# Patient Record
Sex: MALE | Race: WHITE | NOT HISPANIC OR LATINO | Employment: UNEMPLOYED | URBAN - METROPOLITAN AREA
[De-identification: names, ages, dates, MRNs, and addresses within clinical notes are randomized per-mention and may not be internally consistent; named-entity substitution may affect disease eponyms.]

---

## 2017-01-25 DIAGNOSIS — E03.9 HYPOTHYROIDISM: ICD-10-CM

## 2017-01-25 DIAGNOSIS — Z79.899 ASSESSMENT OF EFFECTS OF PSYCHOTROPIC DRUG IN PATIENT AT RISK FOR METABOLIC SYNDROME: ICD-10-CM

## 2017-01-25 DIAGNOSIS — Z91.89 ASSESSMENT OF EFFECTS OF PSYCHOTROPIC DRUG IN PATIENT AT RISK FOR METABOLIC SYNDROME: ICD-10-CM

## 2017-01-25 DIAGNOSIS — E78.2 MIXED HYPERLIPIDEMIA: Primary | ICD-10-CM

## 2017-01-25 DIAGNOSIS — Q90.9 DOWN'S SYNDROME: ICD-10-CM

## 2017-01-25 DIAGNOSIS — E66.9 OBESITY: ICD-10-CM

## 2017-01-25 DIAGNOSIS — Z01.89 ASSESSMENT OF EFFECTS OF PSYCHOTROPIC DRUG IN PATIENT AT RISK FOR METABOLIC SYNDROME: ICD-10-CM

## 2017-02-03 RX ORDER — ESCITALOPRAM OXALATE 20 MG/1
20 TABLET ORAL DAILY
Qty: 30 TABLET | Status: CANCELLED | OUTPATIENT
Start: 2017-02-03

## 2017-02-03 NOTE — TELEPHONE ENCOUNTER
escitalopram (LEXAPRO) 20 MG tablet      Last Written Prescription Date:  na  Last Fill Quantity: na,   # refills: na  Last Office Visit with G, P or [facility] prescribing provider: 9-  Future Office visit:       Routing refill request to provider for review/approval because:  Medication is reported/historical

## 2017-02-06 NOTE — TELEPHONE ENCOUNTER
I don't believe we've ever filled for Hany.  He gets some of his meds through [facility], I think. Please clarify with pharmacy and see if there's a regular person that fills this they need to request from.  Thanks.  Nelson

## 2017-02-07 ENCOUNTER — APPOINTMENT (OUTPATIENT)
Dept: GENERAL RADIOLOGY | Facility: CLINIC | Age: 22
DRG: 870 | End: 2017-02-07
Attending: FAMILY MEDICINE
Payer: MEDICARE

## 2017-02-07 ENCOUNTER — APPOINTMENT (OUTPATIENT)
Dept: GENERAL RADIOLOGY | Facility: CLINIC | Age: 22
DRG: 870 | End: 2017-02-07
Attending: RADIOLOGY
Payer: MEDICARE

## 2017-02-07 ENCOUNTER — APPOINTMENT (OUTPATIENT)
Dept: INTERVENTIONAL RADIOLOGY/VASCULAR | Facility: CLINIC | Age: 22
DRG: 870 | End: 2017-02-07
Attending: FAMILY MEDICINE
Payer: MEDICARE

## 2017-02-07 ENCOUNTER — APPOINTMENT (OUTPATIENT)
Dept: CT IMAGING | Facility: CLINIC | Age: 22
DRG: 870 | End: 2017-02-07
Attending: FAMILY MEDICINE
Payer: MEDICARE

## 2017-02-07 ENCOUNTER — ANESTHESIA EVENT (OUTPATIENT)
Dept: SURGERY | Facility: CLINIC | Age: 22
DRG: 870 | End: 2017-02-07
Payer: MEDICARE

## 2017-02-07 ENCOUNTER — ANESTHESIA (OUTPATIENT)
Dept: SURGERY | Facility: CLINIC | Age: 22
DRG: 870 | End: 2017-02-07
Payer: MEDICARE

## 2017-02-07 ENCOUNTER — TELEPHONE (OUTPATIENT)
Dept: FAMILY MEDICINE | Facility: CLINIC | Age: 22
End: 2017-02-07

## 2017-02-07 ENCOUNTER — HOSPITAL ENCOUNTER (INPATIENT)
Facility: CLINIC | Age: 22
LOS: 12 days | Discharge: HOME HEALTH | DRG: 870 | End: 2017-02-20
Attending: FAMILY MEDICINE | Admitting: SURGERY
Payer: MEDICARE

## 2017-02-07 DIAGNOSIS — R09.02 HYPOXIA: Primary | ICD-10-CM

## 2017-02-07 DIAGNOSIS — J18.9 COMMUNITY ACQUIRED PNEUMONIA: ICD-10-CM

## 2017-02-07 DIAGNOSIS — J91.8 PARAPNEUMONIC EFFUSION: ICD-10-CM

## 2017-02-07 DIAGNOSIS — J18.9 PNEUMONIA: ICD-10-CM

## 2017-02-07 DIAGNOSIS — J18.9 PARAPNEUMONIC EFFUSION: ICD-10-CM

## 2017-02-07 DIAGNOSIS — Q90.9 DOWN'S SYNDROME: ICD-10-CM

## 2017-02-07 DIAGNOSIS — K52.9 CHRONIC DIARRHEA: ICD-10-CM

## 2017-02-07 LAB
ALBUMIN SERPL-MCNC: 2.5 G/DL (ref 3.4–5)
ALP SERPL-CCNC: 60 U/L (ref 40–150)
ALT SERPL W P-5'-P-CCNC: 17 U/L (ref 0–70)
ANION GAP SERPL CALCULATED.3IONS-SCNC: 8 MMOL/L (ref 3–14)
APTT PPP: 35 SEC (ref 22–37)
AST SERPL W P-5'-P-CCNC: 13 U/L (ref 0–45)
BASOPHILS # BLD AUTO: 0.1 10E9/L (ref 0–0.2)
BASOPHILS NFR BLD AUTO: 0.2 %
BILIRUB SERPL-MCNC: 0.7 MG/DL (ref 0.2–1.3)
BUN SERPL-MCNC: 9 MG/DL (ref 7–30)
CALCIUM SERPL-MCNC: 8.8 MG/DL (ref 8.5–10.1)
CHLORIDE SERPL-SCNC: 98 MMOL/L (ref 94–109)
CO2 SERPL-SCNC: 28 MMOL/L (ref 20–32)
CREAT SERPL-MCNC: 0.66 MG/DL (ref 0.66–1.25)
DEPRECATED S PYO AG THROAT QL EIA: NORMAL
DIFFERENTIAL METHOD BLD: ABNORMAL
EOSINOPHIL # BLD AUTO: 0 10E9/L (ref 0–0.7)
EOSINOPHIL NFR BLD AUTO: 0 %
ERYTHROCYTE [DISTWIDTH] IN BLOOD BY AUTOMATED COUNT: 15.2 % (ref 10–15)
GFR SERPL CREATININE-BSD FRML MDRD: ABNORMAL ML/MIN/1.7M2
GLUCOSE SERPL-MCNC: 128 MG/DL (ref 70–99)
HCT VFR BLD AUTO: 39.6 % (ref 40–53)
HGB BLD-MCNC: 13.3 G/DL (ref 13.3–17.7)
IMM GRANULOCYTES # BLD: 0.1 10E9/L (ref 0–0.4)
IMM GRANULOCYTES NFR BLD: 0.5 %
INR PPP: 1.29 (ref 0.86–1.14)
LACTATE BLD-SCNC: 1.8 MMOL/L (ref 0.7–2.1)
LIPASE SERPL-CCNC: 44 U/L (ref 73–393)
LYMPHOCYTES # BLD AUTO: 1 10E9/L (ref 0.8–5.3)
LYMPHOCYTES NFR BLD AUTO: 3.9 %
MCH RBC QN AUTO: 30.2 PG (ref 26.5–33)
MCHC RBC AUTO-ENTMCNC: 33.6 G/DL (ref 31.5–36.5)
MCV RBC AUTO: 90 FL (ref 78–100)
MICRO REPORT STATUS: NORMAL
MONOCYTES # BLD AUTO: 2.2 10E9/L (ref 0–1.3)
MONOCYTES NFR BLD AUTO: 8.5 %
NEUTROPHILS # BLD AUTO: 22.8 10E9/L (ref 1.6–8.3)
NEUTROPHILS NFR BLD AUTO: 86.9 %
NRBC # BLD AUTO: 0 10*3/UL
NRBC BLD AUTO-RTO: 0 /100
PLATELET # BLD AUTO: 305 10E9/L (ref 150–450)
POTASSIUM SERPL-SCNC: 4.2 MMOL/L (ref 3.4–5.3)
PROT SERPL-MCNC: 6.9 G/DL (ref 6.8–8.8)
RBC # BLD AUTO: 4.4 10E12/L (ref 4.4–5.9)
SODIUM SERPL-SCNC: 134 MMOL/L (ref 133–144)
SPECIMEN SOURCE: NORMAL
WBC # BLD AUTO: 26.2 10E9/L (ref 4–11)

## 2017-02-07 PROCEDURE — 85025 COMPLETE CBC W/AUTO DIFF WBC: CPT | Performed by: FAMILY MEDICINE

## 2017-02-07 PROCEDURE — 87077 CULTURE AEROBIC IDENTIFY: CPT | Performed by: FAMILY MEDICINE

## 2017-02-07 PROCEDURE — 89051 BODY FLUID CELL COUNT: CPT | Performed by: FAMILY MEDICINE

## 2017-02-07 PROCEDURE — 27210995 ZZH RX 272: Performed by: RADIOLOGY

## 2017-02-07 PROCEDURE — A9270 NON-COVERED ITEM OR SERVICE: HCPCS | Mod: GY | Performed by: FAMILY MEDICINE

## 2017-02-07 PROCEDURE — 99285 EMERGENCY DEPT VISIT HI MDM: CPT | Mod: 25

## 2017-02-07 PROCEDURE — 36000064 ZZH SURGERY LEVEL 4 EA 15 ADDTL MIN - UMMC: Performed by: RADIOLOGY

## 2017-02-07 PROCEDURE — 83690 ASSAY OF LIPASE: CPT | Performed by: FAMILY MEDICINE

## 2017-02-07 PROCEDURE — 27211024 ZZHC OR SUPPLY OTHER OPNP: Performed by: RADIOLOGY

## 2017-02-07 PROCEDURE — 71000017 ZZH RECOVERY PHASE 1 LEVEL 3 EA ADDTL HR: Performed by: RADIOLOGY

## 2017-02-07 PROCEDURE — 80053 COMPREHEN METABOLIC PANEL: CPT | Performed by: FAMILY MEDICINE

## 2017-02-07 PROCEDURE — 40000277 XR SURGERY CARM FLUORO LESS THAN 5 MIN W STILLS

## 2017-02-07 PROCEDURE — 83605 ASSAY OF LACTIC ACID: CPT | Performed by: FAMILY MEDICINE

## 2017-02-07 PROCEDURE — 37000008 ZZH ANESTHESIA TECHNICAL FEE, 1ST 30 MIN: Performed by: RADIOLOGY

## 2017-02-07 PROCEDURE — 25000132 ZZH RX MED GY IP 250 OP 250 PS 637: Mod: GY | Performed by: NURSE ANESTHETIST, CERTIFIED REGISTERED

## 2017-02-07 PROCEDURE — 87081 CULTURE SCREEN ONLY: CPT | Performed by: FAMILY MEDICINE

## 2017-02-07 PROCEDURE — 85730 THROMBOPLASTIN TIME PARTIAL: CPT | Performed by: FAMILY MEDICINE

## 2017-02-07 PROCEDURE — 85610 PROTHROMBIN TIME: CPT | Performed by: FAMILY MEDICINE

## 2017-02-07 PROCEDURE — 40000940 XR CHEST PORT 1 VW

## 2017-02-07 PROCEDURE — 36000066 ZZH SURGERY LEVEL 4 W FLUORO 1ST 30 MIN - UMMC: Performed by: RADIOLOGY

## 2017-02-07 PROCEDURE — 25800025 ZZH RX 258: Performed by: NURSE ANESTHETIST, CERTIFIED REGISTERED

## 2017-02-07 PROCEDURE — 40000003 IR CHEST TUBE PLACEMENT NON-TUNNELLED LEFT: Mod: TC,LT

## 2017-02-07 PROCEDURE — 27210794 ZZH OR GENERAL SUPPLY STERILE: Performed by: RADIOLOGY

## 2017-02-07 PROCEDURE — A9270 NON-COVERED ITEM OR SERVICE: HCPCS | Mod: GY | Performed by: NURSE ANESTHETIST, CERTIFIED REGISTERED

## 2017-02-07 PROCEDURE — 40000170 ZZH STATISTIC PRE-PROCEDURE ASSESSMENT II: Performed by: RADIOLOGY

## 2017-02-07 PROCEDURE — C1887 CATHETER, GUIDING: HCPCS | Performed by: RADIOLOGY

## 2017-02-07 PROCEDURE — 25000128 H RX IP 250 OP 636: Performed by: NURSE ANESTHETIST, CERTIFIED REGISTERED

## 2017-02-07 PROCEDURE — 87040 BLOOD CULTURE FOR BACTERIA: CPT | Performed by: FAMILY MEDICINE

## 2017-02-07 PROCEDURE — 25000128 H RX IP 250 OP 636: Performed by: ANESTHESIOLOGY

## 2017-02-07 PROCEDURE — 71020 XR CHEST 2 VW: CPT

## 2017-02-07 PROCEDURE — 37000009 ZZH ANESTHESIA TECHNICAL FEE, EACH ADDTL 15 MIN: Performed by: RADIOLOGY

## 2017-02-07 PROCEDURE — 25000132 ZZH RX MED GY IP 250 OP 250 PS 637: Mod: GY | Performed by: FAMILY MEDICINE

## 2017-02-07 PROCEDURE — C1769 GUIDE WIRE: HCPCS | Performed by: RADIOLOGY

## 2017-02-07 PROCEDURE — 87186 SC STD MICRODIL/AGAR DIL: CPT | Performed by: FAMILY MEDICINE

## 2017-02-07 PROCEDURE — 87880 STREP A ASSAY W/OPTIC: CPT | Performed by: FAMILY MEDICINE

## 2017-02-07 PROCEDURE — 96365 THER/PROPH/DIAG IV INF INIT: CPT

## 2017-02-07 PROCEDURE — 74176 CT ABD & PELVIS W/O CONTRAST: CPT

## 2017-02-07 PROCEDURE — 99285 EMERGENCY DEPT VISIT HI MDM: CPT | Mod: Z6 | Performed by: FAMILY MEDICINE

## 2017-02-07 PROCEDURE — 25000128 H RX IP 250 OP 636: Performed by: FAMILY MEDICINE

## 2017-02-07 PROCEDURE — C1729 CATH, DRAINAGE: HCPCS | Performed by: RADIOLOGY

## 2017-02-07 PROCEDURE — 0W9B30Z DRAINAGE OF LEFT PLEURAL CAVITY WITH DRAINAGE DEVICE, PERCUTANEOUS APPROACH: ICD-10-PCS | Performed by: RADIOLOGY

## 2017-02-07 PROCEDURE — 87205 SMEAR GRAM STAIN: CPT | Performed by: FAMILY MEDICINE

## 2017-02-07 PROCEDURE — 71000016 ZZH RECOVERY PHASE 1 LEVEL 3 FIRST HR: Performed by: RADIOLOGY

## 2017-02-07 PROCEDURE — C1894 INTRO/SHEATH, NON-LASER: HCPCS | Performed by: RADIOLOGY

## 2017-02-07 PROCEDURE — 87070 CULTURE OTHR SPECIMN AEROBIC: CPT | Performed by: FAMILY MEDICINE

## 2017-02-07 PROCEDURE — 25000125 ZZHC RX 250: Performed by: NURSE ANESTHETIST, CERTIFIED REGISTERED

## 2017-02-07 RX ORDER — LIDOCAINE HYDROCHLORIDE 20 MG/ML
INJECTION, SOLUTION INFILTRATION; PERINEURAL PRN
Status: DISCONTINUED | OUTPATIENT
Start: 2017-02-07 | End: 2017-02-07

## 2017-02-07 RX ORDER — SODIUM CHLORIDE, SODIUM LACTATE, POTASSIUM CHLORIDE, CALCIUM CHLORIDE 600; 310; 30; 20 MG/100ML; MG/100ML; MG/100ML; MG/100ML
INJECTION, SOLUTION INTRAVENOUS CONTINUOUS
Status: DISCONTINUED | OUTPATIENT
Start: 2017-02-07 | End: 2017-02-08 | Stop reason: HOSPADM

## 2017-02-07 RX ORDER — ONDANSETRON 4 MG/1
4 TABLET, ORALLY DISINTEGRATING ORAL EVERY 30 MIN PRN
Status: DISCONTINUED | OUTPATIENT
Start: 2017-02-07 | End: 2017-02-08 | Stop reason: HOSPADM

## 2017-02-07 RX ORDER — ONDANSETRON 2 MG/ML
4 INJECTION INTRAMUSCULAR; INTRAVENOUS EVERY 30 MIN PRN
Status: DISCONTINUED | OUTPATIENT
Start: 2017-02-07 | End: 2017-02-08 | Stop reason: HOSPADM

## 2017-02-07 RX ORDER — PROPOFOL 10 MG/ML
INJECTION, EMULSION INTRAVENOUS PRN
Status: DISCONTINUED | OUTPATIENT
Start: 2017-02-07 | End: 2017-02-07

## 2017-02-07 RX ORDER — FENTANYL CITRATE 50 UG/ML
INJECTION, SOLUTION INTRAMUSCULAR; INTRAVENOUS PRN
Status: DISCONTINUED | OUTPATIENT
Start: 2017-02-07 | End: 2017-02-07

## 2017-02-07 RX ORDER — SODIUM CHLORIDE 9 MG/ML
1000 INJECTION, SOLUTION INTRAVENOUS CONTINUOUS
Status: DISCONTINUED | OUTPATIENT
Start: 2017-02-07 | End: 2017-02-08 | Stop reason: ALTCHOICE

## 2017-02-07 RX ORDER — LORAZEPAM 2 MG/ML
0.25 INJECTION INTRAMUSCULAR ONCE
Status: COMPLETED | OUTPATIENT
Start: 2017-02-07 | End: 2017-02-08

## 2017-02-07 RX ORDER — PIPERACILLIN SODIUM, TAZOBACTAM SODIUM 4; .5 G/20ML; G/20ML
4.5 INJECTION, POWDER, LYOPHILIZED, FOR SOLUTION INTRAVENOUS ONCE
Status: COMPLETED | OUTPATIENT
Start: 2017-02-07 | End: 2017-02-07

## 2017-02-07 RX ORDER — ACETAMINOPHEN 120 MG/1
SUPPOSITORY RECTAL PRN
Status: DISCONTINUED | OUTPATIENT
Start: 2017-02-07 | End: 2017-02-07

## 2017-02-07 RX ORDER — PROPOFOL 10 MG/ML
INJECTION, EMULSION INTRAVENOUS CONTINUOUS PRN
Status: DISCONTINUED | OUTPATIENT
Start: 2017-02-07 | End: 2017-02-07

## 2017-02-07 RX ORDER — HYDROMORPHONE HYDROCHLORIDE 1 MG/ML
.3-.5 INJECTION, SOLUTION INTRAMUSCULAR; INTRAVENOUS; SUBCUTANEOUS EVERY 5 MIN PRN
Status: DISCONTINUED | OUTPATIENT
Start: 2017-02-07 | End: 2017-02-08 | Stop reason: HOSPADM

## 2017-02-07 RX ORDER — SODIUM CHLORIDE, SODIUM LACTATE, POTASSIUM CHLORIDE, CALCIUM CHLORIDE 600; 310; 30; 20 MG/100ML; MG/100ML; MG/100ML; MG/100ML
INJECTION, SOLUTION INTRAVENOUS CONTINUOUS PRN
Status: DISCONTINUED | OUTPATIENT
Start: 2017-02-07 | End: 2017-02-07

## 2017-02-07 RX ORDER — ACETAMINOPHEN 325 MG/1
650 TABLET ORAL ONCE
Status: COMPLETED | OUTPATIENT
Start: 2017-02-07 | End: 2017-02-07

## 2017-02-07 RX ADMIN — PIPERACILLIN SODIUM,TAZOBACTAM SODIUM 4.5 G: 4; .5 INJECTION, POWDER, FOR SOLUTION INTRAVENOUS at 15:38

## 2017-02-07 RX ADMIN — PHENYLEPHRINE HYDROCHLORIDE 100 MCG: 10 INJECTION, SOLUTION INTRAMUSCULAR; INTRAVENOUS; SUBCUTANEOUS at 20:10

## 2017-02-07 RX ADMIN — PHENYLEPHRINE HYDROCHLORIDE 100 MCG: 10 INJECTION, SOLUTION INTRAMUSCULAR; INTRAVENOUS; SUBCUTANEOUS at 19:56

## 2017-02-07 RX ADMIN — FENTANYL CITRATE 50 MCG: 50 INJECTION, SOLUTION INTRAMUSCULAR; INTRAVENOUS at 19:26

## 2017-02-07 RX ADMIN — DEXMEDETOMIDINE 10 MCG: 100 INJECTION, SOLUTION, CONCENTRATE INTRAVENOUS at 20:46

## 2017-02-07 RX ADMIN — LIDOCAINE HYDROCHLORIDE 40 MG: 20 INJECTION, SOLUTION INFILTRATION; PERINEURAL at 19:19

## 2017-02-07 RX ADMIN — PROPOFOL 50 MG: 10 INJECTION, EMULSION INTRAVENOUS at 19:19

## 2017-02-07 RX ADMIN — DEXMEDETOMIDINE 10 MCG: 100 INJECTION, SOLUTION, CONCENTRATE INTRAVENOUS at 20:38

## 2017-02-07 RX ADMIN — HYDROMORPHONE HYDROCHLORIDE 0.3 MG: 10 INJECTION, SOLUTION INTRAMUSCULAR; INTRAVENOUS; SUBCUTANEOUS at 22:08

## 2017-02-07 RX ADMIN — SODIUM CHLORIDE, POTASSIUM CHLORIDE, SODIUM LACTATE AND CALCIUM CHLORIDE: 600; 310; 30; 20 INJECTION, SOLUTION INTRAVENOUS at 19:19

## 2017-02-07 RX ADMIN — SODIUM CHLORIDE 1000 ML: 9 INJECTION, SOLUTION INTRAVENOUS at 13:52

## 2017-02-07 RX ADMIN — MIDAZOLAM HYDROCHLORIDE 2 MG: 1 INJECTION, SOLUTION INTRAMUSCULAR; INTRAVENOUS at 19:19

## 2017-02-07 RX ADMIN — PROPOFOL 150 MCG/KG/MIN: 10 INJECTION, EMULSION INTRAVENOUS at 19:26

## 2017-02-07 RX ADMIN — PHENYLEPHRINE HYDROCHLORIDE 100 MCG: 10 INJECTION, SOLUTION INTRAMUSCULAR; INTRAVENOUS; SUBCUTANEOUS at 19:43

## 2017-02-07 RX ADMIN — HYDROMORPHONE HYDROCHLORIDE 0.4 MG: 10 INJECTION, SOLUTION INTRAMUSCULAR; INTRAVENOUS; SUBCUTANEOUS at 23:45

## 2017-02-07 RX ADMIN — DEXMEDETOMIDINE 20 MCG: 100 INJECTION, SOLUTION, CONCENTRATE INTRAVENOUS at 20:01

## 2017-02-07 RX ADMIN — PHENYLEPHRINE HYDROCHLORIDE 100 MCG: 10 INJECTION, SOLUTION INTRAMUSCULAR; INTRAVENOUS; SUBCUTANEOUS at 20:00

## 2017-02-07 RX ADMIN — HYDROMORPHONE HYDROCHLORIDE 0.3 MG: 10 INJECTION, SOLUTION INTRAMUSCULAR; INTRAVENOUS; SUBCUTANEOUS at 22:39

## 2017-02-07 RX ADMIN — PHENYLEPHRINE HYDROCHLORIDE 100 MCG: 10 INJECTION, SOLUTION INTRAMUSCULAR; INTRAVENOUS; SUBCUTANEOUS at 19:37

## 2017-02-07 RX ADMIN — FENTANYL CITRATE 25 MCG: 50 INJECTION, SOLUTION INTRAMUSCULAR; INTRAVENOUS at 20:00

## 2017-02-07 RX ADMIN — ACETAMINOPHEN 650 MG: 325 TABLET, FILM COATED ORAL at 15:21

## 2017-02-07 ASSESSMENT — ENCOUNTER SYMPTOMS
VOMITING: 1
COUGH: 1
DIARRHEA: 1
ABDOMINAL PAIN: 1
SLEEP DISTURBANCE: 1
SORE THROAT: 1
FEVER: 1

## 2017-02-07 NOTE — ED NOTES
Pt. Report from Evelyn HUGO and assume care of pt. Pt. Is alert and sitting in bed asking to go home.  Explained to patient and mother plan of care, but patient does not understand.  MD Humphrey explained to mother plan of care and ordered Ativan to try and help relax the patient.  Patient refuses to keep cardiac monitor on.  Patient intermittently keeps SpO2 monitor on and is sating around 90%.  Patient refuses to keep oxygen on.  Patient is not in any respiratory distress at this time.

## 2017-02-07 NOTE — TELEPHONE ENCOUNTER
"Red flag call. Hany had diarrhea at day program and temp of 101.2 yesterday. Also vomited 1x. He has been coughing as well and has had a temp between 100 and 101 the rest of the day and this morning. Didn't sleep last night - just sat awake in the living room. About 6 am today finally went upstairs to bed. Mom went to check on him and noticed is breathing is very fast and shallow. States she isn't sure if she will be able to wake him up because \"he was up all night.\" RN stated that if his breathing is abnormal and she cannot wake him up she needs to call 911. If he wakes up and is able to walk to the car and breathing sounds better she could drive him to the ER but he needs to be seen right away if he is having acute respiratory changes. Mom agrees and will do so.     Taniya Cote RN   February 7, 2017 9:50 AM  [facility] Triage   [phone]   "

## 2017-02-07 NOTE — IP AVS SNAPSHOT
[address] [address]    Phone:  [phone]                                       After Visit Summary   2/7/2017    Hany Patel    MRN: [id number]           After Visit Summary Signature Page     I have received my discharge instructions, and my questions have been answered. I have discussed any challenges I see with this plan with the nurse or doctor.    ..........................................................................................................................................  Patient/Patient Representative Signature      ..........................................................................................................................................  Patient Representative Print Name and Relationship to Patient    ..................................................               ................................................  Date                                            Time    ..........................................................................................................................................  Reviewed by Signature/Title    ...................................................              ..............................................  Date                                                            Time

## 2017-02-07 NOTE — ED PROVIDER NOTES
History     Chief Complaint   Patient presents with     Nausea, Vomiting, & Diarrhea     Nasal Congestion     HPI  Hany Patel is a 22 year old male with a history of Down's syndrome who presents for evaluation of abdominal pain, vomiting, and diarrhea. The patient comes in with both of his parents today. Per mom, the patient has had diarrhea, cough, nasal congestion, poor sleep, and sore throat over the past 2 days. Yesterday, at his day rehabilitation program, staff had reported that the patient developed a fever of 101.5, and when mom picked him up to take him home, he had an episode of vomiting. Mom reports that he's had multiple episodes since then, some of which have been phlegmy post-tussive emeses, while others have been more liquid. Mom reports that they've been treating the patient's fever with Tylenol, and his fever did come down some to 100.4 today. The patient here also complains of diffuse periumbilical pain and ear pain, including pain of the auricle. In addition to these symptoms, mom expresses particular concern for the patient's change in behavior. She describes that he's exhibited anhedonia to activities that he would normal enjoy, including opening gifts for his birthday yesterday.     Mom reports instances of possible sick contact, including the patient's recent attendance to a local [employer], continued attendance to his day rehab program, and to his sister who works with elderly patients.    I have reviewed the Medications, Allergies, Past Medical and Surgical History, and Social History in the Epic system.    Current Facility-Administered Medications   Medication     0.9% sodium chloride infusion     piperacillin-tazobactam (ZOSYN) 4.5 g vial to attach to  mL bag     Current Outpatient Prescriptions   Medication     cyanocobalamin 1000 MCG SUBL     omega-3 acid ethyl esters (LOVAZA) 1 G capsule     vitamin  B complex with vitamin C (VITAMIN  B COMPLEX) TABS     escitalopram  (LEXAPRO) 20 MG tablet     loperamide (IMODIUM) 2 MG capsule     Ascorbic Acid (VITAMIN C) 500 MG CHEW     Calcium Carbonate-Vitamin D (CALCIUM + D PO)     metFORMIN (GLUCOPHAGE) 1000 MG tablet     levothyroxine (SYNTHROID, LEVOTHROID) 112 MCG tablet     Multiple Vitamin (MULTI-VITAMIN) per tablet     Past Medical History   Diagnosis Date     Diarrhea      Diarrhea Episodes        Past Surgical History   Procedure Laterality Date     Surgical history of -        Ear tubes      Surgical history of -        Adenoid removal        Family History   Problem Relation Age of Onset     Breast Cancer Mother      Hypertension Father        Social History   Substance Use Topics     Smoking status: Never Smoker      Smokeless tobacco: Never Used     Alcohol Use: No      No Known Allergies    Review of Systems   Constitutional: Positive for fever.   HENT: Positive for congestion, ear pain and sore throat. Negative for ear discharge.    Respiratory: Positive for cough.    Gastrointestinal: Positive for vomiting, abdominal pain and diarrhea.   Psychiatric/Behavioral: Positive for sleep disturbance.        Anhedonia   All other systems reviewed and are negative.      Physical Exam   BP: 99/56 mmHg  Pulse: 126  Temp: 99  F (37.2  C)  Resp: 18  SpO2: 90 %  Physical Exam   Constitutional: No distress.   HENT:   Head: Atraumatic.   Mouth/Throat: Oropharynx is clear and moist. No oropharyngeal exudate.   Eyes: Pupils are equal, round, and reactive to light. No scleral icterus.   Neck: Neck supple.   Cardiovascular: Normal heart sounds and intact distal pulses.    Pulmonary/Chest: No respiratory distress (O2 sat 88-90% on room air). He has rales (diminished breath sounds throughout on the left).   Abdominal: Soft. Bowel sounds are normal. There is tenderness in the periumbilical area and left lower quadrant. There is no rigidity, no rebound and no guarding.   Genitourinary: Testes normal and penis normal.   Musculoskeletal: He exhibits  no edema or tenderness.   Skin: Skin is warm. No rash noted. He is not diaphoretic.       ED Course     Procedures       12:55 PM  The patient was seen and examined by Balta Humphrey MD, in Room 03.        Critical Care time:  none               Labs Ordered and Resulted from Time of ED Arrival Up to the Time of Departure from the ED   CBC WITH PLATELETS DIFFERENTIAL - Abnormal; Notable for the following:     WBC 26.2 (*)     Hematocrit 39.6 (*)     RDW 15.2 (*)     Absolute Neutrophil 22.8 (*)     Absolute Monocytes 2.2 (*)     All other components within normal limits   COMPREHENSIVE METABOLIC PANEL - Abnormal; Notable for the following:     Glucose 128 (*)     Albumin 2.5 (*)     All other components within normal limits   LIPASE - Abnormal; Notable for the following:     Lipase 44 (*)     All other components within normal limits   INR - Abnormal; Notable for the following:     INR 1.29 (*)     All other components within normal limits   LACTIC ACID WHOLE BLOOD   PARTIAL THROMBOPLASTIN TIME   ROUTINE UA WITH MICROSCOPIC REFLEX TO CULTURE   RAPID STREP SCREEN   BLOOD CULTURE   BETA STREP GROUP A CULTURE       Assessments & Plan (with Medical Decision Making)   Patient with a history of Down s syndrome presenting with 3 days  history of febrile respiratory illness. Also associated vomiting and diarrhea. The patient is unable to provide much meaningful history due to his baseline cognitive impairment. Differential diagnosis includes influenza, streptococcal pharyngitis, pneumonia, bronchitis, as well as abdominal problems including appendicitis, pyelonephritis, bowel obstruction, cholecystitis, numerous other acute infectious and inflammatory conditions which could cause this symptom complex.     On exam, initially the patient is in no respiratory distress. He is febrile and tachycardic. His blood pressure is within the normal range. His oxygen saturation is 80-90% on room air but he does not appear uncomfortable.  He did have diminished breath sounds on the left side and diffuse abdominal tenderness without guarding or peritoneal signs. Genitourinary exam normal, and the remainder of his physical exam is unremarkable. He did vomit once during my evaluation.     A broad diagnostic workup was indicated due to the severity of illness and the inability to obtain much accurate history from the patient himself. Workup is significant for a very elevated white count and a very large left pleural effusion which may be associated with infiltrate or atelectasis. The remainder of his workup today is negative. I m still waiting on his urinalysis.     This clinical scenario appears to fit most closely with acute pneumonia with effusion. No other apparent reason why this patient would have a large pleural effusion and no known history of that. His lactate is normal. As yet, his vital signs have been stable. The patient will not wear oxygen at this time because he says it smells bad, but he does not appear to be in respiratory distress. I have started broad-spectrum antibiotics and will initiate admission.   Case discussed with the hospitalist.  They will agree to admit, but at this time there are no available appropriate beds at the [facility].  We are in agreement that the patient would likely benefit from diagnostic and therapeutic thoracentesis.  Case discussed with interventional radiology.  They're most comfortable performing this procedure in the operating room given the patient's cognitive impairment and underlying comorbidity, which I agree is appropriate.  As the patient is not completely nothing by mouth he will need to be held for another 2-3 hours prior to the procedure.  After the procedure which will until placement of a chest tube for continued drainage of pleural effusion he will be admitted.    This part of the document was transcribed by Noel Dobbs for Balta Humphrey MD.    I have reviewed the nursing  notes.    I have reviewed the findings, diagnosis, plan and need for follow up with the patient.    New Prescriptions    No medications on file       Final diagnoses:   Pneumonia   Parapneumonic effusion   Down's syndrome     I, Noel Dobbs, am serving as a trained medical scribe to document services personally performed by Balta Humphrey MD, based on the provider's statements to me.      I, Balta Humphrey MD, was physically present and have reviewed and verified the accuracy of this note documented by Noel Dobbs.    2/7/2017   [facility], [place], EMERGENCY DEPARTMENT      Balta Humphrey MD  02/07/17 6027    Balta Humphrey MD  02/07/17 0197

## 2017-02-07 NOTE — IP AVS SNAPSHOT
MRN:[id number]                      After Visit Summary   2/7/2017    Hany Patel    MRN: [id number]           Thank you!     Thank you for choosing [facility] for your care. Our goal is always to provide you with excellent care. Hearing back from our patients is one way we can continue to improve our services. Please take a few minutes to complete the written survey that you may receive in the mail after you visit with us. Thank you!        Patient Information     Date Of Birth          1995        About your hospital stay     You were admitted on:  February 8, 2017 You last received care in the:  [address] [place] [address]    You were discharged on:  February 20, 2017        Reason for your hospital stay       You were hospitalized for community acquired pneumonia and parapneumonic effusion. You required chest tube placement and intubation in the ICU. Were on several days of IV antibiotics. Have been stable with chest tube out for several days. No need for supplemental oxygen. Follow up xray was improved. Will continue a few more days of oral antibiotics as discharge. Will need follow up with primary care provider this week.                  Who to Call     For medical emergencies, please call 911.  For non-urgent questions about your medical care, please call your primary care provider or clinic, [phone]          Attending Provider     Provider Specialty    Balta Humphrey MD Family Practice    [place], Alma Patel MD Emergency Medicine    [facility], Chandan Flores MD Surgery    KindredYeimy MD Internal Medicine       Primary Care Provider Office Phone # Fax #    Liu Song PA-C [phone]       [address] [address]         When to contact your care team       If fevers or chills. Change in activity levels. Labored breathing or cough.                  After Care Instructions     Activity       Your activity upon  discharge: activity as tolerated. Would not return to day program for at least one week.            Diet       Follow this diet upon discharge: Orders Placed This Encounter      Snacks/Supplements Adult: Ensure Plus (Adult); Between Meals      Calorie Counts      Room Service      Regular Diet Adult            Discharge Instructions       Will need to continue antibiotics this week to complete course. Follow up with primary care provider this week for post hospital follow up with labs.            Monitor and record       Fevers, chills, labored breathing, cough, change in alertness                  Follow-up Appointments     Adult [facility] Follow-up and recommended labs and tests       Follow up with primary care provider, YULISA SALGADO, this week for hospital follow- up.  The following labs/tests are recommended: BMP and CBC.      Appointments on [facility] and/or [facility] (with [facility] or [facility] provider or service). Call [phone] if you haven't heard regarding these appointments within 7 days of discharge.            Follow Up and recommended labs and tests       Follow up CBC and BMP this week with PCP                  Your next 10 appointments already scheduled     Feb 24, 2017  1:00 PM CST   Office Visit with Yulisa Salgado PA-C   [facility]    [address] [place] [address] [phone]           Bring a current list of meds and any records pertaining to this visit.  For Physicals, please bring immunization records and any forms needing to be filled out.  Please arrive 10 minutes early to complete paperwork.              Additional Services     Home Care OT Referral for Hospital Discharge       OT to eval and treat    Your provider has ordered home care - occupational therapy. If you have not been contacted within 2 days of your discharge please call the department phone number listed on the top of this document.             "Home Care PT Referral for Hospital Discharge       PT to eval and treat    Your provider has ordered home care - physical therapy. If you have not been contacted within 2 days of your discharge please call the department phone number listed on the top of this document.            Home care nursing referral       _______________________  [facility]  Phone  [phone]  Fax  [phone]  ______________________  RN skilled nursing visit   RN to assess vitals signs, respiratory status  RN to assess hydration, nutrition and bowel status  RN to verify medication management with guardian/caregiver.    Your provider has ordered home care nursing services. If you have not been contacted within 2 days of your discharge please call the inpatient department phone number at [phone] .                  Further instructions from your care team       PLEASE FAX DC ORDERS TO    [facility]  Phone  [phone]  Fax  [phone]    Pending Results     Date and Time Order Name Status Description    2/19/2017 1259 EKG 12-lead, tracing only Preliminary     2/19/2017 0429 Blood culture Preliminary     2/14/2017 2239 Blood culture Preliminary     2/14/2017 2239 Blood culture Preliminary     2/7/2017 1645 XR Surgery SAVANNA L/T 5 Min Fluoro w Stills In process             Statement of Approval     Ordered          02/20/17 0910  I have reviewed and agree with all the recommendations and orders detailed in this document.  EFFECTIVE NOW     Approved and electronically signed by:  Reyna Gonzalez MD             Admission Information     Date & Time Provider Department Dept. Phone    2/7/2017 Yeimy Hughes MD [address] [facility] [phone]      Your Vitals Were     Blood Pressure Pulse Temperature Respirations Height Weight    124/64 (BP Location: Right arm) 87 99  F (37.2  C) (Oral) 24 1.6 m (5' 2.99\") 111.2 kg (245 lb 3.2 oz)    Pulse Oximetry BMI (Body Mass Index)                94% 43.45 kg/m2        "   [employer] Information     [employer] gives you secure access to your electronic health record. If you see a primary care provider, you can also send messages to your care team and make appointments. If you have questions, please call your primary care clinic.  If you do not have a primary care provider, please call [phone] and they will assist you.        Care EveryWhere ID     This is your Care EveryWhere ID. This could be used by other organizations to access your [facility] medical records  [id number]           Review of your medicines      START taking        Dose / Directions    levofloxacin 750 MG tablet   Commonly known as:  LEVAQUIN   Indication:  Community Acquired Pneumonia   Used for:  Parapneumonic effusion, Community acquired pneumonia        Dose:  750 mg   Take 1 tablet (750 mg) by mouth every 48 hours   Quantity:  3 tablet   Refills:  0         CONTINUE these medicines which have NOT CHANGED        Dose / Directions    CALCIUM + D PO        Daily chewable   Refills:  0       cyanocobalamin 1000 MCG Subl sublingual tablet        Dose:  1000 mcg   Place 1,000 mcg under the tongue daily   Refills:  0       levothyroxine 112 MCG tablet   Commonly known as:  SYNTHROID/LEVOTHROID        Dose:  112 mcg   Take 112 mcg by mouth daily.   Refills:  0       LEXAPRO 20 MG tablet   Generic drug:  escitalopram        Dose:  20 mg   Take 20 mg by mouth daily   Refills:  0       loperamide 2 MG capsule   Commonly known as:  IMODIUM        Dose:  2 mg   Take 2 mg by mouth daily (with breakfast) Take two tablets with breakfast   Refills:  0       metFORMIN 1000 MG tablet   Commonly known as:  GLUCOPHAGE        Dose:  1000 mg   Take 1,000 mg by mouth 2 times daily (with meals).   Refills:  0       Multi-vitamin Tabs tablet   Generic drug:  multivitamin, therapeutic with minerals        Dose:  1 tablet   Take 1 tablet by mouth daily. With D3   Refills:  0       omega-3 acid ethyl esters 1 G capsule   Commonly known as:   Lovaza        TAKE 2 CAPSULES BY MOUTH TWICE A DAY   Refills:  6       vitamin B complex with vitamin C Tabs tablet        Dose:  1 tablet   Take 1 tablet by mouth daily   Refills:  0       vitamin C 500 MG Chew        Take by mouth daily   Refills:  0            Where to get your medicines      These medications were sent to [facility] - [place], [place] - [address]  [address] [address]     Phone:  [phone]     levofloxacin 750 MG tablet                Protect others around you: Learn how to safely use, store and throw away your medicines at www.disposemymeds.org.             Medication List: This is a list of all your medications and when to take them. Check marks below indicate your daily home schedule. Keep this list as a reference.      Medications           Morning Afternoon Evening Bedtime As Needed    CALCIUM + D PO   Daily chewable                                   cyanocobalamin 1000 MCG Subl sublingual tablet   Place 1,000 mcg under the tongue daily                                   levofloxacin 750 MG tablet   Commonly known as:  LEVAQUIN   Take 1 tablet (750 mg) by mouth every 48 hours   Last time this was given:  750 mg on 2/19/2017  1:29 PM                                   levothyroxine 112 MCG tablet   Commonly known as:  SYNTHROID/LEVOTHROID   Take 112 mcg by mouth daily.   Last time this was given:  112 mcg on 2/20/2017  7:51 AM                                   LEXAPRO 20 MG tablet   Take 20 mg by mouth daily   Last time this was given:  20 mg on 2/20/2017  7:51 AM   Generic drug:  escitalopram                                   loperamide 2 MG capsule   Commonly known as:  IMODIUM   Take 2 mg by mouth daily (with breakfast) Take two tablets with breakfast   Last time this was given:  2 mg on 2/19/2017  4:38 PM                                   metFORMIN 1000 MG tablet   Commonly known as:  GLUCOPHAGE   Take 1,000 mg by mouth 2 times daily  (with meals).                                      Multi-vitamin Tabs tablet   Take 1 tablet by mouth daily. With D3   Generic drug:  multivitamin, therapeutic with minerals                                   omega-3 acid ethyl esters 1 G capsule   Commonly known as:  Lovaza   TAKE 2 CAPSULES BY MOUTH TWICE A DAY                                      vitamin B complex with vitamin C Tabs tablet   Take 1 tablet by mouth daily                                   vitamin C 500 MG Chew   Take by mouth daily

## 2017-02-07 NOTE — ED NOTES
Starting yesterday pt had n/v/d.  He has also been congested. He had a fever yesterday.  Mom is concerned about his behavior, being less engaged, not wanting to eat, did not want to open his birthday gifts yesterday.

## 2017-02-07 NOTE — ED PROVIDER NOTES
Emergency Department Patient Sign-out       Brief HPI:  This is a 22 year old male signed out to me by Dr. Humphrey.  See initial ED Provider note for details of the presentation.     Significant Events prior to my assuming care: 21 y/o male with Down syndrome presenting with fever and hypoxia.  Elevated WBC to 26K.  CT abd negative but large left pleural effusion with PNA.   Patient has received 1L IVF and zosyn.  Plan to admit to [facility] but no bed available, will have thoracentesis in meantime with pigtail with IR will do in OR with anesthesia.  Admit after procedure.      Exam:   Patient Vitals for the past 24 hrs:   BP Temp Temp src Pulse Resp SpO2   02/07/17 1243 99/56 mmHg 99  F (37.2  C) Oral 126 18 90 %           ED RESULTS:   Results for orders placed or performed during the hospital encounter of 02/07/17 (from the past 24 hour(s))   Beta strep group A culture     Status: None (Preliminary result)    Collection Time: 02/07/17  1:50 PM   Result Value Ref Range    Specimen Description Throat     Special Requests Specimen collected in eSwab transport (white cap)     Culture Micro Pending     Micro Report Status Pending    CBC with platelets differential     Status: Abnormal    Collection Time: 02/07/17  1:53 PM   Result Value Ref Range    WBC 26.2 (H) 4.0 - 11.0 10e9/L    RBC Count 4.40 4.4 - 5.9 10e12/L    Hemoglobin 13.3 13.3 - 17.7 g/dL    Hematocrit 39.6 (L) 40.0 - 53.0 %    MCV 90 78 - 100 fl    MCH 30.2 26.5 - 33.0 pg    MCHC 33.6 31.5 - 36.5 g/dL    RDW 15.2 (H) 10.0 - 15.0 %    Platelet Count 305 150 - 450 10e9/L    Diff Method Automated Method     % Neutrophils 86.9 %    % Lymphocytes 3.9 %    % Monocytes 8.5 %    % Eosinophils 0.0 %    % Basophils 0.2 %    % Immature Granulocytes 0.5 %    Nucleated RBCs 0 0 /100    Absolute Neutrophil 22.8 (H) 1.6 - 8.3 10e9/L    Absolute Lymphocytes 1.0 0.8 - 5.3 10e9/L    Absolute Monocytes 2.2 (H) 0.0 - 1.3 10e9/L    Absolute Eosinophils 0.0 0.0 - 0.7  10e9/L    Absolute Basophils 0.1 0.0 - 0.2 10e9/L    Abs Immature Granulocytes 0.1 0 - 0.4 10e9/L    Absolute Nucleated RBC 0.0    Comprehensive metabolic panel     Status: Abnormal    Collection Time: 02/07/17  1:53 PM   Result Value Ref Range    Sodium 134 133 - 144 mmol/L    Potassium 4.2 3.4 - 5.3 mmol/L    Chloride 98 94 - 109 mmol/L    Carbon Dioxide 28 20 - 32 mmol/L    Anion Gap 8 3 - 14 mmol/L    Glucose 128 (H) 70 - 99 mg/dL    Urea Nitrogen 9 7 - 30 mg/dL    Creatinine 0.66 0.66 - 1.25 mg/dL    GFR Estimate >90  Non  GFR Calc   >60 mL/min/1.7m2    GFR Estimate If Black >90   GFR Calc   >60 mL/min/1.7m2    Calcium 8.8 8.5 - 10.1 mg/dL    Bilirubin Total 0.7 0.2 - 1.3 mg/dL    Albumin 2.5 (L) 3.4 - 5.0 g/dL    Protein Total 6.9 6.8 - 8.8 g/dL    Alkaline Phosphatase 60 40 - 150 U/L    ALT 17 0 - 70 U/L    AST 13 0 - 45 U/L   Lipase     Status: Abnormal    Collection Time: 02/07/17  1:53 PM   Result Value Ref Range    Lipase 44 (L) 73 - 393 U/L   Lactic acid     Status: None    Collection Time: 02/07/17  1:53 PM   Result Value Ref Range    Lactic Acid 1.8 0.7 - 2.1 mmol/L   INR     Status: Abnormal    Collection Time: 02/07/17  1:53 PM   Result Value Ref Range    INR 1.29 (H) 0.86 - 1.14   PTT     Status: None    Collection Time: 02/07/17  1:53 PM   Result Value Ref Range    PTT 35 22 - 37 sec   Rapid strep screen     Status: None    Collection Time: 02/07/17  1:56 PM   Result Value Ref Range    Specimen Description Throat     Rapid Strep A Screen       NEGATIVE: No Group A streptococcal antigen detected by immunoassay, await   culture report.      Micro Report Status FINAL 02/07/2017    Abd/pelvis CT no contrast - [facility] Protocol     Status: None    Collection Time: 02/07/17  2:42 PM    Narrative    CT ABDOMEN AND PELVIS WITHOUT CONTRAST 2/7/2017 2:42 PM    HISTORY: Abdominal pain and fever.    TECHNIQUE: Helical axial scans from the dome of liver through the  pubic symphysis  without contrast. Radiation dose for this scan was  reduced using automated exposure control, adjustment of the mA and/or  kV according to patient size, or iterative reconstruction technique.  Exam is somewhat compromised by patient motion artifact.    COMPARISON: None.    FINDINGS: No urinary tract calculi or obstruction bilaterally. There  is a large left pleural effusion with associated left lung  atelectasis. The liver, spleen, pancreas, bilateral adrenal glands and  kidneys bilaterally appear normal without contrast. The bowel and  mesentery in the upper abdomen show no abnormalities.    Scans through the pelvis show moderate distention of the urinary  bladder. No acute abnormalities. The appendix is not definitely  identified but there is no CT evidence for appendicitis. No free  fluid.      Impression    IMPRESSION:  1. Large left pleural effusion with associated left lung atelectatic  change.  2. Urinary bladder distention.    SHEEBA RECINOS MD   Chest XR,  PA & LAT     Status: None    Collection Time: 02/07/17  2:51 PM    Narrative    CHEST TWO VIEW   2/7/2017 2:51 PM     HISTORY: Cough.    COMPARISON: None.    FINDINGS: Near complete opacification of the left hemithorax likely  primarily pleural fluid. Underlying infiltrate cannot be excluded.  Right lung is clear.      Impression    IMPRESSION: Left hemithorax is nearly completely opacified probably a  combination of pleural fluid and infiltrate.    MARISEL CARPENTER MD       ED MEDICATIONS:   Medications   0.9% sodium chloride infusion (not administered)   LORazepam (ATIVAN) injection 0.25 mg (not administered)   0.9% sodium chloride BOLUS (1,000 mLs Intravenous New Bag 2/7/17 1352)   piperacillin-tazobactam (ZOSYN) 4.5 g vial to attach to  mL bag (4.5 g Intravenous New Bag 2/7/17 1538)   acetaminophen (TYLENOL) tablet 650 mg (650 mg Oral Given 2/7/17 1521)         Impression:    ICD-10-CM    1. Pneumonia J18.9 Blood culture ONE site     INR      PTT   2. Parapneumonic effusion J18.9     J91.8    3. Down's syndrome Q90.9        Plan:    Patient transferred to the OR for further management of effusion and admission to medicine following procedure.       Alma Wang, Alma Patel MD  02/08/17 0150

## 2017-02-07 NOTE — TELEPHONE ENCOUNTER
Called and spoke with pharmacist, who said that this has already been addressed.    Jeramie Hinton RN

## 2017-02-07 NOTE — TELEPHONE ENCOUNTER
Patient has downs syndrome, lives with mother April. She did make an appt for him today due to flu symptoms at [facility] but she is concerned regarding his breathing. Just really short, quick breaths. He was up all night not feeling well. Mom would like to speak with nurse to find out what she can do to help Hany Salamanca  Patient Rep  [place]

## 2017-02-08 ENCOUNTER — APPOINTMENT (OUTPATIENT)
Dept: GENERAL RADIOLOGY | Facility: CLINIC | Age: 22
DRG: 870 | End: 2017-02-08
Payer: MEDICARE

## 2017-02-08 ENCOUNTER — ANESTHESIA (OUTPATIENT)
Dept: INTENSIVE CARE | Facility: CLINIC | Age: 22
DRG: 870 | End: 2017-02-08
Payer: MEDICARE

## 2017-02-08 ENCOUNTER — ANESTHESIA EVENT (OUTPATIENT)
Dept: INTENSIVE CARE | Facility: CLINIC | Age: 22
DRG: 870 | End: 2017-02-08
Payer: MEDICARE

## 2017-02-08 PROBLEM — R09.02 HYPOXIA: Status: ACTIVE | Noted: 2017-02-08

## 2017-02-08 LAB
ALBUMIN UR-MCNC: NEGATIVE MG/DL
ANION GAP SERPL CALCULATED.3IONS-SCNC: 7 MMOL/L (ref 3–14)
APPEARANCE FLD: NORMAL
APPEARANCE UR: CLEAR
BASE EXCESS BLDA CALC-SCNC: 0.3 MMOL/L
BASOPHILS NFR FLD MANUAL: 4 %
BILIRUB UR QL STRIP: NEGATIVE
BUN SERPL-MCNC: 8 MG/DL (ref 7–30)
CALCIUM SERPL-MCNC: 7.6 MG/DL (ref 8.5–10.1)
CHLORIDE SERPL-SCNC: 105 MMOL/L (ref 94–109)
CO2 SERPL-SCNC: 27 MMOL/L (ref 20–32)
COLOR FLD: YELLOW
COLOR UR AUTO: YELLOW
CREAT SERPL-MCNC: 0.61 MG/DL (ref 0.66–1.25)
EOSINOPHIL NFR FLD MANUAL: 3 %
ERYTHROCYTE [DISTWIDTH] IN BLOOD BY AUTOMATED COUNT: 15.8 % (ref 10–15)
FLUAV+FLUBV RNA SPEC QL NAA+PROBE: NORMAL
FLUAV+FLUBV RNA SPEC QL NAA+PROBE: NORMAL
GFR SERPL CREATININE-BSD FRML MDRD: ABNORMAL ML/MIN/1.7M2
GLUCOSE BLDC GLUCOMTR-MCNC: 136 MG/DL (ref 70–99)
GLUCOSE FLD-MCNC: 18 MG/DL
GLUCOSE SERPL-MCNC: 103 MG/DL (ref 70–99)
GLUCOSE UR STRIP-MCNC: NEGATIVE MG/DL
GRAM STN SPEC: NORMAL
GRAM STN SPEC: NORMAL
HCO3 BLD-SCNC: 25 MMOL/L (ref 21–28)
HCT VFR BLD AUTO: 33.6 % (ref 40–53)
HGB BLD-MCNC: 10.9 G/DL (ref 13.3–17.7)
HGB UR QL STRIP: NEGATIVE
INTERPRETATION ECG - MUSE: NORMAL
KETONES UR STRIP-MCNC: NEGATIVE MG/DL
L PNEUMO1 AG UR QL IA: NORMAL
L PNEUMO1 AG UR QL IA: NORMAL
LACTATE BLD-SCNC: 0.8 MMOL/L (ref 0.7–2.1)
LACTATE BLD-SCNC: 1.3 MMOL/L (ref 0.7–2.1)
LDH FLD L TO P-CCNC: 155 U/L
LDH SERPL L TO P-CCNC: 95 U/L (ref 85–227)
LEUKOCYTE ESTERASE UR QL STRIP: NEGATIVE
LYMPHOCYTES NFR FLD MANUAL: 45 %
Lab: NORMAL
Lab: NORMAL
MCH RBC QN AUTO: 29.4 PG (ref 26.5–33)
MCHC RBC AUTO-ENTMCNC: 32.4 G/DL (ref 31.5–36.5)
MCV RBC AUTO: 91 FL (ref 78–100)
MICRO REPORT STATUS: NORMAL
MRSA DNA SPEC QL NAA+PROBE: NORMAL
MUCOUS THREADS #/AREA URNS LPF: PRESENT /LPF
NEUTS BAND NFR FLD MANUAL: 45 %
NITRATE UR QL: NEGATIVE
O2/TOTAL GAS SETTING VFR VENT: 60 %
OTHER CELLS FLD MANUAL: 3 %
OXYHGB MFR BLD: 97 % (ref 92–100)
PCO2 BLD: 37 MM HG (ref 35–45)
PH BLD: 7.43 PH (ref 7.35–7.45)
PH FLD: 6 PH
PH UR STRIP: 6.5 PH (ref 5–7)
PLATELET # BLD AUTO: 249 10E9/L (ref 150–450)
PO2 BLD: 106 MM HG (ref 80–105)
POTASSIUM SERPL-SCNC: 4 MMOL/L (ref 3.4–5.3)
PROT FLD-MCNC: 0.5 G/DL
RBC # BLD AUTO: 3.71 10E12/L (ref 4.4–5.9)
RBC # FLD: NORMAL /UL
RBC #/AREA URNS AUTO: 2 /HPF (ref 0–2)
RSV RNA SPEC NAA+PROBE: NORMAL
S PNEUM AG SPEC QL: NORMAL
S PNEUM AG SPEC QL: NORMAL
SODIUM SERPL-SCNC: 139 MMOL/L (ref 133–144)
SP GR UR STRIP: 1.01 (ref 1–1.03)
SPECIMEN SOURCE FLD: NORMAL
SPECIMEN SOURCE: NORMAL
URN SPEC COLLECT METH UR: ABNORMAL
UROBILINOGEN UR STRIP-MCNC: NORMAL MG/DL (ref 0–2)
WBC # BLD AUTO: 17.8 10E9/L (ref 4–11)
WBC # FLD AUTO: 144 /UL
WBC #/AREA URNS AUTO: 4 /HPF (ref 0–2)

## 2017-02-08 PROCEDURE — 25000128 H RX IP 250 OP 636: Performed by: RADIOLOGY

## 2017-02-08 PROCEDURE — 25000125 ZZHC RX 250: Performed by: INTERNAL MEDICINE

## 2017-02-08 PROCEDURE — 93005 ELECTROCARDIOGRAM TRACING: CPT

## 2017-02-08 PROCEDURE — 25000128 H RX IP 250 OP 636

## 2017-02-08 PROCEDURE — 83615 LACTATE (LD) (LDH) ENZYME: CPT | Performed by: SURGERY

## 2017-02-08 PROCEDURE — 25000128 H RX IP 250 OP 636: Performed by: INTERNAL MEDICINE

## 2017-02-08 PROCEDURE — 82945 GLUCOSE OTHER FLUID: CPT | Performed by: INTERNAL MEDICINE

## 2017-02-08 PROCEDURE — 40000275 ZZH STATISTIC RCP TIME EA 10 MIN

## 2017-02-08 PROCEDURE — 82805 BLOOD GASES W/O2 SATURATION: CPT | Performed by: INTERNAL MEDICINE

## 2017-02-08 PROCEDURE — 83986 ASSAY PH BODY FLUID NOS: CPT | Performed by: INTERNAL MEDICINE

## 2017-02-08 PROCEDURE — 71010 XR CHEST PORT 1 VW: CPT

## 2017-02-08 PROCEDURE — 84157 ASSAY OF PROTEIN OTHER: CPT | Performed by: INTERNAL MEDICINE

## 2017-02-08 PROCEDURE — 36556 INSERT NON-TUNNEL CV CATH: CPT | Mod: GC | Performed by: INTERNAL MEDICINE

## 2017-02-08 PROCEDURE — 25800025 ZZH RX 258: Performed by: INTERNAL MEDICINE

## 2017-02-08 PROCEDURE — 87205 SMEAR GRAM STAIN: CPT | Performed by: INTERNAL MEDICINE

## 2017-02-08 PROCEDURE — 87800 DETECT AGNT MULT DNA DIREC: CPT | Performed by: INTERNAL MEDICINE

## 2017-02-08 PROCEDURE — 80048 BASIC METABOLIC PNL TOTAL CA: CPT | Performed by: INTERNAL MEDICINE

## 2017-02-08 PROCEDURE — 87641 MR-STAPH DNA AMP PROBE: CPT | Performed by: INTERNAL MEDICINE

## 2017-02-08 PROCEDURE — 87040 BLOOD CULTURE FOR BACTERIA: CPT | Performed by: INTERNAL MEDICINE

## 2017-02-08 PROCEDURE — 85027 COMPLETE CBC AUTOMATED: CPT | Performed by: SURGERY

## 2017-02-08 PROCEDURE — 99292 CRITICAL CARE ADDL 30 MIN: CPT | Mod: 25 | Performed by: INTERNAL MEDICINE

## 2017-02-08 PROCEDURE — 83615 LACTATE (LD) (LDH) ENZYME: CPT | Performed by: INTERNAL MEDICINE

## 2017-02-08 PROCEDURE — 94002 VENT MGMT INPAT INIT DAY: CPT

## 2017-02-08 PROCEDURE — 87070 CULTURE OTHR SPECIMN AEROBIC: CPT | Performed by: INTERNAL MEDICINE

## 2017-02-08 PROCEDURE — 40000671 ZZH STATISTIC ANESTHESIA CASE

## 2017-02-08 PROCEDURE — 25000125 ZZHC RX 250: Performed by: NURSE PRACTITIONER

## 2017-02-08 PROCEDURE — 83605 ASSAY OF LACTIC ACID: CPT | Performed by: INTERNAL MEDICINE

## 2017-02-08 PROCEDURE — 87899 AGENT NOS ASSAY W/OPTIC: CPT | Performed by: INTERNAL MEDICINE

## 2017-02-08 PROCEDURE — 99291 CRITICAL CARE FIRST HOUR: CPT | Mod: GC | Performed by: SURGERY

## 2017-02-08 PROCEDURE — A9270 NON-COVERED ITEM OR SERVICE: HCPCS | Mod: GY | Performed by: INTERNAL MEDICINE

## 2017-02-08 PROCEDURE — 87077 CULTURE AEROBIC IDENTIFY: CPT | Performed by: INTERNAL MEDICINE

## 2017-02-08 PROCEDURE — 87640 STAPH A DNA AMP PROBE: CPT | Performed by: INTERNAL MEDICINE

## 2017-02-08 PROCEDURE — 25800025 ZZH RX 258

## 2017-02-08 PROCEDURE — 87631 RESP VIRUS 3-5 TARGETS: CPT | Performed by: INTERNAL MEDICINE

## 2017-02-08 PROCEDURE — 83605 ASSAY OF LACTIC ACID: CPT | Performed by: SURGERY

## 2017-02-08 PROCEDURE — 20000004 ZZH R&B ICU UMMC

## 2017-02-08 PROCEDURE — 40000556 ZZH STATISTIC PERIPHERAL IV START W US GUIDANCE

## 2017-02-08 PROCEDURE — 25000125 ZZHC RX 250

## 2017-02-08 PROCEDURE — 25000128 H RX IP 250 OP 636: Performed by: FAMILY MEDICINE

## 2017-02-08 PROCEDURE — 25000132 ZZH RX MED GY IP 250 OP 250 PS 637: Mod: GY | Performed by: INTERNAL MEDICINE

## 2017-02-08 PROCEDURE — 00000146 ZZHCL STATISTIC GLUCOSE BY METER IP

## 2017-02-08 PROCEDURE — 80048 BASIC METABOLIC PNL TOTAL CA: CPT | Performed by: SURGERY

## 2017-02-08 PROCEDURE — 81001 URINALYSIS AUTO W/SCOPE: CPT | Performed by: FAMILY MEDICINE

## 2017-02-08 PROCEDURE — 40000940 XR CHEST PORT 1 VW

## 2017-02-08 PROCEDURE — 36600 WITHDRAWAL OF ARTERIAL BLOOD: CPT

## 2017-02-08 PROCEDURE — 25000125 ZZHC RX 250: Performed by: NURSE ANESTHETIST, CERTIFIED REGISTERED

## 2017-02-08 PROCEDURE — 3E043XZ INTRODUCTION OF VASOPRESSOR INTO CENTRAL VEIN, PERCUTANEOUS APPROACH: ICD-10-PCS | Performed by: INTERNAL MEDICINE

## 2017-02-08 PROCEDURE — 87186 SC STD MICRODIL/AGAR DIL: CPT | Performed by: INTERNAL MEDICINE

## 2017-02-08 RX ORDER — ACETAMINOPHEN 325 MG/1
325-650 TABLET ORAL EVERY 4 HOURS PRN
Status: DISCONTINUED | OUTPATIENT
Start: 2017-02-08 | End: 2017-02-20 | Stop reason: HOSPADM

## 2017-02-08 RX ORDER — FENTANYL CITRATE 50 UG/ML
100 INJECTION, SOLUTION INTRAMUSCULAR; INTRAVENOUS ONCE
Status: COMPLETED | OUTPATIENT
Start: 2017-02-08 | End: 2017-02-08

## 2017-02-08 RX ORDER — PROPOFOL 10 MG/ML
INJECTION, EMULSION INTRAVENOUS PRN
Status: DISCONTINUED | OUTPATIENT
Start: 2017-02-08 | End: 2017-02-08

## 2017-02-08 RX ORDER — LORAZEPAM 2 MG/ML
INJECTION INTRAMUSCULAR
Status: DISCONTINUED
Start: 2017-02-08 | End: 2017-02-08 | Stop reason: HOSPADM

## 2017-02-08 RX ORDER — SODIUM CHLORIDE, SODIUM LACTATE, POTASSIUM CHLORIDE, CALCIUM CHLORIDE 600; 310; 30; 20 MG/100ML; MG/100ML; MG/100ML; MG/100ML
INJECTION, SOLUTION INTRAVENOUS
Status: COMPLETED
Start: 2017-02-08 | End: 2017-02-08

## 2017-02-08 RX ORDER — PROPOFOL 10 MG/ML
5-75 INJECTION, EMULSION INTRAVENOUS CONTINUOUS
Status: DISCONTINUED | OUTPATIENT
Start: 2017-02-08 | End: 2017-02-08

## 2017-02-08 RX ORDER — PROPOFOL 10 MG/ML
10-20 INJECTION, EMULSION INTRAVENOUS EVERY 30 MIN PRN
Status: DISCONTINUED | OUTPATIENT
Start: 2017-02-08 | End: 2017-02-08 | Stop reason: ALTCHOICE

## 2017-02-08 RX ORDER — PANTOPRAZOLE SODIUM 40 MG/1
40 TABLET, DELAYED RELEASE ORAL
Status: DISCONTINUED | OUTPATIENT
Start: 2017-02-08 | End: 2017-02-13

## 2017-02-08 RX ORDER — LEVOTHYROXINE SODIUM 112 UG/1
112 TABLET ORAL DAILY
Status: DISCONTINUED | OUTPATIENT
Start: 2017-02-08 | End: 2017-02-20 | Stop reason: HOSPADM

## 2017-02-08 RX ORDER — LEVOFLOXACIN 5 MG/ML
750 INJECTION, SOLUTION INTRAVENOUS EVERY 24 HOURS
Status: DISCONTINUED | OUTPATIENT
Start: 2017-02-08 | End: 2017-02-11

## 2017-02-08 RX ORDER — SODIUM CHLORIDE, SODIUM LACTATE, POTASSIUM CHLORIDE, CALCIUM CHLORIDE 600; 310; 30; 20 MG/100ML; MG/100ML; MG/100ML; MG/100ML
INJECTION, SOLUTION INTRAVENOUS CONTINUOUS
Status: DISPENSED | OUTPATIENT
Start: 2017-02-08 | End: 2017-02-09

## 2017-02-08 RX ORDER — FENTANYL CITRATE 50 UG/ML
50-100 INJECTION, SOLUTION INTRAMUSCULAR; INTRAVENOUS
Status: DISCONTINUED | OUTPATIENT
Start: 2017-02-08 | End: 2017-02-17

## 2017-02-08 RX ORDER — PROPOFOL 10 MG/ML
10-20 INJECTION, EMULSION INTRAVENOUS EVERY 30 MIN PRN
Status: DISCONTINUED | OUTPATIENT
Start: 2017-02-08 | End: 2017-02-08

## 2017-02-08 RX ORDER — NALOXONE HYDROCHLORIDE 0.4 MG/ML
.1-.4 INJECTION, SOLUTION INTRAMUSCULAR; INTRAVENOUS; SUBCUTANEOUS
Status: DISCONTINUED | OUTPATIENT
Start: 2017-02-08 | End: 2017-02-20 | Stop reason: HOSPADM

## 2017-02-08 RX ORDER — ACETAMINOPHEN 500 MG
500-1000 TABLET ORAL EVERY 6 HOURS PRN
Status: DISCONTINUED | OUTPATIENT
Start: 2017-02-08 | End: 2017-02-08

## 2017-02-08 RX ORDER — HALOPERIDOL 5 MG/ML
2 INJECTION INTRAMUSCULAR EVERY 6 HOURS PRN
Status: DISCONTINUED | OUTPATIENT
Start: 2017-02-08 | End: 2017-02-08

## 2017-02-08 RX ORDER — HALOPERIDOL 5 MG/ML
INJECTION INTRAMUSCULAR
Status: DISCONTINUED
Start: 2017-02-08 | End: 2017-02-08 | Stop reason: HOSPADM

## 2017-02-08 RX ORDER — DEXMEDETOMIDINE HYDROCHLORIDE 4 UG/ML
0.2-0.7 INJECTION, SOLUTION INTRAVENOUS CONTINUOUS
Status: DISCONTINUED | OUTPATIENT
Start: 2017-02-08 | End: 2017-02-09 | Stop reason: CLARIF

## 2017-02-08 RX ORDER — PIPERACILLIN SODIUM, TAZOBACTAM SODIUM 4; .5 G/20ML; G/20ML
4.5 INJECTION, POWDER, LYOPHILIZED, FOR SOLUTION INTRAVENOUS EVERY 6 HOURS
Status: DISCONTINUED | OUTPATIENT
Start: 2017-02-08 | End: 2017-02-11

## 2017-02-08 RX ORDER — PROPOFOL 10 MG/ML
5-75 INJECTION, EMULSION INTRAVENOUS CONTINUOUS
Status: DISCONTINUED | OUTPATIENT
Start: 2017-02-08 | End: 2017-02-08 | Stop reason: ALTCHOICE

## 2017-02-08 RX ORDER — HALOPERIDOL 5 MG/ML
3 INJECTION INTRAMUSCULAR ONCE
Status: DISCONTINUED | OUTPATIENT
Start: 2017-02-08 | End: 2017-02-08

## 2017-02-08 RX ADMIN — SODIUM CHLORIDE, POTASSIUM CHLORIDE, SODIUM LACTATE AND CALCIUM CHLORIDE 1000 ML: 600; 310; 30; 20 INJECTION, SOLUTION INTRAVENOUS at 11:27

## 2017-02-08 RX ADMIN — SODIUM CHLORIDE, POTASSIUM CHLORIDE, SODIUM LACTATE AND CALCIUM CHLORIDE 1000 ML: 600; 310; 30; 20 INJECTION, SOLUTION INTRAVENOUS at 06:09

## 2017-02-08 RX ADMIN — PROPOFOL 60 MG: 10 INJECTION, EMULSION INTRAVENOUS at 03:13

## 2017-02-08 RX ADMIN — LORAZEPAM 0.5 MG: 2 INJECTION INTRAMUSCULAR; INTRAVENOUS at 02:04

## 2017-02-08 RX ADMIN — PIPERACILLIN AND TAZOBACTAM 4.5 G: 4; .5 INJECTION, POWDER, FOR SOLUTION INTRAVENOUS at 21:27

## 2017-02-08 RX ADMIN — MIDAZOLAM HYDROCHLORIDE 4 MG/HR: 5 INJECTION, SOLUTION INTRAMUSCULAR; INTRAVENOUS at 09:52

## 2017-02-08 RX ADMIN — VANCOMYCIN HYDROCHLORIDE 1500 MG: 10 INJECTION, POWDER, LYOPHILIZED, FOR SOLUTION INTRAVENOUS at 06:09

## 2017-02-08 RX ADMIN — PIPERACILLIN AND TAZOBACTAM 4.5 G: 4; .5 INJECTION, POWDER, FOR SOLUTION INTRAVENOUS at 10:51

## 2017-02-08 RX ADMIN — MIDAZOLAM HYDROCHLORIDE 2 MG: 1 INJECTION, SOLUTION INTRAMUSCULAR; INTRAVENOUS at 16:30

## 2017-02-08 RX ADMIN — MIDAZOLAM 2 MG: 1 INJECTION INTRAMUSCULAR; INTRAVENOUS at 04:56

## 2017-02-08 RX ADMIN — SODIUM CHLORIDE, POTASSIUM CHLORIDE, SODIUM LACTATE AND CALCIUM CHLORIDE 1000 ML: 600; 310; 30; 20 INJECTION, SOLUTION INTRAVENOUS at 15:32

## 2017-02-08 RX ADMIN — SODIUM CHLORIDE, POTASSIUM CHLORIDE, SODIUM LACTATE AND CALCIUM CHLORIDE: 600; 310; 30; 20 INJECTION, SOLUTION INTRAVENOUS at 18:18

## 2017-02-08 RX ADMIN — ALTEPLASE 4 MG: KIT at 10:19

## 2017-02-08 RX ADMIN — PROPOFOL 30 MCG/KG/MIN: 10 INJECTION, EMULSION INTRAVENOUS at 04:18

## 2017-02-08 RX ADMIN — LEVOTHYROXINE SODIUM 112 MCG: 112 TABLET ORAL at 07:56

## 2017-02-08 RX ADMIN — FENTANYL CITRATE 100 MCG: 50 INJECTION, SOLUTION INTRAMUSCULAR; INTRAVENOUS at 16:30

## 2017-02-08 RX ADMIN — MIDAZOLAM 2 MG: 1 INJECTION INTRAMUSCULAR; INTRAVENOUS at 16:07

## 2017-02-08 RX ADMIN — HALOPERIDOL LACTATE 3 MG: 5 INJECTION, SOLUTION INTRAMUSCULAR at 02:37

## 2017-02-08 RX ADMIN — HALOPERIDOL LACTATE 2 MG: 5 INJECTION, SOLUTION INTRAMUSCULAR at 02:04

## 2017-02-08 RX ADMIN — PANTOPRAZOLE SODIUM 40 MG: 40 TABLET, DELAYED RELEASE ORAL at 07:56

## 2017-02-08 RX ADMIN — FENTANYL CITRATE 50 MCG: 50 INJECTION INTRAMUSCULAR; INTRAVENOUS at 16:06

## 2017-02-08 RX ADMIN — MIDAZOLAM 2 MG: 1 INJECTION INTRAMUSCULAR; INTRAVENOUS at 14:00

## 2017-02-08 RX ADMIN — Medication 50 ML: at 22:13

## 2017-02-08 RX ADMIN — SUCCINYLCHOLINE CHLORIDE 100 MG: 20 INJECTION, SOLUTION INTRAMUSCULAR; INTRAVENOUS at 03:13

## 2017-02-08 RX ADMIN — FENTANYL CITRATE 50 MCG: 50 INJECTION INTRAMUSCULAR; INTRAVENOUS at 16:34

## 2017-02-08 RX ADMIN — LORAZEPAM 1 MG: 2 INJECTION INTRAMUSCULAR; INTRAVENOUS at 02:17

## 2017-02-08 RX ADMIN — PROPOFOL 20 MCG/KG/MIN: 10 INJECTION, EMULSION INTRAVENOUS at 08:47

## 2017-02-08 RX ADMIN — VANCOMYCIN HYDROCHLORIDE 1500 MG: 10 INJECTION, POWDER, LYOPHILIZED, FOR SOLUTION INTRAVENOUS at 14:58

## 2017-02-08 RX ADMIN — FENTANYL CITRATE 50 MCG: 50 INJECTION INTRAMUSCULAR; INTRAVENOUS at 09:58

## 2017-02-08 RX ADMIN — LORAZEPAM 0.25 MG: 2 INJECTION INTRAMUSCULAR; INTRAVENOUS at 00:12

## 2017-02-08 RX ADMIN — NOREPINEPHRINE BITARTRATE 0.03 MCG/KG/MIN: 1 INJECTION INTRAVENOUS at 16:30

## 2017-02-08 RX ADMIN — VANCOMYCIN HYDROCHLORIDE 1500 MG: 10 INJECTION, POWDER, LYOPHILIZED, FOR SOLUTION INTRAVENOUS at 22:32

## 2017-02-08 RX ADMIN — PIPERACILLIN AND TAZOBACTAM 4.5 G: 4; .5 INJECTION, POWDER, FOR SOLUTION INTRAVENOUS at 04:56

## 2017-02-08 RX ADMIN — MIDAZOLAM 2 MG: 1 INJECTION INTRAMUSCULAR; INTRAVENOUS at 09:51

## 2017-02-08 RX ADMIN — ACETAMINOPHEN 650 MG: 325 TABLET, FILM COATED ORAL at 07:56

## 2017-02-08 RX ADMIN — LEVOFLOXACIN 750 MG: 5 INJECTION, SOLUTION INTRAVENOUS at 08:20

## 2017-02-08 ASSESSMENT — ACTIVITIES OF DAILY LIVING (ADL)
FALL_HISTORY_WITHIN_LAST_SIX_MONTHS: NO
BATHING: 2-->ASSISTIVE PERSON
AMBULATION: 0-->INDEPENDENT
TRANSFERRING: 0-->INDEPENDENT
RETIRED_EATING: 0-->INDEPENDENT
SWALLOWING: 0-->SWALLOWS FOODS/LIQUIDS WITHOUT DIFFICULTY
RETIRED_COMMUNICATION: 2-->DIFFICULTY UNDERSTANDING (NOT RELATED TO LANGUAGE BARRIER)
COGNITION: 2 - DIFFICULTY WITH ORGANIZING THOUGHTS
TOILETING: 0-->INDEPENDENT
DRESS: 0-->INDEPENDENT

## 2017-02-08 NOTE — H&P
[facility]      MICU History and Physicial  Hany Patel  MRN: [id number]  : 1995  Date of Admission:2017  Primary care provider: Liu Song             Chief Complaint:   Fever         History of Present Illness:   Hany Patel is a 23 yo M with a history of Down syndrome, hypothyroidism, and obesity who presents with fevers.     Per his mom, he has not been feeling well over the last few days. Earlier in the week he was having some loose stools. She picked him up from his day training program on Monday after he had a fever to 101.5. She notes that after she picked him up he had an episode of emesis. Reports that since then, he hasn't been eating which is very unusual for him. She also states that he has not been behaving like himself. He seems to be less attentive at times and has had difficulty with sleeping. He was endorsing some congestion, but no shortness of breath or cough. She did feel that yesterday, he looked like he was breathing heavily which is when she brought him to be evaluated.     In the ED, he was febrile to 101.7. Found to have a leukocytosis to 26. Chest XR showed near complete opacification of L hemithorax. He was started on Zosyn empirically. Underwent IR placement of chest tube. On admission to the floor, he was agitated and refusing to wear oxygen. He was sating high 70- low 80s on room air. Additionally with increasing tachycardia and tachypnea. Received haloperidol and ativan for agitation without any improvement. He was transferred to the ICU for further management and intubated by anesthesia on arrival.         Review of Systems:   Unable to perform 2/2 intubation and sedation.          Past Medical History:   Medical History reviewed.   Down Syndrome  Pre-Diabetes  Hypothyroidism         Past Surgical History:   Surgical History reviewed.   Past Surgical History   Procedure Laterality Date     Surgical history of -        Ear tubes      Surgical history  of -        Adenoid removal             Social History:   Social History reviewed.  Social History     Social History     Marital Status: Single     Spouse Name: N/A     Number of Children: N/A     Years of Education: N/A     Occupational History     Not on file.     Social History Main Topics     Smoking status: Never Smoker      Smokeless tobacco: Never Used     Alcohol Use: No     Drug Use: No     Sexual Activity: No     Other Topics Concern     Parent/Sibling W/ Cabg, Mi Or Angioplasty Before 65f 55m? No     Social History Narrative          Family History:   Family History reviewed.   Family History   Problem Relation Age of Onset     Breast Cancer Mother      Hypertension Father           Allergies:    No Known Allergies         Medications:   Medications Reviewed.   Prescriptions prior to admission   Medication Sig Dispense Refill Last Dose     cyanocobalamin 1000 MCG SUBL Place 1,000 mcg under the tongue daily   2/7/2017 at Unknown time     omega-3 acid ethyl esters (LOVAZA) 1 G capsule TAKE 2 CAPSULES BY MOUTH TWICE A DAY  6 2/7/2017 at Unknown time     vitamin  B complex with vitamin C (VITAMIN  B COMPLEX) TABS Take 1 tablet by mouth daily   2/7/2017 at Unknown time     escitalopram (LEXAPRO) 20 MG tablet Take 20 mg by mouth daily   2/7/2017 at Unknown time     loperamide (IMODIUM) 2 MG capsule Take 2 mg by mouth daily (with breakfast) Take two tablets with breakfast   2/6/2017 at Unknown time     Ascorbic Acid (VITAMIN C) 500 MG CHEW Take by mouth daily   2/7/2017 at Unknown time     Calcium Carbonate-Vitamin D (CALCIUM + D PO) Daily chewable   2/7/2017 at Unknown time     metFORMIN (GLUCOPHAGE) 1000 MG tablet Take 1,000 mg by mouth 2 times daily (with meals).   2/7/2017 at Unknown time     levothyroxine (SYNTHROID, LEVOTHROID) 112 MCG tablet Take 112 mcg by mouth daily.   2/7/2017 at Unknown time     Multiple Vitamin (MULTI-VITAMIN) per tablet Take 1 tablet by mouth daily. With D3   2/7/2017 at Unknown  time            Physical Exam:   Vitals were reviewed.  Temp:  [99  F (37.2  C)-103.6  F (39.8  C)] 102.3  F (39.1  C)  Pulse:  [123-126] 123  Heart Rate:  [] 110  Resp:  [13-45] 13  BP: ()/() 112/49 mmHg  FiO2 (%):  [60 %] 60 %  SpO2:  [60 %-98 %] 97 %    General: Intubated, no acute distress  HEENT: PERRLA  CV: Tachycardic, regular, normal S1S2, no murmurs, rubs, or gallops   Resp: Decreased BS L-side, R clear to auscultation without rales, L CT in place with serosanguinous drainage  Abd: Soft, non-tender, non-distended, BS+  : Lagos in place  Extremities: warm and well perfused, no lower extremity edema  Neuro: No focal deficits, moving all extremities spontaneously          Data:     Intake/Output Summary (Last 24 hours) at 02/08/17 0541  Last data filed at 02/08/17 0500   Gross per 24 hour   Intake 1535.77 ml   Output   3427 ml   Net -1891.23 ml     ROUTINE LABS (Last four results)    Recent Results (from the past 24 hour(s))   Beta strep group A culture    Collection Time: 02/07/17  1:50 PM   Result Value Ref Range    Specimen Description Throat     Special Requests Specimen collected in eSwab transport (white cap)     Culture Micro Pending     Micro Report Status Pending    CBC with platelets differential    Collection Time: 02/07/17  1:53 PM   Result Value Ref Range    WBC 26.2 (H) 4.0 - 11.0 10e9/L    RBC Count 4.40 4.4 - 5.9 10e12/L    Hemoglobin 13.3 13.3 - 17.7 g/dL    Hematocrit 39.6 (L) 40.0 - 53.0 %    MCV 90 78 - 100 fl    MCH 30.2 26.5 - 33.0 pg    MCHC 33.6 31.5 - 36.5 g/dL    RDW 15.2 (H) 10.0 - 15.0 %    Platelet Count 305 150 - 450 10e9/L    Diff Method Automated Method     % Neutrophils 86.9 %    % Lymphocytes 3.9 %    % Monocytes 8.5 %    % Eosinophils 0.0 %    % Basophils 0.2 %    % Immature Granulocytes 0.5 %    Nucleated RBCs 0 0 /100    Absolute Neutrophil 22.8 (H) 1.6 - 8.3 10e9/L    Absolute Lymphocytes 1.0 0.8 - 5.3 10e9/L    Absolute Monocytes 2.2 (H) 0.0 - 1.3  10e9/L    Absolute Eosinophils 0.0 0.0 - 0.7 10e9/L    Absolute Basophils 0.1 0.0 - 0.2 10e9/L    Abs Immature Granulocytes 0.1 0 - 0.4 10e9/L    Absolute Nucleated RBC 0.0    Comprehensive metabolic panel    Collection Time: 02/07/17  1:53 PM   Result Value Ref Range    Sodium 134 133 - 144 mmol/L    Potassium 4.2 3.4 - 5.3 mmol/L    Chloride 98 94 - 109 mmol/L    Carbon Dioxide 28 20 - 32 mmol/L    Anion Gap 8 3 - 14 mmol/L    Glucose 128 (H) 70 - 99 mg/dL    Urea Nitrogen 9 7 - 30 mg/dL    Creatinine 0.66 0.66 - 1.25 mg/dL    GFR Estimate >90  Non  GFR Calc   >60 mL/min/1.7m2    GFR Estimate If Black >90   GFR Calc   >60 mL/min/1.7m2    Calcium 8.8 8.5 - 10.1 mg/dL    Bilirubin Total 0.7 0.2 - 1.3 mg/dL    Albumin 2.5 (L) 3.4 - 5.0 g/dL    Protein Total 6.9 6.8 - 8.8 g/dL    Alkaline Phosphatase 60 40 - 150 U/L    ALT 17 0 - 70 U/L    AST 13 0 - 45 U/L   Lipase    Collection Time: 02/07/17  1:53 PM   Result Value Ref Range    Lipase 44 (L) 73 - 393 U/L   Lactic acid    Collection Time: 02/07/17  1:53 PM   Result Value Ref Range    Lactic Acid 1.8 0.7 - 2.1 mmol/L   Blood culture ONE site    Collection Time: 02/07/17  1:53 PM   Result Value Ref Range    Specimen Description Blood Right Arm     Special Requests Aerobic and anaerobic bottles received     Culture Micro No growth after 13 hours     Micro Report Status Pending    INR    Collection Time: 02/07/17  1:53 PM   Result Value Ref Range    INR 1.29 (H) 0.86 - 1.14   PTT    Collection Time: 02/07/17  1:53 PM   Result Value Ref Range    PTT 35 22 - 37 sec   Rapid strep screen    Collection Time: 02/07/17  1:56 PM   Result Value Ref Range    Specimen Description Throat     Rapid Strep A Screen       NEGATIVE: No Group A streptococcal antigen detected by immunoassay, await   culture report.      Micro Report Status FINAL 02/07/2017    Cell count with differential fluid    Collection Time: 02/07/17  8:30 PM   Result Value Ref Range     Body Fluid Analysis Source Pleural fluid     % Neutrophils Fluid 45 %    % Lymphocytes Fluid 45 %    % Eosinophils Fluid 3 %    % Basophils Fluid 4 %    % Other Cells Fluid 3 %    Color Fluid Yellow     Appearance Fluid Slightly Cloudy     RBC Fluid << Do Not Report >> /uL    WBC Fluid 144 /uL   Gram stain    Collection Time: 02/07/17  8:30 PM   Result Value Ref Range    Specimen Description Pleural fluid     Gram Stain       No organisms seen  Few PMNs seen  Gram stain review consistent with reported results. Gram stain slide reviewed at   the [facility]  NOTIFIED AIDEN URIAS @ 4866 2.7.17 MG      Micro Report Status Pending    Glucose by meter    Collection Time: 02/08/17  2:35 AM   Result Value Ref Range    Glucose 136 (H) 70 - 99 mg/dL   UA with Microscopic reflex to Culture    Collection Time: 02/08/17  3:45 AM   Result Value Ref Range    Color Urine Yellow     Appearance Urine Clear     Glucose Urine Negative NEG mg/dL    Bilirubin Urine Negative NEG    Ketones Urine Negative NEG mg/dL    Specific Gravity Urine 1.007 1.003 - 1.035    Blood Urine Negative NEG    pH Urine 6.5 5.0 - 7.0 pH    Protein Albumin Urine Negative NEG mg/dL    Urobilinogen mg/dL Normal 0.0 - 2.0 mg/dL    Nitrite Urine Negative NEG    Leukocyte Esterase Urine Negative NEG    Source Catheterized Urine     WBC Urine 4 (H) 0 - 2 /HPF    RBC Urine 2 0 - 2 /HPF    Mucous Urine Present (A) NEG /LPF   Legionella pneumonia antigen urine    Collection Time: 02/08/17  3:45 AM   Result Value Ref Range    Specimen Description Urine     L Pneumo Urine Antigen       Canceled, Test credited Unsatisfactory specimen - leaked in transit, QNS for   analysis Notification of test cancellation was given to BETHEL FLYNN (4C).   02.08.17 0448 GJS      Micro Report Status FINAL 02/08/2017    EKG 12-lead    Collection Time: 02/08/17  3:47 AM   Result Value Ref Range    Interpretation ECG Click View Image link to view  waveform and result    Blood gas arterial and oxyhgb    Collection Time: 02/08/17  4:24 AM   Result Value Ref Range    pH Arterial 7.43 7.35 - 7.45 pH    pCO2 Arterial 37 35 - 45 mm Hg    pO2 Arterial 106 (H) 80 - 105 mm Hg    Bicarbonate Arterial 25 21 - 28 mmol/L    FIO2 60     Oxyhemoglobin Arterial 97 92 - 100 %    Base Excess Art 0.3 mmol/L   Lactic acid whole blood    Collection Time: 02/08/17  4:24 AM   Result Value Ref Range    Lactic Acid 1.3 0.7 - 2.1 mmol/L     Assessment and Plan:     Hoang Patel is a 21 yo M with a history of Down syndrome, hypothyroidism, and obesity admitted with sepsis and hypoxic respiratory failure in setting of presumed pneumonia and associated L sided pleural effusion.     PLAN:  ===PULMONARY===  # Acute hypoxic respiratory failure  Requiring intubation for hypoxia and respiratory distress. In setting of PNA and associated effusion.  - Vent settings:  R 12 PEEP 5 FiO2 60%    # L parapneumonic effusion  Large left sided pleural effusion in setting of presumed pneumonia. S/p CT placement.  - Add on fluid LDH, protein, pH  - Chest tube to water seal  - Follow up pleural fluid cultures    ===INFECTIOUS DISEASE===  # Sepsis  # Community acquired pneumonia  Presented with fevers, tachycardia, and leukocytosis. Found to have L sided pleural effusion and likely associated pneumonia. Infectious workup with UA otherwise unremarkable. Blood cultures pending.   - Empiric vanc and zosyn for sepsis. Narrow pending studies.   - Sputum cultures, pleural fluid cultures, and blood cultures sent  - Urine strep and legionella  - Influenza PCR    ===NEURO===  # Sedation  - Propofol gtt and PRN boluses for sedation. Goal RASS 0- -1.     ===CARDIOVASCULAR===  # Tachycardia  Sinus tachycardia. Likely hypovolemia in setting of poor po intake.   - Fluid resuscitation with 1L LR    ===GASTROINTESTINAL===  # Nutrition:   - NPO currently.     ===RENAL===  No acute issues.   - Lagos in place. Monitor  I/Os.    ===ENDOCRINE===  # Hypothyroidism  - Continue PTA levothyroxine    Prophylaxis:  DVT: Lovenox   GI: PPI  Family:  Updated Mother at bedside.  Disposition: Critically Ill. Requires ongoing ICU care.    Code Status: Full    Patient was seen and discussed with attending physician Dr. Bolden, who agrees with above assessment and plan.    Lilly Garay MD, MPH  Internal Medicine, PGY-3  Pager [phone]

## 2017-02-08 NOTE — PROGRESS NOTES
MICU STAFF CRITICAL CARE PROGRESS NOTE:    I saw and examined the patient with the MICU team and concur with findings and A&P as detailed in Dr. Garay's note of today    21 yo with Downs Syndrome presented yesterday with several days of fever and loose stools.  Two days ago had fever to 101.5 at day program and V x1.  Then not eating with some congestion.  Yesterday was breathing faster and less alert with some behavioral change.  Presented to [facility] ED with T 101.7 and O2 Sat ~ 85% on RA.  WBC 26K and L sided opacification on CXR.  Treated with Zosyn empirically for pneumonia and had L pigtail catheter placed by IR.  On floor he had trouble with agitation and hypoxia, refusing O2 with O 2 sat 75 - 85%.  Given haldol and ativan without improvement.  Transferred to ICU and rapidly intubated while refusing O2 Rx - no ABG done and not clear whether hypoxemia would have improved with supplemental O2.      PMH  Downs  Hypothyroidism on L-T4  Pre-DM on metformin    Meds: as above    Exam:  Tmax 103.6,  initially now 100-110; BP adequate;   ACMV 450; 12/  5 60%  Intubated male  Decreased BS on left;  R clear  L pigtail catheter in place  Tachy RRR no m/g/r appreciated  ]Abd soft nontender  Ext warm well perfused w/o edema  No rash    Labs:  Hgb 13  WBC 26K Plt 305K  Creat 0.6 nl lytes & LFTs;  Alb 2.5  Lactate 1.8  ABG 7.4/37/106 post intubation  UA - not remarkable  BC done  Pleural 144 WBC 50% PMNs; 45% Lymphs;  Glucose 18;  PH 6.0;  [id number], Prot 0.5  CXR:  Very large L effusion with mediastinal shift  710 mls pleural drainage    Hypoxic Resp Failure with Large L Effusion  Zosyn, Vanco empirically  Not a classic pneumonia presentation. No clear h/o aspiration except for 1 episode of V w/o clinical aspiration  Cultures pending   Legionella, strep pneumonia, influenza studies pending  Need to check function of current pigtail catheter and achieve adequate drainage  Add atypical coverage for CAP    (Critical Care 45  minutes cumulative thus far today)    Balta Reid MD  MICU Staff  [id number]

## 2017-02-08 NOTE — PROGRESS NOTES
Pt arrived to unit 6B at 0120. , /79, O2 sats at 80%. Pt resistive to wearing any type of O2 device. [place] team cross coverage and RT present.

## 2017-02-08 NOTE — PROGRESS NOTES
Pt was intubated at 0310 am, due to respiratory failure with no complications. Pt was intubated with an 8.0 ETT, 24@ the lip. Pt has clear breath sounds bilaterally. Chest x-ray and ABG are ordered. Pt placed on initial vent settings. RT will continue to follow.

## 2017-02-08 NOTE — OR NURSING
Patient transported to [place] Unit 6[facility] by [facility] Transport crew, handoff given to Jason from [facility] and [place], Unit [address] [place] RN.

## 2017-02-08 NOTE — ANESTHESIA PROCEDURE NOTES
ANESTHESIOLOGY RESIDENT/CRNA INTUBATION NOTE  Indication for intubation: respiratory insufficiency, hemodynamic instability, altered level of consciousness, airway protection.  Provider Ordering Intubation: JOAO BOONE  History regarding the most recent potassium obtained: Yes  History regarding renal failure obtained: Yes  History of presence or absence of CVA/stroke was obtained: Yes  History of presence or absence of NM disorder obtained: Yes  Post Intubation:  No apparent complications, Sedation to be ordered by primary/ICU team, Primary/ICU team to review CXR, Vent settings by primary/ICU team, ETT secured and Report given to primary nurse and/or team  Uneventful intubation

## 2017-02-08 NOTE — OR NURSING
Pt was incontinent of urine in OR, PACU nurses attempted to change linens and pt pants and underwear. Pt refused even with his parents involvement, got agitated and started to repeatedly request to take his chest tube out. Able to draw pt's attention to watching DVD movies instead, pt seems cooperative with chest tube at this time, but still doesn't want to change his clothes. Pt's O2 sats in mid 80s on room air, refuses oxygen mask or nasal cannula.Pts mother at bedside.

## 2017-02-08 NOTE — PLAN OF CARE
Problem: Goal Outcome Summary  Goal: Goal Outcome Summary  Outcome: Declining  Neuro: Downs Syndrome. A&O to self. Uncooperative, restless, agitated.  Cardiac: ST, 's-150's. SBP in 100's.   Respiratory: Sating on RA in 70's-80's. Refused O2 devices  GI/: KATRINA. Refused assessment.  Diet/appetite: Reg.  Activity:  Assist of 2.   Pain: Denied   Skin: Intact, no deficits noted.    Pt came to the unit from [facility] PACU at 0120. Pt was tachycardic in the 140's, SBP in 120's, hypoxic and sating in 80's on RA. Pt became agitated, restless, and noncompliant when attempting to place  O2 NC, and oxymask. Pt continued to desat into the 70's and became more agitated and restless. Dr. Charles and RT notified and present at bedside at 0145. Tried multiple attempts to place O2, placed restraints and gave haldol and ativan per order. Pt did not comply and was transferred to  at 0315 to be intubated.

## 2017-02-08 NOTE — BRIEF OP NOTE
Interventional Radiology Brief Post Procedure Note    Procedure: Left chest tube placement    Proceduralist: Coco Zambrano MD    Assistant: None    Time Out: Prior to the start of the procedure and with procedural staff participation, I verbally confirmed the patient s identity using two indicators, relevant allergies, that the procedure was appropriate and matched the consent or emergent situation, and that the correct equipment/implants were available. Immediately prior to starting the procedure I conducted the Time Out with the procedural staff and re-confirmed the patient s name, procedure, and site/side. (The Joint Commission universal protocol was followed.)  Yes    Sedation: Monitored Anesthesia Care (MAC) administered and documented by Anesthesia Care Provider    Findings:   Constant movement during procedure. 12 Fr Lazaro Mcgregor with locking suture removed was placed in left pleural space    Estimated Blood Loss: Less than 10 ml    Fluoroscopy Time: Less than 1 minute    SPECIMENS: Fluid and/or tissue for laboratory analysis and culture    Complications: 1. None     Condition: Stable    Plan:   Chest tube to pleurVAC at 20 cm H20  CXR in am  Intracavitary alteplase to chest tube daily. First dose ordered    Comments: See dictated procedure note for full details.    Coco Zambrano MD

## 2017-02-08 NOTE — ANESTHESIA CARE TRANSFER NOTE
Patient: Hany Patel    Procedure(s):  Placement of Left Chest Tube - Wound Class: I-Clean    Diagnosis: Pleural Effusion  Diagnosis Additional Information: No value filed.    Anesthesia Type:   No value filed.     Note:  Airway :Blow-by  Patient transferred to:PACU        Vitals: (Last set prior to Anesthesia Care Transfer)    CRNA VITALS  2/7/2017 2008 - 2/7/2017 2051 2/7/2017             Resp Rate (observed): (!) 55                Electronically Signed By: TAMEKA Abebe CRNA  February 7, 2017  8:51 PM

## 2017-02-08 NOTE — PLAN OF CARE
Problem: Goal Outcome Summary  Goal: Goal Outcome Summary  Outcome: Declining  D: Pt transferred from  for resp decomp, intubation r/t pneumonia  I/A: Pt arrived around 0330, intubated on arrival- (O2 sats in 70s): CMV/50%/RR 12//Peep 5. Thick white secretions, crackles throughout lungs. Febrile 102.3- pan cultured, abx started. Diaphoretic. Sedated on propofol, BP soft but stable. Chest tube placed prior to transfer- serosang drainage. Lagos placed with 2L immediate UOP. No BM.  Withdraws from pain, does not follow commands- Downs syndrome at baseline.  P: Continue POC, titrate sedation/FiO2 as able.

## 2017-02-08 NOTE — PHARMACY-VANCOMYCIN DOSING SERVICE
Pharmacy Vancomycin Initial Note  Date of Service 2017  Patient's  1995  22 year old, male    Indication: Sepsis    Current estimated CrCl = Estimated Creatinine Clearance: 197.5 mL/min (based on Cr of 0.66).    Creatinine for last 3 days  2017:  1:53 PM Creatinine 0.66 mg/dL    Recent Vancomycin Level(s) for last 3 days  No results found for requested labs within last 3 days.      Vancomycin IV Administrations (past 72 hours)      No vancomycin orders with administrations in past 72 hours.                Nephrotoxins and other renal medications (Future)    Start     Dose/Rate Route Frequency Ordered Stop    17 0530  vancomycin (VANCOCIN) 1,500 mg in NaCl 0.9 % 250 mL intermittent infusion      1,500 mg Intravenous EVERY 8 HOURS 17 0504      17 0400  piperacillin-tazobactam (ZOSYN) 4.5 g vial to attach to  mL bag      4.5 g  over 1 Hours Intravenous EVERY 6 HOURS 17 0344            Contrast Orders - past 72 hours     None                Plan:  1.  Start vancomycin  1500 mg IV q8h.   2.  Goal Trough Level: 15-20 mg/L   3.  Pharmacy will check trough levels as appropriate in 1-3 Days.    4. Serum creatinine levels will be ordered daily for the first week of therapy and at least twice weekly for subsequent weeks.    5. [place] method utilized to dose vancomycin therapy: Method 2    Jordi Aguilar

## 2017-02-08 NOTE — ANESTHESIA PREPROCEDURE EVALUATION
Physical Exam  Normal systems: cardiovascular, pulmonary and dental    Airway   Mallampati: III  TM distance: <3 FB  Neck ROM: limited    Dental     Cardiovascular       Pulmonary                     Anesthesia Plan      History & Physical Review  History and physical reviewed and following examination; no interval change.    ASA Status:  3 emergent.    NPO Status:  > 8 hours    Plan for MAC with Intravenous and Propofol induction. Maintenance will be TIVA.  Reason for MAC:  Deep or markedly invasive procedure (G8)  PONV prophylaxis:  Ondansetron (or other 5HT-3)  Additional equipment: Videolaryngoscope      Postoperative Care  Postoperative pain management:  IV analgesics.  Plan for postoperative opioid use.    Consents  Anesthetic plan, risks, benefits and alternatives discussed with:  Patient and Parent (Mother and/or Father).  Use of blood products discussed: No .   .                          .

## 2017-02-08 NOTE — ANESTHESIA POSTPROCEDURE EVALUATION
Patient: Hany Patel    Procedure(s):  Placement of Left Chest Tube - Wound Class: I-Clean    Diagnosis:Pleural Effusion  Diagnosis Additional Information: No value filed.    Anesthesia Type:  MAC    Note:  Anesthesia Post Evaluation    Patient location during evaluation: PACU  Patient participation: Able to fully participate in evaluation  Level of consciousness: awake and alert  Pain management: adequate  Airway patency: patent  Cardiovascular status: acceptable  Respiratory status: acceptable  Hydration status: acceptable  PONV: none     Anesthetic complications: None    Comments: SpO2 on RA post operatively was 85-89%, pre operative RA SpO2 was 90-91%.  Patient was awake and alert and refusing oxygen supplementation and would scream and fight against having an oxygen mask so we did not apply supplemental O2 and patient remained stable.     Janiya Echevarria MD  February 7, 2017          Last vitals:  Filed Vitals:    02/07/17 2115 02/07/17 2130 02/07/17 2139   BP:   106/59   Pulse:      Temp:      Resp:      SpO2: 89% 86% 85%         Electronically Signed By: Janiya Echevarria MD  February 7, 2017  10:10 PM

## 2017-02-08 NOTE — PROGRESS NOTES
CLINICAL NUTRITION SERVICES - ASSESSMENT NOTE     Nutrition Prescription    RECOMMENDATIONS FOR MDs/PROVIDERS TO ORDER:  Recommend diet advancement or nutrition support within the next 2-3 days    Malnutrition Status:    Patient does not meet two of the above criteria necessary for diagnosing malnutrition but is at risk for malnutrition.    Recommendations already ordered by Registered Dietitian (RD):  None at this time    Future/Additional Recommendations:  If nutrition support is warranted, recommend TwoCal HN through OGT @ 40 ml/hr (960 ml/day) to provide 1920 kcals (27 kcal/kg), 81 g PRO (1.1 g/kg), 672 ml free H2O, 210 g CHO and 5 g Fiber daily.    Provide free water flushes of 30 mL q 4 hrs    Recommend multivitamin with mineral for potential micronutrient deficiencies related to variable intake.        REASON FOR ASSESSMENT  Hany Patel is a/an 22 year old male assessed by the dietitian for Interdisciplinary Rounds    Patient has a history of Down syndrome, hypothyroidism, and obesity admitted with sepsis and hypoxic respiratory failure in setting of pneumonia and associated L sided pleural effusion    NUTRITION HISTORY  Per mother, the patient's last meal was Monday afternoon (2/6) during his day training program. He has had some orange juice and a can of soda in the ED but no solid food since then. Prior to Monday, patient was eating well and mother reports he loves to eat. After going out to dinner with his paraprofessionals, he would sometimes complain that his stomach hurt from eating too much, so the parents have been discussing healthy eating habits with Hany.      CURRENT NUTRITION ORDERS  Diet: NPO x 2 days  Intake/Tolerance: N/A    LABS  Phos: 2.2 (L)  Cre 0.62 (L)- likely r/t low lean body mass d/t DS  Urinary Ketones: 80 (H)- unclear if r/t starvation or not. Pt has not received significant amt of dextrose in IVF    MEDICATIONS  Norepinephrine: 2.2 mL/hr     ANTHROPOMETRICS  Height: 160 cm (5'  "3\")  Most Recent Weight: 112.8 kg (257 lb 15 oz)    IBW: 56 kg (209% ideal)  BMI: 45.7kg/m^2 Obesity Grade III BMI >40  Weight History: No weight loss noted  Wt Readings from Last 10 Encounters:   02/08/17 117 kg (257 lb 15 oz)   09/19/16 109.997 kg (242 lb 8 oz)   09/12/16 112.605 kg (248 lb 4 oz)   10/14/15 112.038 kg (247 lb)   06/05/15 110.224 kg (243 lb)   05/18/15 111.131 kg (245 lb)   01/30/15 108.863 kg (240 lb)   10/01/14 108.41 kg (239 lb)   06/04/14 106.142 kg (234 lb)   04/14/14 105.235 kg (232 lb)     Dosing Weight: 70 kg (adjusted weight based on lowest admission weight)    ASSESSED NUTRITION NEEDS  Estimated Energy Needs: 9259-0157 kcals/day (25 - 30 kcals/kg)  Justification: Maintenance  Estimated Protein Needs: 70-84 grams protein/day (1 - 1.2 grams of pro/kg)  Justification: Maintenance  Estimated Fluid Needs: 9601-8829 mL/day (1 mL/kcal)   Justification: Maintenance or Per Provider    PHYSICAL FINDINGS  No abnormal nutrition-related physical findings observed.   Mother reported patient frequently gets pustules on buttocks.    MALNUTRITION  % Intake: < 50% for 3 days   % Weight Loss: None noted  Subcutaneous Fat Loss: None observed  Muscle Loss: None observed  Fluid Accumulation/Edema: None noted  Malnutrition Diagnosis: Patient does not meet two of the above criteria necessary for diagnosing malnutrition but is at risk for malnutrition    NUTRITION DIAGNOSIS  Inadequate oral intake related to decreased appetite with illness and NPO diet order as evidenced by NPO x 2 days.     INTERVENTIONS  Implementation  Collaboration with other providers-discussed patient during interdisciplinary rounds    Goals  Diet adv v nutrition support within 2-3 days.    Monitoring/Evaluation  Progress toward goals will be monitored and evaluated per protocol.    Erica Strickland  Dietetic Intern      I have read and agree with the above assessment, evaluation, interventions and recommendations.  Ana Rosa Meredith RD, " ED  ([facility] [place] [facility] dietitian, [phone]- [phone])

## 2017-02-08 NOTE — OR NURSING
Patient awake, sitting up in PACU, very agitated with monitoring and repeatedly requesting to go home. O2 sats in mid 80s, patient refuses any form of oxygen delivery device on or near face. Dr. Echevarria and Dr. Zambrano aware. Parents at bedside to help support and calm patient. Patient was incontinent of urine in OR but refusing clean linens at this time. Patient tolerating PO intake and calm while watching movie. Portable CXR done at bedside. Dr. Zambrano at bedside to update parents. Report given to Candido Brown RN. Handoff of care.

## 2017-02-09 ENCOUNTER — APPOINTMENT (OUTPATIENT)
Dept: GENERAL RADIOLOGY | Facility: CLINIC | Age: 22
DRG: 870 | End: 2017-02-09
Payer: MEDICARE

## 2017-02-09 ENCOUNTER — APPOINTMENT (OUTPATIENT)
Dept: CARDIOLOGY | Facility: CLINIC | Age: 22
DRG: 870 | End: 2017-02-09
Payer: MEDICARE

## 2017-02-09 ENCOUNTER — APPOINTMENT (OUTPATIENT)
Dept: OCCUPATIONAL THERAPY | Facility: CLINIC | Age: 22
DRG: 870 | End: 2017-02-09
Payer: MEDICARE

## 2017-02-09 LAB
ALBUMIN SERPL-MCNC: 1.5 G/DL (ref 3.4–5)
ALBUMIN UR-MCNC: 30 MG/DL
ALP SERPL-CCNC: 54 U/L (ref 40–150)
ALT SERPL W P-5'-P-CCNC: 12 U/L (ref 0–70)
AMORPH CRY #/AREA URNS HPF: ABNORMAL /HPF
ANION GAP SERPL CALCULATED.3IONS-SCNC: 8 MMOL/L (ref 3–14)
APPEARANCE UR: CLEAR
AST SERPL W P-5'-P-CCNC: 9 U/L (ref 0–45)
BACTERIA #/AREA URNS HPF: ABNORMAL /HPF
BASOPHILS # BLD AUTO: 0 10E9/L (ref 0–0.2)
BASOPHILS NFR BLD AUTO: 0.3 %
BILIRUB SERPL-MCNC: 0.5 MG/DL (ref 0.2–1.3)
BILIRUB UR QL STRIP: NEGATIVE
BUN SERPL-MCNC: 7 MG/DL (ref 7–30)
CA-I BLD-MCNC: 4.4 MG/DL (ref 4.4–5.2)
CALCIUM SERPL-MCNC: 7.9 MG/DL (ref 8.5–10.1)
CHLORIDE SERPL-SCNC: 106 MMOL/L (ref 94–109)
CO2 SERPL-SCNC: 27 MMOL/L (ref 20–32)
COLOR UR AUTO: YELLOW
CREAT SERPL-MCNC: 0.62 MG/DL (ref 0.66–1.25)
DIFFERENTIAL METHOD BLD: ABNORMAL
EOSINOPHIL # BLD AUTO: 0 10E9/L (ref 0–0.7)
EOSINOPHIL NFR BLD AUTO: 0.1 %
ERYTHROCYTE [DISTWIDTH] IN BLOOD BY AUTOMATED COUNT: 15.8 % (ref 10–15)
GFR SERPL CREATININE-BSD FRML MDRD: ABNORMAL ML/MIN/1.7M2
GLUCOSE SERPL-MCNC: 90 MG/DL (ref 70–99)
GLUCOSE UR STRIP-MCNC: NEGATIVE MG/DL
GRAN CASTS #/AREA URNS LPF: 1 /LPF
HCT VFR BLD AUTO: 32.2 % (ref 40–53)
HGB BLD-MCNC: 10.5 G/DL (ref 13.3–17.7)
HGB BLD-MCNC: 10.6 G/DL (ref 13.3–17.7)
HGB UR QL STRIP: NEGATIVE
IMM GRANULOCYTES # BLD: 0.1 10E9/L (ref 0–0.4)
IMM GRANULOCYTES NFR BLD: 0.3 %
KETONES UR STRIP-MCNC: 80 MG/DL
LEUKOCYTE ESTERASE UR QL STRIP: NEGATIVE
LYMPHOCYTES # BLD AUTO: 1.6 10E9/L (ref 0.8–5.3)
LYMPHOCYTES NFR BLD AUTO: 11 %
MAGNESIUM SERPL-MCNC: 2.1 MG/DL (ref 1.6–2.3)
MCH RBC QN AUTO: 29 PG (ref 26.5–33)
MCHC RBC AUTO-ENTMCNC: 32.6 G/DL (ref 31.5–36.5)
MCV RBC AUTO: 89 FL (ref 78–100)
MONOCYTES # BLD AUTO: 1.7 10E9/L (ref 0–1.3)
MONOCYTES NFR BLD AUTO: 11.4 %
MUCOUS THREADS #/AREA URNS LPF: PRESENT /LPF
NEUTROPHILS # BLD AUTO: 11.3 10E9/L (ref 1.6–8.3)
NEUTROPHILS NFR BLD AUTO: 76.9 %
NITRATE UR QL: NEGATIVE
NRBC # BLD AUTO: 0 10*3/UL
NRBC BLD AUTO-RTO: 0 /100
PH UR STRIP: 6 PH (ref 5–7)
PHOSPHATE SERPL-MCNC: 2.2 MG/DL (ref 2.5–4.5)
PLATELET # BLD AUTO: 249 10E9/L (ref 150–450)
POTASSIUM SERPL-SCNC: 3.6 MMOL/L (ref 3.4–5.3)
PROT SERPL-MCNC: 5 G/DL (ref 6.8–8.8)
RBC # BLD AUTO: 3.62 10E12/L (ref 4.4–5.9)
RBC #/AREA URNS AUTO: 2 /HPF (ref 0–2)
SODIUM SERPL-SCNC: 141 MMOL/L (ref 133–144)
SP GR UR STRIP: 1.02 (ref 1–1.03)
TSH SERPL DL<=0.005 MIU/L-ACNC: 2.84 MU/L (ref 0.4–4)
URN SPEC COLLECT METH UR: ABNORMAL
UROBILINOGEN UR STRIP-MCNC: NORMAL MG/DL (ref 0–2)
VANCOMYCIN SERPL-MCNC: 9.8 MG/L
WBC # BLD AUTO: 14.7 10E9/L (ref 4–11)
WBC #/AREA URNS AUTO: 4 /HPF (ref 0–2)

## 2017-02-09 PROCEDURE — 93306 TTE W/DOPPLER COMPLETE: CPT | Mod: 26 | Performed by: INTERNAL MEDICINE

## 2017-02-09 PROCEDURE — 83735 ASSAY OF MAGNESIUM: CPT | Performed by: SURGERY

## 2017-02-09 PROCEDURE — 81001 URINALYSIS AUTO W/SCOPE: CPT | Performed by: INTERNAL MEDICINE

## 2017-02-09 PROCEDURE — 25000128 H RX IP 250 OP 636: Performed by: INTERNAL MEDICINE

## 2017-02-09 PROCEDURE — 97166 OT EVAL MOD COMPLEX 45 MIN: CPT | Mod: GO | Performed by: OCCUPATIONAL THERAPIST

## 2017-02-09 PROCEDURE — 20000004 ZZH R&B ICU UMMC

## 2017-02-09 PROCEDURE — 84100 ASSAY OF PHOSPHORUS: CPT | Performed by: SURGERY

## 2017-02-09 PROCEDURE — 25800025 ZZH RX 258: Performed by: INTERNAL MEDICINE

## 2017-02-09 PROCEDURE — 25000125 ZZHC RX 250: Performed by: NURSE PRACTITIONER

## 2017-02-09 PROCEDURE — 25000125 ZZHC RX 250: Performed by: SURGERY

## 2017-02-09 PROCEDURE — 25000128 H RX IP 250 OP 636: Performed by: SURGERY

## 2017-02-09 PROCEDURE — 88305 TISSUE EXAM BY PATHOLOGIST: CPT | Performed by: INTERNAL MEDICINE

## 2017-02-09 PROCEDURE — 88112 CYTOPATH CELL ENHANCE TECH: CPT | Performed by: INTERNAL MEDICINE

## 2017-02-09 PROCEDURE — 97110 THERAPEUTIC EXERCISES: CPT | Mod: GO | Performed by: OCCUPATIONAL THERAPIST

## 2017-02-09 PROCEDURE — 99291 CRITICAL CARE FIRST HOUR: CPT | Mod: GC | Performed by: INTERNAL MEDICINE

## 2017-02-09 PROCEDURE — 25500064 ZZH RX 255 OP 636: Performed by: SURGERY

## 2017-02-09 PROCEDURE — 71010 XR CHEST PORT 1 VW: CPT

## 2017-02-09 PROCEDURE — 80053 COMPREHEN METABOLIC PANEL: CPT | Performed by: SURGERY

## 2017-02-09 PROCEDURE — 88312 SPECIAL STAINS GROUP 1: CPT | Performed by: INTERNAL MEDICINE

## 2017-02-09 PROCEDURE — 25000125 ZZHC RX 250: Performed by: INTERNAL MEDICINE

## 2017-02-09 PROCEDURE — 87040 BLOOD CULTURE FOR BACTERIA: CPT | Performed by: INTERNAL MEDICINE

## 2017-02-09 PROCEDURE — 82330 ASSAY OF CALCIUM: CPT | Performed by: INTERNAL MEDICINE

## 2017-02-09 PROCEDURE — 25000128 H RX IP 250 OP 636

## 2017-02-09 PROCEDURE — 85025 COMPLETE CBC W/AUTO DIFF WBC: CPT | Performed by: SURGERY

## 2017-02-09 PROCEDURE — 00000155 ZZHCL STATISTIC H-CELL BLOCK W/STAIN: Performed by: INTERNAL MEDICINE

## 2017-02-09 PROCEDURE — 00000102 ZZHCL STATISTIC CYTO WRIGHT STAIN TC: Performed by: INTERNAL MEDICINE

## 2017-02-09 PROCEDURE — 80202 ASSAY OF VANCOMYCIN: CPT | Performed by: SURGERY

## 2017-02-09 PROCEDURE — 84443 ASSAY THYROID STIM HORMONE: CPT | Performed by: SURGERY

## 2017-02-09 PROCEDURE — A9270 NON-COVERED ITEM OR SERVICE: HCPCS | Mod: GY | Performed by: INTERNAL MEDICINE

## 2017-02-09 PROCEDURE — 25000132 ZZH RX MED GY IP 250 OP 250 PS 637: Mod: GY | Performed by: INTERNAL MEDICINE

## 2017-02-09 PROCEDURE — 40000264 ECHO COMPLETE WITH OPTISON

## 2017-02-09 PROCEDURE — 40000275 ZZH STATISTIC RCP TIME EA 10 MIN

## 2017-02-09 PROCEDURE — 40000133 ZZH STATISTIC OT WARD VISIT: Performed by: OCCUPATIONAL THERAPIST

## 2017-02-09 PROCEDURE — 94003 VENT MGMT INPAT SUBQ DAY: CPT

## 2017-02-09 PROCEDURE — 25000125 ZZHC RX 250

## 2017-02-09 PROCEDURE — 85018 HEMOGLOBIN: CPT | Performed by: INTERNAL MEDICINE

## 2017-02-09 RX ORDER — SODIUM CHLORIDE, SODIUM LACTATE, POTASSIUM CHLORIDE, CALCIUM CHLORIDE 600; 310; 30; 20 MG/100ML; MG/100ML; MG/100ML; MG/100ML
INJECTION, SOLUTION INTRAVENOUS CONTINUOUS
Status: DISPENSED | OUTPATIENT
Start: 2017-02-09 | End: 2017-02-09

## 2017-02-09 RX ORDER — MAGNESIUM SULFATE HEPTAHYDRATE 40 MG/ML
4 INJECTION, SOLUTION INTRAVENOUS EVERY 4 HOURS PRN
Status: DISCONTINUED | OUTPATIENT
Start: 2017-02-09 | End: 2017-02-17

## 2017-02-09 RX ORDER — POTASSIUM CHLORIDE 29.8 MG/ML
20 INJECTION INTRAVENOUS
Status: DISCONTINUED | OUTPATIENT
Start: 2017-02-09 | End: 2017-02-13

## 2017-02-09 RX ORDER — POTASSIUM CHLORIDE 750 MG/1
20-40 TABLET, EXTENDED RELEASE ORAL
Status: DISCONTINUED | OUTPATIENT
Start: 2017-02-09 | End: 2017-02-13

## 2017-02-09 RX ORDER — POTASSIUM CHLORIDE 29.8 MG/ML
20 INJECTION INTRAVENOUS 2 TIMES DAILY
Status: COMPLETED | OUTPATIENT
Start: 2017-02-09 | End: 2017-02-09

## 2017-02-09 RX ORDER — POTASSIUM CHLORIDE 7.45 MG/ML
10 INJECTION INTRAVENOUS
Status: DISCONTINUED | OUTPATIENT
Start: 2017-02-09 | End: 2017-02-13

## 2017-02-09 RX ORDER — SODIUM CHLORIDE, SODIUM LACTATE, POTASSIUM CHLORIDE, CALCIUM CHLORIDE 600; 310; 30; 20 MG/100ML; MG/100ML; MG/100ML; MG/100ML
INJECTION, SOLUTION INTRAVENOUS CONTINUOUS
Status: DISCONTINUED | OUTPATIENT
Start: 2017-02-09 | End: 2017-02-10 | Stop reason: ALTCHOICE

## 2017-02-09 RX ORDER — POTASSIUM CHLORIDE 1.5 G/1.58G
20-40 POWDER, FOR SOLUTION ORAL
Status: DISCONTINUED | OUTPATIENT
Start: 2017-02-09 | End: 2017-02-13

## 2017-02-09 RX ADMIN — PIPERACILLIN AND TAZOBACTAM 4.5 G: 4; .5 INJECTION, POWDER, FOR SOLUTION INTRAVENOUS at 04:33

## 2017-02-09 RX ADMIN — LEVOTHYROXINE SODIUM 112 MCG: 112 TABLET ORAL at 07:51

## 2017-02-09 RX ADMIN — LEVOFLOXACIN 750 MG: 5 INJECTION, SOLUTION INTRAVENOUS at 09:03

## 2017-02-09 RX ADMIN — Medication 40 MG: at 08:14

## 2017-02-09 RX ADMIN — PIPERACILLIN AND TAZOBACTAM 4.5 G: 4; .5 INJECTION, POWDER, FOR SOLUTION INTRAVENOUS at 11:01

## 2017-02-09 RX ADMIN — PIPERACILLIN AND TAZOBACTAM 4.5 G: 4; .5 INJECTION, POWDER, FOR SOLUTION INTRAVENOUS at 22:23

## 2017-02-09 RX ADMIN — POTASSIUM PHOSPHATE, MONOBASIC AND POTASSIUM PHOSPHATE, DIBASIC 15 MMOL: 224; 236 INJECTION, SOLUTION INTRAVENOUS at 10:43

## 2017-02-09 RX ADMIN — SODIUM CHLORIDE, POTASSIUM CHLORIDE, SODIUM LACTATE AND CALCIUM CHLORIDE: 600; 310; 30; 20 INJECTION, SOLUTION INTRAVENOUS at 10:08

## 2017-02-09 RX ADMIN — SODIUM CHLORIDE, POTASSIUM CHLORIDE, SODIUM LACTATE AND CALCIUM CHLORIDE: 600; 310; 30; 20 INJECTION, SOLUTION INTRAVENOUS at 02:03

## 2017-02-09 RX ADMIN — PIPERACILLIN AND TAZOBACTAM 4.5 G: 4; .5 INJECTION, POWDER, FOR SOLUTION INTRAVENOUS at 16:20

## 2017-02-09 RX ADMIN — Medication 50 ML: at 04:34

## 2017-02-09 RX ADMIN — ACETAMINOPHEN 650 MG: 325 TABLET, FILM COATED ORAL at 19:41

## 2017-02-09 RX ADMIN — ACETAMINOPHEN 650 MG: 325 TABLET, FILM COATED ORAL at 00:22

## 2017-02-09 RX ADMIN — POTASSIUM CHLORIDE 20 MEQ: 29.8 INJECTION, SOLUTION INTRAVENOUS at 07:51

## 2017-02-09 RX ADMIN — VANCOMYCIN HYDROCHLORIDE 1500 MG: 10 INJECTION, POWDER, LYOPHILIZED, FOR SOLUTION INTRAVENOUS at 14:10

## 2017-02-09 RX ADMIN — VANCOMYCIN HYDROCHLORIDE 2000 MG: 1 INJECTION, POWDER, LYOPHILIZED, FOR SOLUTION INTRAVENOUS at 21:39

## 2017-02-09 RX ADMIN — MIDAZOLAM HYDROCHLORIDE 6 MG/HR: 5 INJECTION, SOLUTION INTRAMUSCULAR; INTRAVENOUS at 02:02

## 2017-02-09 RX ADMIN — Medication 50 ML: at 16:20

## 2017-02-09 RX ADMIN — VANCOMYCIN HYDROCHLORIDE 1500 MG: 10 INJECTION, POWDER, LYOPHILIZED, FOR SOLUTION INTRAVENOUS at 06:21

## 2017-02-09 RX ADMIN — HUMAN ALBUMIN MICROSPHERES AND PERFLUTREN 3 ML: 10; .22 INJECTION, SOLUTION INTRAVENOUS at 14:00

## 2017-02-09 RX ADMIN — POTASSIUM CHLORIDE 20 MEQ: 29.8 INJECTION, SOLUTION INTRAVENOUS at 10:38

## 2017-02-09 NOTE — PLAN OF CARE
Problem: Goal Outcome Summary  Goal: Goal Outcome Summary  Outcome: No Change  D: Resp decomp, HRF d/t PNA, L pleural effusion  I/A: Pt opens eyes to voice but not following commands. Vent overnight (see flowsheet for settings). Febrile, prn tylenol given. Map goal >65, Levophen weaned to 0.02 mcg/kg/min. No BM, adequate UOP. Remains in restraints for impulsiveness. Chest tube clamped most of night due to high output, currently unclamped to water seal. Sedated on 6 of versed  P: Wean levo, watch CT output, notify MD of any acute issues.

## 2017-02-09 NOTE — PHARMACY-VANCOMYCIN DOSING SERVICE
Pharmacy Vancomycin Note  Date of Service 2017  Patient's  1995   22 year old, male    Indication: Sepsis  Goal Trough Level: 15-20 mg/L  Day of Therapy: 2  Current Vancomycin regimen:  1500 mg IV q8h    Current estimated CrCl = Estimated Creatinine Clearance: 206.1 mL/min (based on Cr of 0.62).    Creatinine for last 3 days  2017:  1:53 PM Creatinine 0.66 mg/dL  2017:  7:46 PM Creatinine 0.61 mg/dL*  2017:  4:54 AM Creatinine 0.62 mg/dL*    Recent Vancomycin Levels (past 3 days)  2017:  4:54 AM Vancomycin Level 9.8 mg/L    Vancomycin IV Administrations (past 72 hours)                   vancomycin (VANCOCIN) 1,500 mg in NaCl 0.9 % 250 mL intermittent infusion (mg) 1,500 mg New Bag 17 1410     1,500 mg New Bag  0621     1,500 mg New Bag 17 2232     1,500 mg New Bag  1458     1,500 mg New Bag  0609                Nephrotoxins and other renal medications (Future)    Start     Dose/Rate Route Frequency Ordered Stop    17 2200  vancomycin (VANCOCIN) 2,000 mg in NaCl 0.9 % 250 mL intermittent infusion      2,000 mg (central catheter) Intravenous EVERY 8 HOURS 17 1503      17 1630  norepinephrine (LEVOPHED) 16 mg in D5W 250 mL infusion      0.03-0.4 mcg/kg/min × 117 kg  3.3-43.9 mL/hr  Intravenous CONTINUOUS 17 1616      17 0400  piperacillin-tazobactam (ZOSYN) 4.5 g vial to attach to  mL bag      4.5 g  over 1 Hours Intravenous EVERY 6 HOURS 17 0344               Contrast Orders - past 72 hours (72h ago through future)    Start     Dose/Rate Route Frequency Ordered Stop    17 1400  perflutren diluted 1mL to 2mL with saline (OPTISON) diluted injection 4 mL      4 mL Intravenous ONCE 17 1347 17 1400          Interpretation of levels and current regimen:  Trough level is  Subtherapeutic    Has serum creatinine changed > 50% in last 72 hours: No    Urine output:  good urine output    Renal Function: Stable    Plan:  1.   Increase vancomycin dose to 2000 mg IV q8 hours.  2.  Pharmacy will check trough levels as appropriate in 1-3 Days.    3. Serum creatinine levels will be ordered daily for the first week of therapy and at least twice weekly for subsequent weeks.      Nirali Aaron, pharmD  PGY-1 Pharmacy Resident

## 2017-02-09 NOTE — PLAN OF CARE
Problem: Goal Outcome Summary  Goal: Goal Outcome Summary  D: Pneumonia  I/A: Vented, minimal vent settings. FiO2 between 40-50% today. , Rate 12, Peep 5. Moderate white, thin secretions orally and from ETT. Lungs coarse with crackles. Maintaining Sats >92%. Propofol D/Cd and Versed started d/t soft BPs. Multiple PRN versed bumps needed to maintain adequate sedation. Left pigtail chest tube to large pleural effusion. Output about 750 since insertion, no longer draining. Alteplase infused and clamped for about 1 hour. Large volume of output put out. Serous/Serosanguinous. MD notified, tube clamped for about 1 hour and unclamped. Increased output, see flowsheets. Per MD to clamp again at 1800 d/t increased output. Central line placed d/t multiple incompatible antibiotics and sedation. Unable to draw labs peripherally due to tough stick. Central line placed by Resident MD. Levo started @ 0.03mcg/min d/t soft BPs. Map goal >65, maintaining.   P: Continue to monitor chest tube output, monitor vitals closely. Will continue to follow plan of care and notify MD with changes.

## 2017-02-09 NOTE — PLAN OF CARE
Problem: Goal Outcome Summary  Goal: Goal Outcome Summary    4C: OT eval completed and tx initiated. Pt opens eyes to name and tracks to voice; able to follow simple motor commands in all limbs. Pt engaged in AAROM/PROM to BUEs and BLEs to promote joint integrity and maintain ROM needed for ADLs. VSS.    Anticipate pt may need rehab stay at discharge pending LOS.

## 2017-02-09 NOTE — PROVIDER NOTIFICATION
Pt requiring bilat soft wrist restraints due to agitation/restlessness, pulling at lines. No s/s of injury. Will continue to monitor q2hrs and will notify MD regarding any change in behavior.

## 2017-02-09 NOTE — PROGRESS NOTES
MICU Progress Note    Hany Patel MRN: [id number]  1995  Date of Admission:2/7/2017  Primary care provider: Liu Song      Assessment and Plan     Hany Patel is a 21 yo M with a history of Down syndrome, hypothyroidism, and obesity admitted with sepsis and hypoxic respiratory failure in setting of presumed pneumonia and associated L sided pleural effusion.     Changes Today  -- Continues to be febrile; pan cultured. Levofed down to 0.02.  -- Large amounts of sero more than serosanguineous drainage from chest tube. 2.5 L out on 2/8 and another 800 ccs out so far today  -- Fluids decreased to 75cc/hr from 150 cc/hr  -- Continued on Zosyn, Levaquin, and Vancomycin  -- Exudative Pleural Effusion; Cytology sent; will discuss bronchoscopy.  -- PEEP maintained at 5; RR of 12; FiO2 down to 40%.  -- One set of blood cultures from 2/8 growing Coag Neg Staph. Likely contaminant.   -- Influenza PCR neg; S pneumo and Legionella Uag neg.   -- Worked with OT and Nursing to get up to chair. Responsive and following commands.    ===PULMONARY===  # Acute hypoxic respiratory failure  Requiring intubation for hypoxia and respiratory distress. In setting of PNA and associated effusion.  - Vent settings:  R 12 PEEP 5 FiO2 40%    # L parapneumonic effusion - Exudative.  DDx is broad but I favor pneumonia +/- malignancy given history of Trisomy 21. Tsh is wnl. Large left sided pleural effusion in setting of presumed pneumonia. S/p CT placement of L pleural pigtail catheter that is putting out serosanguineous fluid.   -- Meets both of LDH Jorge's Criteria. TSH wnl.   -- Cytology sent of pleural fluid. Will consider bronch     ===INFECTIOUS DISEASE===  # Sepsis  # Community acquired pneumonia?   Presented with fevers, tachycardia, and leukocytosis. Found to have L sided pleural effusion and likely associated pneumonia. Infectious workup with UA otherwise unremarkable. Blood cultures  pending.    -- Repeat Blood Cx drawn on 2/9/17 as well as UA/UC  -- Empirically being treated with Vancomycin, Zosyn, and Levofloxacin.    -- One bottle of blood culture from 2/8 growing Coag Neg staph - likely contaminant. WIll continue to follow. Urine Strep and Legionella neg.  -- Influenza PCR neg    ===NEURO===  # Sedation  - Versed gtt - down to 2 (2/9) from 6 early the am of 2/9/    ===CARDIOVASCULAR===  # Tachycardia  Sinus tachycardia. Likely hypovolemia in setting of poor po intake; Resolving though intermittently agitated with increased HR at those times.   -- Continue 75cc/hr of LR.    ===GASTROINTESTINAL===  # Nutrition:    - NPO currently.  Will consider tube feedings starting tomorrow.     ===RENAL===  #Electrolytes: Repleting PRN - on Potassium and Phosphate Repletion protocols  High Dose    ===ENDOCRINE===  # Hypothyroidism  - Continue PTA levothyroxine; TSH on 2/9 wnl.     ===HEM/ONC===  #Normocytic anemia  Likely some amount of dilution but also acute blood loss from chest tube site. RDW elevated. Recheck stable at 10.6. Will watch closely for now.     Prophylaxis:  DVT: Lovenox   GI: PPI  Family:  Updated Mother at bedside.  Disposition: Critically Ill. Requires ongoing ICU care.    Code Status: Full    Patient seen and discussed with staff attending, Dr. Reid.     Gabe Leon MD  Internal Medicine PGY-1  Pager: [phone]      MICU STAFF CRITICAL CARE PROGRESS NOTE:    I saw and examined the patient with the MICU team and concur with findings and A&P as detailed in Dr. Leon's note of today    See my independent note of today    Balta Reid MD  MICU Staff  2280      Events of last 24 hrs:     Patient being weaned off of the levofed. Spiked a fever to 102.4 around midnight. Pan cultured this AM. Alert and able to follow commands. Maintained on Vanc, Zosyn, Levaquin.      OBJECTIVE   Ventilator  Ventilation Mode: CMV/AC  FiO2 (%): 40 %  Rate Set (breaths/minute): 12  "breaths/min  Tidal Volume Set (mL): 450 mL  PEEP (cm H2O): 5 cmH2O  Oxygen Concentration (%): 40 %  Resp: 14  Arterial Blood Gas result: No ABG results       Intake/Output    Intake/Output Summary (Last 24 hours) at 02/09/17 0702  Last data filed at 02/09/17 0700   Gross per 24 hour   Intake 6202.1 ml   Output   3805 ml   Net 2397.1 ml       Vital Signs    Temp: 100.5  F (38.1  C) Temp src: Axillary BP: 107/57 mmHg   Heart Rate: 87 Resp: 14 SpO2: 97 % O2 Device: Mechanical Ventilator   Height: 160 cm (5' 2.99\") Weight: 112.8 kg (248 lb 10.9 oz)  Estimated body mass index is 44.06 kg/(m^2) as calculated from the following:    Height as of this encounter: 1.6 m (5' 2.99\").    Weight as of this encounter: 112.8 kg (248 lb 10.9 oz).       Physical Exam  General: Intubated, no acute distress; able to follow commands  HEENT: PERRLA  CV: Tachycardic, regular, normal S1S2, no murmurs, rubs, or gallops    Resp: Decreased BS L-side, R clear to auscultation without rales, L CT in place with serosanguinous drainage  Abd: Soft, non-tender, non-distended, BS+  : Lagos in place  Extremities: warm and well perfused, no lower extremity edema  Neuro: No focal deficits, moving all extremities spontaneously      LINES  RIJ 2/8 -- Present  Lagos 2/8 - Present  Peripheral IV x1 2/8 - Present R arm  Peripheral IVx1 2/7 - Present  R arm  12 Welsh Left Pleural Pigtail catheter - L chest  NG Tube 2/8 - Present    ETT Tube Size 8   ROUTINE ICU LABS:     Labs Reviewed  Pertinent for:   Recent Labs   Lab Test  02/09/17   0454  02/08/17   1946   NA  141  139   POTASSIUM  3.6  4.0   CHLORIDE  106  105   CO2  27  27   ANIONGAP  8  7   GLC  90  103*   BUN  7  8   CR  0.62*  0.61*   JUAN  7.9*  7.6*     ICal 4.4  Albumin 1.5 from 2.5  Mg 2.1  Phos 2.2  WBC 26.2 --> 14.7  Hgb 13.3 --> 10.5  Plat 305 --> 249    Serum LDH 95  Pleural   Serum Protein 6.9  Pleural Protein 0.5  Mg 2.1  Phos 2.5  Ical 4.4    Microbiology         Blood culture " [[id number]] (Abnormal) Collected: 02/08/17 0535     Order Status: Completed Lab Status: Preliminary result Updated: 02/09/17 1132     Specimen Information: Blood       Specimen Description Blood Port       Culture Micro -- (A)       Result:        Cultured on the 1st day of incubation: Gram positive cocci in clusters   Critical Value/Significant Value, preliminary result only, called to and read    back by Teresita Brown RN 4C @0822 2/9/17. DH.   (Note)   POSITIVE for Staphylococci other than S.aureus, S.epidermidis and   S.lugdunensis, by Knotchigene multiplex nucleic acid test.   Coagulase-negative staphylococci are the most common venipuncture or   collection associated skin CONTAMINANTS grown in blood cultures.   Final identification and antimicrobial susceptibility testing will be   verified by standard methods.     Specimen tested with Verigene multiplex, gram-positive blood culture   nucleic acid test for the following targets: Staph aureus, Staph   epidermidis, Staph lugdunensis, other Staph species, Enterococcus   faecalis, Enterococcus faecium, Streptococcus species, S. agalactiae,   S. anginosus grp., S. pneumoniae, S. pyogenes, Listeria sp., mecA   (methicillin resistance) and Sherri/B (vancomycin resistance).     Critical Value/Significant Value called to and read back by Teresita Brown RN @1132 02/09/17 gd           Imaging     Exam:  XR CHEST PORT 1 VW, 2/9/2017 8:20 AM     History: Follow up chest tube drainage     Comparison:  Chest radiograph from 2/8/2017.     Findings:  Single AP view of the chest is obtained. Stable  endotracheal tube position approximately 5 cm above the diana. Right  IJ central venous catheter tip in the high SVC. Enteric tube projects  off the field-of-view below the diaphragm. Stable position of left  basilar pigtail chest tube. The cardiomediastinal silhouette is  obscured. No significant change in large left pleural effusion and  near complete opacification of the left lung.  Hazy right perihilar and  basilar opacities may represent edema versus atelectasis and/or  infection. No pneumothorax. Visualized upper abdomen and osseous  structures are unremarkable.                                                                       Impression:     1. Unchanged appearance of large left pleural effusion with near  complete opacification of the left lung. Stable position of left  basilar chest tube.  2. Right perihilar and basilar hazy airspace opacities may represent  edema versus atelectasis and/or infection.     I have personally reviewed the examination and initial interpretation  and I agree with the findings.     ISSAC BECK MD

## 2017-02-09 NOTE — PROGRESS NOTES
02/09/17 1611   Quick Adds   Type of Visit Initial Occupational Therapy Evaluation   Living Environment   Lives With parent(s)   Living Arrangements house  (3 level; pt's bedroom is upstairs)   Home Accessibility stairs to enter home;stairs within home   Number of Stairs to Enter Home 4   Number of Stairs Within Home 24   Transportation Available family or friend will provide   Living Environment Comment Pt's father works full time during the day; pt's mother is self-employed and planning to retire at the end of this month; pt intubated/sedated - mother present and provided eval information   Self-Care   Dominant Hand right   Usual Activity Tolerance good   Current Activity Tolerance poor   Regular Exercise yes   Activity/Exercise Type (treadmill, stationary bike, walking the dog)   Exercise Amount/Frequency daily   Equipment Currently Used at Home none   Functional Level Prior   Ambulation 0-->independent   Transferring 0-->independent   Toileting 2-->assistive person   Bathing 2-->assistive person  (set-up assist)   Dressing 0-->independent   Eating 0-->independent   Communication 0-->understands/communicates without difficulty   Swallowing 0-->swallows foods/liquids without difficulty   Cognition (pervasive developmental delay)   Fall history within last six months no   Which of the above functional risks had a recent onset or change? ambulation;transferring;toileting;bathing;dressing;cognition   Prior Functional Level Comment Pt was (I) with functional transfers, g/h and bathing after set-up, toileting except with BMs (needs help with pericares), and dressing; pt's parents do meals and medications; pt attends a day program 4 days/week   General Information   Onset of Illness/Injury or Date of Surgery - Date 02/07/17   Referring Physician Lilly Garay MD   Patient/Family Goals Statement unable to state due to intubation   Additional Occupational Profile Info/Pertinent History of Current Problem Pt is a 23 yo  with Downs Syndrome presented yesterday with several days of fever and loose stools.  Two days ago had fever to 101.5 at day program and V x1.  Then not eating with some congestion.  Yesterday was breathing faster and less alert with some behavioral change.  Presented to [facility] ED with T 101.7 and O2 Sat ~ 85% on RA.  WBC 26K and L sided opacification on CXR.  Treated with Zosyn empirically for pneumonia and had L pigtail catheter placed by IR   Precautions/Limitations oxygen therapy device and L/min   Weight-Bearing Status - LUE full weight-bearing   Weight-Bearing Status - RUE full weight-bearing   Weight-Bearing Status - LLE full weight-bearing   Weight-Bearing Status - RLE full weight-bearing   Cognitive Status Examination   Cognitive Comment Pt opens eyes and tracks to name; pt able to follow simple motor commands   Visual Perception   Visual Perception (has glasses but does not wear per mother)   Sensory Examination   Sensory Quick Adds No deficits were identified   Pain Assessment   Patient Currently in Pain No   Range of Motion (ROM)   ROM Comment BUE/BLE PROM WFL   Strength   Strength Comments unable to assess   Activities of Daily Living Analysis   Impairments Contributing to Impaired Activities of Daily Living cognition impaired;strength decreased   General Therapy Interventions   Planned Therapy Interventions ADL retraining;IADL retraining;bed mobility training;cognition;ROM;strengthening;stretching;transfer training;home program guidelines;progressive activity/exercise   Clinical Impression   Criteria for Skilled Therapeutic Interventions Met yes, treatment indicated   OT Diagnosis decreased ADL/IADL (I)   Influenced by the following impairments weakness, cognition   Assessment of Occupational Performance 5 or more Performance Deficits   Identified Performance Deficits toileting, grooming, bathing, dressing, functional transfers   Clinical Decision Making (Complexity) Moderate complexity   Therapy  "Frequency 5 times/wk   Predicted Duration of Therapy Intervention (days/wks) 14 days   Anticipated Equipment Needs at Discharge (TBD pending discharge location)   Anticipated Discharge Disposition Home with Home Therapy;Home with Outpatient Therapy;Transitional Care Facility;Acute Rehabilitation Facility   Risks and Benefits of Treatment have been explained. Yes   Patient, Family & other staff in agreement with plan of care Yes   [facility] TM \"6 Clicks\"   2016, Trustees of [facility], under license to [employer].  All rights reserved.   6 Clicks Short Forms Daily Activity Inpatient Short Form   [facility]  \"6 Clicks\" Daily Activity Inpatient Short Form   1. Putting on and taking off regular lower body clothing? 2 - A Lot   2. Bathing (including washing, rinsing, drying)? 2 - A Lot   3. Toileting, which includes using toilet, bedpan or urinal? 2 - A Lot   4. Putting on and taking off regular upper body clothing? 2 - A Lot   5. Taking care of personal grooming such as brushing teeth? 2 - A Lot   6. Eating meals? 3 - A Little   Daily Activity Raw Score (Score out of 24.Lower scores equate to lower levels of function) 13   Total Evaluation Time   Total Evaluation Time (Minutes) 7     "

## 2017-02-09 NOTE — PROGRESS NOTES
MICU STAFF CRITICAL CARE PROGRESS NOTE:    I saw and examined the patient with the MICU team and concur with findings and A&P as detailed in Dr. Fulton note of today    Febrile overnight Tmax 102.4  On low dose levophed 0.03 for MAP > 65 with good UO  2.4L out from chest tube    Sleeping male on ventilator, following commands when awake  Versed 2 mg; Norepi 0.02  Tachy 100 RRR w/o m  R crackles ant base  Left chest tube with some crackles and wheezing  Abd mildly distended and soft, not apparently tender  No edema    Hgb 10.5 WBC 14.7 (26K yest)  Plt adeqauate  K 3.6; HCO3 27 creat 0.6 Chris 4.4  Prot 5 Alb 1.5 nl LFTs  Mg  PO4 2.2  Pleural: / serum 95  Protein   Meets Light's criteria for exudate  BC 1+ GPC in clusters Staph - prob coag negative by report  UA - small bacteria & WBC  Negative Legionella, strep pneumonia, influenza studies  ACMV 12 450 5 40%  CXR:  Less mediastinal shift;  Possible increase in R basal density; Left side still largely opacified  Moderate thick yellow secretions from ETT  Sputum gm stain PMNs, culture pending    Febrile Hypoxic Resp Failure with Large L Exudative Effusion  Zosyn, Vanco & Levoflox  empirically  Not a classic pneumonia presentation. No clear h/o aspiration except for 1 episode of V w/o clinical aspiration  Pleural fluid cytology given strong family history  Consider Bronch depending upon chest CT results after better expansion  Chest tube to suction to drain max of 500 mls per 8 hours (and then water seal or clamp)    Hypotension / ? Sepsis  Cardiac echo to r/o valvular disease component.    Downs Syndrome    Hypothyroidism      (Critical Care 45 minutes cumulative thus far today)    Balta Reid MD  MICU Staff  [phone]

## 2017-02-09 NOTE — PROCEDURES
"Procedure/Surgery Information  [facility], [place]    Bedside Procedure Note  Date of Service (when I performed the procedure): 02/08/2017    Hany Patel is a 22 year old male patient.  1. Pneumonia    2. Parapneumonic effusion    3. Down's syndrome      Past Medical History   Diagnosis Date     Diarrhea      Diarrhea Episodes      Temp: 98.7  F (37.1  C) Temp src: Axillary BP: 128/60 mmHg   Heart Rate: 96 Resp: 18 SpO2: 96 % O2 Device: Mechanical Ventilator      Central line  Date/Time: 2/8/2017 6:48 PM  Performed by: JAMEL CHERRY  Authorized by: JAMEL CHERRY  Consent: Verbal consent obtained. Written consent obtained.  Risks and benefits: risks, benefits and alternatives were discussed  Consent given by: parent  Patient understanding: patient states understanding of the procedure being performed  Patient consent: the patient's understanding of the procedure matches consent given  Procedure consent: procedure consent matches procedure scheduled  Relevant documents: relevant documents present and verified  Test results: test results available and properly labeled  Site marked: the operative site was marked  Imaging studies: imaging studies available  Required items: required blood products, implants, devices, and special equipment available  Patient identity confirmed: arm band and hospital-assigned identification number  Time out: Immediately prior to procedure a \"time out\" was called to verify the correct patient, procedure, equipment, support staff and site/side marked as required.  Indications: vascular access  Local anesthetic: lidocaine 1% without epinephrine  Patient sedated: yes  Sedation type: anxiolysis  Sedatives: midazolam and fentanyl  Vitals: Vital signs were monitored during sedation.  Preparation: skin prepped with chlorhexidine and sterile field established  Location details: right internal jugular  Patient position: flat  Catheter type: triple " lumen  Pre-procedure: landmarks identified  Ultrasound guidance: yes  Number of attempts: 1  Successful placement: yes  Post-procedure: line sutured and dressing applied  Assessment: blood return through all parts,  free fluid flow and placement verified by x-ray  Patient tolerance: Patient tolerated the procedure well with no immediate complications    Supervised by Anyiah Pina & Balta Leon     MICU Staff    I was present for the critical portion of the procedure.  Reviewed and agree    Balta Reid MD

## 2017-02-09 NOTE — PLAN OF CARE
Problem: Goal Outcome Summary  Goal: Goal Outcome Summary  PT 4C: HOLD - PT orders received and acknowledged. Pt is newly intubated and following minimal commands. At this time, OT to follow for ROM needs and PT to HOLD. Will initiate PT when the pt is able to more actively participate with two therapies.

## 2017-02-10 ENCOUNTER — APPOINTMENT (OUTPATIENT)
Dept: GENERAL RADIOLOGY | Facility: CLINIC | Age: 22
DRG: 870 | End: 2017-02-10
Payer: MEDICARE

## 2017-02-10 ENCOUNTER — APPOINTMENT (OUTPATIENT)
Dept: OCCUPATIONAL THERAPY | Facility: CLINIC | Age: 22
DRG: 870 | End: 2017-02-10
Payer: MEDICARE

## 2017-02-10 LAB
ALBUMIN UR-MCNC: NEGATIVE MG/DL
ANION GAP SERPL CALCULATED.3IONS-SCNC: 9 MMOL/L (ref 3–14)
APPEARANCE FLD: NORMAL
APPEARANCE UR: CLEAR
BACTERIA SPEC CULT: NORMAL
BASOPHILS # BLD AUTO: 0.1 10E9/L (ref 0–0.2)
BASOPHILS NFR BLD AUTO: 0.4 %
BILIRUB UR QL STRIP: NEGATIVE
BUN SERPL-MCNC: 12 MG/DL (ref 7–30)
CA-I BLD-MCNC: 4.7 MG/DL (ref 4.4–5.2)
CALCIUM SERPL-MCNC: 8.3 MG/DL (ref 8.5–10.1)
CHLORIDE SERPL-SCNC: 105 MMOL/L (ref 94–109)
CO2 SERPL-SCNC: 28 MMOL/L (ref 20–32)
COLOR FLD: COLORLESS
COLOR UR AUTO: ABNORMAL
COPATH REPORT: NORMAL
CREAT SERPL-MCNC: 0.93 MG/DL (ref 0.66–1.25)
CREAT UR-MCNC: 42 MG/DL
DIFFERENTIAL METHOD BLD: ABNORMAL
EOSINOPHIL # BLD AUTO: 0.1 10E9/L (ref 0–0.7)
EOSINOPHIL NFR BLD AUTO: 0.6 %
EOSINOPHIL NFR FLD MANUAL: 4 %
ERYTHROCYTE [DISTWIDTH] IN BLOOD BY AUTOMATED COUNT: 15.8 % (ref 10–15)
FRACT EXCRET NA UR+SERPL-RTO: 1 %
GFR SERPL CREATININE-BSD FRML MDRD: ABNORMAL ML/MIN/1.7M2
GLUCOSE SERPL-MCNC: 85 MG/DL (ref 70–99)
GLUCOSE UR STRIP-MCNC: NEGATIVE MG/DL
GRAM STN SPEC: NORMAL
GRAM STN SPEC: NORMAL
HCT VFR BLD AUTO: 31.4 % (ref 40–53)
HGB BLD-MCNC: 10.2 G/DL (ref 13.3–17.7)
HGB UR QL STRIP: ABNORMAL
IMM GRANULOCYTES # BLD: 0.1 10E9/L (ref 0–0.4)
IMM GRANULOCYTES NFR BLD: 0.8 %
KETONES UR STRIP-MCNC: 5 MG/DL
LEUKOCYTE ESTERASE UR QL STRIP: NEGATIVE
LYMPHOCYTES # BLD AUTO: 1.4 10E9/L (ref 0.8–5.3)
LYMPHOCYTES NFR BLD AUTO: 12 %
LYMPHOCYTES NFR FLD MANUAL: 8 %
Lab: NORMAL
Lab: NORMAL
MAGNESIUM SERPL-MCNC: 2.2 MG/DL (ref 1.6–2.3)
MAGNESIUM SERPL-MCNC: 2.3 MG/DL (ref 1.6–2.3)
MCH RBC QN AUTO: 29.4 PG (ref 26.5–33)
MCHC RBC AUTO-ENTMCNC: 32.5 G/DL (ref 31.5–36.5)
MCV RBC AUTO: 91 FL (ref 78–100)
MICRO REPORT STATUS: NORMAL
MONOCYTES # BLD AUTO: 1.5 10E9/L (ref 0–1.3)
MONOCYTES NFR BLD AUTO: 12.2 %
MUCOUS THREADS #/AREA URNS LPF: PRESENT /LPF
NEUTROPHILS # BLD AUTO: 8.8 10E9/L (ref 1.6–8.3)
NEUTROPHILS NFR BLD AUTO: 74 %
NEUTS BAND NFR FLD MANUAL: 23 %
NITRATE UR QL: NEGATIVE
NRBC # BLD AUTO: 0 10*3/UL
NRBC BLD AUTO-RTO: 0 /100
OTHER CELLS FLD MANUAL: 65 %
PH UR STRIP: 5.5 PH (ref 5–7)
PHOSPHATE SERPL-MCNC: 2.8 MG/DL (ref 2.5–4.5)
PHOSPHATE SERPL-MCNC: 3.3 MG/DL (ref 2.5–4.5)
PLATELET # BLD AUTO: 274 10E9/L (ref 150–450)
POTASSIUM SERPL-SCNC: 3.7 MMOL/L (ref 3.4–5.3)
RBC # BLD AUTO: 3.47 10E12/L (ref 4.4–5.9)
RBC # FLD: NORMAL /UL
RBC #/AREA URNS AUTO: 47 /HPF (ref 0–2)
SODIUM SERPL-SCNC: 142 MMOL/L (ref 133–144)
SODIUM UR-SCNC: 62 MMOL/L
SP GR UR STRIP: 1.01 (ref 1–1.03)
SPECIMEN SOURCE FLD: NORMAL
SPECIMEN SOURCE: NORMAL
SQUAMOUS #/AREA URNS AUTO: <1 /HPF (ref 0–1)
TRANS CELLS #/AREA URNS HPF: <1 /HPF (ref 0–1)
URN SPEC COLLECT METH UR: ABNORMAL
UROBILINOGEN UR STRIP-MCNC: NORMAL MG/DL (ref 0–2)
VANCOMYCIN SERPL-MCNC: 24.2 MG/L
WBC # BLD AUTO: 11.9 10E9/L (ref 4–11)
WBC # FLD AUTO: 178 /UL
WBC #/AREA URNS AUTO: 4 /HPF (ref 0–2)

## 2017-02-10 PROCEDURE — 97530 THERAPEUTIC ACTIVITIES: CPT | Mod: GO | Performed by: OCCUPATIONAL THERAPIST

## 2017-02-10 PROCEDURE — 25000125 ZZHC RX 250: Performed by: NURSE PRACTITIONER

## 2017-02-10 PROCEDURE — 87205 SMEAR GRAM STAIN: CPT | Performed by: INTERNAL MEDICINE

## 2017-02-10 PROCEDURE — 5A1955Z RESPIRATORY VENTILATION, GREATER THAN 96 CONSECUTIVE HOURS: ICD-10-PCS | Performed by: SURGERY

## 2017-02-10 PROCEDURE — 20000004 ZZH R&B ICU UMMC

## 2017-02-10 PROCEDURE — 40000275 ZZH STATISTIC RCP TIME EA 10 MIN

## 2017-02-10 PROCEDURE — 80048 BASIC METABOLIC PNL TOTAL CA: CPT | Performed by: INTERNAL MEDICINE

## 2017-02-10 PROCEDURE — 25000128 H RX IP 250 OP 636: Performed by: SURGERY

## 2017-02-10 PROCEDURE — 82330 ASSAY OF CALCIUM: CPT | Performed by: INTERNAL MEDICINE

## 2017-02-10 PROCEDURE — 25000125 ZZHC RX 250: Performed by: SURGERY

## 2017-02-10 PROCEDURE — 82570 ASSAY OF URINE CREATININE: CPT | Performed by: INTERNAL MEDICINE

## 2017-02-10 PROCEDURE — 25000125 ZZHC RX 250: Performed by: INTERNAL MEDICINE

## 2017-02-10 PROCEDURE — 99291 CRITICAL CARE FIRST HOUR: CPT | Mod: 25 | Performed by: INTERNAL MEDICINE

## 2017-02-10 PROCEDURE — 27210429 ZZH NUTRITION PRODUCT INTERMEDIATE LITER

## 2017-02-10 PROCEDURE — A7035 POS AIRWAY PRESS HEADGEAR: HCPCS

## 2017-02-10 PROCEDURE — 87070 CULTURE OTHR SPECIMN AEROBIC: CPT | Performed by: INTERNAL MEDICINE

## 2017-02-10 PROCEDURE — 25000128 H RX IP 250 OP 636: Performed by: INTERNAL MEDICINE

## 2017-02-10 PROCEDURE — 85025 COMPLETE CBC W/AUTO DIFF WBC: CPT | Performed by: INTERNAL MEDICINE

## 2017-02-10 PROCEDURE — A9270 NON-COVERED ITEM OR SERVICE: HCPCS | Mod: GY | Performed by: INTERNAL MEDICINE

## 2017-02-10 PROCEDURE — 25000132 ZZH RX MED GY IP 250 OP 250 PS 637: Mod: GY | Performed by: INTERNAL MEDICINE

## 2017-02-10 PROCEDURE — 87633 RESP VIRUS 12-25 TARGETS: CPT | Performed by: INTERNAL MEDICINE

## 2017-02-10 PROCEDURE — 94003 VENT MGMT INPAT SUBQ DAY: CPT

## 2017-02-10 PROCEDURE — 89051 BODY FLUID CELL COUNT: CPT | Performed by: INTERNAL MEDICINE

## 2017-02-10 PROCEDURE — 83735 ASSAY OF MAGNESIUM: CPT | Performed by: INTERNAL MEDICINE

## 2017-02-10 PROCEDURE — 84100 ASSAY OF PHOSPHORUS: CPT | Performed by: INTERNAL MEDICINE

## 2017-02-10 PROCEDURE — 40000133 ZZH STATISTIC OT WARD VISIT: Performed by: OCCUPATIONAL THERAPIST

## 2017-02-10 PROCEDURE — 81001 URINALYSIS AUTO W/SCOPE: CPT | Performed by: INTERNAL MEDICINE

## 2017-02-10 PROCEDURE — 88312 SPECIAL STAINS GROUP 1: CPT | Performed by: INTERNAL MEDICINE

## 2017-02-10 PROCEDURE — 84300 ASSAY OF URINE SODIUM: CPT | Performed by: INTERNAL MEDICINE

## 2017-02-10 PROCEDURE — 31624 DX BRONCHOSCOPE/LAVAGE: CPT

## 2017-02-10 PROCEDURE — 25000128 H RX IP 250 OP 636

## 2017-02-10 PROCEDURE — 80202 ASSAY OF VANCOMYCIN: CPT | Performed by: SURGERY

## 2017-02-10 PROCEDURE — 88108 CYTOPATH CONCENTRATE TECH: CPT | Performed by: INTERNAL MEDICINE

## 2017-02-10 PROCEDURE — 0B998ZX DRAINAGE OF LINGULA BRONCHUS, VIA NATURAL OR ARTIFICIAL OPENING ENDOSCOPIC, DIAGNOSTIC: ICD-10-PCS | Performed by: INTERNAL MEDICINE

## 2017-02-10 PROCEDURE — 71010 XR CHEST PORT 1 VW: CPT

## 2017-02-10 PROCEDURE — 87086 URINE CULTURE/COLONY COUNT: CPT | Performed by: SURGERY

## 2017-02-10 PROCEDURE — 40000809 ZZH STATISTIC NO DOCUMENTATION TO SUPPORT CHARGE

## 2017-02-10 PROCEDURE — 31624 DX BRONCHOSCOPE/LAVAGE: CPT | Mod: GC | Performed by: INTERNAL MEDICINE

## 2017-02-10 RX ORDER — FUROSEMIDE 10 MG/ML
10 INJECTION INTRAMUSCULAR; INTRAVENOUS ONCE
Status: COMPLETED | OUTPATIENT
Start: 2017-02-10 | End: 2017-02-10

## 2017-02-10 RX ORDER — SODIUM CHLORIDE 9 MG/ML
INJECTION, SOLUTION INTRAVENOUS
Status: COMPLETED
Start: 2017-02-10 | End: 2017-02-10

## 2017-02-10 RX ORDER — ATROPINE SULFATE 10 MG/ML
1 SOLUTION/ DROPS OPHTHALMIC 3 TIMES DAILY PRN
Status: DISCONTINUED | OUTPATIENT
Start: 2017-02-10 | End: 2017-02-10 | Stop reason: ALTCHOICE

## 2017-02-10 RX ORDER — GLYCOPYRROLATE 1 MG/5ML
2 SOLUTION ORAL 2 TIMES DAILY PRN
Status: DISCONTINUED | OUTPATIENT
Start: 2017-02-10 | End: 2017-02-14

## 2017-02-10 RX ORDER — FENTANYL CITRATE 50 UG/ML
50-100 INJECTION, SOLUTION INTRAMUSCULAR; INTRAVENOUS ONCE
Status: COMPLETED | OUTPATIENT
Start: 2017-02-10 | End: 2017-02-10

## 2017-02-10 RX ORDER — POLYETHYLENE GLYCOL 3350 17 G/17G
17 POWDER, FOR SOLUTION ORAL DAILY
Status: DISCONTINUED | OUTPATIENT
Start: 2017-02-10 | End: 2017-02-17

## 2017-02-10 RX ORDER — DEXMEDETOMIDINE HYDROCHLORIDE 4 UG/ML
0.2-0.7 INJECTION, SOLUTION INTRAVENOUS CONTINUOUS
Status: DISCONTINUED | OUTPATIENT
Start: 2017-02-10 | End: 2017-02-12

## 2017-02-10 RX ORDER — LORAZEPAM 2 MG/ML
.5-2 INJECTION INTRAMUSCULAR
Status: DISCONTINUED | OUTPATIENT
Start: 2017-02-10 | End: 2017-02-12 | Stop reason: ALTCHOICE

## 2017-02-10 RX ORDER — SODIUM CHLORIDE, SODIUM LACTATE, POTASSIUM CHLORIDE, CALCIUM CHLORIDE 600; 310; 30; 20 MG/100ML; MG/100ML; MG/100ML; MG/100ML
INJECTION, SOLUTION INTRAVENOUS
Status: DISCONTINUED
Start: 2017-02-10 | End: 2017-02-17 | Stop reason: HOSPADM

## 2017-02-10 RX ADMIN — POLYETHYLENE GLYCOL 3350 17 G: 17 POWDER, FOR SOLUTION ORAL at 08:19

## 2017-02-10 RX ADMIN — MIDAZOLAM 2 MG: 1 INJECTION INTRAMUSCULAR; INTRAVENOUS at 02:13

## 2017-02-10 RX ADMIN — LORAZEPAM 0.5 MG/HR: 2 INJECTION INTRAMUSCULAR; INTRAVENOUS at 12:36

## 2017-02-10 RX ADMIN — FUROSEMIDE 10 MG: 10 INJECTION, SOLUTION INTRAVENOUS at 18:51

## 2017-02-10 RX ADMIN — PIPERACILLIN AND TAZOBACTAM 4.5 G: 4; .5 INJECTION, POWDER, FOR SOLUTION INTRAVENOUS at 22:11

## 2017-02-10 RX ADMIN — VANCOMYCIN HYDROCHLORIDE 2000 MG: 10 INJECTION, POWDER, LYOPHILIZED, FOR SOLUTION INTRAVENOUS at 20:18

## 2017-02-10 RX ADMIN — LEVOTHYROXINE SODIUM 112 MCG: 112 TABLET ORAL at 08:11

## 2017-02-10 RX ADMIN — PIPERACILLIN AND TAZOBACTAM 4.5 G: 4; .5 INJECTION, POWDER, FOR SOLUTION INTRAVENOUS at 11:08

## 2017-02-10 RX ADMIN — LEVOFLOXACIN 750 MG: 5 INJECTION, SOLUTION INTRAVENOUS at 08:11

## 2017-02-10 RX ADMIN — PIPERACILLIN AND TAZOBACTAM 4.5 G: 4; .5 INJECTION, POWDER, FOR SOLUTION INTRAVENOUS at 15:54

## 2017-02-10 RX ADMIN — ACETAMINOPHEN 650 MG: 325 TABLET, FILM COATED ORAL at 08:59

## 2017-02-10 RX ADMIN — SENNOSIDES A AND B 5 ML: 415.36 LIQUID ORAL at 20:17

## 2017-02-10 RX ADMIN — FENTANYL CITRATE 50 MCG/HR: 50 INJECTION, SOLUTION INTRAMUSCULAR; INTRAVENOUS at 04:57

## 2017-02-10 RX ADMIN — SODIUM CHLORIDE 500 ML: 9 INJECTION, SOLUTION INTRAVENOUS at 08:19

## 2017-02-10 RX ADMIN — MIDAZOLAM 2 MG: 1 INJECTION INTRAMUSCULAR; INTRAVENOUS at 06:18

## 2017-02-10 RX ADMIN — MIDAZOLAM 2 MG: 1 INJECTION INTRAMUSCULAR; INTRAVENOUS at 04:12

## 2017-02-10 RX ADMIN — Medication 50 ML: at 15:54

## 2017-02-10 RX ADMIN — DEXMEDETOMIDINE HYDROCHLORIDE 0.5 MCG/KG/HR: 4 INJECTION, SOLUTION INTRAVENOUS at 22:13

## 2017-02-10 RX ADMIN — LORAZEPAM 0.5 MG: 2 INJECTION INTRAMUSCULAR; INTRAVENOUS at 20:15

## 2017-02-10 RX ADMIN — PIPERACILLIN AND TAZOBACTAM 4.5 G: 4; .5 INJECTION, POWDER, FOR SOLUTION INTRAVENOUS at 03:43

## 2017-02-10 RX ADMIN — MIDAZOLAM 2 MG: 1 INJECTION INTRAMUSCULAR; INTRAVENOUS at 03:19

## 2017-02-10 RX ADMIN — VANCOMYCIN HYDROCHLORIDE 2000 MG: 1 INJECTION, POWDER, LYOPHILIZED, FOR SOLUTION INTRAVENOUS at 05:57

## 2017-02-10 RX ADMIN — FENTANYL CITRATE 100 MCG: 50 INJECTION INTRAMUSCULAR; INTRAVENOUS at 13:36

## 2017-02-10 RX ADMIN — DEXMEDETOMIDINE HYDROCHLORIDE 0.2 MCG/KG/HR: 4 INJECTION, SOLUTION INTRAVENOUS at 06:59

## 2017-02-10 RX ADMIN — ACETAMINOPHEN 650 MG: 325 TABLET, FILM COATED ORAL at 20:17

## 2017-02-10 RX ADMIN — FENTANYL CITRATE 100 MCG: 50 INJECTION INTRAMUSCULAR; INTRAVENOUS at 04:31

## 2017-02-10 RX ADMIN — FENTANYL CITRATE 100 MCG: 50 INJECTION, SOLUTION INTRAMUSCULAR; INTRAVENOUS at 06:30

## 2017-02-10 RX ADMIN — Medication 40 MG: at 08:11

## 2017-02-10 RX ADMIN — SENNOSIDES A AND B 5 ML: 415.36 LIQUID ORAL at 08:18

## 2017-02-10 RX ADMIN — FENTANYL CITRATE 50 MCG: 50 INJECTION INTRAMUSCULAR; INTRAVENOUS at 03:33

## 2017-02-10 RX ADMIN — POTASSIUM CHLORIDE 20 MEQ: 29.8 INJECTION, SOLUTION INTRAVENOUS at 05:02

## 2017-02-10 RX ADMIN — Medication 50 ML: at 03:43

## 2017-02-10 RX ADMIN — FENTANYL CITRATE 100 MCG: 50 INJECTION INTRAMUSCULAR; INTRAVENOUS at 06:16

## 2017-02-10 RX ADMIN — ACETAMINOPHEN 650 MG: 325 TABLET, FILM COATED ORAL at 14:16

## 2017-02-10 RX ADMIN — Medication 15 ML: at 11:08

## 2017-02-10 RX ADMIN — FENTANYL CITRATE 50 MCG: 50 INJECTION INTRAMUSCULAR; INTRAVENOUS at 20:54

## 2017-02-10 RX ADMIN — FENTANYL CITRATE 100 MCG: 50 INJECTION INTRAMUSCULAR; INTRAVENOUS at 13:44

## 2017-02-10 RX ADMIN — DEXMEDETOMIDINE HYDROCHLORIDE 0.5 MCG/KG/HR: 4 INJECTION, SOLUTION INTRAVENOUS at 15:40

## 2017-02-10 NOTE — PHARMACY-VANCOMYCIN DOSING SERVICE
"Pharmacy Vancomycin Consult Note    Date of Service February 10, 2017  Patient's  1995   22 year old, male    Vancomycin therapy d/c'd earlier today but then resumed given GPCs in pleural fluid    Indication: GPCs in pleural fliuid  Goal Trough Level: 15-20 mg/L  Day of Therapy: 3  Current Vancomycin regimen: 2000 mg IV q8h    Current estimated CrCl = Estimated Creatinine Clearance: 138.9 mL/min (based on Cr of 0.93).    Creatinine for last 3 days  2017:  7:46 PM Creatinine 0.61 mg/dL*  2017:  4:54 AM Creatinine 0.62 mg/dL*  2/10/2017:  3:53 AM Creatinine 0.93 mg/dL    Recent Vancomycin Levels (past 3 days)  2017:  4:54 AM Vancomycin Level 9.8 mg/L  2/10/2017:  1:57 PM Vancomycin Level 24.2 mg/L (8 hrs post-dose)    Body mass index is 44.96 kg/(m^2).  Height is 5' 2.992\".   Wt Readings from Last 2 Encounters:   02/10/17 115.1 kg (253 lb 12 oz)   16 109.997 kg (242 lb 8 oz)       Vancomycin IV Administrations (past 72 hours)                   vancomycin (VANCOCIN) 2,000 mg in NaCl 0.9 % 250 mL intermittent infusion (mg) 2,000 mg New Bag 02/10/17 0557     2,000 mg New Bag 17 2139    vancomycin (VANCOCIN) 1,500 mg in NaCl 0.9 % 250 mL intermittent infusion (mg) 1,500 mg New Bag 17 1410     1,500 mg New Bag  0621     1,500 mg New Bag 17 2232     1,500 mg New Bag  1458     1,500 mg New Bag  0609              Nephrotoxins and other renal medications (Future)    Start     Dose/Rate Route Frequency Ordered Stop    17 1630  norepinephrine (LEVOPHED) 16 mg in D5W 250 mL infusion      0.03-0.4 mcg/kg/min × 117 kg  3.3-43.9 mL/hr  Intravenous CONTINUOUS 17 1616      17 0400  piperacillin-tazobactam (ZOSYN) 4.5 g vial to attach to  mL bag      4.5 g  over 1 Hours Intravenous EVERY 6 HOURS 17 0344           Contrast Orders - past 72 hours (72h ago through future)    Start     Dose/Rate Route Frequency Ordered Stop    17 1400  perflutren diluted 1mL to " 2mL with saline (OPTISON) diluted injection 4 mL      4 mL Intravenous ONCE 02/09/17 1347 02/09/17 1400        Interpretation of levels and current regimen:  Trough level is Supratherapeutic  Has serum creatinine changed > 50% in last 72 hours: No  Urine output: unable to determine  Renal Function: slight trend up in SCr    Plan:  1.  Vancomycin therapy is being resumed, last dose was vancomycin 2000 mg IV @ 05:57 this AM. Trough level=24.2 mg/L (8 hrs post-dose) so will decrease dose to 2000 mg IV every 12 hours. Resume dosing tonight at 8PM to allow for vancomycin level to decrease <20 mg/L.  2.  Pharmacy will check trough levels as appropriate in 1-3 Days.    3.  Serum creatinine levels will be ordered daily for the first week of therapy and at least twice weekly for subsequent weeks.      Bebe Cantu, PharmD  February 10, 2017              .

## 2017-02-10 NOTE — PLAN OF CARE
Problem: Goal Outcome Summary  Goal: Goal Outcome Summary  Outcome: No Change  D: Parapneumonia Effusion  I/A: Pt remains on CMV at 40%/12/450/5 with PIPs in the 20's.  Thick, clear secretions with suctioning.  Oral care/suctioning needed often.  Bronch performed today-labs sent. Failed PS this AM.  NSR, BP WDL (MAP Goal > 65), TMax 101.5.  Chest tube patent with good, serosanguinous output.  Alteplase instilled for 2 hours.  TF started at 14:30 at 10mL (goal 40ml/hr) q 4 30mL free water flushes-increase 10mL q 8 hours pending residuals.  Versed DC'd and Ativan started.  Remains on precedex and fentanyl.  Tylenol given x2 for fevers.   Lasix given x1.  Lagos patent with good UO-1 bout of pink urine with clots but cleared to yellow. UA sent.  Lagos DC'd-unable to place condom catheter.  Bowel regimen started to treat constipation.  Starting to develop pinkness on cheeks under ETT equipment.  Up to chair x1(pivot and lift).    P:  Will continue to support and monitor.  Possible PS tomorrow AM.  Possible CT tomorrow.

## 2017-02-10 NOTE — PLAN OF CARE
Problem: Goal Outcome Summary  Goal: Goal Outcome Summary  Outcome: Improving  D: patient stable this morning on 0.02 of levophed and 6 mg/hr versed, RASS -3 at 0700. Weaned versed and levophed as able. Lung sounds coarse with diminished left lung sounds, though only a small amount of thick secretions with suctioning. Chest tube in place with 650 cc output from 0700 - 1600. Order to clamp if over 500cc output in 8 hours. TPA in CT every 8 hours per order. Tmax 101.2, with positive blood culture returned from 2/7. Pan culture performed, including sending pleural fluid from chest tube. Sling used to assist patient to chair in afternoon after sedation wean. Discussion of tube feedings on rounds and decision to reassess tomorrow. Electrolytes replaced, but not rechecked. Noon hgb check stable.   A: Patient improved this afternoon. Now on 2 mg /hr versed and able to interact with nurse and family. Does not complain of pain. Remains febrile. Will administer tylenol. Chest tube continues to have serosanginous output when unclamped. Tolerated activity with no change in vital signs. Urine output remains adequate and patient continues on LR for hydration.   P:Continue to monitor overnight and treat as ordered. Notify team of any changes.

## 2017-02-10 NOTE — PROGRESS NOTES
MICU STAFF CRITICAL CARE PROGRESS NOTE:    I saw and examined the patient with the MICU team and concur with findings and A&P as detailed in Dr. Leon's note of today    Febrile on Vanco / Zosyn / Levoquin  Failed this AM's PSV trial due to tachypnea  Off levophed  7 AM very agitated, needing increased sedation    Agitated, febrile HR ,  ACMV 40% 12/15-18  450  I:O  + 2.3L (incl CT drainage)  1.2L out from chest tube  Responding to commands  Bronchial BS on Left  L pigtail catheter  RRR no m/g/r  Abd more distended  No edema    Hgb 10.2  WBC 11.9 (14.7)  Na 142 HCO3 28 K 3.7 creat 0.9 (0.6) PO4 2.8  Echo LVEF 50-55%  BC 1/many coag negative Staph  CXR  signiificant improvement L lung aeration  Pleural fluid cytology:  pending    Febrile Hypoxic Resp Failure with Large L Exudative Effusion  Zosyn, Vanco & Levoflox  empirically  Not a classic pneumonia presentation. No clear h/o aspiration except for 1 episode of V w/o clinical aspiration  Consider Bronch depending upon chest CT results after better expansion  Continue chest tube to suction to drain max of 500 mls per 8 hours (and then water seal or clamp)  D/c Vanco, continue rest    Hypotension / ? Sepsis  Cardiac echo to r/o valvular disease component.    Downs Syndrome    GUILLERMINA  D/c Vanco    Hypothyroidism    Start nutrition and bowel regimen    (Critical Care 45 minutes cumulative thus far today)    Balta Reid MD  MICU Staff  [phone]

## 2017-02-10 NOTE — PROGRESS NOTES
CLINICAL NUTRITION SERVICES - BRIEF NOTE    Nutrition Prescription    RECOMMENDATIONS FOR MDs/PROVIDERS TO ORDER:  -Closely monitor electrolytes and replace using the high replacement protocol  -Free water flush adjustment per MD discretion    Recommendations already ordered by Registered Dietitian (RD):  -If K/Mg are WNL and Phos >1.9, start TF via OGT: TwoCal HN @ 10 mL/hr.   -If lytes remain WNL, adv TF by 10 mL q8h to goal 40 ml/hr (960 ml/day) to provide 1920 kcals (27 kcal/kg), 81 g PRO (1.1 g/kg), 672 ml free H2O, 210 g CHO and 5 g Fiber daily per dosing weight 70 kg.  -15 mL liquid MVI for micronutrient needs  -30 mL water flush q4h for tube patency  -Assess gastric residuals and HOB >30 degrees while feeding gastrically    Future/Additional Recommendations:  -EN start, adv, lytes  -Gastric feed tolerance     Nutrition Progress Note - f/u for progress towards previous nutrition POC (see previous 2/9 reassessment for details)    Macy Shen RDN, LD  Pgr: [phone]

## 2017-02-10 NOTE — PLAN OF CARE
Problem: Goal Outcome Summary  Goal: Goal Outcome Summary  OT: anticipate TCU pt intubated and with mild anxiety like symptoms which may be interfering with his mobility assessment today. , pt min assist EOB to chair today, pt with deconditioning leading to decreased ADL I.

## 2017-02-10 NOTE — PHARMACY-CONSULT NOTE
Pharmacy Tube Feeding Consult    Medication reviewed for administration by feeding tube and for potential food/drug interactions.    Recommendation: No changes are needed at this time.     Pharmacy will continue to follow as new medications are ordered.      Cristian Segal, LeticiaD

## 2017-02-10 NOTE — PLAN OF CARE
Problem: Goal Outcome Summary  Goal: Goal Outcome Summary  Outcome: No Change  D: Intubated, chest tube for pneumonia    IA: Follows commands. Appropriately sedated at beginning of shift but steadily became more agitated. Not responding to versed boluses so fentanyl boluses given and gtt started. Continued to be difficult to sedate, trying to climb out of bed so now precedex added. Copious oral secretions, head gear changed. Pt attempting to communicate with hands, gesturing to take ETT out. Attempted to educate and redirect but pt not responsive to this. Tmax 101.3, tylenol given with good response. LS coarse, CMV 12/450/5/40%. L chest tube to water seal, instilling TPA and clamping. Failed PST this am with tachypneic. OG clamped. Lagos with adequate output. No BM since 2/7.     P: Keep pt adequately sedated. Continue to orient.

## 2017-02-10 NOTE — PROGRESS NOTES
MICU Progress Note    Hany Patel MRN: [id number]  1995  Date of Admission:2/7/2017  Primary care provider: Liu Song  DATE OF SERVICE: February 10, 2017        Assessment and Plan     Hany Patel is a 23 yo M with a history of Down syndrome, hypothyroidism, and obesity admitted with sepsis and hypoxic respiratory failure in setting of presumed pneumonia and associated L sided pleural effusion.     Changes Today  -- Continues to be febrile overnight; cultures not redrawn. No growth to date on any cultures except for one blood cx from 2/8 growing Coag neg staph - likely contaminant.  -- Levofed off; Very agitated this morning; Added precedex. Switched from versed to ativan gtt. Also getting fentanyl gtt with bumps  -- Chest tube output decreased yesterday to 1.2 L from 2.4L on 2/8.   -- Fluids turned off.   -- D/C ed Vancomycin; MRSA nasal swab negative and mild GUILLERMINA.  -- Cytology pending on pleural fluid  -- Failed pressure support trial due to tachypnea.  -- Nutrition started today.  -- Will likely obtain bronchoscopy today and repeat CT tomorrow.  -- CXR improved - L sided pleural effusions decreased  -- Started on bowel regimen - last bm 2/7    ===PULMONARY===  # Acute hypoxic respiratory failure  Requiring intubation for hypoxia and respiratory distress. In setting of PNA and associated effusion.  -- Vent settings:  R 12 PEEP 5 FiO2 40%  -- Failed PS trial this AM due to tachpnea to 40s.     # L parapneumonic effusion - Exudative.  DDx is broad but I favor pneumonia +/- malignancy given history of Trisomy 21. Tsh is wnl. Large left sided pleural effusion in setting of presumed pneumonia. S/p CT placement of L pleural pigtail catheter that is putting out serosanguineous fluid.   -- Meets both of LDH Light's Criteria. TSH wnl. Protein criteria not met  -- Cytology sent of pleural fluid. Will consider bronch. Cytology pending  -- White count downtrending.  Will continue levofloxacin and zosyn. D/Chirag vancomycin.     ===INFECTIOUS DISEASE===  # Sepsis  # Community acquired pneumonia?   Presented with fevers, tachycardia, and leukocytosis. Found to have L sided pleural effusion and likely associated pneumonia. Infectious workup with UA otherwise unremarkable. Blood cultures pending.    -- No new growth to date on any cultures.  -- On Zosyn and Levaquin. D/C ed vanc  -- One bottle of blood culture from 2/8 growing Coag Neg staph - likely contaminant. WIll continue to follow. Urine Strep and Legionella neg.  -- Influenza PCR neg  -- Will consider bronchoscopy for better cultures today. If not will redraw sputum culture.     ===NEURO===  # Sedation  - Ativan gtt, precedex gtt, fentanyl gtt. Transitioned off of versed. Has Versed and Fentanyl Bumps available.     ===CARDIOVASCULAR===  # Tachycardia  Sinus tachycardia. Likely hypovolemia in setting of poor po intake; Resolving though intermittently agitated with increased HR at those times.   -- Stopped IVF today as has had ~6L fluid resuscitation. Will start tube feeds today. See Fluid status discussion below.     #Decreased EF 50-55%  No evidence of any septal defects (noted because of Trisomy 21).     ===GASTROINTESTINAL===  # Nutrition:    - Put in dietary consult today and will start tube feeds per their recs.    ===RENAL===  #Fluid Status  Slightly net positive this admission. Will hold IVF at this time as >6L resuscitated. Will start tube feeds.     #Electrolytes: Repleting PRN - on Potassium and Phosphate Repletion protocols  High Dose     #GUILLERMINA Cr 0.9 from 0.6 baseline. On vancomycin and Zosyn. Trough level of vanc yesterday wnl but given that MRSA nares swab negative will d/c.  -- FENa and UA ordered  -- Will continue to follow  -- Lagos continues to drain well but will ask nursing to bladder sca  ===ENDOCRINE===  # Hypothyroidism  - Continue PTA levothyroxine; TSH on 2/9 wnl.     ===HEM/ONC===  #Normocytic  "anemia  Likely some amount of dilution but also acute blood loss from chest tube site. RDW elevated. Recheck stable at 10.6. Will watch closely for now.     Prophylaxis:  DVT: Lovenox   GI: PPI  Family:  Updated Mother at bedside.  Disposition: Critically Ill. Requires ongoing ICU care.    Code Status: Full    Patient seen and discussed with staff attending, Dr. Reid.     Gabe Leon MD  Internal Medicine PGY-1  Pager: [phone]    MICU STAFF CRITICAL CARE PROGRESS NOTE:    I saw and examined the patient with the MICU team and concur with findings and A&P as detailed in Dr. Leon's note of today    See my independent note of today.    Balta Reid MD  MICU Staff  [phone]        Events of last 24 hrs:     Patient weaned off of levofed; Failed pressure support trial this AM due to tachycardia. Continues to spike fevers; Tmax o/n was 101.3 - chest tube to water seal currently.      OBJECTIVE   Ventilator  Ventilation Mode: CMV/AC  FiO2 (%): 40 %  Rate Set (breaths/minute): 12 breaths/min  Tidal Volume Set (mL): 450 mL  PEEP (cm H2O): 5 cmH2O  Oxygen Concentration (%): 40 %  Resp: 14  Arterial Blood Gas result: No ABG results       Intake/Output      Intake/Output Summary (Last 24 hours) at 02/10/17 1110  Last data filed at 02/10/17 1100   Gross per 24 hour   Intake 3868.52 ml   Output   2105 ml   Net 1763.52 ml           Vital Signs    Temp: 101.5  F (38.6  C) Temp src: Axillary BP: 118/54 mmHg   Heart Rate: 86 Resp: 14 SpO2: 92 % O2 Device: Mechanical Ventilator   Height: 160 cm (5' 2.99\") Weight: 115.1 kg (253 lb 12 oz)  Estimated body mass index is 44.96 kg/(m^2) as calculated from the following:    Height as of this encounter: 1.6 m (5' 2.99\").    Weight as of this encounter: 115.1 kg (253 lb 12 oz).       Physical Exam  General: Intubated, no acute distress; able to follow commands  HEENT: PERRLA  CV: Tachycardic, regular, normal S1S2, no murmurs, rubs, or gallops    Resp: Decreased BS L-side, " R clear to auscultation without rales, L CT in place with serosanguinous drainage; Bronchial breath sounds auscultated in the L anterior lung fields  Abd: Soft, non-tender, non-distended, BS+  : [place] in place  Extremities: warm and well perfused, no lower extremity edema  Neuro: No focal deficits, moving all extremities spontaneously; able to follow commands    LINES  RIJ 2/8 -- Present  [place] 2/8 - Present  Peripheral IV x1 2/8 - Present R arm  Peripheral IVx1 2/7 - Present  R arm  12 Persian Left Pleural Pigtail catheter - L chest  NG Tube 2/8 - Present    ETT Tube Size 8   ROUTINE ICU LABS:     Labs Reviewed  Pertinent for:   Recent Labs   Lab Test  02/09/17   0454  02/08/17   1946   NA  141  139   POTASSIUM  3.6  4.0   CHLORIDE  106  105   CO2  27  27   ANIONGAP  8  7   GLC  90  103*   BUN  7  8   CR  0.62*  0.61*   JUAN  7.9*  7.6*     Na 134 --> 142  K of 3.7  ICal 4.4 -->   Albumin 1.5 from 2.5  Mg 2.1 --> 2.2  Phos 2.5 --> 2.8   WBC 26.2 --> 14.7 --> 11.9  Hgb 13.3 --> 10.5 --> 10.2  Plat 305 --> 249 --> 274    Serum LDH 95  Pleural   Serum Protein 6.9  Pleural Protein 0.5        Microbiology         Blood culture [[id number]] (Abnormal) Collected: 02/08/17 0535     Order Status: Completed Lab Status: Preliminary result Updated: 02/09/17 1132     Specimen Information: Blood       Specimen Description Blood Port       Culture Micro -- (A)       Result:        Cultured on the 1st day of incubation: Gram positive cocci in clusters   Critical Value/Significant Value, preliminary result only, called to and read    back by Teresita Brown RN 4C @0610 2/9/17. DH.   (Note)   POSITIVE for Staphylococci other than S.aureus, S.epidermidis and   S.lugdunensis, by FanGo multiplex nucleic acid test.   Coagulase-negative staphylococci are the most common venipuncture or   collection associated skin CONTAMINANTS grown in blood cultures.   Final identification and antimicrobial susceptibility testing will be    verified by standard methods.     Specimen tested with Fiixigene multiplex, gram-positive blood culture   nucleic acid test for the following targets: Staph aureus, Staph   epidermidis, Staph lugdunensis, other Staph species, Enterococcus   faecalis, Enterococcus faecium, Streptococcus species, S. agalactiae,   S. anginosus grp., S. pneumoniae, S. pyogenes, Listeria sp., mecA   (methicillin resistance) and Sherri/B (vancomycin resistance).     Critical Value/Significant Value called to and read back by Teresita Brown RN @1132 02/09/17 gd           Imaging     CXR from 2/10/17 AM shows improved L sided pleural effusions on my read; official read pending.                ADDENDUM 14:24 2/10/17    Pleural Fluid growing Gram +cocci. Vancomycin restarted. Bronchoscopy shows no obvious masses;

## 2017-02-10 NOTE — PROCEDURES
Bronchoscopy     PROCEDURE:   Bronchoscopy with Bronchoalveolar lavage    INDICATION:  Left sided infiltrates and effusions    PROCEDURE :  Gordon Pina MD    SUPERVISOR:  Dr. Reid    CONSENT:  Obtained and in the paper chart.    UNIVERSAL PROTOCOL: Patient Identification was verified, time out was performed. Imaging data reviewed. Barrier precaution done: Hands washed, mask, gloves, gown, and eye protection all used.     MEDICATIONS: 100 mcg fentanyl and 1 mg lorazepam    PROCEDURE: Patient monitored during entire procedure. 5 mL of lidocaine instilled via ET tube with minimal cough. The flexible bronchoscope was inserted through patients ET tube.  The ET tube was in adequate position.  The diana was sharp.  An airway inspection was done to the subsegmental level which showed moderate inflammation/edema of the airway lumens and minimal secretions.  The bronchoscope was then advanced to the lingula and placed into wedge.  A BAL was performed where  120 mL of saline were instilled in 60 mL aliquots.  A total of 80 mL were collected.    SAMPLES:   80 mL of  fluid were sent for analysis.    Dr. Reid was present for the procedure.    COMPLICATIONS: None    Gordon Pina MD    MICU Staff    I was present for the entire viewing portion of the procedure.  Above reviewed and agree.    Balta Reid MD

## 2017-02-11 ENCOUNTER — APPOINTMENT (OUTPATIENT)
Dept: ULTRASOUND IMAGING | Facility: CLINIC | Age: 22
DRG: 870 | End: 2017-02-11
Attending: SURGERY
Payer: MEDICARE

## 2017-02-11 ENCOUNTER — APPOINTMENT (OUTPATIENT)
Dept: CT IMAGING | Facility: CLINIC | Age: 22
DRG: 870 | End: 2017-02-11
Attending: INTERNAL MEDICINE
Payer: MEDICARE

## 2017-02-11 ENCOUNTER — APPOINTMENT (OUTPATIENT)
Dept: GENERAL RADIOLOGY | Facility: CLINIC | Age: 22
DRG: 870 | End: 2017-02-11
Payer: MEDICARE

## 2017-02-11 LAB
ALBUMIN SERPL-MCNC: 1.4 G/DL (ref 3.4–5)
ALBUMIN UR-MCNC: 10 MG/DL
ALP SERPL-CCNC: 114 U/L (ref 40–150)
ALT SERPL W P-5'-P-CCNC: 18 U/L (ref 0–70)
ANION GAP SERPL CALCULATED.3IONS-SCNC: 11 MMOL/L (ref 3–14)
ANION GAP SERPL CALCULATED.3IONS-SCNC: 6 MMOL/L (ref 3–14)
APPEARANCE UR: CLEAR
AST SERPL W P-5'-P-CCNC: 16 U/L (ref 0–45)
BACTERIA SPEC CULT: ABNORMAL
BACTERIA SPEC CULT: NORMAL
BASOPHILS # BLD AUTO: 0.1 10E9/L (ref 0–0.2)
BASOPHILS NFR BLD AUTO: 0.6 %
BILIRUB DIRECT SERPL-MCNC: 0.1 MG/DL (ref 0–0.2)
BILIRUB SERPL-MCNC: 0.4 MG/DL (ref 0.2–1.3)
BILIRUB UR QL STRIP: NEGATIVE
BUN SERPL-MCNC: 26 MG/DL (ref 7–30)
BUN SERPL-MCNC: 28 MG/DL (ref 7–30)
CALCIUM SERPL-MCNC: 7.6 MG/DL (ref 8.5–10.1)
CALCIUM SERPL-MCNC: 8.3 MG/DL (ref 8.5–10.1)
CHLORIDE SERPL-SCNC: 107 MMOL/L (ref 94–109)
CHLORIDE SERPL-SCNC: 108 MMOL/L (ref 94–109)
CO2 SERPL-SCNC: 25 MMOL/L (ref 20–32)
CO2 SERPL-SCNC: 29 MMOL/L (ref 20–32)
COLOR UR AUTO: ABNORMAL
CREAT SERPL-MCNC: 2.76 MG/DL (ref 0.66–1.25)
CREAT SERPL-MCNC: 3.33 MG/DL (ref 0.66–1.25)
CREAT UR-MCNC: 80 MG/DL
DIFFERENTIAL METHOD BLD: ABNORMAL
EOSINOPHIL # BLD AUTO: 0.1 10E9/L (ref 0–0.7)
EOSINOPHIL NFR BLD AUTO: 0.6 %
ERYTHROCYTE [DISTWIDTH] IN BLOOD BY AUTOMATED COUNT: 15.8 % (ref 10–15)
ERYTHROCYTE [DISTWIDTH] IN BLOOD BY AUTOMATED COUNT: 15.8 % (ref 10–15)
FLUAV H1 2009 PAND RNA SPEC QL NAA+PROBE: NEGATIVE
FLUAV H1 RNA SPEC QL NAA+PROBE: NEGATIVE
FLUAV H3 RNA SPEC QL NAA+PROBE: NEGATIVE
FLUAV RNA SPEC QL NAA+PROBE: NEGATIVE
FLUBV RNA SPEC QL NAA+PROBE: NEGATIVE
GFR SERPL CREATININE-BSD FRML MDRD: 23 ML/MIN/1.7M2
GFR SERPL CREATININE-BSD FRML MDRD: 28 ML/MIN/1.7M2
GLUCOSE SERPL-MCNC: 97 MG/DL (ref 70–99)
GLUCOSE SERPL-MCNC: 99 MG/DL (ref 70–99)
GLUCOSE UR STRIP-MCNC: NEGATIVE MG/DL
HADV DNA SPEC QL NAA+PROBE: NEGATIVE
HADV DNA SPEC QL NAA+PROBE: NEGATIVE
HCT VFR BLD AUTO: 29.2 % (ref 40–53)
HCT VFR BLD AUTO: 32.1 % (ref 40–53)
HGB BLD-MCNC: 10.4 G/DL (ref 13.3–17.7)
HGB BLD-MCNC: 9.3 G/DL (ref 13.3–17.7)
HGB UR QL STRIP: ABNORMAL
HMPV RNA SPEC QL NAA+PROBE: NEGATIVE
HPIV1 RNA SPEC QL NAA+PROBE: NEGATIVE
HPIV2 RNA SPEC QL NAA+PROBE: NEGATIVE
HPIV3 RNA SPEC QL NAA+PROBE: NEGATIVE
IMM GRANULOCYTES # BLD: 0.2 10E9/L (ref 0–0.4)
IMM GRANULOCYTES NFR BLD: 1.5 %
KETONES UR STRIP-MCNC: NEGATIVE MG/DL
LACTATE BLD-SCNC: 0.7 MMOL/L (ref 0.7–2.1)
LEUKOCYTE ESTERASE UR QL STRIP: ABNORMAL
LYMPHOCYTES # BLD AUTO: 1.2 10E9/L (ref 0.8–5.3)
LYMPHOCYTES NFR BLD AUTO: 12 %
MAGNESIUM SERPL-MCNC: 2.5 MG/DL (ref 1.6–2.3)
MCH RBC QN AUTO: 28.9 PG (ref 26.5–33)
MCH RBC QN AUTO: 29.3 PG (ref 26.5–33)
MCHC RBC AUTO-ENTMCNC: 31.8 G/DL (ref 31.5–36.5)
MCHC RBC AUTO-ENTMCNC: 32.4 G/DL (ref 31.5–36.5)
MCV RBC AUTO: 90 FL (ref 78–100)
MCV RBC AUTO: 91 FL (ref 78–100)
MICRO REPORT STATUS: ABNORMAL
MICRO REPORT STATUS: NORMAL
MICROBIOLOGIST REVIEW: NORMAL
MICROORGANISM SPEC CULT: ABNORMAL
MONOCYTES # BLD AUTO: 1.4 10E9/L (ref 0–1.3)
MONOCYTES NFR BLD AUTO: 13.5 %
MUCOUS THREADS #/AREA URNS LPF: PRESENT /LPF
NEUTROPHILS # BLD AUTO: 7.4 10E9/L (ref 1.6–8.3)
NEUTROPHILS NFR BLD AUTO: 71.8 %
NITRATE UR QL: NEGATIVE
NRBC # BLD AUTO: 0 10*3/UL
NRBC BLD AUTO-RTO: 0 /100
PH UR STRIP: 5.5 PH (ref 5–7)
PHOSPHATE SERPL-MCNC: 4.4 MG/DL (ref 2.5–4.5)
PLATELET # BLD AUTO: 297 10E9/L (ref 150–450)
PLATELET # BLD AUTO: 301 10E9/L (ref 150–450)
POTASSIUM SERPL-SCNC: 3.7 MMOL/L (ref 3.4–5.3)
POTASSIUM SERPL-SCNC: 3.9 MMOL/L (ref 3.4–5.3)
PROCALCITONIN SERPL-MCNC: 0.72 NG/ML
PROT SERPL-MCNC: 5 G/DL (ref 6.8–8.8)
RBC # BLD AUTO: 3.22 10E12/L (ref 4.4–5.9)
RBC # BLD AUTO: 3.55 10E12/L (ref 4.4–5.9)
RBC #/AREA URNS AUTO: 15 /HPF (ref 0–2)
RETICS # AUTO: 36.1 10E9/L (ref 25–95)
RETICS/RBC NFR AUTO: 1.1 % (ref 0.5–2)
RHINOVIRUS RNA SPEC QL NAA+PROBE: NEGATIVE
RSV RNA SPEC QL NAA+PROBE: NEGATIVE
RSV RNA SPEC QL NAA+PROBE: NEGATIVE
SODIUM SERPL-SCNC: 143 MMOL/L (ref 133–144)
SODIUM SERPL-SCNC: 143 MMOL/L (ref 133–144)
SP GR UR STRIP: 1.01 (ref 1–1.03)
SPECIMEN SOURCE: ABNORMAL
SPECIMEN SOURCE: NORMAL
SPECIMEN SOURCE: NORMAL
URN SPEC COLLECT METH UR: ABNORMAL
UROBILINOGEN UR STRIP-MCNC: NORMAL MG/DL (ref 0–2)
UUN UR-MCNC: 426 MG/DL
UUN/CREAT 24H UR: 5 G/G CR
VANCOMYCIN SERPL-MCNC: 40.7 MG/L
WBC # BLD AUTO: 10.3 10E9/L (ref 4–11)
WBC # BLD AUTO: 11.2 10E9/L (ref 4–11)
WBC #/AREA URNS AUTO: 6 /HPF (ref 0–2)

## 2017-02-11 PROCEDURE — 25000128 H RX IP 250 OP 636: Performed by: SURGERY

## 2017-02-11 PROCEDURE — 80076 HEPATIC FUNCTION PANEL: CPT | Performed by: INTERNAL MEDICINE

## 2017-02-11 PROCEDURE — 80048 BASIC METABOLIC PNL TOTAL CA: CPT | Performed by: INTERNAL MEDICINE

## 2017-02-11 PROCEDURE — 99291 CRITICAL CARE FIRST HOUR: CPT | Mod: GC | Performed by: INTERNAL MEDICINE

## 2017-02-11 PROCEDURE — 85025 COMPLETE CBC W/AUTO DIFF WBC: CPT | Performed by: INTERNAL MEDICINE

## 2017-02-11 PROCEDURE — A9270 NON-COVERED ITEM OR SERVICE: HCPCS | Mod: GY | Performed by: INTERNAL MEDICINE

## 2017-02-11 PROCEDURE — 27210429 ZZH NUTRITION PRODUCT INTERMEDIATE LITER

## 2017-02-11 PROCEDURE — 25000128 H RX IP 250 OP 636: Performed by: INTERNAL MEDICINE

## 2017-02-11 PROCEDURE — 25000125 ZZHC RX 250: Performed by: NURSE PRACTITIONER

## 2017-02-11 PROCEDURE — 83605 ASSAY OF LACTIC ACID: CPT | Performed by: INTERNAL MEDICINE

## 2017-02-11 PROCEDURE — 25000125 ZZHC RX 250: Performed by: INTERNAL MEDICINE

## 2017-02-11 PROCEDURE — 20000004 ZZH R&B ICU UMMC

## 2017-02-11 PROCEDURE — 87040 BLOOD CULTURE FOR BACTERIA: CPT | Performed by: SURGERY

## 2017-02-11 PROCEDURE — 85045 AUTOMATED RETICULOCYTE COUNT: CPT | Performed by: INTERNAL MEDICINE

## 2017-02-11 PROCEDURE — 25000132 ZZH RX MED GY IP 250 OP 250 PS 637: Mod: GY | Performed by: INTERNAL MEDICINE

## 2017-02-11 PROCEDURE — 71010 XR CHEST PORT 1 VW: CPT

## 2017-02-11 PROCEDURE — 84145 PROCALCITONIN (PCT): CPT | Performed by: INTERNAL MEDICINE

## 2017-02-11 PROCEDURE — 84540 ASSAY OF URINE/UREA-N: CPT | Performed by: INTERNAL MEDICINE

## 2017-02-11 PROCEDURE — 76700 US EXAM ABDOM COMPLETE: CPT

## 2017-02-11 PROCEDURE — 40000281 ZZH STATISTIC TRANSPORT TIME EA 15 MIN

## 2017-02-11 PROCEDURE — 36415 COLL VENOUS BLD VENIPUNCTURE: CPT | Performed by: SURGERY

## 2017-02-11 PROCEDURE — 84100 ASSAY OF PHOSPHORUS: CPT | Performed by: INTERNAL MEDICINE

## 2017-02-11 PROCEDURE — 85027 COMPLETE CBC AUTOMATED: CPT | Performed by: INTERNAL MEDICINE

## 2017-02-11 PROCEDURE — 40000275 ZZH STATISTIC RCP TIME EA 10 MIN

## 2017-02-11 PROCEDURE — 25000125 ZZHC RX 250: Performed by: SURGERY

## 2017-02-11 PROCEDURE — 81001 URINALYSIS AUTO W/SCOPE: CPT | Performed by: INTERNAL MEDICINE

## 2017-02-11 PROCEDURE — 83735 ASSAY OF MAGNESIUM: CPT | Performed by: INTERNAL MEDICINE

## 2017-02-11 PROCEDURE — 80202 ASSAY OF VANCOMYCIN: CPT | Performed by: SURGERY

## 2017-02-11 PROCEDURE — 71250 CT THORAX DX C-: CPT

## 2017-02-11 PROCEDURE — 94003 VENT MGMT INPAT SUBQ DAY: CPT

## 2017-02-11 PROCEDURE — 40000611 ZZHCL STATISTIC MORPHOLOGY W/INTERP HEMEPATH TC 85060: Performed by: SURGERY

## 2017-02-11 RX ORDER — SODIUM CHLORIDE 9 MG/ML
INJECTION, SOLUTION INTRAVENOUS
Status: DISCONTINUED
Start: 2017-02-11 | End: 2017-02-17 | Stop reason: HOSPADM

## 2017-02-11 RX ORDER — PIPERACILLIN SODIUM, TAZOBACTAM SODIUM 2; .25 G/10ML; G/10ML
2.25 INJECTION, POWDER, LYOPHILIZED, FOR SOLUTION INTRAVENOUS EVERY 6 HOURS
Status: DISCONTINUED | OUTPATIENT
Start: 2017-02-11 | End: 2017-02-11

## 2017-02-11 RX ORDER — PIPERACILLIN SODIUM, TAZOBACTAM SODIUM 3; .375 G/15ML; G/15ML
3.38 INJECTION, POWDER, LYOPHILIZED, FOR SOLUTION INTRAVENOUS EVERY 6 HOURS
Status: DISCONTINUED | OUTPATIENT
Start: 2017-02-11 | End: 2017-02-12

## 2017-02-11 RX ORDER — LEVOFLOXACIN 5 MG/ML
750 INJECTION, SOLUTION INTRAVENOUS
Status: DISCONTINUED | OUTPATIENT
Start: 2017-02-13 | End: 2017-02-13

## 2017-02-11 RX ADMIN — SODIUM CHLORIDE 1000 ML: 9 INJECTION, SOLUTION INTRAVENOUS at 08:45

## 2017-02-11 RX ADMIN — FENTANYL CITRATE 50 MCG: 50 INJECTION INTRAMUSCULAR; INTRAVENOUS at 08:15

## 2017-02-11 RX ADMIN — NOREPINEPHRINE BITARTRATE 0.03 MCG/KG/MIN: 1 INJECTION INTRAVENOUS at 17:36

## 2017-02-11 RX ADMIN — FENTANYL CITRATE 200 MCG/HR: 50 INJECTION, SOLUTION INTRAMUSCULAR; INTRAVENOUS at 22:15

## 2017-02-11 RX ADMIN — PIPERACILLIN AND TAZOBACTAM 4.5 G: 4; .5 INJECTION, POWDER, FOR SOLUTION INTRAVENOUS at 10:32

## 2017-02-11 RX ADMIN — SODIUM CHLORIDE 1000 ML: 9 INJECTION, SOLUTION INTRAVENOUS at 15:34

## 2017-02-11 RX ADMIN — ACETAMINOPHEN 650 MG: 325 TABLET, FILM COATED ORAL at 08:29

## 2017-02-11 RX ADMIN — SENNOSIDES A AND B 5 ML: 415.36 LIQUID ORAL at 08:29

## 2017-02-11 RX ADMIN — LORAZEPAM 1 MG: 2 INJECTION INTRAMUSCULAR; INTRAVENOUS at 09:11

## 2017-02-11 RX ADMIN — PIPERACILLIN SODIUM,TAZOBACTAM SODIUM 3.38 G: 3; .375 INJECTION, POWDER, FOR SOLUTION INTRAVENOUS at 23:04

## 2017-02-11 RX ADMIN — DEXMEDETOMIDINE HYDROCHLORIDE 0.5 MCG/KG/HR: 4 INJECTION, SOLUTION INTRAVENOUS at 12:30

## 2017-02-11 RX ADMIN — Medication 15 ML: at 08:29

## 2017-02-11 RX ADMIN — LORAZEPAM 1 MG: 2 INJECTION INTRAMUSCULAR; INTRAVENOUS at 20:00

## 2017-02-11 RX ADMIN — ACETAMINOPHEN 650 MG: 325 TABLET, FILM COATED ORAL at 12:51

## 2017-02-11 RX ADMIN — ACETAMINOPHEN 650 MG: 325 TABLET, FILM COATED ORAL at 18:36

## 2017-02-11 RX ADMIN — Medication 40 MG: at 08:30

## 2017-02-11 RX ADMIN — PIPERACILLIN AND TAZOBACTAM 4.5 G: 4; .5 INJECTION, POWDER, FOR SOLUTION INTRAVENOUS at 04:15

## 2017-02-11 RX ADMIN — PIPERACILLIN SODIUM,TAZOBACTAM SODIUM 3.38 G: 3; .375 INJECTION, POWDER, FOR SOLUTION INTRAVENOUS at 16:19

## 2017-02-11 RX ADMIN — LEVOFLOXACIN 750 MG: 5 INJECTION, SOLUTION INTRAVENOUS at 08:19

## 2017-02-11 RX ADMIN — DEXMEDETOMIDINE HYDROCHLORIDE 0.5 MCG/KG/HR: 4 INJECTION, SOLUTION INTRAVENOUS at 05:20

## 2017-02-11 RX ADMIN — SENNOSIDES A AND B 5 ML: 415.36 LIQUID ORAL at 20:10

## 2017-02-11 RX ADMIN — LEVOTHYROXINE SODIUM 112 MCG: 112 TABLET ORAL at 08:29

## 2017-02-11 RX ADMIN — POLYETHYLENE GLYCOL 3350 17 G: 17 POWDER, FOR SOLUTION ORAL at 08:30

## 2017-02-11 RX ADMIN — Medication 50 ML: at 04:13

## 2017-02-11 RX ADMIN — FENTANYL CITRATE 100 MCG: 50 INJECTION INTRAMUSCULAR; INTRAVENOUS at 20:00

## 2017-02-11 RX ADMIN — DEXMEDETOMIDINE HYDROCHLORIDE 0.7 MCG/KG/HR: 4 INJECTION, SOLUTION INTRAVENOUS at 18:56

## 2017-02-11 RX ADMIN — LORAZEPAM 1 MG: 2 INJECTION INTRAMUSCULAR; INTRAVENOUS at 12:29

## 2017-02-11 RX ADMIN — FENTANYL CITRATE 50 MCG/HR: 50 INJECTION, SOLUTION INTRAMUSCULAR; INTRAVENOUS at 05:43

## 2017-02-11 RX ADMIN — FENTANYL CITRATE 50 MCG: 50 INJECTION INTRAMUSCULAR; INTRAVENOUS at 12:29

## 2017-02-11 RX ADMIN — VANCOMYCIN HYDROCHLORIDE 2000 MG: 10 INJECTION, POWDER, LYOPHILIZED, FOR SOLUTION INTRAVENOUS at 08:48

## 2017-02-11 RX ADMIN — FENTANYL CITRATE 50 MCG: 50 INJECTION INTRAMUSCULAR; INTRAVENOUS at 17:16

## 2017-02-11 RX ADMIN — LORAZEPAM 4 MG/HR: 2 INJECTION INTRAMUSCULAR; INTRAVENOUS at 19:22

## 2017-02-11 NOTE — PLAN OF CARE
Problem: Goal Outcome Summary  Goal: Goal Outcome Summary  Outcome: Improving  D: Admitted to MICU for PNA, pleural effusion  I/A: Intubated on CMV settings. Lightly sedated, purposeful movements in all extremities, opening eyes spontaneously. CT with moderate amount serosang output. TF not advanced at this time d/t high residuals and hypoactive bowel sounds.  P: Attempt pressure support trial again this AM and monitor resp status closely.

## 2017-02-11 NOTE — PROGRESS NOTES
D/I: 800 ml chest tube output in 4 hours. MD notified, verbal order given to clamp CT. Resp status remains stable, minimal vent settings, sats >95% on 40% fiO2. No urine output post lasix, bladder scanned for 550 ml, altman reinserted for close I&O monitoring.   A/P: Reevaluate CT at 0000 with MD. Rest overnight. Attempt PST in am, consider PRN anxiolytics for tachypnea. Continue with plan of care.

## 2017-02-11 NOTE — PROGRESS NOTES
MICU STAFF CRITICAL CARE PROGRESS NOTE: 2-11-17    I saw and examined the patient with the MICU team and concur with findings and A&P as detailed in housestaff note of today    Febrile to 103 on Vanco / Zosyn   Failed this AM's PSV trial due to tachypnea  Off levophed      Agitated, febrile HR ,  ACMV 40% 12/15-18  450  I:O  + 2.3L (incl CT drainage)  1.2L out from chest tube  Responding to commands  Bronchial BS on Left  L pigtail catheter  RRR no m/g/r  Abd more distended  No edema    Hgb 11  WBC 11.2    Echo LVEF 50-55%  BC 1/many coag negative Staph  CXR  signiificant improvement L lung aeration  Pleural fluid cytology:  pending    Febrile Hypoxic Resp Failure with Large L Exudative Effusion  Zosyn, Vanco empirically  Not a classic pneumonia presentation. No clear h/o aspiration except for 1 episode of V w/o clinical aspiration  Continue chest tube to suction to drain fluid - get chest Ct without contrast to evaluate further  Suspect will need to wean sedation and extubate when felt to be ready for extubation as becomes agitated when awake    Hypotension / ? Sepsis  Cardiac echo to r/o valvular disease component.    Downs Syndrome    GUILLERMINA  D/c Vanco    Hypothyroidism    Start nutrition and bowel regimen    (Critical Care 35 minutes cumulative thus far today)      Artem Nunes  Pulmonary/Critical Care  February 11, 2017 2:20 PM

## 2017-02-11 NOTE — PLAN OF CARE
Problem: Goal Outcome Summary  Goal: Goal Outcome Summary  Outcome: No Change  D: Parapneumonic effusion, GUILLERMINA, possible lung infection  I/A: TMax 103 and creat increased from 0.93 to 2.67-MD notified.  1L NS bolus given, BC, UCs, urine creat and urine nitrogen sent, Tylenol and scheduled abx given.  Renal and abdominal US ordered to investigate GUILLERMINA and decreased GI motility (no BM since 2/7, BS hypoactive, high OG residuals and abdominal discomfort).  CMV at 40%/12/450/5 PIPs in 20's and RR 12-16.  Tolerated PS for 1.5 hours (RR15-25, 's)-taken off to prevent fatigue.  LS: inspiratory and expiratory wheezing/coarse, suctioning thick, white secretions.  Chest CT today.  Chest tube patent with serosanguinous output-verbal orders not to clamp anymore.  IJ and CT dressings changed.  Alteplase DC'd.  Creat elevated to 3.33 and soft BPs-NS bolus given and Levophed started and stopped d/t bradycardia.  TF stopped d/t high residuals. [place] patent with good UO.  Blanchable redness on coccyx-repositioned q2 and applied barrier cream.  Remains on precedex, ativan gtt and fentanyl.  Blood morphology lab sent d/t concern for malignancy.  P: Consider another pressor.  Will continue to support and monitor.

## 2017-02-11 NOTE — PROGRESS NOTES
MICU Progress Note    Hany Patel MRN: [id number]  1995  Date of Admission:2/7/2017  Primary care provider: Liu Song  DATE OF SERVICE: February 10, 2017        Assessment and Plan     Hany Patel is a 21 yo M with a history of Down syndrome, hypothyroidism, and obesity admitted with sepsis and hypoxic respiratory failure in setting of presumed pneumonia and associated L sided pleural effusion.     Changes Today  -- Continues to be febrile overnight, pan-cultured; No growth to date on any cultures except for one blood cx from 2/8 growing Coag neg staph, suspected contaminant  -- BAL cell ct: , 23% neutrophils, cultures NGTD, viral PCR negative  -- Pleural effusion cx 2/7 growing strep intermedius, sensitivities pending  -- Chest tube output 2/10 980cc   -- CT chest 2/11 -> decreased size fluid collection, extensive bilateral groundglass opacities and patchy consolidation L>R, complete LLL collapse  -- Cr up-trending 0.93 -> 2.76; UOP 2/10 ~1500cc, lytes stable  -- Abd US -> hepatomegaly, contracted gallbladder, trace ascites, two echogenic foci along gb wall (polyps vs adherent stones)   -- Send FeUrea, repeat UA    ===PULMONARY===  # Acute hypoxic respiratory failure   # Pneumonia c/b left sided parapneumonic effusion (exudative)   Initially only effusion visible on imaging, pigtail catheter placed by IR on admission 2/7, have been facilitating drainage with tpa. Repeat CT done 2/11 demonstrates decrease in pleural fluid but dense consolidation throughout bilateral lower lobes and some consolidation in upper lobes as well, c/f development of necrotizing pneumonia  -- Bronch 2/10 -> no obstructions  --  BAL cell ct: , 23% neutrophils, cultures NGTD, viral PCR negative  -- Pleural effusion cx 2/7 growing strep intermedius, sensitivities pending, cytology neg  -- Chest tube output 2/10 980cc   -- CT chest 2/11 -> decreased size fluid collection,  extensive bilateral groundglass opacities and patchy consolidation L>R, complete LLL collapse  -- Continue Vancomycin, Zosyn, Levaquin   -- Per review of CT imaging, respiratory status today do not expect patient to be ready to be extubated for several days   -- Continue tpa       ===INFECTIOUS DISEASE===  # Sepsis  # Community Acquired Pneumonia  Presented with fevers, tachycardia, and leukocytosis. Found to have L sided pleural effusion on admission, draining with pigtail catheter. Repeat imaging 2/11 c/w dense bilateral pneumonia. Remains intermittently febrile despite broad antibiotic coverage as of 2/11.   -- Pan-cultured overnight (blood, urine, sputum)  -- One bottle of blood culture from 2/8 growing Coag Neg staph - likely contaminant. WIll continue to follow. Urine Strep and Legionella neg.  .-- Bronch 2/10 -> no obstructions  --  BAL cell ct: , 23% neutrophils, cultures NGTD, viral PCR negative  -- Pleural effusion cx 2/7 growing strep intermedius, sensitivities pending, cytology neg    ===NEURO===  # Sedation  - Ativan gtt, precedex gtt, fentanyl gtt. Transitioned off of versed. Has Versed and Fentanyl boluses prn     ===CARDIOVASCULAR===  # Tachycardia (improving)   Sinus tachycardia on admission to ICU. Likely hypovolemia in setting of poor po intake; Resolving though intermittently agitated with increased HR at those times.   -- Stopped IVF 2/10 as has had ~6L fluid resuscitation. TFs started 2/10 See Fluid status discussion below  -- Goal maintain net even     #Decreased EF 50-55%  No evidence of any septal defects (noted because of Trisomy 21).     ===GASTROINTESTINAL===  # Nutrition:    -- TFs initiated 2/10    ===RENAL===  #Fluid Status  Slightly net positive this admission. Will hold IVF at this time as >6L resuscitated. Will start tube feeds, given CT imaging will minimize additional fluids and maintain goal net even to net neg 1L daily  -- Favor pressors over additional IVF resuscitation  "at this point (2/11)     #Electrolytes: Repleting PRN - on Potassium and Phosphate Repletion protocols  High Dose     #GUILLERMINA  Cr 0.6 -> 0.9 -> 2.76 (2/11) c/w GUILLERMINA. Unknown etiology, though suspect ATN given intermittent hypotension on admission to ICU. Renal US done 2/11 without evidence of hydronephrosis. Trough vanco level 2/10 wnl  -- UA  -- FeUrea    ===ENDOCRINE===  # Hypothyroidism  - Continue PTA levothyroxine; TSH on 2/9 wnl.     ===HEM/ONC===  #Normocytic anemia  Likely some amount of dilution but also acute blood loss from chest tube site. RDW elevated. Recheck stable at 10.6. Will watch closely for now.     Prophylaxis:  DVT: Lovenox   GI: PPI  Family:  Updated Mother at bedside.  Disposition: Critically Ill. Requires ongoing ICU care.    Code Status: Full    Patient seen and examined with attending physician, Dr. Nunes, who is in agreement with assessment and plan as above.    Bebe Moreno MD  Internal Medicine, PGY-2  [phone]        Events of last 24 hrs:     Febrile overnight, pan-cultured. MAPs stable in 70s, no additional pressors required. Sedation needs stable. No other concerns. Maintained good chest tube output, good UOP overnight. Intermittently wakes up and attempts to pull at tube, mitts and restraints on.      OBJECTIVE   Ventilator  Ventilation Mode: CMV/AC  FiO2 (%): 40 %  Rate Set (breaths/minute): 12 breaths/min  Tidal Volume Set (mL): 450 mL  PEEP (cm H2O): 5 cmH2O  Pressure Support (cm H2O): 10 cmH2O  Oxygen Concentration (%): 40 %  Resp: 14  Arterial Blood Gas result: No ABG results       Intake/Output      Intake/Output Summary (Last 24 hours) at 02/10/17 1110  Last data filed at 02/10/17 1100   Gross per 24 hour   Intake 3868.52 ml   Output   2105 ml   Net 1763.52 ml           Vital Signs    Temp: 100.7  F (38.2  C) Temp src: Tympanic BP: 94/52 mmHg   Heart Rate: 40 Resp: 14 SpO2: 97 % O2 Device: Mechanical Ventilator   Height: 160 cm (5' 2.99\") Weight: 115.6 kg (254 lb 13.6 " "oz)  Estimated body mass index is 45.16 kg/(m^2) as calculated from the following:    Height as of this encounter: 1.6 m (5' 2.99\").    Weight as of this encounter: 115.6 kg (254 lb 13.6 oz).       Physical Exam  General: Intubated, no acute distress; able to follow commands  HEENT: PERRLA  CV: Tachycardic, regular, normal S1S2, no murmurs, rubs, or gallops    Resp: bibasilar rhonchi, L>R, coarse breath sounds throughout  L CT in place with serosanguinous drainage;   Abd: Soft, non-tender, non-distended, BS+  Extremities: warm and well perfused, no lower extremity edema  Neuro: No focal deficits, moving all extremities spontaneously; able to follow commands    LINES  RIJ 2/8 -- Present  [place] 2/8 - Present  Peripheral IV x1 2/8 - Present R arm  Peripheral IVx1 2/7 - Present  R arm  12 Upper sorbian Left Pleural Pigtail catheter - L chest  NG Tube 2/8 - Present    ETT Tube Size 8   ROUTINE ICU LABS:     Labs Reviewed      Microbiology         Blood culture [[id number]] (Abnormal) Collected: 02/08/17 0535     Order Status: Completed Lab Status: Preliminary result Updated: 02/09/17 1132     Specimen Information: Blood       Specimen Description Blood Port       Culture Micro -- (A)       Result:        Cultured on the 1st day of incubation: Gram positive cocci in clusters   Critical Value/Significant Value, preliminary result only, called to and read    back by Teresita Brown RN 4C @2709 2/9/17. DH.   (Note)   POSITIVE for Staphylococci other than S.aureus, S.epidermidis and   S.lugdunensis, by Smappoigene multiplex nucleic acid test.   Coagulase-negative staphylococci are the most common venipuncture or   collection associated skin CONTAMINANTS grown in blood cultures.   Final identification and antimicrobial susceptibility testing will be   verified by standard methods.     Specimen tested with Verigene multiplex, gram-positive blood culture   nucleic acid test for the following targets: Staph aureus, Staph   epidermidis, Staph " lugdunensis, other Staph species, Enterococcus   faecalis, Enterococcus faecium, Streptococcus species, S. agalactiae,   S. anginosus grp., S. pneumoniae, S. pyogenes, Listeria sp., mecA   (methicillin resistance) and Sherri/B (vancomycin resistance).     Critical Value/Significant Value called to and read back by Teresita Brown RN @1132 02/09/17 gd           Imaging     CXR from 2/10/17 AM shows improved L sided pleural effusions on my read; official read pending.            CT chest w/o contrast 2/11   Impression:    1. Left basilar chest tube with a small hydropneumothorax. There is a  4.5 x 2.2 cm lentiform simple fluid density collection in the medial  base which could represent a loculated component of the pleural  effusion.  2. Patchy attenuation in the left upper and left lower lobes  concerning for necrotizing pneumonia. There are superimposed diffuse  bilateral groundglass opacities and patchy consolidation.  3. New small right-sided pleural effusion with right lower lobe  subsegmental atelectasis.  4. Complete left lower lobe collapse.    Renal US 2/11:  Impression:  1.  Hepatomegaly.  2.  Contracted gallbladder with layering sludge.  3.  Trace ascites with small pleural effusions.  4.  Two 1-2 mm echogenic foci along the anterior wall of the  gallbladder, either small cholesterol polyps or adherent stones.

## 2017-02-11 NOTE — PLAN OF CARE
Pharmacy Renal Dosing Adjustment    Patient currently receiving levofloxacin 750mg IV Q24h, vancomycin 2000mg IV Q12H, and piperacillin/tazo 4.5g IV Q6h for PNA.  Patient's CrCl ~ 46 , scr increased to 2.8 mg/dL and UOP for the past 8 hours has been 0.8 mL/kg/hr.  Following dosing is more appropriately -  levofloxacin 750mg IV Q48h, vancomycin 2000mg IV HELD will check lvl, and piperacillin/tazo 3.375g IV Q6h dose will be automatically changed per P&T renal dosing policy.     Tamika Montero, PharmD  [phone]

## 2017-02-11 NOTE — PLAN OF CARE
Problem: Goal Outcome Summary  Goal: Goal Outcome Summary    4C: Cxl - Pt leaving for procedure upon attempt this afternoon.

## 2017-02-12 ENCOUNTER — APPOINTMENT (OUTPATIENT)
Dept: GENERAL RADIOLOGY | Facility: CLINIC | Age: 22
DRG: 870 | End: 2017-02-12
Attending: INTERNAL MEDICINE
Payer: MEDICARE

## 2017-02-12 LAB
ANION GAP SERPL CALCULATED.3IONS-SCNC: 7 MMOL/L (ref 3–14)
BACTERIA SPEC CULT: NO GROWTH
BACTERIA SPEC CULT: NO GROWTH
BACTERIA SPEC CULT: NORMAL
BASE EXCESS BLDA CALC-SCNC: 0.2 MMOL/L
BUN SERPL-MCNC: 26 MG/DL (ref 7–30)
CALCIUM SERPL-MCNC: 8 MG/DL (ref 8.5–10.1)
CHLORIDE SERPL-SCNC: 108 MMOL/L (ref 94–109)
CO2 SERPL-SCNC: 27 MMOL/L (ref 20–32)
CREAT SERPL-MCNC: 3.43 MG/DL (ref 0.66–1.25)
ERYTHROCYTE [DISTWIDTH] IN BLOOD BY AUTOMATED COUNT: 16.3 % (ref 10–15)
GFR SERPL CREATININE-BSD FRML MDRD: 22 ML/MIN/1.7M2
GLUCOSE BLDC GLUCOMTR-MCNC: 96 MG/DL (ref 70–99)
GLUCOSE SERPL-MCNC: 90 MG/DL (ref 70–99)
HCO3 BLD-SCNC: 25 MMOL/L (ref 21–28)
HCT VFR BLD AUTO: 30.8 % (ref 40–53)
HGB BLD-MCNC: 9.8 G/DL (ref 13.3–17.7)
MCH RBC QN AUTO: 29.3 PG (ref 26.5–33)
MCHC RBC AUTO-ENTMCNC: 31.8 G/DL (ref 31.5–36.5)
MCV RBC AUTO: 92 FL (ref 78–100)
MICRO REPORT STATUS: NORMAL
O2/TOTAL GAS SETTING VFR VENT: 40 %
PCO2 BLD: 39 MM HG (ref 35–45)
PH BLD: 7.41 PH (ref 7.35–7.45)
PLATELET # BLD AUTO: 317 10E9/L (ref 150–450)
PO2 BLD: 115 MM HG (ref 80–105)
POTASSIUM SERPL-SCNC: 3.5 MMOL/L (ref 3.4–5.3)
PROCALCITONIN SERPL-MCNC: 0.35 NG/ML
RBC # BLD AUTO: 3.35 10E12/L (ref 4.4–5.9)
SODIUM SERPL-SCNC: 143 MMOL/L (ref 133–144)
SPECIMEN SOURCE: NORMAL
VANCOMYCIN SERPL-MCNC: 25.1 MG/L
WBC # BLD AUTO: 12 10E9/L (ref 4–11)

## 2017-02-12 PROCEDURE — 71010 XR CHEST PORT 1 VW: CPT

## 2017-02-12 PROCEDURE — A9270 NON-COVERED ITEM OR SERVICE: HCPCS | Mod: GY | Performed by: INTERNAL MEDICINE

## 2017-02-12 PROCEDURE — 84145 PROCALCITONIN (PCT): CPT | Performed by: SURGERY

## 2017-02-12 PROCEDURE — 25000128 H RX IP 250 OP 636: Performed by: SURGERY

## 2017-02-12 PROCEDURE — 25000132 ZZH RX MED GY IP 250 OP 250 PS 637: Mod: GY | Performed by: INTERNAL MEDICINE

## 2017-02-12 PROCEDURE — 80048 BASIC METABOLIC PNL TOTAL CA: CPT | Performed by: SURGERY

## 2017-02-12 PROCEDURE — 40000275 ZZH STATISTIC RCP TIME EA 10 MIN

## 2017-02-12 PROCEDURE — 25000125 ZZHC RX 250: Performed by: INTERNAL MEDICINE

## 2017-02-12 PROCEDURE — 20000004 ZZH R&B ICU UMMC

## 2017-02-12 PROCEDURE — 00000146 ZZHCL STATISTIC GLUCOSE BY METER IP

## 2017-02-12 PROCEDURE — 94003 VENT MGMT INPAT SUBQ DAY: CPT

## 2017-02-12 PROCEDURE — 99291 CRITICAL CARE FIRST HOUR: CPT | Mod: GC | Performed by: INTERNAL MEDICINE

## 2017-02-12 PROCEDURE — 82803 BLOOD GASES ANY COMBINATION: CPT | Performed by: INTERNAL MEDICINE

## 2017-02-12 PROCEDURE — 36600 WITHDRAWAL OF ARTERIAL BLOOD: CPT

## 2017-02-12 PROCEDURE — 25000128 H RX IP 250 OP 636: Performed by: INTERNAL MEDICINE

## 2017-02-12 PROCEDURE — 86255 FLUORESCENT ANTIBODY SCREEN: CPT | Performed by: SURGERY

## 2017-02-12 PROCEDURE — 80202 ASSAY OF VANCOMYCIN: CPT | Performed by: SURGERY

## 2017-02-12 PROCEDURE — 85027 COMPLETE CBC AUTOMATED: CPT | Performed by: SURGERY

## 2017-02-12 RX ORDER — PIPERACILLIN SODIUM, TAZOBACTAM SODIUM 2; .25 G/10ML; G/10ML
2.25 INJECTION, POWDER, LYOPHILIZED, FOR SOLUTION INTRAVENOUS EVERY 6 HOURS
Status: DISCONTINUED | OUTPATIENT
Start: 2017-02-12 | End: 2017-02-15

## 2017-02-12 RX ORDER — SODIUM CHLORIDE 9 MG/ML
INJECTION, SOLUTION INTRAVENOUS
Status: DISCONTINUED
Start: 2017-02-12 | End: 2017-02-17 | Stop reason: HOSPADM

## 2017-02-12 RX ORDER — PROPOFOL 10 MG/ML
5-75 INJECTION, EMULSION INTRAVENOUS CONTINUOUS
Status: DISCONTINUED | OUTPATIENT
Start: 2017-02-12 | End: 2017-02-15

## 2017-02-12 RX ADMIN — PIPERACILLIN SODIUM,TAZOBACTAM SODIUM 3.38 G: 3; .375 INJECTION, POWDER, FOR SOLUTION INTRAVENOUS at 04:45

## 2017-02-12 RX ADMIN — PIPERACILLIN SODIUM, TAZOBACTAM SODIUM 2.25 G: 2; .25 INJECTION, POWDER, LYOPHILIZED, FOR SOLUTION INTRAVENOUS at 16:45

## 2017-02-12 RX ADMIN — POLYETHYLENE GLYCOL 3350 17 G: 17 POWDER, FOR SOLUTION ORAL at 10:07

## 2017-02-12 RX ADMIN — MIDAZOLAM HYDROCHLORIDE 3 MG/HR: 5 INJECTION, SOLUTION INTRAMUSCULAR; INTRAVENOUS at 13:55

## 2017-02-12 RX ADMIN — FENTANYL CITRATE 200 MCG/HR: 50 INJECTION, SOLUTION INTRAMUSCULAR; INTRAVENOUS at 11:28

## 2017-02-12 RX ADMIN — Medication 40 MG: at 10:12

## 2017-02-12 RX ADMIN — PROPOFOL 15 MCG/KG/MIN: 10 INJECTION, EMULSION INTRAVENOUS at 21:12

## 2017-02-12 RX ADMIN — FENTANYL CITRATE 200 MCG/HR: 50 INJECTION, SOLUTION INTRAMUSCULAR; INTRAVENOUS at 05:20

## 2017-02-12 RX ADMIN — SENNOSIDES A AND B 5 ML: 415.36 LIQUID ORAL at 10:11

## 2017-02-12 RX ADMIN — LORAZEPAM 2 MG: 2 INJECTION INTRAMUSCULAR; INTRAVENOUS at 08:00

## 2017-02-12 RX ADMIN — FENTANYL CITRATE 100 MCG: 50 INJECTION INTRAMUSCULAR; INTRAVENOUS at 11:46

## 2017-02-12 RX ADMIN — FENTANYL CITRATE 100 MCG: 50 INJECTION INTRAMUSCULAR; INTRAVENOUS at 05:25

## 2017-02-12 RX ADMIN — SENNOSIDES A AND B 5 ML: 415.36 LIQUID ORAL at 20:39

## 2017-02-12 RX ADMIN — ACETAMINOPHEN 650 MG: 325 TABLET, FILM COATED ORAL at 21:43

## 2017-02-12 RX ADMIN — PIPERACILLIN SODIUM, TAZOBACTAM SODIUM 2.25 G: 2; .25 INJECTION, POWDER, LYOPHILIZED, FOR SOLUTION INTRAVENOUS at 10:09

## 2017-02-12 RX ADMIN — LORAZEPAM 2 MG: 2 INJECTION INTRAMUSCULAR; INTRAVENOUS at 11:46

## 2017-02-12 RX ADMIN — PIPERACILLIN SODIUM, TAZOBACTAM SODIUM 2.25 G: 2; .25 INJECTION, POWDER, LYOPHILIZED, FOR SOLUTION INTRAVENOUS at 21:13

## 2017-02-12 RX ADMIN — FENTANYL CITRATE 200 MCG/HR: 50 INJECTION, SOLUTION INTRAMUSCULAR; INTRAVENOUS at 18:27

## 2017-02-12 RX ADMIN — LEVOTHYROXINE SODIUM 112 MCG: 112 TABLET ORAL at 10:07

## 2017-02-12 RX ADMIN — Medication 15 ML: at 10:06

## 2017-02-12 RX ADMIN — ACETAMINOPHEN 650 MG: 325 TABLET, FILM COATED ORAL at 17:43

## 2017-02-12 RX ADMIN — LORAZEPAM 2 MG: 2 INJECTION INTRAMUSCULAR; INTRAVENOUS at 09:00

## 2017-02-12 RX ADMIN — SENNOSIDES A AND B 5 ML: 415.36 LIQUID ORAL at 10:06

## 2017-02-12 RX ADMIN — LORAZEPAM 5 MG/HR: 2 INJECTION INTRAMUSCULAR; INTRAVENOUS at 06:53

## 2017-02-12 NOTE — PLAN OF CARE
Problem: Goal Outcome Summary  Goal: Goal Outcome Summary  Outcome: Improving  D: Parapneumonic effusion, GUILLERMINA, possible lung infection  I/A: Agitated, follows commands.  Afebrile. CMV at 40%/12/550/5 PIPs in 20's and RR 12-16. Holding PS until infection improves. LS:coarse, suctioning thick, white secretions. NSR, maintaining MAP goal of 65.  CVP added-08:00 CVP 12.  Chest tube patent with minimal serosanguinous output. [place] patent with good UO. Blanchable redness on coccyx-repositioned q2 and applied barrier cream. Ativan DC'd, Versed started, fentanyl at 200, levo at 0.02   P: Will continue to support and monitor.

## 2017-02-12 NOTE — PHARMACY-VANCOMYCIN DOSING SERVICE
Pharmacy Vancomycin Note  Date of Service 2017  Patient's  1995   22 year old, male    Indication: GPCs in pleural fluid  Goal Trough Level: 15-20 mg/L  Day of Therapy: 4  Current Vancomycin regimen:  intermittent    Current estimated CrCl = Estimated Creatinine Clearance: 38.9 mL/min (based on Cr of 3.33).    Creatinine for last 3 days  2017:  4:54 AM Creatinine 0.62 mg/dL*  2/10/2017:  3:53 AM Creatinine 0.93 mg/dL  2017:  3:35 PM Creatinine 3.33 mg/dL*;  4:20 AM Creatinine 2.76 mg/dL*    Recent Vancomycin Levels (past 3 days)  2017:  4:54 AM Vancomycin Level 9.8 mg/L  2/10/2017:  1:57 PM Vancomycin Level 24.2 mg/L  2017:  6:27 PM Vancomycin Level 40.7 mg/L*    Vancomycin IV Administrations (past 72 hours)                   vancomycin (VANCOCIN) 2,000 mg in NaCl 0.9 % 250 mL intermittent infusion (mg) 2,000 mg New Bag 17 0848     2,000 mg New Bag 02/10/17 2018    vancomycin (VANCOCIN) 2,000 mg in NaCl 0.9 % 250 mL intermittent infusion (mg) 2,000 mg New Bag 02/10/17 0557     2,000 mg New Bag 17 2139                Nephrotoxins and other renal medications (Future)    Start     Dose/Rate Route Frequency Ordered Stop    17  vancomycin place dennis - receiving intermittent dosing      1 each Does not apply SEE ADMIN INSTRUCTIONS 17 1600  piperacillin-tazobactam (ZOSYN) 3.375 g vial to attach to  mL bag      3.375 g  over 1 Hours Intravenous EVERY 6 HOURS 17 1532      17 1630  norepinephrine (LEVOPHED) 16 mg in D5W 250 mL infusion      0.03-0.4 mcg/kg/min × 117 kg  3.3-43.9 mL/hr  Intravenous CONTINUOUS 17 1616               Contrast Orders - past 72 hours (72h ago through future)    Start     Dose/Rate Route Frequency Ordered Stop    17 1400  perflutren diluted 1mL to 2mL with saline (OPTISON) diluted injection 4 mL      4 mL Intravenous ONCE 17 1347 17 1400          Interpretation of levels  and current regimen:  Trough level is  Supratherapeutic @ 40.7 mg/L    Has serum creatinine changed > 50% in last 72 hours: Yes (5x baseline)  Urine output:  good urine output  Renal Function: GUILLERMINA    Plan:  1.  COntinue to hold vancomcyin  2.  Pharmacy will check trough levels as appropriate in 1-3 Days.    3. Serum creatinine levels will be ordered daily for the first week of therapy and at least twice weekly for subsequent weeks.      Sonia Esposito, PharmD        .

## 2017-02-12 NOTE — PLAN OF CARE
Problem: Infection, Risk/Actual (Adult)  Intervention: Manage Suspected/Actual Infection  D: Sepsis and hypoxic respiratory failure. Agitation.     I/A: Pt on full vent support overnight with minimal amount of secretions noticed when suctioned. LS coarse and diminished more on LLL. Chest tube secured and patent, minimal output for the shift. Sedation increased due to agitation and restlessness: Lorazepam 5mg, Fentanyl 200mcg with bumps given. Hypoactive bowel sounds, TF off, bowel meds given but no BM yet. SR overnight, slightly febrile, Tylenol given with good relief. [place] cath secured and patent with good urine output. Repositioned as planned.     P: Continue to assess and monitor respiratory status. Wean sedation as tolerated. Continue to monitor.

## 2017-02-12 NOTE — PLAN OF CARE
Problem: Goal Outcome Summary  Goal: Goal Outcome Summary     4C: Cxl - Per RN, pt needing high level of sedation to protect respiratory status/ETT; not appropriate for therapy as unable to tolerate sedation holiday; will f/u tomorrow and initiate PROM when appropriate if necessary.

## 2017-02-13 ENCOUNTER — APPOINTMENT (OUTPATIENT)
Dept: GENERAL RADIOLOGY | Facility: CLINIC | Age: 22
DRG: 870 | End: 2017-02-13
Payer: MEDICARE

## 2017-02-13 ENCOUNTER — APPOINTMENT (OUTPATIENT)
Dept: OCCUPATIONAL THERAPY | Facility: CLINIC | Age: 22
DRG: 870 | End: 2017-02-13
Payer: MEDICARE

## 2017-02-13 LAB
ANION GAP SERPL CALCULATED.3IONS-SCNC: 11 MMOL/L (ref 3–14)
BACTERIA SPEC CULT: ABNORMAL
BACTERIA SPEC CULT: NO GROWTH
BACTERIA SPEC CULT: NORMAL
BASE DEFICIT BLDV-SCNC: 0.5 MMOL/L
BASOPHILS # BLD AUTO: 0.1 10E9/L (ref 0–0.2)
BASOPHILS NFR BLD AUTO: 0.6 %
BUN SERPL-MCNC: 25 MG/DL (ref 7–30)
CALCIUM SERPL-MCNC: 8 MG/DL (ref 8.5–10.1)
CHLORIDE SERPL-SCNC: 107 MMOL/L (ref 94–109)
CO2 SERPL-SCNC: 24 MMOL/L (ref 20–32)
COPATH REPORT: NORMAL
COPATH REPORT: NORMAL
CREAT SERPL-MCNC: 3.41 MG/DL (ref 0.66–1.25)
DIFFERENTIAL METHOD BLD: ABNORMAL
EOSINOPHIL # BLD AUTO: 0.6 10E9/L (ref 0–0.7)
EOSINOPHIL NFR BLD AUTO: 5.6 %
ERYTHROCYTE [DISTWIDTH] IN BLOOD BY AUTOMATED COUNT: 15.9 % (ref 10–15)
GFR SERPL CREATININE-BSD FRML MDRD: 22 ML/MIN/1.7M2
GLUCOSE SERPL-MCNC: 86 MG/DL (ref 70–99)
GRAM STN SPEC: NORMAL
HCO3 BLDV-SCNC: 25 MMOL/L (ref 21–28)
HCT VFR BLD AUTO: 28.6 % (ref 40–53)
HGB BLD-MCNC: 9.3 G/DL (ref 13.3–17.7)
IMM GRANULOCYTES # BLD: 0.2 10E9/L (ref 0–0.4)
IMM GRANULOCYTES NFR BLD: 1.9 %
LYMPHOCYTES # BLD AUTO: 1.4 10E9/L (ref 0.8–5.3)
LYMPHOCYTES NFR BLD AUTO: 13.6 %
Lab: NORMAL
Lab: NORMAL
MAGNESIUM SERPL-MCNC: 2.4 MG/DL (ref 1.6–2.3)
MCH RBC QN AUTO: 29 PG (ref 26.5–33)
MCHC RBC AUTO-ENTMCNC: 32.5 G/DL (ref 31.5–36.5)
MCV RBC AUTO: 89 FL (ref 78–100)
MICRO REPORT STATUS: ABNORMAL
MICRO REPORT STATUS: NORMAL
MICROORGANISM SPEC CULT: ABNORMAL
MONOCYTES # BLD AUTO: 0.9 10E9/L (ref 0–1.3)
MONOCYTES NFR BLD AUTO: 8.4 %
NEUTROPHILS # BLD AUTO: 7.4 10E9/L (ref 1.6–8.3)
NEUTROPHILS NFR BLD AUTO: 69.9 %
NRBC # BLD AUTO: 0 10*3/UL
NRBC BLD AUTO-RTO: 0 /100
O2/TOTAL GAS SETTING VFR VENT: 40 %
PCO2 BLDV: 47 MM HG (ref 40–50)
PH BLDV: 7.34 PH (ref 7.32–7.43)
PHOSPHATE SERPL-MCNC: 3.7 MG/DL (ref 2.5–4.5)
PLATELET # BLD AUTO: 325 10E9/L (ref 150–450)
PO2 BLDV: 45 MM HG (ref 25–47)
POTASSIUM SERPL-SCNC: 3.6 MMOL/L (ref 3.4–5.3)
PROCALCITONIN SERPL-MCNC: 5.37 NG/ML
RBC # BLD AUTO: 3.21 10E12/L (ref 4.4–5.9)
SODIUM SERPL-SCNC: 142 MMOL/L (ref 133–144)
SPECIMEN SOURCE: ABNORMAL
SPECIMEN SOURCE: NORMAL
WBC # BLD AUTO: 10.6 10E9/L (ref 4–11)

## 2017-02-13 PROCEDURE — 80048 BASIC METABOLIC PNL TOTAL CA: CPT | Performed by: SURGERY

## 2017-02-13 PROCEDURE — 40000275 ZZH STATISTIC RCP TIME EA 10 MIN

## 2017-02-13 PROCEDURE — 71010 XR CHEST PORT 1 VW: CPT

## 2017-02-13 PROCEDURE — 25000132 ZZH RX MED GY IP 250 OP 250 PS 637: Mod: GY | Performed by: INTERNAL MEDICINE

## 2017-02-13 PROCEDURE — 84145 PROCALCITONIN (PCT): CPT | Performed by: SURGERY

## 2017-02-13 PROCEDURE — 82803 BLOOD GASES ANY COMBINATION: CPT | Performed by: INTERNAL MEDICINE

## 2017-02-13 PROCEDURE — 83735 ASSAY OF MAGNESIUM: CPT | Performed by: SURGERY

## 2017-02-13 PROCEDURE — 87205 SMEAR GRAM STAIN: CPT | Performed by: INTERNAL MEDICINE

## 2017-02-13 PROCEDURE — 25000128 H RX IP 250 OP 636: Performed by: INTERNAL MEDICINE

## 2017-02-13 PROCEDURE — 40000133 ZZH STATISTIC OT WARD VISIT

## 2017-02-13 PROCEDURE — A9270 NON-COVERED ITEM OR SERVICE: HCPCS | Mod: GY | Performed by: INTERNAL MEDICINE

## 2017-02-13 PROCEDURE — 97530 THERAPEUTIC ACTIVITIES: CPT | Mod: GO

## 2017-02-13 PROCEDURE — 87106 FUNGI IDENTIFICATION YEAST: CPT | Performed by: INTERNAL MEDICINE

## 2017-02-13 PROCEDURE — 40000196 ZZH STATISTIC RAPCV CVP MONITORING

## 2017-02-13 PROCEDURE — 20000004 ZZH R&B ICU UMMC

## 2017-02-13 PROCEDURE — 94003 VENT MGMT INPAT SUBQ DAY: CPT

## 2017-02-13 PROCEDURE — 27210429 ZZH NUTRITION PRODUCT INTERMEDIATE LITER

## 2017-02-13 PROCEDURE — 87070 CULTURE OTHR SPECIMN AEROBIC: CPT | Performed by: INTERNAL MEDICINE

## 2017-02-13 PROCEDURE — 25000128 H RX IP 250 OP 636: Performed by: SURGERY

## 2017-02-13 PROCEDURE — 25000125 ZZHC RX 250: Performed by: INTERNAL MEDICINE

## 2017-02-13 PROCEDURE — 85025 COMPLETE CBC W/AUTO DIFF WBC: CPT | Performed by: SURGERY

## 2017-02-13 PROCEDURE — 87040 BLOOD CULTURE FOR BACTERIA: CPT | Performed by: INTERNAL MEDICINE

## 2017-02-13 PROCEDURE — 99291 CRITICAL CARE FIRST HOUR: CPT | Mod: GC | Performed by: INTERNAL MEDICINE

## 2017-02-13 PROCEDURE — 84100 ASSAY OF PHOSPHORUS: CPT | Performed by: SURGERY

## 2017-02-13 RX ORDER — HEPARIN SODIUM 5000 [USP'U]/.5ML
5000 INJECTION, SOLUTION INTRAVENOUS; SUBCUTANEOUS EVERY 8 HOURS
Status: DISCONTINUED | OUTPATIENT
Start: 2017-02-13 | End: 2017-02-20 | Stop reason: HOSPADM

## 2017-02-13 RX ORDER — SODIUM CHLORIDE 9 MG/ML
INJECTION, SOLUTION INTRAVENOUS CONTINUOUS
Status: DISCONTINUED | OUTPATIENT
Start: 2017-02-13 | End: 2017-02-14

## 2017-02-13 RX ORDER — SODIUM CHLORIDE 9 MG/ML
INJECTION, SOLUTION INTRAVENOUS
Status: DISCONTINUED
Start: 2017-02-13 | End: 2017-02-17 | Stop reason: HOSPADM

## 2017-02-13 RX ADMIN — MIDAZOLAM HYDROCHLORIDE 2 MG: 1 INJECTION, SOLUTION INTRAMUSCULAR; INTRAVENOUS at 21:13

## 2017-02-13 RX ADMIN — LEVOFLOXACIN 750 MG: 5 INJECTION, SOLUTION INTRAVENOUS at 09:19

## 2017-02-13 RX ADMIN — POLYETHYLENE GLYCOL 3350 17 G: 17 POWDER, FOR SOLUTION ORAL at 09:32

## 2017-02-13 RX ADMIN — Medication 15 ML: at 09:24

## 2017-02-13 RX ADMIN — FENTANYL CITRATE 200 MCG/HR: 50 INJECTION, SOLUTION INTRAMUSCULAR; INTRAVENOUS at 05:56

## 2017-02-13 RX ADMIN — FENTANYL CITRATE 200 MCG/HR: 50 INJECTION, SOLUTION INTRAMUSCULAR; INTRAVENOUS at 16:40

## 2017-02-13 RX ADMIN — PIPERACILLIN SODIUM, TAZOBACTAM SODIUM 2.25 G: 2; .25 INJECTION, POWDER, LYOPHILIZED, FOR SOLUTION INTRAVENOUS at 16:01

## 2017-02-13 RX ADMIN — HEPARIN SODIUM 5000 UNITS: 10000 INJECTION, SOLUTION INTRAVENOUS; SUBCUTANEOUS at 21:14

## 2017-02-13 RX ADMIN — LEVOTHYROXINE SODIUM 112 MCG: 112 TABLET ORAL at 09:24

## 2017-02-13 RX ADMIN — PIPERACILLIN SODIUM, TAZOBACTAM SODIUM 2.25 G: 2; .25 INJECTION, POWDER, LYOPHILIZED, FOR SOLUTION INTRAVENOUS at 11:37

## 2017-02-13 RX ADMIN — MIDAZOLAM HYDROCHLORIDE 2 MG: 1 INJECTION, SOLUTION INTRAMUSCULAR; INTRAVENOUS at 13:11

## 2017-02-13 RX ADMIN — PIPERACILLIN SODIUM, TAZOBACTAM SODIUM 2.25 G: 2; .25 INJECTION, POWDER, LYOPHILIZED, FOR SOLUTION INTRAVENOUS at 21:29

## 2017-02-13 RX ADMIN — PANTOPRAZOLE SODIUM 40 MG: 40 TABLET, DELAYED RELEASE ORAL at 09:24

## 2017-02-13 RX ADMIN — ACETAMINOPHEN 650 MG: 325 TABLET, FILM COATED ORAL at 13:15

## 2017-02-13 RX ADMIN — SODIUM CHLORIDE: 9 INJECTION, SOLUTION INTRAVENOUS at 21:13

## 2017-02-13 RX ADMIN — FENTANYL CITRATE 200 MCG/HR: 50 INJECTION, SOLUTION INTRAMUSCULAR; INTRAVENOUS at 01:36

## 2017-02-13 RX ADMIN — SENNOSIDES A AND B 5 ML: 415.36 LIQUID ORAL at 09:35

## 2017-02-13 RX ADMIN — PROPOFOL 25 MCG/KG/MIN: 10 INJECTION, EMULSION INTRAVENOUS at 02:12

## 2017-02-13 RX ADMIN — HEPARIN SODIUM 5000 UNITS: 10000 INJECTION, SOLUTION INTRAVENOUS; SUBCUTANEOUS at 16:00

## 2017-02-13 RX ADMIN — PIPERACILLIN SODIUM, TAZOBACTAM SODIUM 2.25 G: 2; .25 INJECTION, POWDER, LYOPHILIZED, FOR SOLUTION INTRAVENOUS at 04:23

## 2017-02-13 NOTE — PLAN OF CARE
Problem: Restraint for Non-Violent/Non-Self-Destructive Behavior  Goal: Prevent/Manage Potential Problems  Maintain safety of patient and others during period of restraint.  Promote psychological and physical wellbeing.  Prevent injury to skin and involved body parts.  Promote nutrition, hydration, and elimination.   Outcome: Improving  Pt adequately sedated. No longer requires restraints. Discontinued at 2200 on 2/12/17.

## 2017-02-13 NOTE — PHARMACY-VANCOMYCIN DOSING SERVICE
Pharmacy Vancomycin Note  Date of Service 2017  Patient's  1995   22 year old, male    Indication: GPCs in pleural fluid  Goal Trough Level: 15-20 mg/L  Day of Therapy: 5  Current Vancomycin regimen: intermittent    Current estimated CrCl = Estimated Creatinine Clearance: 38.2 mL/min (based on Cr of 3.43).    Creatinine for last 3 days  2/10/2017:  3:53 AM Creatinine 0.93 mg/dL  2017:  3:35 PM Creatinine 2.76 mg/dL;  3:35 PM Creatinine 3.33 mg/dL  2017:  3:53 PM Creatinine 3.43 mg/dL    Recent Vancomycin Levels (past 3 days)  2/10/2017:  1:57 PM Vancomycin Level 24.2 mg/L  2017:  6:27 PM Vancomycin Level 40.7 mg/L  2017:  9:05 PM Vancomycin Level 25.1 mg/L    Vancomycin IV Administrations (past 72 hours)                   vancomycin (VANCOCIN) 2,000 mg in NaCl 0.9 % 250 mL intermittent infusion (mg) 2,000 mg New Bag 17 0848     2,000 mg New Bag 02/10/17 2018                Nephrotoxins and other renal medications (Future)    Start     Dose/Rate Route Frequency Ordered Stop    17 1000  piperacillin-tazobactam (ZOSYN) 2.25 g vial to attach to  ml bag      2.25 g  over 30 Minutes Intravenous EVERY 6 HOURS 17 0636      17  vancomycin place denins - receiving intermittent dosing      1 each Does not apply SEE ADMIN INSTRUCTIONS 17 1630  norepinephrine (LEVOPHED) 16 mg in D5W 250 mL infusion      0.03-0.4 mcg/kg/min × 117 kg  3.3-43.9 mL/hr  Intravenous CONTINUOUS 17 1616               Contrast Orders - past 72 hours     None          Interpretation of levels and current regimen:  Trough level is  Supratherapeutic    Has serum creatinine changed > 50% in last 72 hours: Yes    Urine output:  good urine output    Renal Function: Worsening    Plan:  1.  Continue to hold vancomycin  2.  Pharmacy will check trough levels as appropriate in 1-3 Days.    3. Serum creatinine levels will be ordered daily for the first week of  therapy and at least twice weekly for subsequent weeks.      Jordi Aguilar        .

## 2017-02-13 NOTE — PROGRESS NOTES
MICU Progress Note    Hany Patel MRN: [id number]  1995  Date of Admission:2/7/2017  Primary care provider: Liu Song  DATE OF SERVICE: February 10, 2017        Assessment and Plan     Hany Patel is a 21 yo M with a history of Down syndrome, hypothyroidism, and obesity admitted with sepsis and hypoxic respiratory failure in setting of presumed pneumonia and associated L sided pleural effusion.     Changes Today  -- Febrile to 101.1 o/n. Cultured again. No clear bacteria identified yet - growing out Strep Intermedius in the pleural fluid and Staph capitis in one bottle of blood - both of which may be contaminants.  -- Will retrial Pressure support today  -- D/Chirag vancomycin and levofloxacin; Will continue on zosyn.   -- Dropped RR to 8 to allow for some permissive hypercapnia to allow for spontaneous respiratory drive  -- Cr stable; Uop ~2400 mls on 2/12/17  -- Tube feeds off for unknown reason; Restarted this am.   -- Free water flushes decreased to [id number]  -- Switched to propofol from versed and precedex  -- drainage of chest pigtail catheter diminishing. 100ccs out on 2/12/17    ===PULMONARY===  # Acute hypoxic respiratory failure   # Pneumonia c/b left sided parapneumonic effusion (exudative)   Initially only effusion visible on imaging, pigtail catheter placed by IR on admission 2/7, have been facilitating drainage with tpa. Repeat CT done 2/11 demonstrates decrease in pleural fluid but dense consolidation throughout bilateral lower lobes and some consolidation in upper lobes as well, c/f development of necrotizing pneumonia  -- VENT Settings 2/12 AC FiO2 of 40% RR of 8-12 TV of 550 Peep of 5  -- Bronch 2/10 -> no obstructions  --  BAL cell ct: , 23% neutrophils, cultures NGTD, viral PCR negative  -- Pleural effusion cx 2/7 growing strep intermedius, sensitivities pending, cytology neg  -- Chest tube output 2/11 510 ccs  -- CT chest 2/11 ->  decreased size fluid collection, extensive bilateral groundglass opacities and patchy consolidation L>R, complete LLL collapse  -- Continue Zosyn    -- Per review of CT imaging, respiratory status, do not expect patient to be ready to be extubated for several days - Will trial pressure support    ===INFECTIOUS DISEASE===  # Sepsis  # Community Acquired Pneumonia  Presented with fevers, tachycardia, and leukocytosis. Found to have L sided pleural effusion on admission, draining with pigtail catheter. Repeat imaging 2/11 c/w dense bilateral pneumonia. Afebrile since 1800 on 2/12/17  -- One bottle of blood culture from 2/8 growing Coag Neg staph - likely contaminant. WIll continue to follow. Urine Strep and Legionella neg.  -- Bronch 2/10 -> no obstructions  --  BAL cell ct: , 23% neutrophils, cultures NGTD, viral PCR negative   -- Pleural effusion cx 2/7 growing strep intermedius, pan sensitive; cytology negative    ===NEURO===  # Sedation  - propofol gtt; with versed bumps available.     ===CARDIOVASCULAR===  # Tachycardia (improved)   Sinus tachycardia on admission to ICU. Likely hypovolemia in setting of poor po intake; Resolving though intermittently agitated with increased HR at those times.   -- Stopped IVF 2/10 as has had ~6L fluid resuscitation. TFs started 2/10 See Fluid status discussion below  -- Goal maintain net even     #Decreased EF 50-55%  No evidence of any septal defects (noted because of Trisomy 21).     ===GASTROINTESTINAL===  # Nutrition:    -- TFs initiated 2/10    ===RENAL===  #Fluid Status  Slightly net positive this admission. Will hold IVF at this time as >6L resuscitated. Will start tube feeds, given CT imaging will minimize additional fluids and maintain goal net even to net neg 1L daily  -- Favor net even today and may need to diurese to keep pulmonary status (may be developing some non cardiogenic pulmonary edema).     #Electrolytes: Repleting PRN - on Potassium and Phosphate  Repletion protocols  High Dose     #GUILLERMINA  Cr 0.6 -> 0.9 -> 2.76 -->3.3-->3.4 --> 3.4 -->3.4  c/w GUILLERMINA. Unknown etiology, though suspect ATN given intermittent hypotension on admission to ICU. Renal US done 2/11 without evidence of hydronephrosis and condom cath is draining good urine. Trough vanco level 2/10 wnl however jumped to supratherapeutic on 2/12 at 40. Trough on 2/12 was 25.1.   -- FeUrea is >1000%; suspect ATN 2/2 hypotension and vanc toxicity.   -- D/Chirag vanc  -- Avoid nephrotoxins as possible.   -- Strict IOs. Continue condom cath. Watch urine output closely.     ===ENDOCRINE===  # Hypothyroidism  - Continue PTA levothyroxine; TSH on 2/9 wnl.     ===HEM/ONC===  #Normocytic anemia  Likely some amount of dilution but also acute blood loss from chest tube site. RDW elevated. Down to 9.7 --> 9.3. Will continue to follow.     Prophylaxis:  DVT: Lovenox   GI: PPI  Family:  Updated Mother at bedside.  Disposition: Critically Ill. Requires ongoing ICU care.    Code Status: Full    Staffed with Dr. James Leon MD  Internal Medicine PGY-1  [phone]        Events of last 24 hrs:     Febrile o/n to 101.1; Switched to propofol and more comfortable at this time. Responsive and follows commands; Mother at bedside.      OBJECTIVE   Ventilator  Ventilation Mode: CPAP/PS  FiO2 (%): 40 %  Rate Set (breaths/minute): 8 breaths/min  Tidal Volume Set (mL): 550 mL  PEEP (cm H2O): 5 cmH2O  Pressure Support (cm H2O): 10 cmH2O  Oxygen Concentration (%): 40 %  Resp: 33  Arterial Blood Gas result:  7.41/39/115/25/0.2  CVP of 10-18     Intake/Output    Intake/Output Summary (Last 24 hours) at 02/12/17 1339  Last data filed at 02/12/17 1200   Gross per 24 hour   Intake          2099.92 ml   Output             1900 ml   Net           199.92 ml           Vital Signs    Temp: 100.3  F (37.9  C) Temp src: Oral BP: 99/52   Heart Rate: 108 Resp: (!) 33 SpO2: 92 % O2 Device: Mechanical Ventilator   Height: 160 cm (5'  "2.99\") Weight: 118 kg (260 lb 2.3 oz)  Estimated body mass index is 46.09 kg/(m^2) as calculated from the following:    Height as of this encounter: 1.6 m (5' 2.99\").    Weight as of this encounter: 118 kg (260 lb 2.3 oz).       Physical Exam  General: Intubated, no acute distress; able to follow commands  HEENT: PERRLA  CV: Tachycardic, regular, normal S1S2, no murmurs, rubs, or gallops    Resp: bibasilar rhonchi, L>R, coarse breath sounds throughout  L CT in place with serosanguinous drainage;   Abd: Soft, non-tender, non-distended, BS+  Extremities: warm and well perfused, no lower extremity edema  Neuro: No focal deficits, moving all extremities spontaneously; able to follow commands    LINES  RIJ 2/8 -- Present  [place] 2/8 - Present  Peripheral IV x1 2/8 - Present R arm  Peripheral IVx1 2/7 - Present  R arm  12 [place] Left Pleural Pigtail catheter - L chest  NG Tube 2/8 - Present    ETT Tube Size 8   ROUTINE ICU LABS:     Labs Reviewed  CBC RESULTS:   Recent Labs   Lab Test  02/13/17   0351   WBC  10.6   RBC  3.21*   HGB  9.3*   HCT  28.6*   MCV  89   MCH  29.0   MCHC  32.5   RDW  15.9*   PLT  325     Recent Labs   Lab Test  02/13/17   0351  02/12/17   1553   NA  142  143   POTASSIUM  3.6  3.5   CHLORIDE  107  108   CO2  24  27   ANIONGAP  11  7   GLC  86  90   BUN  25  26   CR  3.41*  3.43*   JUAN  8.0*  8.0*       Microbiology      Fluid Culture Aerobic Bacterial [[id number]] (Abnormal)  Collected: 02/07/17 2030     Order Status: Completed Lab Status: Preliminary result Updated: 02/12/17 0918     Specimen: Pleural fluid from Pleural fluid       Specimen Description Pleural fluid      Culture Micro -- (A)      Moderate growth Streptococcus intermedius   Critical Value/Significant Value, preliminary result only, called to and read    back by RUPERTO AYOUB RN 4C, ON 02/10/17 @ 1138, AUBREY         Micro Report Status Pending      Organism: Moderate growth Streptococcus intermedius     Culture & Susceptibility      " MODERATE GROWTH STREPTOCOCCUS INTERMEDIUS (THAI GRAM POS PANEL)      Antibiotic Sensitivity Unit Status     AMPICILLIN <=0.06 Susceptible ug/mL Final     CEFOTAXIME <=0.25 Susceptible ug/mL Final     CEFTRIAXONE <=0.25 Susceptible ug/mL Final     CLINDAMYCIN <=0.06 Susceptible ug/mL Final     PENICILLIN <=0.03 Susceptible ug/mL Final     VANCOMYCIN 0.5 Susceptible ug/mL Final                         Blood culture [[id number]] (Abnormal) Collected: 02/08/17 0535     Order Status: Completed Lab Status: Preliminary result Updated: 02/09/17 1132     Specimen Information: Blood       Specimen Description Blood Port       Culture Micro -- (A)       Result:        Cultured on the 1st day of incubation: Gram positive cocci in clusters   Critical Value/Significant Value, preliminary result only, called to and read    back by Teresita Brown RN 4C @1527 2/9/17. DH.   (Note)   POSITIVE for Staphylococci other than S.aureus, S.epidermidis and   S.lugdunensis, by Verigene multiplex nucleic acid test.   Coagulase-negative staphylococci are the most common venipuncture or   collection associated skin CONTAMINANTS grown in blood cultures.   Final identification and antimicrobial susceptibility testing will be   verified by standard methods.     Specimen tested with Verigene multiplex, gram-positive blood culture   nucleic acid test for the following targets: Staph aureus, Staph   epidermidis, Staph lugdunensis, other Staph species, Enterococcus   faecalis, Enterococcus faecium, Streptococcus species, S. agalactiae,   S. anginosus grp., S. pneumoniae, S. pyogenes, Listeria sp., mecA   (methicillin resistance) and Sherri/B (vancomycin resistance).     Critical Value/Significant Value called to and read back by Teresita Brown RN @1132 02/09/17 gd           Imaging     CXR from 2/13/17 fairly stable from prior read           CT chest w/o contrast 2/11   Impression:    1. Left basilar chest tube with a small hydropneumothorax. There is a  4.5  x 2.2 cm lentiform simple fluid density collection in the medial  base which could represent a loculated component of the pleural  effusion.  2. Patchy attenuation in the left upper and left lower lobes  concerning for necrotizing pneumonia. There are superimposed diffuse  bilateral groundglass opacities and patchy consolidation.  3. New small right-sided pleural effusion with right lower lobe  subsegmental atelectasis.  4. Complete left lower lobe collapse.    Renal US 2/11:  Impression:  1.  Hepatomegaly.  2.  Contracted gallbladder with layering sludge.  3.  Trace ascites with small pleural effusions.  4.  Two 1-2 mm echogenic foci along the anterior wall of the  gallbladder, either small cholesterol polyps or adherent stones.  Critical Care Services Progress Note:     Hany Patel remains critically ill with hypoxic respiratory failure and presumed sepsis     I personally examined and evaluated the patient today.   The patient s prognosis today is tentative and unchanged  I have evaluated all laboratory values and imaging studies from the past 24 hours.  Key findings and decisions made today included spontaneous breathing trial  I personally managed the ventilator, sedation, pain control and analgesia, metabolic abnormalities, antibiotic therapy and nutritional status.   Consults ongoing and ordered are none  Procedures that will happen today are: mechanical ventilation  All treatments were placed at my direction.  I formulated today s plan with the house staff team or resident(s) and agree with the findings and plan in the associated note.       The above plans and care have been discussed with mother and brother and all questions and concerns were addressed.     I spent a total of 40 minutes (excluding procedure time) personally providing and directing critical care services at the bedside and on the critical care unit for Hany Patel.        Stalin Ramirez

## 2017-02-13 NOTE — PLAN OF CARE
Problem: Goal Outcome Summary  Goal: Goal Outcome Summary  Outcome: No Change  D: Tmax 101.1. NSR. Stable on CMV settings, 50%, peep 5.  Adequate UO. BMx1. Free water @125ml/hr. Minimal output from left chest tube.  I: Tylenol given. Blood cultures and sputum sent. Levo titrated to maintain MAPS >60. Propofol started this shift. Versed titrated down and now stopped. Continues on fentanyl. Restraints removed. Did not replace potassium this am due to increasing creatinine, per MD.   A: Appeared restless and anxious at start of shift, now comfortably and appropriately sedated.   P: Continue to monitor respiratory status and kidney function.

## 2017-02-13 NOTE — PLAN OF CARE
Problem: Goal Outcome Summary  Goal: Goal Outcome Summary  OT 4A: Pt max A to EOB today, Unable to stand. Pt lifted to recliner. Incontinent of stool. VSS.     REC: TCU as pt significantly below baseline with ADLs and functional transfers.

## 2017-02-14 ENCOUNTER — APPOINTMENT (OUTPATIENT)
Dept: GENERAL RADIOLOGY | Facility: CLINIC | Age: 22
DRG: 870 | End: 2017-02-14
Payer: MEDICARE

## 2017-02-14 LAB
ANCA IGG TITR SER IF: NORMAL {TITER}
ANION GAP SERPL CALCULATED.3IONS-SCNC: 9 MMOL/L (ref 3–14)
BASE DEFICIT BLDV-SCNC: 0.2 MMOL/L
BUN SERPL-MCNC: 23 MG/DL (ref 7–30)
CALCIUM SERPL-MCNC: 8.1 MG/DL (ref 8.5–10.1)
CHLORIDE SERPL-SCNC: 109 MMOL/L (ref 94–109)
CO2 SERPL-SCNC: 26 MMOL/L (ref 20–32)
CREAT SERPL-MCNC: 3.15 MG/DL (ref 0.66–1.25)
ERYTHROCYTE [DISTWIDTH] IN BLOOD BY AUTOMATED COUNT: 15.8 % (ref 10–15)
GFR SERPL CREATININE-BSD FRML MDRD: 24 ML/MIN/1.7M2
GLUCOSE BLDC GLUCOMTR-MCNC: 103 MG/DL (ref 70–99)
GLUCOSE BLDC GLUCOMTR-MCNC: 104 MG/DL (ref 70–99)
GLUCOSE BLDC GLUCOMTR-MCNC: 109 MG/DL (ref 70–99)
GLUCOSE BLDC GLUCOMTR-MCNC: 98 MG/DL (ref 70–99)
GLUCOSE BLDC GLUCOMTR-MCNC: 99 MG/DL (ref 70–99)
GLUCOSE SERPL-MCNC: 100 MG/DL (ref 70–99)
HCO3 BLDV-SCNC: 25 MMOL/L (ref 21–28)
HCT VFR BLD AUTO: 27.2 % (ref 40–53)
HGB BLD-MCNC: 8.7 G/DL (ref 13.3–17.7)
MAGNESIUM SERPL-MCNC: 2.4 MG/DL (ref 1.6–2.3)
MCH RBC QN AUTO: 28.8 PG (ref 26.5–33)
MCHC RBC AUTO-ENTMCNC: 32 G/DL (ref 31.5–36.5)
MCV RBC AUTO: 90 FL (ref 78–100)
O2/TOTAL GAS SETTING VFR VENT: 40 %
PCO2 BLDV: 41 MM HG (ref 40–50)
PH BLDV: 7.39 PH (ref 7.32–7.43)
PHOSPHATE SERPL-MCNC: 4.5 MG/DL (ref 2.5–4.5)
PLATELET # BLD AUTO: 344 10E9/L (ref 150–450)
PO2 BLDV: 36 MM HG (ref 25–47)
POTASSIUM SERPL-SCNC: 3.6 MMOL/L (ref 3.4–5.3)
RBC # BLD AUTO: 3.02 10E12/L (ref 4.4–5.9)
SODIUM SERPL-SCNC: 144 MMOL/L (ref 133–144)
WBC # BLD AUTO: 10.1 10E9/L (ref 4–11)

## 2017-02-14 PROCEDURE — A9270 NON-COVERED ITEM OR SERVICE: HCPCS | Mod: GY | Performed by: INTERNAL MEDICINE

## 2017-02-14 PROCEDURE — 25800025 ZZH RX 258: Performed by: INTERNAL MEDICINE

## 2017-02-14 PROCEDURE — 71010 XR CHEST PORT 1 VW: CPT

## 2017-02-14 PROCEDURE — 83735 ASSAY OF MAGNESIUM: CPT | Performed by: INTERNAL MEDICINE

## 2017-02-14 PROCEDURE — 25000125 ZZHC RX 250: Performed by: INTERNAL MEDICINE

## 2017-02-14 PROCEDURE — 40000556 ZZH STATISTIC PERIPHERAL IV START W US GUIDANCE

## 2017-02-14 PROCEDURE — 25000128 H RX IP 250 OP 636: Performed by: INTERNAL MEDICINE

## 2017-02-14 PROCEDURE — 00000146 ZZHCL STATISTIC GLUCOSE BY METER IP

## 2017-02-14 PROCEDURE — 40000257 ZZH STATISTIC CONSULT NO CHARGE VASC ACCESS

## 2017-02-14 PROCEDURE — 84100 ASSAY OF PHOSPHORUS: CPT | Performed by: INTERNAL MEDICINE

## 2017-02-14 PROCEDURE — 40000275 ZZH STATISTIC RCP TIME EA 10 MIN

## 2017-02-14 PROCEDURE — 25000132 ZZH RX MED GY IP 250 OP 250 PS 637: Mod: GY | Performed by: INTERNAL MEDICINE

## 2017-02-14 PROCEDURE — 85027 COMPLETE CBC AUTOMATED: CPT | Performed by: INTERNAL MEDICINE

## 2017-02-14 PROCEDURE — 80048 BASIC METABOLIC PNL TOTAL CA: CPT | Performed by: INTERNAL MEDICINE

## 2017-02-14 PROCEDURE — 94003 VENT MGMT INPAT SUBQ DAY: CPT

## 2017-02-14 PROCEDURE — 25000128 H RX IP 250 OP 636

## 2017-02-14 PROCEDURE — 99291 CRITICAL CARE FIRST HOUR: CPT | Mod: GC | Performed by: INTERNAL MEDICINE

## 2017-02-14 PROCEDURE — 82803 BLOOD GASES ANY COMBINATION: CPT | Performed by: INTERNAL MEDICINE

## 2017-02-14 PROCEDURE — 74000 XR ABDOMEN PORT F1 VW: CPT

## 2017-02-14 PROCEDURE — 20000004 ZZH R&B ICU UMMC

## 2017-02-14 PROCEDURE — 25000128 H RX IP 250 OP 636: Performed by: SURGERY

## 2017-02-14 RX ORDER — SODIUM CHLORIDE 9 MG/ML
INJECTION, SOLUTION INTRAVENOUS
Status: COMPLETED
Start: 2017-02-14 | End: 2017-02-14

## 2017-02-14 RX ORDER — SODIUM CHLORIDE, SODIUM LACTATE, POTASSIUM CHLORIDE, CALCIUM CHLORIDE 600; 310; 30; 20 MG/100ML; MG/100ML; MG/100ML; MG/100ML
INJECTION, SOLUTION INTRAVENOUS CONTINUOUS
Status: DISPENSED | OUTPATIENT
Start: 2017-02-14 | End: 2017-02-15

## 2017-02-14 RX ORDER — GLYCOPYRROLATE 1 MG/5ML
2 SOLUTION ORAL 3 TIMES DAILY PRN
Status: DISCONTINUED | OUTPATIENT
Start: 2017-02-14 | End: 2017-02-20 | Stop reason: HOSPADM

## 2017-02-14 RX ORDER — ATROPINE SULFATE 10 MG/ML
1 SOLUTION/ DROPS OPHTHALMIC 3 TIMES DAILY
Status: DISCONTINUED | OUTPATIENT
Start: 2017-02-14 | End: 2017-02-15 | Stop reason: ALTCHOICE

## 2017-02-14 RX ADMIN — HEPARIN SODIUM 5000 UNITS: 10000 INJECTION, SOLUTION INTRAVENOUS; SUBCUTANEOUS at 06:43

## 2017-02-14 RX ADMIN — SENNOSIDES A AND B 5 ML: 415.36 LIQUID ORAL at 07:52

## 2017-02-14 RX ADMIN — PIPERACILLIN SODIUM, TAZOBACTAM SODIUM 2.25 G: 2; .25 INJECTION, POWDER, LYOPHILIZED, FOR SOLUTION INTRAVENOUS at 15:32

## 2017-02-14 RX ADMIN — MIDAZOLAM HYDROCHLORIDE 2 MG: 1 INJECTION, SOLUTION INTRAMUSCULAR; INTRAVENOUS at 15:40

## 2017-02-14 RX ADMIN — ACETAMINOPHEN 650 MG: 325 TABLET, FILM COATED ORAL at 13:41

## 2017-02-14 RX ADMIN — PIPERACILLIN SODIUM, TAZOBACTAM SODIUM 2.25 G: 2; .25 INJECTION, POWDER, LYOPHILIZED, FOR SOLUTION INTRAVENOUS at 23:27

## 2017-02-14 RX ADMIN — MIDAZOLAM HYDROCHLORIDE 2 MG: 1 INJECTION, SOLUTION INTRAMUSCULAR; INTRAVENOUS at 18:14

## 2017-02-14 RX ADMIN — MIDAZOLAM HYDROCHLORIDE 2 MG: 1 INJECTION, SOLUTION INTRAMUSCULAR; INTRAVENOUS at 14:26

## 2017-02-14 RX ADMIN — SODIUM CHLORIDE 1000 ML: 9 INJECTION, SOLUTION INTRAVENOUS at 20:29

## 2017-02-14 RX ADMIN — FENTANYL CITRATE 200 MCG/HR: 50 INJECTION, SOLUTION INTRAMUSCULAR; INTRAVENOUS at 04:50

## 2017-02-14 RX ADMIN — TOPICAL ANESTHETIC 1 EACH: 200 SPRAY DENTAL; PERIODONTAL at 15:50

## 2017-02-14 RX ADMIN — SENNOSIDES A AND B 5 ML: 415.36 LIQUID ORAL at 20:29

## 2017-02-14 RX ADMIN — PIPERACILLIN SODIUM, TAZOBACTAM SODIUM 2.25 G: 2; .25 INJECTION, POWDER, LYOPHILIZED, FOR SOLUTION INTRAVENOUS at 11:18

## 2017-02-14 RX ADMIN — MIDAZOLAM HYDROCHLORIDE 2 MG: 1 INJECTION, SOLUTION INTRAMUSCULAR; INTRAVENOUS at 01:11

## 2017-02-14 RX ADMIN — LEVOTHYROXINE SODIUM 112 MCG: 112 TABLET ORAL at 07:52

## 2017-02-14 RX ADMIN — ATROPINE SULFATE 1 DROP: 10 SOLUTION/ DROPS OPHTHALMIC at 20:29

## 2017-02-14 RX ADMIN — PIPERACILLIN SODIUM, TAZOBACTAM SODIUM 2.25 G: 2; .25 INJECTION, POWDER, LYOPHILIZED, FOR SOLUTION INTRAVENOUS at 03:58

## 2017-02-14 RX ADMIN — Medication 40 MG: at 07:53

## 2017-02-14 RX ADMIN — FENTANYL CITRATE 100 MCG/HR: 50 INJECTION, SOLUTION INTRAMUSCULAR; INTRAVENOUS at 18:22

## 2017-02-14 RX ADMIN — SODIUM CHLORIDE, POTASSIUM CHLORIDE, SODIUM LACTATE AND CALCIUM CHLORIDE: 600; 310; 30; 20 INJECTION, SOLUTION INTRAVENOUS at 20:29

## 2017-02-14 RX ADMIN — HEPARIN SODIUM 5000 UNITS: 10000 INJECTION, SOLUTION INTRAVENOUS; SUBCUTANEOUS at 23:27

## 2017-02-14 RX ADMIN — HEPARIN SODIUM 5000 UNITS: 10000 INJECTION, SOLUTION INTRAVENOUS; SUBCUTANEOUS at 15:32

## 2017-02-14 RX ADMIN — POLYETHYLENE GLYCOL 3350 17 G: 17 POWDER, FOR SOLUTION ORAL at 07:52

## 2017-02-14 RX ADMIN — DEXTROSE MONOHYDRATE: 100 INJECTION, SOLUTION INTRAVENOUS at 08:52

## 2017-02-14 RX ADMIN — Medication 15 ML: at 07:52

## 2017-02-14 RX ADMIN — ACETAMINOPHEN 650 MG: 325 TABLET, FILM COATED ORAL at 17:49

## 2017-02-14 NOTE — PROGRESS NOTES
MICU Progress Note  Hany Patel MRN: [id number]  1995  Date of Admission:2/7/2017  Primary care provider: Liu Song  DATE OF SERVICE: February 10, 2017      Assessment and Plan     Hany Patel is a 23 yo M with a history of Down syndrome, hypothyroidism, and obesity admitted with sepsis and hypoxic respiratory failure in setting of presumed pneumonia and associated L sided pleural effusion.     Changes Today  -- Norepi titrated on overnight with increasing propofol; however back off by 8 am this am.   -- OG found to be long-term pulled out today with tube feeds running. Readvanced forward and xray shows that it is in the correct place. However patient's fever curve uptrending slightly. Cultured again (blood x2, sputum, urine).   -- Cr slowly downtrending and urine output is stable at around 2.6L q day. Will watch closely for a post ATN diuresis  -- Patient pressure supported for ~1 hour this morning with TVs in the 200-300 range but did become more agitated later in the day. Copious oral secretions; Glycopyrollate added on.  -- Chest tube output minimal. Will d/c chest tube today.     ===PULMONARY===  # Acute hypoxic respiratory failure   # Pneumonia c/b left sided parapneumonic effusion (exudative)  # Necrotizing Pneumonia   Initially only effusion visible on imaging, pigtail catheter placed by IR on admission 2/7, have been facilitating drainage with tpa. Repeat CT done 2/11 demonstrates decrease in pleural fluid but dense consolidation throughout bilateral lower lobes and some consolidation in upper lobes as well, c/f development of necrotizing pneumonia  -- VENT Settings 2/12 AC FiO2 of 40% RR of 8-12 TV of 550 Peep of 5  -- Bronch 2/10 -> no obstructions  --  BAL cell ct: , 23% neutrophils, cultures negative; Viral pcr negative  -- Pleural effusion cx 2/7 growing strep intermedius, pan sensitive  -- Chest tube output 2/13 20ccs  -- CT chest 2/11 ->  decreased size fluid collection, extensive bilateral groundglass opacities and patchy consolidation L>R, complete LLL collapse  -- Continue Zosyn    -- Will aim to extubate in the next 24 to 48 hours.     ===INFECTIOUS DISEASE===  # Sepsis  # Community Acquired Pneumonia  Presented with fevers, tachycardia, and leukocytosis. Found to have L sided pleural effusion on admission, draining with pigtail catheter. Repeat imaging 2/11 c/w dense bilateral pneumonia. Afebrile since 1800 on 2/12/17  -- One bottle of blood culture from 2/8 growing Coag Neg staph - likely contaminant. WIll continue to follow. Urine Strep and Legionella neg.  -- Bronch 2/10 -> no obstructions  --  BAL cell ct: , 23% neutrophils, cultures NGTD, viral PCR negative   -- Pleural effusion cx 2/7 growing strep intermedius, pan sensitive; cytology negative    ===NEURO===  # Sedation  - propofol gtt; with versed bumps available.     ===CARDIOVASCULAR===  # Tachycardia (improved)   Sinus tachycardia on admission to ICU. Likely hypovolemia in setting of poor po intake; Resolving though intermittently agitated with increased HR at those times.   -- Stopped IVF 2/10 as has had ~6L fluid resuscitation. TFs started 2/10 See Fluid status discussion below  -- Goal maintain net even     #Decreased EF 50-55%  No evidence of any septal defects (noted because of Trisomy 21).     ===GASTROINTESTINAL===  # Nutrition:    -- TFs initiated 2/10    ===RENAL===  #Fluid Status  Slightly net positive this admission. Will hold IVF at this time as >6L resuscitated. Will start tube feeds, given CT imaging will minimize additional fluids and maintain goal net even to net neg 1L daily  -- Favor net even today and may need to diurese to keep pulmonary status (may be developing some non cardiogenic pulmonary edema).     #Electrolytes: Repleting PRN - on Potassium and Phosphate Repletion protocols  High Dose     #GUILLERMINA: Slowly resolving  Cr 0.6 -> 0.9 -> 2.76 -->3.3-->3.4 -->  "3.4 -->3.4 --> 3.1  Suspect ATN given intermittent hypotension on admission to ICU. Renal US done 2/11 without evidence of hydronephrosis and condom cath is draining good urine. Trough vanco level 2/10 wnl however jumped to supratherapeutic on 2/12 at 40. Trough on 2/12 was 25.1.   -- FeUrea is >1000%; suspect ATN 2/2 hypotension and vanc toxicity.   -- D/Chirag vanc  -- Avoid nephrotoxins as possible.   -- Strict IOs. Continue condom cath. Watch urine output closely.     ===ENDOCRINE===  # Hypothyroidism  - Continue PTA levothyroxine; TSH on 2/9 wnl.     ===HEM/ONC===  #Normocytic anemia  Likely some amount of dilution but also acute blood loss from chest tube site. RDW elevated. Down to 9.7 --> 9.3. Will continue to follow.     Prophylaxis:  DVT: Lovenox   GI: PPI  Family:  Updated Mother at bedside.  Disposition: Critically Ill. Requires ongoing ICU care.    Code Status: Full    Staffed with Dr. James Leon MD  Internal Medicine PGY-1  [phone]        Events of last 24 hrs:     -- Patient titrated back on to levofed o/n.  -- Febrile o/n to 101.6  -- Pressure supported this morning for ~1 hour      OBJECTIVE   Ventilator  Ventilation Mode: CMV/AC  FiO2 (%): 40 %  Rate Set (breaths/minute): 8 breaths/min  Tidal Volume Set (mL): 550 mL  PEEP (cm H2O): 5 cmH2O  Pressure Support (cm H2O): 10 cmH2O  Oxygen Concentration (%): 40 %  Resp: 18  Arterial Blood Gas result:  7.41/39/115/25/0.2  CVP of 10-18     Intake/Output      Intake/Output Summary (Last 24 hours) at 02/14/17 1701  Last data filed at 02/14/17 1600   Gross per 24 hour   Intake           1522.7 ml   Output             1410 ml   Net            112.7 ml       Vital Signs    Temp: 98.5  F (36.9  C) Temp src: Oral BP: 126/81 Pulse: 107 Heart Rate: 98 Resp: 18 SpO2: 95 % O2 Device: Nasal cannula Oxygen Delivery: 2.5 LPM Height: 160 cm (5' 2.99\") Weight: 118 kg (260 lb 2.3 oz)  Estimated body mass index is 46.09 kg/(m^2) as calculated from " "the following:    Height as of this encounter: 1.6 m (5' 2.99\").    Weight as of this encounter: 118 kg (260 lb 2.3 oz).       Physical Exam  General: Intubated,mild distress; able to follow commands  HEENT: PERRLA  CV: Tachycardic, regular, normal S1S2, no murmurs, rubs, or gallops    Resp: bibasilar rhonchi, L>R, coarse breath sounds throughout  L CT in place with serosanguinous drainage;   Abd: Soft, non-tender, non-distended, BS+  Extremities: warm and well perfused, no lower extremity edema  Neuro: No focal deficits, moving all extremities spontaneously; able to follow commands    LINES  RIJ 2/8 -- Present  [place] 2/8 - Present  Peripheral IV x1 2/8 - Present R arm  Peripheral IVx1 2/7 - Present  R arm  12 [place] Left Pleural Pigtail catheter - L chest  NG Tube 2/8 - Present    ETT Tube Size 8   ROUTINE ICU LABS:     Labs Reviewed  CBC RESULTS:   Recent Labs   Lab Test  02/14/17   0421   WBC  10.1   RBC  3.02*   HGB  8.7*   HCT  27.2*   MCV  90   MCH  28.8   MCHC  32.0   RDW  15.8*   PLT  344     Recent Labs   Lab Test  02/14/17   0421  02/13/17   0351   NA  144  142   POTASSIUM  3.6  3.6   CHLORIDE  109  107   CO2  26  24   ANIONGAP  9  11   GLC  100*  86   BUN  23  25   CR  3.15*  3.41*   JUAN  8.1*  8.0*         Microbiology      Fluid Culture Aerobic Bacterial [[id number]] (Abnormal)  Collected: 02/07/17 2030     Order Status: Completed Lab Status: Preliminary result Updated: 02/12/17 0918     Specimen: Pleural fluid from Pleural fluid       Specimen Description Pleural fluid      Culture Micro -- (A)      Moderate growth Streptococcus intermedius   Critical Value/Significant Value, preliminary result only, called to and read    back by RUPERTO AYOUB RN 4C, ON 02/10/17 @ [facility]         Micro Report Status Pending      Organism: Moderate growth Streptococcus intermedius     Culture & Susceptibility      MODERATE GROWTH STREPTOCOCCUS INTERMEDIUS (THAI GRAM POS PANEL)      Antibiotic Sensitivity Unit Status     " AMPICILLIN <=0.06 Susceptible ug/mL Final     CEFOTAXIME <=0.25 Susceptible ug/mL Final     CEFTRIAXONE <=0.25 Susceptible ug/mL Final     CLINDAMYCIN <=0.06 Susceptible ug/mL Final     PENICILLIN <=0.03 Susceptible ug/mL Final     VANCOMYCIN 0.5 Susceptible ug/mL Final                         Blood culture [[id number]] (Abnormal) Collected: 02/08/17 0535     Order Status: Completed Lab Status: Preliminary result Updated: 02/09/17 1132     Specimen Information: Blood       Specimen Description Blood Port       Culture Micro -- (A)       Result:        Cultured on the 1st day of incubation: Gram positive cocci in clusters   Critical Value/Significant Value, preliminary result only, called to and read    back by Teresita Brown RN 4C @0884 2/9/17. DH.   (Note)   POSITIVE for Staphylococci other than S.aureus, S.epidermidis and   S.lugdunensis, by [employer]igene multiplex nucleic acid test.   Coagulase-negative staphylococci are the most common venipuncture or   collection associated skin CONTAMINANTS grown in blood cultures.   Final identification and antimicrobial susceptibility testing will be   verified by standard methods.     Specimen tested with Verigene multiplex, gram-positive blood culture   nucleic acid test for the following targets: Staph aureus, Staph   epidermidis, Staph lugdunensis, other Staph species, Enterococcus   faecalis, Enterococcus faecium, Streptococcus species, S. agalactiae,   S. anginosus grp., S. pneumoniae, S. pyogenes, Listeria sp., mecA   (methicillin resistance) and Sherri/B (vancomycin resistance).     Critical Value/Significant Value called to and read back by Teresita Brown RN @1132 02/09/17 gd           Imaging     CXR from 2/13/17 fairly stable from prior read           CT chest w/o contrast 2/11   Impression:    1. Left basilar chest tube with a small hydropneumothorax. There is a  4.5 x 2.2 cm lentiform simple fluid density collection in the medial  base which could represent a loculated  component of the pleural  effusion.  2. Patchy attenuation in the left upper and left lower lobes  concerning for necrotizing pneumonia. There are superimposed diffuse  bilateral groundglass opacities and patchy consolidation.  3. New small right-sided pleural effusion with right lower lobe  subsegmental atelectasis.  4. Complete left lower lobe collapse.    Renal US 2/11:  Impression:  1.  Hepatomegaly.  2.  Contracted gallbladder with layering sludge.  3.  Trace ascites with small pleural effusions.  4.  Two 1-2 mm echogenic foci along the anterior wall of the  gallbladder, either small cholesterol polyps or adherent stones.      Critical Care Services Progress Note:     Hany Patel remains critically ill with hypoxic respiratory failure     I personally examined and evaluated the patient today.   The patient s prognosis today is unchanged  I have evaluated all laboratory values and imaging studies from the past 24 hours.  Key findings and decisions made today included spontaneous breathing trial and clamp chest tube  I personally managed the ventilator, sedation, pain control and analgesia, metabolic abnormalities, antibiotic therapy and nutritional status.   Consults ongoing and ordered are none  Procedures that will happen today are: mechanical ventilation  All treatments were placed at my direction.  I formulated today s plan with the house staff team or resident(s) and agree with the findings and plan in the associated note.       The above plans and care have been discussed with mother and father and all questions and concerns were addressed.     I spent a total of 40 minutes (excluding procedure time) personally providing and directing critical care services at the bedside and on the critical care unit for Hany Patel.        Stalin Ramirez

## 2017-02-14 NOTE — PLAN OF CARE
Problem: Goal Outcome Summary  Goal: Goal Outcome Summary  Outcome: No Change  D: Tmax 100.3. NSR. Stable on CMV settings, 50%, peep 5. PS 10/5 in afternoon for 1.5 hours. Tolerated well, became very agitated at end with RR in 50s. Adequate UO. BMx1. Free water changed to @125ml/4hrs. TF increased to 20 ml/hr.  Minimal output from left chest tube.  I: Versed given x1 d/t severe anxiety/restlessness. Levo titrated to maintain MAPS >60, currently at 0.02mcgs/hr. Continues on fentanyl.   A: Appeared restless and anxious throughout shift, versed had little effect.   P: Continue to monitor respiratory status and kidney function. Pressure support TID tomorrow.

## 2017-02-14 NOTE — PROGRESS NOTES
Levophed turned off about 03:00.  MAP >65 even when asleep.  C/O thirst, and becomes upset when told that he cannot have a drink just yet.  Lungs coarse throuhout all lung fields.  Suctioning moderate amount thick white secretions.  Good urine output.  Continues to have chest tube.  No air leak.

## 2017-02-15 ENCOUNTER — APPOINTMENT (OUTPATIENT)
Dept: OCCUPATIONAL THERAPY | Facility: CLINIC | Age: 22
DRG: 870 | End: 2017-02-15
Payer: MEDICARE

## 2017-02-15 ENCOUNTER — APPOINTMENT (OUTPATIENT)
Dept: SPEECH THERAPY | Facility: CLINIC | Age: 22
DRG: 870 | End: 2017-02-15
Payer: MEDICARE

## 2017-02-15 ENCOUNTER — APPOINTMENT (OUTPATIENT)
Dept: GENERAL RADIOLOGY | Facility: CLINIC | Age: 22
DRG: 870 | End: 2017-02-15
Payer: MEDICARE

## 2017-02-15 LAB
ANION GAP SERPL CALCULATED.3IONS-SCNC: 12 MMOL/L (ref 3–14)
ANION GAP SERPL CALCULATED.3IONS-SCNC: 12 MMOL/L (ref 3–14)
BACTERIA SPEC CULT: ABNORMAL
BACTERIA SPEC CULT: NO GROWTH
BACTERIA SPEC CULT: NO GROWTH
BASOPHILS # BLD AUTO: 0.1 10E9/L (ref 0–0.2)
BASOPHILS NFR BLD AUTO: 0.8 %
BUN SERPL-MCNC: 23 MG/DL (ref 7–30)
BUN SERPL-MCNC: 25 MG/DL (ref 7–30)
CALCIUM SERPL-MCNC: 7.9 MG/DL (ref 8.5–10.1)
CALCIUM SERPL-MCNC: 8 MG/DL (ref 8.5–10.1)
CHLORIDE SERPL-SCNC: 111 MMOL/L (ref 94–109)
CHLORIDE SERPL-SCNC: 111 MMOL/L (ref 94–109)
CO2 SERPL-SCNC: 23 MMOL/L (ref 20–32)
CO2 SERPL-SCNC: 23 MMOL/L (ref 20–32)
CREAT SERPL-MCNC: 3.17 MG/DL (ref 0.66–1.25)
CREAT SERPL-MCNC: 3.23 MG/DL (ref 0.66–1.25)
DIFFERENTIAL METHOD BLD: ABNORMAL
EOSINOPHIL # BLD AUTO: 0.3 10E9/L (ref 0–0.7)
EOSINOPHIL NFR BLD AUTO: 3.5 %
ERYTHROCYTE [DISTWIDTH] IN BLOOD BY AUTOMATED COUNT: 15.7 % (ref 10–15)
ERYTHROCYTE [DISTWIDTH] IN BLOOD BY AUTOMATED COUNT: 15.8 % (ref 10–15)
GFR SERPL CREATININE-BSD FRML MDRD: 24 ML/MIN/1.7M2
GFR SERPL CREATININE-BSD FRML MDRD: 24 ML/MIN/1.7M2
GLUCOSE BLDC GLUCOMTR-MCNC: 105 MG/DL (ref 70–99)
GLUCOSE SERPL-MCNC: 95 MG/DL (ref 70–99)
GLUCOSE SERPL-MCNC: 99 MG/DL (ref 70–99)
HCT VFR BLD AUTO: 26.4 % (ref 40–53)
HCT VFR BLD AUTO: 26.5 % (ref 40–53)
HGB BLD-MCNC: 8.7 G/DL (ref 13.3–17.7)
HGB BLD-MCNC: 8.8 G/DL (ref 13.3–17.7)
IMM GRANULOCYTES # BLD: 0.4 10E9/L (ref 0–0.4)
IMM GRANULOCYTES NFR BLD: 4.6 %
LYMPHOCYTES # BLD AUTO: 1.7 10E9/L (ref 0.8–5.3)
LYMPHOCYTES NFR BLD AUTO: 18.6 %
MAGNESIUM SERPL-MCNC: 2.4 MG/DL (ref 1.6–2.3)
MCH RBC QN AUTO: 29 PG (ref 26.5–33)
MCH RBC QN AUTO: 29.2 PG (ref 26.5–33)
MCHC RBC AUTO-ENTMCNC: 32.8 G/DL (ref 31.5–36.5)
MCHC RBC AUTO-ENTMCNC: 33.3 G/DL (ref 31.5–36.5)
MCV RBC AUTO: 88 FL (ref 78–100)
MCV RBC AUTO: 88 FL (ref 78–100)
MICRO REPORT STATUS: ABNORMAL
MICRO REPORT STATUS: NORMAL
MICRO REPORT STATUS: NORMAL
MONOCYTES # BLD AUTO: 0.9 10E9/L (ref 0–1.3)
MONOCYTES NFR BLD AUTO: 9.5 %
NEUTROPHILS # BLD AUTO: 5.8 10E9/L (ref 1.6–8.3)
NEUTROPHILS NFR BLD AUTO: 63 %
NRBC # BLD AUTO: 0 10*3/UL
NRBC BLD AUTO-RTO: 0 /100
PHOSPHATE SERPL-MCNC: 2.2 MG/DL (ref 2.5–4.5)
PLATELET # BLD AUTO: 395 10E9/L (ref 150–450)
PLATELET # BLD AUTO: 397 10E9/L (ref 150–450)
POTASSIUM SERPL-SCNC: 3.2 MMOL/L (ref 3.4–5.3)
POTASSIUM SERPL-SCNC: 3.2 MMOL/L (ref 3.4–5.3)
PROCALCITONIN SERPL-MCNC: 7.53 NG/ML
RBC # BLD AUTO: 3 10E12/L (ref 4.4–5.9)
RBC # BLD AUTO: 3.01 10E12/L (ref 4.4–5.9)
SODIUM SERPL-SCNC: 145 MMOL/L (ref 133–144)
SODIUM SERPL-SCNC: 146 MMOL/L (ref 133–144)
SPECIMEN SOURCE: ABNORMAL
SPECIMEN SOURCE: NORMAL
SPECIMEN SOURCE: NORMAL
WBC # BLD AUTO: 9.1 10E9/L (ref 4–11)
WBC # BLD AUTO: 9.3 10E9/L (ref 4–11)

## 2017-02-15 PROCEDURE — 25000132 ZZH RX MED GY IP 250 OP 250 PS 637: Mod: GY | Performed by: INTERNAL MEDICINE

## 2017-02-15 PROCEDURE — 20000004 ZZH R&B ICU UMMC

## 2017-02-15 PROCEDURE — 87040 BLOOD CULTURE FOR BACTERIA: CPT | Performed by: SURGERY

## 2017-02-15 PROCEDURE — 85025 COMPLETE CBC W/AUTO DIFF WBC: CPT | Performed by: SURGERY

## 2017-02-15 PROCEDURE — A9270 NON-COVERED ITEM OR SERVICE: HCPCS | Mod: GY | Performed by: INTERNAL MEDICINE

## 2017-02-15 PROCEDURE — 00000146 ZZHCL STATISTIC GLUCOSE BY METER IP

## 2017-02-15 PROCEDURE — 83735 ASSAY OF MAGNESIUM: CPT | Performed by: SURGERY

## 2017-02-15 PROCEDURE — 92610 EVALUATE SWALLOWING FUNCTION: CPT | Mod: GN

## 2017-02-15 PROCEDURE — 85027 COMPLETE CBC AUTOMATED: CPT | Performed by: SURGERY

## 2017-02-15 PROCEDURE — 25000125 ZZHC RX 250: Performed by: INTERNAL MEDICINE

## 2017-02-15 PROCEDURE — 40000225 ZZH STATISTIC SLP WARD VISIT

## 2017-02-15 PROCEDURE — 40000133 ZZH STATISTIC OT WARD VISIT: Performed by: OCCUPATIONAL THERAPIST

## 2017-02-15 PROCEDURE — 40000275 ZZH STATISTIC RCP TIME EA 10 MIN

## 2017-02-15 PROCEDURE — 71010 XR CHEST PORT 1 VW: CPT

## 2017-02-15 PROCEDURE — 94003 VENT MGMT INPAT SUBQ DAY: CPT

## 2017-02-15 PROCEDURE — 25000128 H RX IP 250 OP 636: Performed by: INTERNAL MEDICINE

## 2017-02-15 PROCEDURE — 84100 ASSAY OF PHOSPHORUS: CPT | Performed by: SURGERY

## 2017-02-15 PROCEDURE — 25000128 H RX IP 250 OP 636: Performed by: SURGERY

## 2017-02-15 PROCEDURE — 25800025 ZZH RX 258: Performed by: INTERNAL MEDICINE

## 2017-02-15 PROCEDURE — 36415 COLL VENOUS BLD VENIPUNCTURE: CPT | Performed by: SURGERY

## 2017-02-15 PROCEDURE — 84145 PROCALCITONIN (PCT): CPT | Performed by: SURGERY

## 2017-02-15 PROCEDURE — 97530 THERAPEUTIC ACTIVITIES: CPT | Mod: GO | Performed by: OCCUPATIONAL THERAPIST

## 2017-02-15 PROCEDURE — 99291 CRITICAL CARE FIRST HOUR: CPT | Mod: GC | Performed by: INTERNAL MEDICINE

## 2017-02-15 PROCEDURE — 80048 BASIC METABOLIC PNL TOTAL CA: CPT | Performed by: SURGERY

## 2017-02-15 RX ORDER — PANTOPRAZOLE SODIUM 40 MG/1
40 TABLET, DELAYED RELEASE ORAL EVERY MORNING
Status: DISCONTINUED | OUTPATIENT
Start: 2017-02-16 | End: 2017-02-16

## 2017-02-15 RX ORDER — SODIUM CHLORIDE, SODIUM LACTATE, POTASSIUM CHLORIDE, CALCIUM CHLORIDE 600; 310; 30; 20 MG/100ML; MG/100ML; MG/100ML; MG/100ML
INJECTION, SOLUTION INTRAVENOUS CONTINUOUS
Status: DISCONTINUED | OUTPATIENT
Start: 2017-02-15 | End: 2017-02-15 | Stop reason: ALTCHOICE

## 2017-02-15 RX ORDER — ESCITALOPRAM OXALATE 20 MG/1
20 TABLET ORAL DAILY
Status: DISCONTINUED | OUTPATIENT
Start: 2017-02-15 | End: 2017-02-20 | Stop reason: HOSPADM

## 2017-02-15 RX ORDER — PIPERACILLIN SODIUM, TAZOBACTAM SODIUM 3; .375 G/15ML; G/15ML
3.38 INJECTION, POWDER, LYOPHILIZED, FOR SOLUTION INTRAVENOUS EVERY 6 HOURS
Status: DISCONTINUED | OUTPATIENT
Start: 2017-02-15 | End: 2017-02-17

## 2017-02-15 RX ORDER — MULTIPLE VITAMINS W/ MINERALS TAB 9MG-400MCG
1 TAB ORAL DAILY
Status: DISCONTINUED | OUTPATIENT
Start: 2017-02-16 | End: 2017-02-20 | Stop reason: HOSPADM

## 2017-02-15 RX ORDER — LORAZEPAM 2 MG/ML
.5-1 INJECTION INTRAMUSCULAR EVERY 4 HOURS PRN
Status: COMPLETED | OUTPATIENT
Start: 2017-02-15 | End: 2017-02-16

## 2017-02-15 RX ORDER — POTASSIUM CHLORIDE 1.5 G/1.58G
40 POWDER, FOR SOLUTION ORAL ONCE
Status: COMPLETED | OUTPATIENT
Start: 2017-02-15 | End: 2017-02-15

## 2017-02-15 RX ORDER — DEXTROSE MONOHYDRATE 50 MG/ML
INJECTION, SOLUTION INTRAVENOUS CONTINUOUS
Status: DISCONTINUED | OUTPATIENT
Start: 2017-02-15 | End: 2017-02-15 | Stop reason: ALTCHOICE

## 2017-02-15 RX ORDER — METHADONE HYDROCHLORIDE 5 MG/1
5 TABLET ORAL EVERY 8 HOURS
Status: DISCONTINUED | OUTPATIENT
Start: 2017-02-15 | End: 2017-02-15 | Stop reason: ALTCHOICE

## 2017-02-15 RX ORDER — TRAZODONE HYDROCHLORIDE 50 MG/1
50 TABLET, FILM COATED ORAL
Status: DISCONTINUED | OUTPATIENT
Start: 2017-02-15 | End: 2017-02-20 | Stop reason: HOSPADM

## 2017-02-15 RX ORDER — AMOXICILLIN 250 MG
1 CAPSULE ORAL 2 TIMES DAILY
Status: DISCONTINUED | OUTPATIENT
Start: 2017-02-15 | End: 2017-02-16

## 2017-02-15 RX ADMIN — PIPERACILLIN SODIUM, TAZOBACTAM SODIUM 2.25 G: 2; .25 INJECTION, POWDER, LYOPHILIZED, FOR SOLUTION INTRAVENOUS at 16:28

## 2017-02-15 RX ADMIN — HEPARIN SODIUM 5000 UNITS: 10000 INJECTION, SOLUTION INTRAVENOUS; SUBCUTANEOUS at 22:06

## 2017-02-15 RX ADMIN — FENTANYL CITRATE 125 MCG/HR: 50 INJECTION, SOLUTION INTRAMUSCULAR; INTRAVENOUS at 05:52

## 2017-02-15 RX ADMIN — HEPARIN SODIUM 5000 UNITS: 10000 INJECTION, SOLUTION INTRAVENOUS; SUBCUTANEOUS at 05:26

## 2017-02-15 RX ADMIN — HEPARIN SODIUM 5000 UNITS: 10000 INJECTION, SOLUTION INTRAVENOUS; SUBCUTANEOUS at 16:48

## 2017-02-15 RX ADMIN — POTASSIUM CHLORIDE 40 MEQ: 1.5 POWDER, FOR SOLUTION ORAL at 05:26

## 2017-02-15 RX ADMIN — Medication 15 ML: at 09:23

## 2017-02-15 RX ADMIN — LEVOTHYROXINE SODIUM 112 MCG: 112 TABLET ORAL at 09:23

## 2017-02-15 RX ADMIN — LORAZEPAM 0.5 MG: 2 INJECTION INTRAMUSCULAR; INTRAVENOUS at 17:56

## 2017-02-15 RX ADMIN — PIPERACILLIN SODIUM,TAZOBACTAM SODIUM 3.38 G: 3; .375 INJECTION, POWDER, FOR SOLUTION INTRAVENOUS at 22:06

## 2017-02-15 RX ADMIN — POTASSIUM PHOSPHATE, MONOBASIC AND POTASSIUM PHOSPHATE, DIBASIC 20 MMOL: 224; 236 INJECTION, SOLUTION INTRAVENOUS at 16:39

## 2017-02-15 RX ADMIN — ESCITALOPRAM OXALATE 20 MG: 20 TABLET ORAL at 16:46

## 2017-02-15 RX ADMIN — LORAZEPAM 0.5 MG: 2 INJECTION INTRAMUSCULAR; INTRAVENOUS at 19:38

## 2017-02-15 RX ADMIN — Medication 40 MG: at 09:23

## 2017-02-15 RX ADMIN — PIPERACILLIN SODIUM, TAZOBACTAM SODIUM 2.25 G: 2; .25 INJECTION, POWDER, LYOPHILIZED, FOR SOLUTION INTRAVENOUS at 05:26

## 2017-02-15 RX ADMIN — SODIUM CHLORIDE, POTASSIUM CHLORIDE, SODIUM LACTATE AND CALCIUM CHLORIDE: 600; 310; 30; 20 INJECTION, SOLUTION INTRAVENOUS at 09:23

## 2017-02-15 RX ADMIN — ATROPINE SULFATE 1 DROP: 10 SOLUTION/ DROPS OPHTHALMIC at 09:24

## 2017-02-15 RX ADMIN — DEXTROSE MONOHYDRATE: 50 INJECTION, SOLUTION INTRAVENOUS at 10:39

## 2017-02-15 RX ADMIN — PIPERACILLIN SODIUM, TAZOBACTAM SODIUM 2.25 G: 2; .25 INJECTION, POWDER, LYOPHILIZED, FOR SOLUTION INTRAVENOUS at 09:51

## 2017-02-15 NOTE — PLAN OF CARE
Problem: Goal Outcome Summary  Goal: Goal Outcome Summary  Outcome: Improving  D: Pt admitted to MICU for hypoxic episode requiring intubation. Remains intubated, sedated comfortably on Fentanyl.      I/A: Pt remained hemodynamically stable during shift, no blood pressure support needed. SR 70-90s. TMAX 101.7. Alert, follows commands, can answer question and makes most needs known. Becomes frustrated when told he can't have anything to drink and he is continuously asking for the tube to be taken out. Failed PST this morning due to apnea. No output from CT. Thick creamy secretions from ETT. Frequent oral cares d/t copious oral secretions. TF restarted at approx 2100. Increased to 30ml/hr at 0100. 1 small soft BM during shift. Adequate UO from altman. Potassium replaced per protocol.      P: Plan for extubation as soon as able. Continue to monitor respiratory status. Notify team of any changes.

## 2017-02-15 NOTE — PROGRESS NOTES
02/15/17 1645   General Information   Onset Date 02/07/17   Start of Care Date 02/15/17   Patient Profile Review/OT: Additional Occupational Profile Info See Profile for full history and prior level of function   Patient/Family Goals Statement Pt wants to drink Diet Mountain Dew   Swallowing Evaluation Bedside swallow evaluation   Behaviorial Observations (pleasant and cooperative; Down's Syndrome)   Mode of current nutrition NPO   Respiratory Status O2 Supply   Type of O2 supply Nasal cannula   Comments Orders received for swallow evaluation. Pt with a history of Down syndrome, hypothyroidism, and obesity admitted with sepsis and hypoxic respiratory failure in setting of presumed pneumonia and associated L sided pleural effusion. Extubated earlier this AM; medical team requesting swallow evaluation be completed this afternoon   Clinical Swallow Evaluation   Oral Musculature generally intact;unable to assess due to poor participation/comprehension   Structural Abnormalities (baseline variances related to Down's Syndrome)   Dentition present and adequate   Mucosal Quality adequate   Laryngeal Function Voicing initiated  (voice hoarse/harsh post extubation)   Additional Documentation Yes   Swallow Eval   Feeding Assistance minimal assistance required   Clinical Swallow Eval: Thin Liquid Texture Trial   Mode of Presentation, Thin Liquids straw;self-fed;fed by clinician   Volume of Liquid or Food Presented 7oz   Oral Phase of Swallow WFL   Pharyngeal Phase of Swallow intact  (no overt s/sx of aspiration observed)   Diagnostic Statement swallow appears functional for thin liquids   Clinical Swallow Eval: Puree Solid Texture Trial   Mode of Presentation, Puree spoon;self-fed   Volume of Puree Presented 1/2 tsp trials of pudding x10   Oral Phase, Puree WFL   Pharyngeal Phase, Puree intact  (no overt s/sx of aspiration observed)   Diagnostic Statement swallow appears functional for puree textures   Clinical Swallow Eval:  Solid Food Texture Trial   Diagnostic Statement Pt declined trials of advanced textures. On questioning, Pt endorsed pain/irritation in throat from ET tube as reason for declining   Swallow Compensations   Swallow Compensations Pacing;Reduce amounts  (implemented by clinician; Pt requires cues)   Esophageal Phase of Swallow   Patient reports or presents with symptoms of esophageal dysphagia No   General Therapy Interventions   Planned Therapy Interventions Dysphagia Treatment   Dysphagia treatment Modified diet education;Instruction of safe swallow strategies   Swallow Eval: Clinical Impressions   Skilled Criteria for Therapy Intervention Skilled criteria met.  Treatment indicated.   Functional Assessment Scale (FAS) 5   Treatment Diagnosis mild dysphagia   Diet texture recommendations Full liquid   Recommended Feeding/Eating Techniques alternate between small bites and sips of food/liquid;maintain upright posture during/after eating for 30 mins;small sips/bites   Demonstrates Need for Referral to Another Service occupational therapy;physical therapy;dietitian   Therapy Frequency 5 times/wk   Predicted Duration of Therapy Intervention (days/wks) 2 weeks   Risks and Benefits of Treatment have been explained. Yes   Patient, family and/or staff in agreement with Plan of Care Yes   Clinical Impression Comments Clinical swallow evaluation completed per MD order. Pt presents with mild oral-pharyngeal dysphagia in the setting of weakness and hoarse vocal quality post extubation. No overt s/sx of aspiration observed with thin liquids or puree textures. Pt declined trials of advanced solids this date. Recommend initiate full liquid diet as tolerated. Pt to sit upright for all PO intake, take small bites/sips and alternate consistencies. ST to follow for PO tolerance and to assist with diet advance as appropriate.   Total Evaluation Time   Total Evaluation Time (Minutes) 17

## 2017-02-15 NOTE — PLAN OF CARE
Problem: Goal Outcome Summary  Goal: Goal Outcome Summary  SLP: Orders received for bedside swallow evaluation. Pt just extubated.  ST to hold and f/u with bedside swallow evaluation 24 hours post extubation per best practice protocol.  ST to follow.

## 2017-02-15 NOTE — PROGRESS NOTES
[facility] [place]  Procedure Note          Extubation:       Hany Patel  MRN# [id number]   February 15, 2017, 9:38 AM         Patient extubated at: February 15, 2017, 9:38 AM   Supplemental Oxygen: Via nasal cannula at 6 liters per minute   Cough: The cough is good   Secretion Mode: Able to clear   Secretion Amount: Moderate amount, moderately thick and white / yellow in color   Respiratory Exam:: Breath sounds: good aeration     Location: bilaterally   Skin Exam:: Patient color: natural   Patient Status: Currently anxious   Arterial Blood Gasses: pH Arterial (pH)   Date Value   02/12/2017 7.41     pO2 Arterial (mm Hg)   Date Value   02/12/2017 115 (H)     pCO2 Arterial (mm Hg)   Date Value   02/12/2017 39     Bicarbonate Arterial (mmol/L)   Date Value   02/12/2017 25            Recorded by Taty Powers

## 2017-02-15 NOTE — PROGRESS NOTES
MICU Progress Note  Hany Patel MRN: [id number]  1995  Date of Admission:2/7/2017  Primary care provider: Liu Song  DATE OF SERVICE: February 10, 2017      Assessment and Plan     Hany Patel is a 23 yo M with a history of Down syndrome, hypothyroidism, and obesity admitted with sepsis and hypoxic respiratory failure in setting of presumed pneumonia and associated L sided pleural effusion who was extubated on 2/15/17    Changes Today  -- Patient pressure supported this morning well; awake; interactive, and able to take deep breaths. Extubated this AM and satting >94% on room air  -- Seen by speech who recommended full liquid diet - upright for all po intake, small bites/sips and alternated consistencies  -- Patient's chest tube pulled around 4pm on 2/15/17  -- Cr roughly stable    -- [place] d/avery today; Will try condom cath  -- Skin near groin, buttocks, inner thigh erythematous; Not more warm than the rest of his skin and not typical cellulitis look - will ask wound care to see and assess.   -- May be able to d/c to floor tomorrow.     ===PULMONARY===  # Acute hypoxic respiratory failure   # Pneumonia c/b left sided parapneumonic effusion (exudative)  # Necrotizing Pneumonia   Initially only effusion visible on imaging, pigtail catheter placed by IR on admission 2/7, have been facilitating drainage with tpa. Repeat CT done 2/11 demonstrates decrease in pleural fluid but dense consolidation throughout bilateral lower lobes and some consolidation in upper lobes as well, c/f development of necrotizing pneumonia  -- VENT Settings 2/12 AC FiO2 of 40% RR of 8-12 TV of 550 Peep of 5  -- Bronch 2/10 -> no obstructions  --  BAL cell ct: , 23% neutrophils, cultures negative; Viral pcr negative  -- Pleural effusion cx 2/7 growing strep intermedius, pan sensitive  -- Chest tube output 2/13 20ccs  -- CT chest 2/11 -> decreased size fluid collection, extensive bilateral  groundglass opacities and patchy consolidation L>R, complete LLL collapse  -- Continue Zosyn    -- Will aim to extubate in the next 24 to 48 hours.     ===INFECTIOUS DISEASE===  # Sepsis  # Community Acquired Pneumonia  Presented with fevers, tachycardia, and leukocytosis. Found to have L sided pleural effusion on admission, draining with pigtail catheter. Repeat imaging 2/11 c/w dense bilateral pneumonia. Afebrile since 1800 on 2/12/17  -- One bottle of blood culture from 2/8 growing Coag Neg staph - likely contaminant. WIll continue to follow. Urine Strep and Legionella neg.  -- Bronch 2/10 -> no obstructions  --  BAL cell ct: , 23% neutrophils, cultures NGTD, viral PCR negative   -- Pleural effusion cx 2/7 growing strep intermedius, pan sensitive; cytology negative    ===NEURO===  # Sedation  - propofol gtt; with versed bumps available.     ===CARDIOVASCULAR===  # Tachycardia (improved)   Sinus tachycardia on admission to ICU. Likely hypovolemia in setting of poor po intake; Resolving though intermittently agitated with increased HR at those times.   -- Stopped IVF 2/10 as has had ~6L fluid resuscitation. TFs started 2/10 See Fluid status discussion below  -- Goal maintain net even     #Decreased EF 50-55%  No evidence of any septal defects (noted because of Trisomy 21).     ===GASTROINTESTINAL===  # Nutrition:    -- TFs initiated 2/10    ===RENAL===  #Fluid Status  Slightly net positive this admission. Will hold IVF at this time as >6L resuscitated. Will start tube feeds, given CT imaging will minimize additional fluids and maintain goal net even to net neg 1L daily  -- Favor net even today and may need to diurese to keep pulmonary status (may be developing some non cardiogenic pulmonary edema).     #Electrolytes: Repleting PRN - on Potassium and Phosphate Repletion protocols  High Dose     #GUILLERMINA: Slowly resolving  Cr 0.6 -> 0.9 -> 2.76 -->3.3-->3.4 --> 3.4 -->3.4 --> 3.1  Suspect ATN given intermittent  "hypotension on admission to ICU. Renal US done 2/11 without evidence of hydronephrosis and condom cath is draining good urine. Trough vanco level 2/10 wnl however jumped to supratherapeutic on 2/12 at 40. Trough on 2/12 was 25.1.   -- FeUrea is >1000%; suspect ATN 2/2 hypotension and vanc toxicity.   -- D/Chirag vanc  -- Avoid nephrotoxins as possible.   -- Strict IOs. Continue condom cath. Watch urine output closely.     ===ENDOCRINE===  # Hypothyroidism  - Continue PTA levothyroxine; TSH on 2/9 wnl.     ===HEM/ONC===  #Normocytic anemia  Likely some amount of dilution but also acute blood loss from chest tube site. RDW elevated. Down to 9.7 --> 9.3. Will continue to follow.     Prophylaxis:  DVT: Lovenox   GI: PPI  Family:  Updated Mother at bedside.  Disposition: Stable; could possibly dispo to floor tomorrow.     Code Status: Full    Staffed with Dr. James Leon MD  Internal Medicine PGY-1  [phone]        Events of last 24 hrs:     -- Patient extubated today  -- Satting >94% on room air  -- Chest tube pulled  -- Speech saw and recommended full liquid diet.      OBJECTIVE   INTAKE and OUTPUT    Intake/Output Summary (Last 24 hours) at 02/15/17 1704  Last data filed at 02/15/17 1300   Gross per 24 hour   Intake          2714.99 ml   Output             1375 ml   Net          1339.99 ml       Vital Signs    Temp: 99  F (37.2  C) Temp src: Axillary BP: 112/88   Heart Rate: 97 Resp: 20 SpO2: 96 % O2 Device: Nasal cannula Oxygen Delivery: 2 LPM Height: 160 cm (5' 2.99\") Weight: 118 kg (260 lb 2.3 oz)  Estimated body mass index is 46.09 kg/(m^2) as calculated from the following:    Height as of this encounter: 1.6 m (5' 2.99\").    Weight as of this encounter: 118 kg (260 lb 2.3 oz).       Physical Exam  General: Intubated,mild distress; able to follow commands  HEENT: PERRLA  CV: Tachycardic, regular, normal S1S2, no murmurs, rubs, or gallops    Resp: bibasilar rhonchi, L>R, coarse breath sounds " throughout  L CT in place with serosanguinous drainage;   Abd: Soft, non-tender, non-distended, BS+  Extremities: warm and well perfused, no lower extremity edema  Neuro: No focal deficits, moving all extremities spontaneously; able to follow commands    LINES  RIJ 2/8 -- 2/14  [place] 2/8 - 2/14  Peripheral IV x1 2/8 - Present R arm  Peripheral IVx1 2/7 - Present  R arm  12 [place]uv[place] Left Pleural Pigtail catheter - L chest 2/8-2/15  OG Tube 2/8 - 2/15    ETT Tube Size 8 2/8 - 2/15    ROUTINE ICU LABS:     Labs Reviewed  CBC RESULTS:   Recent Labs   Lab Test  02/15/17   0432   WBC  9.1   RBC  3.00*   HGB  8.7*   HCT  26.5*   MCV  88   MCH  29.0   MCHC  32.8   RDW  15.7*   PLT  395     Recent Labs   Lab Test  02/15/17   0432  02/15/17   0217   NA  145*  146*   POTASSIUM  3.2*  3.2*   CHLORIDE  111*  111*   CO2  23  23   ANIONGAP  12  12   GLC  99  95   BUN  23  25   CR  3.17*  3.23*   JUAN  8.0*  7.9*         Microbiology      Fluid Culture Aerobic Bacterial [[id number]] (Abnormal)  Collected: 02/07/17 2030     Order Status: Completed Lab Status: Preliminary result Updated: 02/12/17 0918     Specimen: Pleural fluid from Pleural fluid       Specimen Description Pleural fluid      Culture Micro -- (A)      Moderate growth Streptococcus intermedius   Critical Value/Significant Value, preliminary result only, called to and read    back by RUPERTO AYOUB RN 4C, ON 02/10/17 @ 113AUBREY Morris         Micro Report Status Pending      Organism: Moderate growth Streptococcus intermedius     Culture & Susceptibility      MODERATE GROWTH STREPTOCOCCUS INTERMEDIUS (THAI GRAM POS PANEL)      Antibiotic Sensitivity Unit Status     AMPICILLIN <=0.06 Susceptible ug/mL Final     CEFOTAXIME <=0.25 Susceptible ug/mL Final     CEFTRIAXONE <=0.25 Susceptible ug/mL Final     CLINDAMYCIN <=0.06 Susceptible ug/mL Final     PENICILLIN <=0.03 Susceptible ug/mL Final     VANCOMYCIN 0.5 Susceptible ug/mL Final                         Blood culture [[id number]]  (Abnormal) Collected: 02/08/17 0535     Order Status: Completed Lab Status: Preliminary result Updated: 02/09/17 1132     Specimen Information: Blood       Specimen Description Blood Port       Culture Micro -- (A)       Result:        Cultured on the 1st day of incubation: Gram positive cocci in clusters   Critical Value/Significant Value, preliminary result only, called to and read    back by Teresita Brown RN 4C @0822 2/9/17. DH.   (Note)   POSITIVE for Staphylococci other than S.aureus, S.epidermidis and   S.lugdunensis, by [employer] multiplex nucleic acid test.   Coagulase-negative staphylococci are the most common venipuncture or   collection associated skin CONTAMINANTS grown in blood cultures.   Final identification and antimicrobial susceptibility testing will be   verified by standard methods.     Specimen tested with Verigene multiplex, gram-positive blood culture   nucleic acid test for the following targets: Staph aureus, Staph   epidermidis, Staph lugdunensis, other Staph species, Enterococcus   faecalis, Enterococcus faecium, Streptococcus species, S. agalactiae,   S. anginosus grp., S. pneumoniae, S. pyogenes, Listeria sp., mecA   (methicillin resistance) and Sherri/B (vancomycin resistance).     Critical Value/Significant Value called to and read back by Teresita Brown RN @1132 02/09/17 gd           Imaging     CXR from 2/13/17 fairly stable from prior read           CT chest w/o contrast 2/11   Impression:    1. Left basilar chest tube with a small hydropneumothorax. There is a  4.5 x 2.2 cm lentiform simple fluid density collection in the medial  base which could represent a loculated component of the pleural  effusion.  2. Patchy attenuation in the left upper and left lower lobes  concerning for necrotizing pneumonia. There are superimposed diffuse  bilateral groundglass opacities and patchy consolidation.  3. New small right-sided pleural effusion with right lower lobe  subsegmental atelectasis.  4.  Complete left lower lobe collapse.    Renal US 2/11:  Impression:  1.  Hepatomegaly.  2.  Contracted gallbladder with layering sludge.  3.  Trace ascites with small pleural effusions.  4.  Two 1-2 mm echogenic foci along the anterior wall of the  gallbladder, either small cholesterol polyps or adherent stones.      ATTENDING PHYSICIAN:    I reviewed the patient's history, obtained additional history, conducted an examination to confirm key findings and reviewed the studies and labs. Discussed the case with the MICU team and agree with the findings and plan as outlined in their note as written above.   I personally spent 35 minutes of critical care time reviewing labs and imaging, examining the patient, and managing the ventilator. This does not include time spent on procedures or teaching. Hany is critically ill based on: pneumonia with respiratory failure.      Stalin Ramirez MD  Pager [phone]

## 2017-02-15 NOTE — PLAN OF CARE
Problem: Goal Outcome Summary  Goal: Goal Outcome Summary  OT: REC TCU, pt with significant deconditioning leading to decreased ADL I. Pt mod to max assist standing today.

## 2017-02-15 NOTE — PLAN OF CARE
Problem: Goal Outcome Summary  Goal: Goal Outcome Summary  ST 4C: Clinical swallow evaluation completed per MD order. Pt presents with mild oral-pharyngeal dysphagia in the setting of weakness and hoarse vocal quality post extubation. No overt s/sx of aspiration observed with thin liquids or puree textures. Pt declined trials of advanced solids this date. Recommend initiate full liquid diet as tolerated. Pt to sit upright for all PO intake, take small bites/sips and alternate consistencies. ST to follow for PO tolerance and to assist with diet advance as appropriate.

## 2017-02-15 NOTE — PROGRESS NOTES
CLINICAL NUTRITION SERVICES - progress toward nutrition POC. See 2/9 note for full assessment.    -Pt extubated today, EN access (OGT) lost. Awaiting SLP eval tomorrow.    Interventions  -D/C'ed EN orders as no longer has EN access.      RD will continue to follow.    Carmen Pritchard RD, LD  Pager: [phone]

## 2017-02-16 ENCOUNTER — APPOINTMENT (OUTPATIENT)
Dept: PHYSICAL THERAPY | Facility: CLINIC | Age: 22
DRG: 870 | End: 2017-02-16
Payer: MEDICARE

## 2017-02-16 ENCOUNTER — APPOINTMENT (OUTPATIENT)
Dept: OCCUPATIONAL THERAPY | Facility: CLINIC | Age: 22
DRG: 870 | End: 2017-02-16
Payer: MEDICARE

## 2017-02-16 ENCOUNTER — APPOINTMENT (OUTPATIENT)
Dept: GENERAL RADIOLOGY | Facility: CLINIC | Age: 22
DRG: 870 | End: 2017-02-16
Payer: MEDICARE

## 2017-02-16 LAB
ANION GAP SERPL CALCULATED.3IONS-SCNC: 8 MMOL/L (ref 3–14)
BUN SERPL-MCNC: 19 MG/DL (ref 7–30)
CALCIUM SERPL-MCNC: 8 MG/DL (ref 8.5–10.1)
CHLORIDE SERPL-SCNC: 108 MMOL/L (ref 94–109)
CO2 SERPL-SCNC: 25 MMOL/L (ref 20–32)
CREAT SERPL-MCNC: 2.57 MG/DL (ref 0.66–1.25)
ERYTHROCYTE [DISTWIDTH] IN BLOOD BY AUTOMATED COUNT: 15.7 % (ref 10–15)
GFR SERPL CREATININE-BSD FRML MDRD: 31 ML/MIN/1.7M2
GLUCOSE SERPL-MCNC: 103 MG/DL (ref 70–99)
HCT VFR BLD AUTO: 29.5 % (ref 40–53)
HGB BLD-MCNC: 9.7 G/DL (ref 13.3–17.7)
MAGNESIUM SERPL-MCNC: 2.2 MG/DL (ref 1.6–2.3)
MCH RBC QN AUTO: 29.1 PG (ref 26.5–33)
MCHC RBC AUTO-ENTMCNC: 32.9 G/DL (ref 31.5–36.5)
MCV RBC AUTO: 89 FL (ref 78–100)
PLATELET # BLD AUTO: 487 10E9/L (ref 150–450)
POTASSIUM SERPL-SCNC: 3.7 MMOL/L (ref 3.4–5.3)
RBC # BLD AUTO: 3.33 10E12/L (ref 4.4–5.9)
SODIUM SERPL-SCNC: 141 MMOL/L (ref 133–144)
WBC # BLD AUTO: 14.4 10E9/L (ref 4–11)

## 2017-02-16 PROCEDURE — 40000193 ZZH STATISTIC PT WARD VISIT: Performed by: PHYSICAL THERAPIST

## 2017-02-16 PROCEDURE — 83735 ASSAY OF MAGNESIUM: CPT | Performed by: INTERNAL MEDICINE

## 2017-02-16 PROCEDURE — 93005 ELECTROCARDIOGRAM TRACING: CPT

## 2017-02-16 PROCEDURE — 85027 COMPLETE CBC AUTOMATED: CPT | Performed by: INTERNAL MEDICINE

## 2017-02-16 PROCEDURE — 97530 THERAPEUTIC ACTIVITIES: CPT | Mod: GP | Performed by: PHYSICAL THERAPIST

## 2017-02-16 PROCEDURE — 99233 SBSQ HOSP IP/OBS HIGH 50: CPT | Mod: GC | Performed by: INTERNAL MEDICINE

## 2017-02-16 PROCEDURE — A9270 NON-COVERED ITEM OR SERVICE: HCPCS | Mod: GY | Performed by: INTERNAL MEDICINE

## 2017-02-16 PROCEDURE — 12000001 ZZH R&B MED SURG/OB UMMC

## 2017-02-16 PROCEDURE — 25000132 ZZH RX MED GY IP 250 OP 250 PS 637: Mod: GY

## 2017-02-16 PROCEDURE — 25000128 H RX IP 250 OP 636: Performed by: SURGERY

## 2017-02-16 PROCEDURE — 25000128 H RX IP 250 OP 636: Performed by: INTERNAL MEDICINE

## 2017-02-16 PROCEDURE — 40000133 ZZH STATISTIC OT WARD VISIT: Performed by: OCCUPATIONAL THERAPIST

## 2017-02-16 PROCEDURE — 97535 SELF CARE MNGMENT TRAINING: CPT | Mod: GO | Performed by: OCCUPATIONAL THERAPIST

## 2017-02-16 PROCEDURE — 97110 THERAPEUTIC EXERCISES: CPT | Mod: GO | Performed by: OCCUPATIONAL THERAPIST

## 2017-02-16 PROCEDURE — A9270 NON-COVERED ITEM OR SERVICE: HCPCS | Mod: GY

## 2017-02-16 PROCEDURE — 25000132 ZZH RX MED GY IP 250 OP 250 PS 637: Mod: GY | Performed by: INTERNAL MEDICINE

## 2017-02-16 PROCEDURE — 80048 BASIC METABOLIC PNL TOTAL CA: CPT | Performed by: INTERNAL MEDICINE

## 2017-02-16 PROCEDURE — 97530 THERAPEUTIC ACTIVITIES: CPT | Mod: GO | Performed by: OCCUPATIONAL THERAPIST

## 2017-02-16 PROCEDURE — 36415 COLL VENOUS BLD VENIPUNCTURE: CPT | Performed by: INTERNAL MEDICINE

## 2017-02-16 PROCEDURE — 97162 PT EVAL MOD COMPLEX 30 MIN: CPT | Mod: GP | Performed by: PHYSICAL THERAPIST

## 2017-02-16 PROCEDURE — 93010 ELECTROCARDIOGRAM REPORT: CPT | Performed by: INTERNAL MEDICINE

## 2017-02-16 PROCEDURE — 71010 XR CHEST PORT 1 VW: CPT

## 2017-02-16 PROCEDURE — 25000128 H RX IP 250 OP 636

## 2017-02-16 RX ORDER — HALOPERIDOL 5 MG/ML
2 INJECTION INTRAMUSCULAR EVERY 6 HOURS PRN
Status: DISCONTINUED | OUTPATIENT
Start: 2017-02-16 | End: 2017-02-16

## 2017-02-16 RX ORDER — HALOPERIDOL 5 MG/ML
INJECTION INTRAMUSCULAR
Status: DISCONTINUED
Start: 2017-02-16 | End: 2017-02-17 | Stop reason: HOSPADM

## 2017-02-16 RX ORDER — ONDANSETRON 2 MG/ML
4 INJECTION INTRAMUSCULAR; INTRAVENOUS EVERY 6 HOURS PRN
Status: DISCONTINUED | OUTPATIENT
Start: 2017-02-16 | End: 2017-02-20 | Stop reason: HOSPADM

## 2017-02-16 RX ORDER — LORAZEPAM 2 MG/ML
0.5 INJECTION INTRAMUSCULAR ONCE
Status: COMPLETED | OUTPATIENT
Start: 2017-02-16 | End: 2017-02-16

## 2017-02-16 RX ORDER — OLANZAPINE 5 MG/1
5 TABLET ORAL ONCE
Status: DISCONTINUED | OUTPATIENT
Start: 2017-02-16 | End: 2017-02-16 | Stop reason: ALTCHOICE

## 2017-02-16 RX ORDER — LORAZEPAM 2 MG/ML
INJECTION INTRAMUSCULAR
Status: COMPLETED
Start: 2017-02-16 | End: 2017-02-16

## 2017-02-16 RX ORDER — AMOXICILLIN 250 MG
1 CAPSULE ORAL 2 TIMES DAILY PRN
Status: DISCONTINUED | OUTPATIENT
Start: 2017-02-16 | End: 2017-02-20 | Stop reason: HOSPADM

## 2017-02-16 RX ORDER — OLANZAPINE 5 MG/1
5 TABLET ORAL DAILY
Status: DISCONTINUED | OUTPATIENT
Start: 2017-02-16 | End: 2017-02-16

## 2017-02-16 RX ORDER — OLANZAPINE 10 MG/2ML
5 INJECTION, POWDER, FOR SOLUTION INTRAMUSCULAR DAILY PRN
Status: DISCONTINUED | OUTPATIENT
Start: 2017-02-16 | End: 2017-02-16

## 2017-02-16 RX ORDER — HALOPERIDOL 5 MG/ML
2 INJECTION INTRAMUSCULAR ONCE
Status: COMPLETED | OUTPATIENT
Start: 2017-02-16 | End: 2017-02-16

## 2017-02-16 RX ADMIN — MULTIPLE VITAMINS W/ MINERALS TAB 1 TABLET: TAB at 07:35

## 2017-02-16 RX ADMIN — PIPERACILLIN SODIUM,TAZOBACTAM SODIUM 3.38 G: 3; .375 INJECTION, POWDER, FOR SOLUTION INTRAVENOUS at 17:16

## 2017-02-16 RX ADMIN — HEPARIN SODIUM 5000 UNITS: 10000 INJECTION, SOLUTION INTRAVENOUS; SUBCUTANEOUS at 06:11

## 2017-02-16 RX ADMIN — PIPERACILLIN SODIUM,TAZOBACTAM SODIUM 3.38 G: 3; .375 INJECTION, POWDER, FOR SOLUTION INTRAVENOUS at 11:20

## 2017-02-16 RX ADMIN — ESCITALOPRAM OXALATE 20 MG: 20 TABLET ORAL at 07:35

## 2017-02-16 RX ADMIN — HALOPERIDOL LACTATE 2 MG: 5 INJECTION, SOLUTION INTRAMUSCULAR at 08:50

## 2017-02-16 RX ADMIN — LORAZEPAM 1 MG: 2 INJECTION INTRAMUSCULAR; INTRAVENOUS at 04:59

## 2017-02-16 RX ADMIN — ONDANSETRON 4 MG: 2 INJECTION INTRAMUSCULAR; INTRAVENOUS at 07:43

## 2017-02-16 RX ADMIN — PANTOPRAZOLE SODIUM 40 MG: 40 TABLET, DELAYED RELEASE ORAL at 07:35

## 2017-02-16 RX ADMIN — LORAZEPAM 0.5 MG: 2 INJECTION INTRAMUSCULAR; INTRAVENOUS at 10:36

## 2017-02-16 RX ADMIN — LEVOTHYROXINE SODIUM 112 MCG: 112 TABLET ORAL at 07:35

## 2017-02-16 RX ADMIN — PIPERACILLIN SODIUM,TAZOBACTAM SODIUM 3.38 G: 3; .375 INJECTION, POWDER, FOR SOLUTION INTRAVENOUS at 03:37

## 2017-02-16 RX ADMIN — HEPARIN SODIUM 5000 UNITS: 10000 INJECTION, SOLUTION INTRAVENOUS; SUBCUTANEOUS at 17:16

## 2017-02-16 RX ADMIN — PIPERACILLIN SODIUM,TAZOBACTAM SODIUM 3.38 G: 3; .375 INJECTION, POWDER, FOR SOLUTION INTRAVENOUS at 22:30

## 2017-02-16 RX ADMIN — LORAZEPAM 0.5 MG: 2 INJECTION INTRAMUSCULAR at 10:36

## 2017-02-16 NOTE — PROGRESS NOTES
MICU Progress Note  Hany Patel MRN: [id number]  1995  Date of Admission:2/7/2017  Primary care provider: Liu Song  DATE OF SERVICE: February 10, 2017      Assessment and Plan     Hany Patel is a 21 yo M with a history of Down syndrome, hypothyroidism, and obesity admitted with sepsis and hypoxic respiratory failure in setting of presumed pneumonia and associated L sided pleural effusion who was extubated on 2/15/17    Changes Today  -- Patient intermittently anxious with the nasal cannula and pulling at it yelling no; However needing it with sleeping and with any movement as he desats to 85-88% - (is 92-98% on rm air without movement).   -- Given 2mg of haldol and a total of 1.5mg of ativan and then started on olanzapine 5mg daily for agitation/anxiety  -- Continue Zosyn (2/7-  ) - 10-14 day course; given uptrending white count would opt for longer course  -- Repeat CXR today 2/16/17 shows slightly increased L sided pleural effusion after chest tube drainage. Would watch closely. Patient still oxygenating well - however wbc and procal uptrending.     ANTIMICROBIALS  Levofloxacin (2/9 - 2/11, 2/13)  Zosyn (2/7 - Present)  Vancomycin (2/8 - 2/11)      ===PULMONARY===  # Acute hypoxic respiratory failure   # Pneumonia c/b left sided parapneumonic effusion (exudative)  # Necrotizing Pneumonia   Very large effusion initially (could not see anything else on the L side on CXR), pigtail catheter placed by IR on admission 2/7 and was draining 1-2L of fluid daily until it tapered off and was pulled on 2/16/17. Repeat CT done 2/11 demonstrates decrease in pleural fluid but dense consolidation throughout bilateral lower lobes and some consolidation in upper lobes as well, c/f development of necrotizing pneumonia  -- Exudative pleural effusion - Light's Criteria (2/3  - both of the LDH criteria)   -- Bronch 2/10 -> no obstructions  --  BAL cell ct: , 23% neutrophils,  cultures negative; Viral pcr negative  -- Pleural effusion cx 2/7 growing Strep intermedius, pan sensitive  -- Chest tube pulled 2/15/17. No pneumothorax on chest xray 2/16  -- CT chest 2/11 -> decreased size fluid collection, extensive bilateral groundglass opacities and patchy consolidation L>R, complete LLL collapse  -- Continue Zosyn    --Still requiring oxygen 2-4L via NC when sleeping or moving too much - likely 2/2 necrotizing pneumonia in L lower lobe with lower lobe collapse and L sided pleural effusion. However at rest is satting 92-98% on rm air.     ===INFECTIOUS DISEASE===  # Sepsis  # Community Acquired Pneumonia  Presented with fevers, tachycardia, and leukocytosis. Found to have L sided pleural effusion on admission, draining with pigtail catheter. Repeat imaging 2/11 c/w dense bilateral pneumonia. Afebrile since 1800 on 2/12/17  -- One bottle of blood culture from 2/8 growing Coag Neg staph - likely contaminant. WIll continue to follow. Urine Strep and Legionella neg.  -- Bronch 2/10 -> no obstructions  --  BAL cell ct: , 23% neutrophils, cultures NGTD, viral PCR negative   -- Pleural effusion cx 2/7 growing strep intermedius, pan sensitive; cytology negative  -- Procal and WBC count uptrending; would finish out 14 day course of zosyn. If continues and L sided pleural effusions increases in size could consider replacement of the chest tube.     ANTIMICROBIALS  Levofloxacin (2/9 - 2/11, 2/13)  Zosyn (2/7 - Present)  Vancomycin (2/8 - 2/11)    ===NEURO===  # Agitation/Anxiety  Hany is very anxious with any new lines and with the nasal cannula. Gets very fixated on objects and needs to be redirected.   -- given 1.5mg of ativan on 2/16/17 and 2mg of haldol. Started on daily Zyprexa 5mg qday.     ===CARDIOVASCULAR===  # Tachycardia (improved)   Sinus tachycardia on admission to ICU. Likely hypovolemia in setting of poor po intake; Resolving though intermittently agitated with increased HR at those  times.   -- Goal maintain net even     #Decreased EF 50-55% on echo on 2/9/17  No evidence of any septal defects (noted because of Trisomy 21).     ===GASTROINTESTINAL===  # Nutrition:    -- Full liquid diet initiated on 2/15/17 after speech evaluation     ===RENAL===  #Fluid Status  Net positive this admission. Not having pulmonary edema currently and urine output (though not charted over the past 18 hours due to altman pulling has maintained with no additional IVF.   -- Favor net even fluid status on a day by day basis at this time.     #Electrolytes: Repleting PRN    #GUILLERMINA: Slowly resolving  Cr 0.6 -> 0.9 -> 2.76 -->3.3-->3.4 --> 3.4 -->3.4 --> 3.1 --> 2.57  Sustained ATN likely 2/2 intermittent hypotension on admission to ICU and vancomycin use.. Renal US done 2/11 without evidence of hydronephrosis . Trough vanco level 2/10 wnl however jumped to supratherapeutic on 2/12 at 40. Trough on 2/12 was 25.1.   -- FeUrea is >1000%; ATN likely 2/2 hypotension and vanc toxicity.   -- D/Avery vanc on 2/11  -- Avoid nephrotoxins as possible.      ===ENDOCRINE===  # Hypothyroidism  - Continue PTA levothyroxine; TSH on 2/9 wnl.     ===HEM/ONC===  #Normocytic anemia  Likely some amount of dilution but also acute blood loss from chest tube site. RDW elevated. Down to 9.7 --> 9.3 --> 9.7. Continue to follow.     ===SKIN===  #Pressure Wound/ Local Irritation from stool and urine  Noted to have large erythema around the groin without satellite lesions, purpura. Do not believe this is a cellulitis but rather local irritation from stool and urine and pressure wound.   -- Wound consult ordered.     Prophylaxis:  DVT: heparin   GI: PPI d/avery on 2/16/17   Family:  Updated Mother at bedside.  Disposition: Stable; Dispo to Med/Surg Today    Code Status: Full    Staffed with Dr. James Leon MD  Internal Medicine PGY-1  [phone]    ATTENDING PHYSICIAN:    I reviewed the patient's history, obtained additional history,  "conducted an examination to confirm key findings and reviewed the studies and labs. Discussed the case with the MICU team and agree with the findings and plan as outlined in their note as written above.   Stalin Ramirez MD  Pager [phone]          Events of last 24 hrs:     -- Patient very anxious about the nasal cannula and would like to go home. Is having a hard time understanding that the nasal cannula is for his oxygenation. [place] removed on 2/15. Patient not willing to move to use the commode and is therefore incontinent of bowel and bladder.      OBJECTIVE   INTAKE and OUTPUT    Intake/Output Summary (Last 24 hours) at 02/15/17 1704  Last data filed at 02/15/17 1300   Gross per 24 hour   Intake          2714.99 ml   Output             1375 ml   Net          1339.99 ml       Vital Signs    Temp: 99.8  F (37.7  C) Temp src: Axillary BP: 144/77   Heart Rate: 79 Resp: 20 SpO2: 95 % O2 Device: Nasal cannula Oxygen Delivery: 2 LPM Height: 160 cm (5' 2.99\") Weight: 118 kg (260 lb 2.3 oz)  Estimated body mass index is 46.09 kg/(m^2) as calculated from the following:    Height as of this encounter: 1.6 m (5' 2.99\").    Weight as of this encounter: 118 kg (260 lb 2.3 oz).       Physical Exam  General: Awake and alert; able to follow commands; no acute distress  HEENT: PERRLA  CV: Tachycardic, regular, normal S1S2, no murmurs, rubs, or gallops    Resp: bibasilar rhonchi, L>R, coarse breath sounds throughout  L CT in place with serosanguinous drainage;   Abd: Soft, non-tender, non-distended, BS+  Extremities: warm and well perfused, no lower extremity edema  Neuro: No focal deficits, moving all extremities spontaneously; able to follow commands  SKIN: Large erythema around the groin, scrotum, and sacrum. No open ulcers. No more warmth than the rest of the periennium. No purpura; No satellite lesions.     LINES  RIJ 2/8 -- 2/14  [place] 2/8 - 2/15  Peripheral IV x1 2/8 - Present R arm  Peripheral IVx1 2/7 - Present  " R arm  12 [place]vorian Left Pleural Pigtail catheter - L chest 2/8-2/15  OG Tube 2/8 - 2/15    ETT Tube Size 8 2/8 - 2/15    ROUTINE ICU LABS:     Labs Reviewed  CBC RESULTS:   Recent Labs   Lab Test  02/15/17   0432   WBC  9.1   RBC  3.00*   HGB  8.7*   HCT  26.5*   MCV  88   MCH  29.0   MCHC  32.8   RDW  15.7*   PLT  395     Recent Labs   Lab Test  02/15/17   0432  02/15/17   0217   NA  145*  146*   POTASSIUM  3.2*  3.2*   CHLORIDE  111*  111*   CO2  23  23   ANIONGAP  12  12   GLC  99  95   BUN  23  25   CR  3.17*  3.23*   JUAN  8.0*  7.9*         Microbiology      Fluid Culture Aerobic Bacterial [[id number]] (Abnormal)  Collected: 02/07/17 2030     Order Status: Completed Lab Status: Preliminary result Updated: 02/12/17 0918     Specimen: Pleural fluid from Pleural fluid       Specimen Description Pleural fluid      Culture Micro -- (A)      Moderate growth Streptococcus intermedius   Critical Value/Significant Value, preliminary result only, called to and read    back by RUPERTO AYOUB RN 4C, ON 02/10/17 @ 113AUBREY Morris         Micro Report Status Pending      Organism: Moderate growth Streptococcus intermedius     Culture & Susceptibility      MODERATE GROWTH STREPTOCOCCUS INTERMEDIUS (THAI GRAM POS PANEL)      Antibiotic Sensitivity Unit Status     AMPICILLIN <=0.06 Susceptible ug/mL Final     CEFOTAXIME <=0.25 Susceptible ug/mL Final     CEFTRIAXONE <=0.25 Susceptible ug/mL Final     CLINDAMYCIN <=0.06 Susceptible ug/mL Final     PENICILLIN <=0.03 Susceptible ug/mL Final     VANCOMYCIN 0.5 Susceptible ug/mL Final                         Blood culture [[id number]] (Abnormal) Collected: 02/08/17 0535     Order Status: Completed Lab Status: Preliminary result Updated: 02/09/17 1132     Specimen Information: Blood       Specimen Description Blood Port       Culture Micro -- (A)       Result:        Cultured on the 1st day of incubation: Gram positive cocci in clusters   Critical Value/Significant Value, preliminary result  only, called to and read    back by Teresita Brown RN 4C @0822 2/9/17. DH.   (Note)   POSITIVE for Staphylococci other than S.aureus, S.epidermidis and   S.lugdunensis, by [employer] multiplex nucleic acid test.   Coagulase-negative staphylococci are the most common venipuncture or   collection associated skin CONTAMINANTS grown in blood cultures.   Final identification and antimicrobial susceptibility testing will be   verified by standard methods.     Specimen tested with Verigene multiplex, gram-positive blood culture   nucleic acid test for the following targets: Staph aureus, Staph   epidermidis, Staph lugdunensis, other Staph species, Enterococcus   faecalis, Enterococcus faecium, Streptococcus species, S. agalactiae,   S. anginosus grp., S. pneumoniae, S. pyogenes, Listeria sp., mecA   (methicillin resistance) and Sherri/B (vancomycin resistance).     Critical Value/Significant Value called to and read back by Teresita Brown RN @1132 02/09/17 gd           Imaging     CT chest w/o contrast 2/11   Impression:    1. Left basilar chest tube with a small hydropneumothorax. There is a  4.5 x 2.2 cm lentiform simple fluid density collection in the medial  base which could represent a loculated component of the pleural  effusion.  2. Patchy attenuation in the left upper and left lower lobes  concerning for necrotizing pneumonia. There are superimposed diffuse  bilateral groundglass opacities and patchy consolidation.  3. New small right-sided pleural effusion with right lower lobe  subsegmental atelectasis.  4. Complete left lower lobe collapse.    Renal US 2/11:  Impression:  1.  Hepatomegaly.  2.  Contracted gallbladder with layering sludge.  3.  Trace ascites with small pleural effusions.  4.  Two 1-2 mm echogenic foci along the anterior wall of the  gallbladder, either small cholesterol polyps or adherent stones.

## 2017-02-16 NOTE — PROGRESS NOTES
Care Coordinator Progress Note     Admission Date/Time:  2/7/2017  Attending MD:  Chandan Bolden MD     Data  Chart reviewed, discussed with interdisciplinary team.   Patient was admitted for:    Pneumonia  Parapneumonic effusion  Down's syndrome  Hypoxia.    Concerns with insurance coverage for discharge needs: None.  Current Living Situation: Patient lives with family.  Support System: Supportive  Services Involved: N/A  Transportation: Family or Friend will provide          Assessment  Met with pt and his motherMarcella to review his plan of discharge.  Pt lives at home with his parents.  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 Pt has been asking his mother to go home as he has the family dog that he would like to see.  Pt's mother would prefer home PT over TCU/ARU but realizes that pt may need to go to rehab to be safe.  Current PT recommendations are TCU.  RNCC/SW to continue to follow for dual plan of TCU vs home with home PT.         Plan  Anticipated Discharge Date:  TBD    Anticipated Discharge Plan:  Home with home PT vs TCU      Gregg Eubanks RN, BSN  ICU Care Coordinator  Pager: [phone]  Phone:  [phone]

## 2017-02-16 NOTE — PLAN OF CARE
"Problem: Goal Outcome Summary  Goal: Goal Outcome Summary  PT 4C: PT eval completed and treatment initiated. Pt perseverating on his IVs/oxygen, often stating \"no\" and needs education to wear the NC for mobility. On RA in sitting, SpO2 drops to 87%,  But with 2L of O2 with mobility the pt's SpO2 >94%. Needs min Ax2 for bed mobility and mod Ax2 for standing transfers to the chair. Pt with mild knee buckling noted, is below his baseline for functional strength. PT to continue to follow for safe transfers and gait. At this time, anticipate the pt may need a rehab stay at discharge. However, pt has excellent support from family at baseline. May be able to discharge home with 24 hour assist. Will monitor progress carefully.       "

## 2017-02-16 NOTE — PLAN OF CARE
Problem: Goal Outcome Summary  Goal: Goal Outcome Summary  SLP: Dysphagia therapy cancelled.  Pt with increased respiratory needs and refusing O2 support.  In addition, pt declining participation in PO trials.  ST to follow as indicated on POC.

## 2017-02-16 NOTE — CONSULTS
Social Work: Assessment with Discharge Plan    Patient Name:  Hany Patel  :  1995  Age:  22 year old  MRN:  [id number]  Risk/Complexity Score:  Filed Complexity Screen Score: 8  Completed assessment with:  Mother: Marcella Crump; RNCC.    Presenting Information   Reason for Referral:  ICU admission; psychosocial assessment, patient/family support and discharge planning.   Date of Intake:  2017  Referral Source:   auto case finding.   Decision Maker:  Legal guardians: Mother: Marcella Crump ([phone]) and Father: Fred Patel.   Alternate Decision Maker:  Legal guardians: Mother: Marcella Crump ([phone]) and Father: Fred Patel.   Health Care Directive:  Guardian paperwork on file.   Living Situation:  House: with motherMarcella.   Previous Functional Status:  Independent with assistance from mom; participates in [employer]ympics for basketball, hockey, and will be starting track and field this spring.   Patient and family understanding of hospitalization:  Restorative.   Cultural/Language/Spiritual Considerations:  None specific reported.   Adjustment to Illness:  Appropriate for situation.     Physical Health  Reason for Admission:    1. Hypoxia    2. Pneumonia    3. Parapneumonic effusion    4. Down's syndrome      Services Needed/Recommended:    TCU currently being recommended; however mom strongly would prefer to take patient home.  Reviewed option of ARU with OT, but it was felt that patient would not be able to tolerate this level of therapy and mom would still much prefer to take patient home.     Mental Health/Chemical Dependency  Diagnosis:  Down Syndrome.   Support/Services in Place:  Support from mom, other family, friends and community.   Services Needed/Recommended:  As previous.     Support System  Significant relationship at present time:  Mother, Marcella; father, Fred.   Family of origin is available for support:  Yes; involved and supportive.   Other support  available:  Friends through [employer]ympics; community.   Gaps in support system:  None.   Patient is caregiver to:  None.    Provider Information   Primary Care Physician:  Liu Song   [phone]   Clinic:  [address] / [facility]      :  Uncertain.     Financial   Income Source:  Disability.   Financial Concerns:  None specific reported by mom.   Insurance:    Payor/Plan Subscriber Name Rel Member # Group #   MEDICARE - MEDICARE JAI HOWARD  [id number]       ATTN CLAIMS, [address]   [place] JAI HOWARD  [id number]       [address] [address], [address]       Discharge Plan   Patient and family discharge goal:  Home with support from family and friends.   Provided education on discharge plan:  YES - with mom.  Patient agreeable to discharge plan:  Very much wanting to d/c directly home.   Barriers to discharge:  Medical stability; likely transfer to the floor later today.     Discharge Recommendations   Anticipated Disposition:  Home with services; RNCC discussed C services with mom who was in agreement with this.   Transportation Needs:  TBD  Name of Transportation Company and Phone:  TBD    Additional comments   Await patient progress and further recommendations.   After meeting with family, spoke briefly with OT who indicate patient did much better with therapy today.  Goal remains hopefully home once medically stable; will continue to follow for ongoing support and d/c planning.     NEERAJ Morales, [facility]  ICU   Pager: [phone]  Phone: [phone]

## 2017-02-16 NOTE — PLAN OF CARE
Problem: Goal Outcome Summary  Goal: Goal Outcome Summary  OT: REC TCU, pt making progress in therapy today, pt ambulating 16 feet today mod assist with need for sitting rest breaks. Pt continues to be moderate assist for functional transfer however this is an increase from last few session where pt was max assist.

## 2017-02-16 NOTE — PROGRESS NOTES
02/16/17 1000   Quick Adds   Type of Visit Initial PT Evaluation   Living Environment   Lives With parent(s)   Living Arrangements house   Home Accessibility stairs to enter home;stairs within home   Number of Stairs to Enter Home 4   Number of Stairs Within Home 12   Transportation Available family or friend will provide   Living Environment Comment Pt has full time assistance from his mother as needed, she works from home. Typically independent in the home   Self-Care   Dominant Hand right   Usual Activity Tolerance good   Current Activity Tolerance poor   Regular Exercise yes   Activity/Exercise Type team sports  (basketball)   Exercise Amount/Frequency 3-5 times/wk   Equipment Currently Used at Home none   Activity/Exercise/Self-Care Comment Pt plays basketball and is very motibated for movement. Very active, redirectable at baseline.    Functional Level Prior   Ambulation 0-->independent   Transferring 0-->independent   Toileting 0-->independent   Bathing 0-->independent   Dressing 0-->independent   Cognition (baseline developmental delay)   Fall history within last six months no   Which of the above functional risks had a recent onset or change? ambulation;transferring   General Information   Onset of Illness/Injury or Date of Surgery - Date 02/08/17   Referring Physician Lilly Garay MD   Patient/Family Goals Statement none stated   Pertinent History of Current Problem (include personal factors and/or comorbidities that impact the POC) Hany Patel is a 23 yo M with a history of Down syndrome, hypothyroidism, and obesity admitted with sepsis and hypoxic respiratory failure in setting of presumed pneumonia and associated L sided pleural effusion who was extubated on 2/15/17   Precautions/Limitations fall precautions;oxygen therapy device and L/min   Cognitive Status Examination   Orientation person   Level of Consciousness alert   Follows Commands and Answers Questions 25% of the time;able to follow  single-step instructions   Personal Safety and Judgment impaired   Memory impaired   Cognitive Comment Pt perseverating on IVs/O2 often, not following many commands for mobility, but is able to be redirected   Pain Assessment   Patient Currently in Pain No   Integumentary/Edema   Integumentary/Edema Comments no increased edema noted from baseline   Posture    Posture Kyphosis;Forward head position;Protracted shoulders   Range of Motion (ROM)   ROM Quick Adds No deficits were identified   Strength   Strength Comments LE strength appearing to be >3/5, unable to perform MMTs due to decreased pt compliance   Bed Mobility   Bed Mobility Comments needs many cues and min Ax2 to roll and sit EOB   Transfer Skills   Transfer Comments sit<>stand with mod Ax2 and transfer to the chair   Gait   Gait Comments NT   Balance   Balance Comments sits EOB independently, needs Ax2 for safety with static standing balance    Sensory Examination   Sensory Perception Comments no noted deficits    Coordination   Coordination Comments good baseline coordination, no overt deficits noted   Muscle Tone   Muscle Tone Comments low tone, baseline for the pt    General Therapy Interventions   Planned Therapy Interventions balance training;bed mobility training;gait training;neuromuscular re-education;strengthening;transfer training   Clinical Impression   Criteria for Skilled Therapeutic Intervention yes, treatment indicated   PT Diagnosis decreased functional mobility   Influenced by the following impairments decreased strength affecting transfers and gait   Functional limitations due to impairments inability to return to the home safely   Clinical Presentation Evolving/Changing   Clinical Presentation Rationale PMH, decreased willingness to work with therapy   Clinical Decision Making (Complexity) Moderate complexity   Therapy Frequency` 5 times/week   Predicted Duration of Therapy Intervention (days/wks) 2 weeks   Anticipated Discharge  Disposition Home with Assist;Home with Home Therapy;Transitional Care Facility   Risk & Benefits of therapy have been explained Yes   Patient, Family & other staff in agreement with plan of care Yes   Clinical Impression Comments PT eval completed and tx initiated   Total Evaluation Time   Total Evaluation Time (Minutes) 10

## 2017-02-16 NOTE — PROGRESS NOTES
Medicine Daily Progress Note   02/16/2017    Hany Patel MRN# [id number]   Age: 22 year old YOB: 1995          Assessment and Plan:     Mr. Patel is a 22 year old M with PMH of Down Syndrome, hypothyroidism; who was admitted for acute hypoxic respiratory failure. Presumed to be secondary to community acquired pneumonia and left sided pleural effusion. Extubated on 2/15. Chest tube removed 2/15 as well. Stay complicated by ATN. Renal function improving.     ## Community Acquired Pneumonia/necrotizing pna:  ## Left parapneumonic effusion:  Presented with fever, tachycardia, and leukocytosis. Found to have large left sided effusion. A pigtail catheter placed by IR on admission 2/7 and was draining 1-2L of fluid daily until it tapered off. Pleural effusion cx 2/7 growing Strep intermedius, pan sensitive. Bronch 2/10 -> no obstructions.  Repeat CT done 2/11 demonstrates decrease in pleural fluid but dense consolidation throughout bilateral lower lobes and some consolidation in upper lobes as well, c/f development of necrotizing pneumonia.  --Continue Zosyn (2/7 -- ) plan for 14 day coarse  --chest tube removed   --Still requiring oxygen 2-4L via NC when sleeping or moving too much. Has aversion to NC and refused to wear.   --Repeat xray today with slight increase of pleural effusion. Low threshold to repeat xray if resp status changes    ## Agitation: has not been able to tolerate nasal cannula. Trialed ativan and haldol. Also zyprexa. Was quite somnolent this afternoon.   --hold on further meds for now.     #GUILLERMINA: Slowly resolving  Cr peaked at 3.1.  Sustained ATN likely 2/2 intermittent hypotension on admission to ICU and vancomycin use. Renal US done 2/11 without evidence of hydronephrosis . Trough vanco level 2/10 wnl however jumped to supratherapeutic on 2/12 at 40. Trough on 2/12 was 25.1.   -- FeUrea is >1000%; ATN likely 2/2 hypotension and vanc toxicity.   -- D/Chirag vanc on 2/11  -- Avoid  nephrotoxins as possible.      # Hypothyroidism  - Continue PTA levothyroxine; TSH on 2/9 wnl.     Summary  FEN: Full liquid diet, speech following  Thrombo-propholaxis:  Change to heparin given renal function  Code Status: FULL      Reyna Gonzalez  PGY-3   [phone]     Subjective:     Patient is 22 year old M who was admitted to MICU for pna and left parapneumonic effusion. Patient was extubated 2/15 and has been stable on room air during the day. Has been very agitated with use of nasal cannula. Given ativan, haldol, and zyrexa today. Very somnolent now this afternoon. Mom at bedside.     ROS: 4 system ROS was done. Pertinent positive and negatives noted above.         Physical Exam:   Vitals:  Temp:  [98.2  F (36.8  C)-99.8  F (37.7  C)] 99.8  F (37.7  C)  Heart Rate:  [] 79  Resp:  [18-22] 20  BP: (102-146)/(57-83) 144/77  SpO2:  [89 %-96 %] 95 %      Wt Readings from Last 4 Encounters:   02/12/17 118 kg (260 lb 2.3 oz)   09/19/16 110 kg (242 lb 8 oz)   09/12/16 112.6 kg (248 lb 4 oz)   10/14/15 112 kg (247 lb)         Intake/Output Summary (Last 24 hours) at 02/16/17 1402  Last data filed at 02/16/17 1000   Gross per 24 hour   Intake             1655 ml   Output              975 ml   Net              680 ml       Gen: sitting up in bed, intermittently falling asleep, no distress  HEENT: mm dry, cracked lips  Resp: left side decreased throughout, right decreased at base, no crackles or wheezes  CV: RRR, no m/r/g  Abdominal: Soft; NT, ND; NABS  Extremities: wwp, no edema in BLE  Neurological: alert and oriented, intermittently falling asleep sitting up however         Laboratory Data:   ROUTINE ICU LABS (Last four results)  CMP  Recent Labs  Lab 02/16/17  0828 02/15/17  0432 02/15/17  0217 02/14/17  0421 02/13/17  0351  02/11/17  1535 02/11/17  0420    145* 146* 144 142  < > 143 143   POTASSIUM 3.7 3.2* 3.2* 3.6 3.6  < > 3.7 3.9   CHLORIDE 108 111* 111* 109 107  < > 108 107   CO2 25 23 23 26 24   < > 29 25   ANIONGAP 8 12 12 9 11  < > 6 11   * 99 95 100* 86  < > 99 97   BUN 19 23 25 23 25  < > 28 26   CR 2.57* 3.17* 3.23* 3.15* 3.41*  < > 3.33* 2.76*   GFRESTIMATED 31* 24* 24* 24* 22*  < > 23* 28*   GFRESTBLACK 37* 29* 29* 30* 27*  < > 28* 34*   JUAN 8.0* 8.0* 7.9* 8.1* 8.0*  < > 7.6* 8.3*   MAG 2.2 2.4*  --  2.4* 2.4*  --   --  2.5*   PHOS  --  2.2*  --  4.5 3.7  --   --  4.4   PROTTOTAL  --   --   --   --   --   --  5.0*  --    ALBUMIN  --   --   --   --   --   --  1.4*  --    BILITOTAL  --   --   --   --   --   --  0.4  --    ALKPHOS  --   --   --   --   --   --  114  --    AST  --   --   --   --   --   --  16  --    ALT  --   --   --   --   --   --  18  --    < > = values in this interval not displayed.  CBC  Recent Labs  Lab 02/16/17  0828 02/15/17  0432 02/15/17  0217 02/14/17  0421   WBC 14.4* 9.1 9.3 10.1   RBC 3.33* 3.00* 3.01* 3.02*   HGB 9.7* 8.7* 8.8* 8.7*   HCT 29.5* 26.5* 26.4* 27.2*   MCV 89 88 88 90   MCH 29.1 29.0 29.2 28.8   MCHC 32.9 32.8 33.3 32.0   RDW 15.7* 15.7* 15.8* 15.8*   * [phone]              Imaging:     XRAY  Findings:   Frontal radiograph of the chest is obtained. Interval removal of  tracheal tube, enteric and left basilar chest tube. Slight increase in  left pleural effusion. Increased bibasilar opacities likely  representing atelectasis/consolidation. Unchanged perihilar opacities.  No pneumothorax.          Impression:   1. Interval removal of endotracheal tube and chest tube.  2. Increased bilateral opacities likely representing  atelectasis/consolidation.  3. Slight increase in left pleural effusion.

## 2017-02-17 ENCOUNTER — APPOINTMENT (OUTPATIENT)
Dept: OCCUPATIONAL THERAPY | Facility: CLINIC | Age: 22
DRG: 870 | End: 2017-02-17
Payer: MEDICARE

## 2017-02-17 ENCOUNTER — APPOINTMENT (OUTPATIENT)
Dept: PHYSICAL THERAPY | Facility: CLINIC | Age: 22
DRG: 870 | End: 2017-02-17
Payer: MEDICARE

## 2017-02-17 LAB
ANION GAP SERPL CALCULATED.3IONS-SCNC: 10 MMOL/L (ref 3–14)
BACTERIA SPEC CULT: NO GROWTH
BACTERIA SPEC CULT: NO GROWTH
BUN SERPL-MCNC: 18 MG/DL (ref 7–30)
C DIFF TOX B STL QL: NORMAL
CALCIUM SERPL-MCNC: 8.5 MG/DL (ref 8.5–10.1)
CHLORIDE SERPL-SCNC: 110 MMOL/L (ref 94–109)
CO2 SERPL-SCNC: 24 MMOL/L (ref 20–32)
CREAT SERPL-MCNC: 2.19 MG/DL (ref 0.66–1.25)
ERYTHROCYTE [DISTWIDTH] IN BLOOD BY AUTOMATED COUNT: 15.7 % (ref 10–15)
GFR SERPL CREATININE-BSD FRML MDRD: 37 ML/MIN/1.7M2
GLUCOSE SERPL-MCNC: 98 MG/DL (ref 70–99)
HCT VFR BLD AUTO: 30.1 % (ref 40–53)
HGB BLD-MCNC: 9.8 G/DL (ref 13.3–17.7)
INTERPRETATION ECG - MUSE: NORMAL
MAGNESIUM SERPL-MCNC: 2.3 MG/DL (ref 1.6–2.3)
MCH RBC QN AUTO: 28.9 PG (ref 26.5–33)
MCHC RBC AUTO-ENTMCNC: 32.6 G/DL (ref 31.5–36.5)
MCV RBC AUTO: 89 FL (ref 78–100)
MICRO REPORT STATUS: NORMAL
MICRO REPORT STATUS: NORMAL
PHOSPHATE SERPL-MCNC: 3.8 MG/DL (ref 2.5–4.5)
PLATELET # BLD AUTO: 555 10E9/L (ref 150–450)
POTASSIUM SERPL-SCNC: 3.7 MMOL/L (ref 3.4–5.3)
RBC # BLD AUTO: 3.39 10E12/L (ref 4.4–5.9)
SODIUM SERPL-SCNC: 144 MMOL/L (ref 133–144)
SPECIMEN SOURCE: NORMAL
WBC # BLD AUTO: 13.9 10E9/L (ref 4–11)

## 2017-02-17 PROCEDURE — 36415 COLL VENOUS BLD VENIPUNCTURE: CPT | Performed by: INTERNAL MEDICINE

## 2017-02-17 PROCEDURE — 97530 THERAPEUTIC ACTIVITIES: CPT | Mod: GO | Performed by: OCCUPATIONAL THERAPIST

## 2017-02-17 PROCEDURE — 25000132 ZZH RX MED GY IP 250 OP 250 PS 637: Mod: GY

## 2017-02-17 PROCEDURE — 25000132 ZZH RX MED GY IP 250 OP 250 PS 637: Mod: GY | Performed by: INTERNAL MEDICINE

## 2017-02-17 PROCEDURE — 87493 C DIFF AMPLIFIED PROBE: CPT | Performed by: STUDENT IN AN ORGANIZED HEALTH CARE EDUCATION/TRAINING PROGRAM

## 2017-02-17 PROCEDURE — 84100 ASSAY OF PHOSPHORUS: CPT | Performed by: INTERNAL MEDICINE

## 2017-02-17 PROCEDURE — 80048 BASIC METABOLIC PNL TOTAL CA: CPT | Performed by: INTERNAL MEDICINE

## 2017-02-17 PROCEDURE — 85027 COMPLETE CBC AUTOMATED: CPT | Performed by: INTERNAL MEDICINE

## 2017-02-17 PROCEDURE — 25000128 H RX IP 250 OP 636: Performed by: SURGERY

## 2017-02-17 PROCEDURE — 12000008 ZZH R&B INTERMEDIATE UMMC

## 2017-02-17 PROCEDURE — A9270 NON-COVERED ITEM OR SERVICE: HCPCS | Mod: GY | Performed by: INTERNAL MEDICINE

## 2017-02-17 PROCEDURE — 97116 GAIT TRAINING THERAPY: CPT | Mod: GP | Performed by: PHYSICAL THERAPIST

## 2017-02-17 PROCEDURE — 99233 SBSQ HOSP IP/OBS HIGH 50: CPT | Mod: GC | Performed by: INTERNAL MEDICINE

## 2017-02-17 PROCEDURE — 83735 ASSAY OF MAGNESIUM: CPT | Performed by: INTERNAL MEDICINE

## 2017-02-17 PROCEDURE — 99211 OFF/OP EST MAY X REQ PHY/QHP: CPT

## 2017-02-17 PROCEDURE — 40000193 ZZH STATISTIC PT WARD VISIT: Performed by: PHYSICAL THERAPIST

## 2017-02-17 PROCEDURE — 40000133 ZZH STATISTIC OT WARD VISIT: Performed by: OCCUPATIONAL THERAPIST

## 2017-02-17 PROCEDURE — 25000128 H RX IP 250 OP 636: Performed by: INTERNAL MEDICINE

## 2017-02-17 PROCEDURE — 97530 THERAPEUTIC ACTIVITIES: CPT | Mod: GP | Performed by: PHYSICAL THERAPIST

## 2017-02-17 RX ORDER — MICONAZOLE NITRATE 20 MG/G
CREAM TOPICAL 2 TIMES DAILY
Status: DISCONTINUED | OUTPATIENT
Start: 2017-02-17 | End: 2017-02-20 | Stop reason: HOSPADM

## 2017-02-17 RX ORDER — OLANZAPINE 2.5 MG/1
2.5 TABLET, FILM COATED ORAL
Status: COMPLETED | OUTPATIENT
Start: 2017-02-17 | End: 2017-02-17

## 2017-02-17 RX ADMIN — ESCITALOPRAM OXALATE 20 MG: 20 TABLET ORAL at 08:03

## 2017-02-17 RX ADMIN — HEPARIN SODIUM 5000 UNITS: 10000 INJECTION, SOLUTION INTRAVENOUS; SUBCUTANEOUS at 08:03

## 2017-02-17 RX ADMIN — MICONAZOLE NITRATE: 20 CREAM TOPICAL at 22:36

## 2017-02-17 RX ADMIN — PIPERACILLIN SODIUM,TAZOBACTAM SODIUM 3.38 G: 3; .375 INJECTION, POWDER, FOR SOLUTION INTRAVENOUS at 04:11

## 2017-02-17 RX ADMIN — MULTIPLE VITAMINS W/ MINERALS TAB 1 TABLET: TAB at 08:03

## 2017-02-17 RX ADMIN — LEVOTHYROXINE SODIUM 112 MCG: 112 TABLET ORAL at 08:03

## 2017-02-17 RX ADMIN — PIPERACILLIN SODIUM,TAZOBACTAM SODIUM 3.38 G: 3; .375 INJECTION, POWDER, FOR SOLUTION INTRAVENOUS at 11:30

## 2017-02-17 RX ADMIN — HEPARIN SODIUM 5000 UNITS: 10000 INJECTION, SOLUTION INTRAVENOUS; SUBCUTANEOUS at 17:30

## 2017-02-17 RX ADMIN — HEPARIN SODIUM 5000 UNITS: 10000 INJECTION, SOLUTION INTRAVENOUS; SUBCUTANEOUS at 00:45

## 2017-02-17 RX ADMIN — OLANZAPINE 2.5 MG: 2.5 TABLET, FILM COATED ORAL at 18:20

## 2017-02-17 NOTE — PLAN OF CARE
Problem: Goal Outcome Summary  Goal: Goal Outcome Summary  SLP: Dysphagia therapy cancelled.  Pt declined participation despite encouragement from ST and mother (present).  Pt perseverating on returning home and unwilling to accept any PO intake.  Spoke with MD team and mother who were in agreement with diet advancement despite possible risk related to aspiration given persistent refusal to participate.  Educated mother on s/s of aspiration and instructed to discontinue PO should overt s/s of aspiration occur.  ST to follow as indicated on POC.

## 2017-02-17 NOTE — PROGRESS NOTES
Medicine Daily Progress Note   02/17/2017    Hany Patel MRN# [id number]   Age: 22 year old YOB: 1995          Assessment and Plan:     Mr. Patel is a 22 year old M with PMH of Down Syndrome, hypothyroidism; who was admitted for acute hypoxic respiratory failure. Presumed to be secondary to community acquired pneumonia and left sided pleural effusion. Extubated on 2/15. Chest tube removed 2/15 as well. Stay complicated by ATN. Renal function improving. Off oxygen.     ## Community Acquired Pneumonia/necrotizing pna:  ## Left parapneumonic effusion:  Presented with fever, tachycardia, and leukocytosis. Found to have large left sided effusion. A pigtail catheter placed by IR on admission 2/7 and was draining 1-2L of fluid daily until it tapered off. Pleural effusion cx 2/7 growing Strep intermedius, pan sensitive. Bronch 2/10 -> no obstructions.  Repeat CT done 2/11 demonstrates decrease in pleural fluid but dense consolidation throughout bilateral lower lobes and some consolidation in upper lobes as well, c/f development of necrotizing pneumonia.  --Continue Zosyn (2/7 -- )stopped today  --chest tube removed 2/15  --Has been oxygenating well on room air.     ## Agitation: has not been able to tolerate nasal cannula. Flip agitated that he wants to go home. Improvement with low dose zyprexa  --hold on further meds for now.   --Consider zyprexa 2.5 mg if needed    ## Diarrhea:  -will check cdiff    #GUILLERMINA: Slowly resolving  Cr peaked at 3.1.  Sustained ATN likely 2/2 intermittent hypotension on admission to ICU and vancomycin use. Renal US done 2/11 without evidence of hydronephrosis . Trough vanco level 2/10 wnl however jumped to supratherapeutic on 2/12 at 40. Trough on 2/12 was 25.1.   -- FeUrea is >1000%; ATN likely 2/2 hypotension and vanc toxicity.   -- D/Chirag vanc on 2/11  -- Avoid nephrotoxins as possible.      # Hypothyroidism  - Continue PTA levothyroxine; TSH on 2/9 wnl.     Summary  FEN:  difficult to assess due to cooperating with assessment. Will trial empiric full diet  Thrombo-propholaxis:  Change to heparin given renal function  Code Status: FULL    Dispo: pending respiratory stability and rehab. Ideally, plan for dc to home.       Patient seen and discussed with Dr. Saul Gonzalez  PGY-3   [phone]     Subjective:     No acute events overnight. Doing well on room air. Wants to go home. Has not been drinking much. Faint cough. Denies shortness of breath. Parents at bedside. Concerned about diarrhea.     ROS: 4 system ROS was done. Pertinent positive and negatives noted above.         Physical Exam:   Vitals:  Temp:  [97.7  F (36.5  C)-98.8  F (37.1  C)] 98.8  F (37.1  C)  Heart Rate:  [81-89] 82  Resp:  [16-22] 18  BP: (118-136)/(65-86) 128/65  SpO2:  [90 %-92 %] 91 %      Wt Readings from Last 4 Encounters:   02/12/17 118 kg (260 lb 2.3 oz)   09/19/16 110 kg (242 lb 8 oz)   09/12/16 112.6 kg (248 lb 4 oz)   10/14/15 112 kg (247 lb)         Intake/Output Summary (Last 24 hours) at 02/16/17 1402  Last data filed at 02/16/17 1000   Gross per 24 hour   Intake             1655 ml   Output              975 ml   Net              680 ml       Gen: alert, sitting up in bed, no distress  HEENT: mm dry, cracked lips  Resp: left side decreased throughout, improved from yesterday  CV: RRR, no m/r/g  Abdominal: Soft; NT, ND; NABS  Extremities: wwp, no edema in BLE  Neurological: alert, answers questions appropriately         Laboratory Data:   ROUTINE ICU LABS (Last four results)  CMP  Recent Labs  Lab 02/17/17  0421 02/16/17  0828 02/15/17  0432 02/15/17  0217 02/14/17  0421 02/13/17  0351  02/11/17  1535    141 145* 146* 144 142  < > 143   POTASSIUM 3.7 3.7 3.2* 3.2* 3.6 3.6  < > 3.7   CHLORIDE 110* 108 111* 111* 109 107  < > 108   CO2 24 25 23 23 26 24  < > 29   ANIONGAP 10 8 12 12 9 11  < > 6   GLC 98 103* 99 95 100* 86  < > 99   BUN 18 19 23 25 23 25  < > 28   CR 2.19* 2.57* 3.17*  3.23* 3.15* 3.41*  < > 3.33*   GFRESTIMATED 37* 31* 24* 24* 24* 22*  < > 23*   GFRESTBLACK 45* 37* 29* 29* 30* 27*  < > 28*   JUAN 8.5 8.0* 8.0* 7.9* 8.1* 8.0*  < > 7.6*   MAG 2.3 2.2 2.4*  --  2.4* 2.4*  --   --    PHOS 3.8  --  2.2*  --  4.5 3.7  --   --    PROTTOTAL  --   --   --   --   --   --   --  5.0*   ALBUMIN  --   --   --   --   --   --   --  1.4*   BILITOTAL  --   --   --   --   --   --   --  0.4   ALKPHOS  --   --   --   --   --   --   --  114   AST  --   --   --   --   --   --   --  16   ALT  --   --   --   --   --   --   --  18   < > = values in this interval not displayed.  CBC    Recent Labs  Lab 02/17/17  0421 02/16/17  0828 02/15/17  0432 02/15/17  0217   WBC 13.9* 14.4* 9.1 9.3   RBC 3.39* 3.33* 3.00* 3.01*   HGB 9.8* 9.7* 8.7* 8.8*   HCT 30.1* 29.5* 26.5* 26.4*   MCV 89 89 88 88   MCH 28.9 29.1 29.0 29.2   MCHC 32.6 32.9 32.8 33.3   RDW 15.7* 15.7* 15.7* 15.8*   * 487* 395 397              Imaging:     No new imaging

## 2017-02-17 NOTE — PROGRESS NOTES
"WOC consult for groin and \"backside\"    Per nursing staff, pt too agitated to attempt to turn  P:  Will attempt visit tomm.   "

## 2017-02-17 NOTE — PLAN OF CARE
Problem: Goal Outcome Summary  Goal: Goal Outcome Summary  PT 7D: Pt making improvement with therapy. Able to ambulate ~200 feet with HHA x2 and perform transfers with CGAx2. Pt continues to have baseline behavorial deficits, fixating on wanting to return home. Pt incontinent of stool during walk. All VSS on RA, though some mild SOB noted. As per discussion with the pt's mother, pt would not do well with transport to a TCU/ARU and would do better with returning home. Pt is making great progress, but still would need to be able to perform transfers, gait, and stairs with Ax1 from family only. Has to do a minimum of 4 stairs to get into his house, otherwise could have 24 hour assist. If the pt remains in the hospital for 2-3 more days, anticipate that he would make progress to be able to discharge home with assistance and HHPT/OT. Will monitor progress closely.

## 2017-02-17 NOTE — PLAN OF CARE
Problem: Goal Outcome Summary  Goal: Goal Outcome Summary  Outcome: No Change  Assumed responsibility of care at apporx 1900hrs, with patient sitting in bed, calm, watching TV. Noted to intermittently be anxious, uncooperative  with placement of nasal cannula, stating and yelling  no ; However needing it with sleeping  as he desats to  85-88% , while awake and alert, Spo2 stable between 90-93%.  POC was, in order to administer supplemental O2, to sedate with Haldol, Ativan, Olanzapine 5mg daily for agitation/anxiety. Since increased somnolence has occurred during administration, judicial utilization of sedation now consideration. With allowing patient to express desires, Spo2 increases. Did not have to sedate patient thru shift, advised Dr. Ceballos.  Continues with current course of abx tx,  given uptrending white count.  Very large effusion initially (could not see anything else on the L side on CXR), pigtail catheter placed by IR on admission 2/7 and was pulled  on 2/15 , old CT insertion site with slight exudative purulence, covered with meplix.  Noted to be HDS, allows leads to be adjusted and remain on, but not NIBP cuff, which this writer has had to put on and take off after taking pressures, q4H NIBP noted on charting. Unable to accomplish strict I&O as patient has no urinary catheter, is incontinent to bowel and bladder as well as refuses condom cath.  Goal, however, is to maintain net even, despite poor PO intake of clear liquid diet. Noted to have large erythema around the groin without satellite lesions, purpura.  As per MD notes, unlikely to be cellulitis but rather local irritation from stool and urine and pressure wound. Wound consult has seen patient, however, unable to assess. Pending C  reassessment.  Will con t to implement POC till transfer to lower level of care as patient has transfer orders.

## 2017-02-17 NOTE — PLAN OF CARE
"Problem: Goal Outcome Summary  Goal: Goal Outcome Summary     OT-7d: Pt needing encouragement for participation, but ambulated hallway with close CGAx2 with hand-held assist and wheelchair follow. Pt ambulated approx 20 feet x 10 feet, somewhat shaky and SOB with activity. Pt becoming agitated, and repeating \"I wanna go home\". Unsuccessful at attempts of redirection/distraction. Discussed d/c with mother, and pt has good support at home, but is deconditioned and will likely need more assist than is available currently. Pt will also have to complete stairs to get to his bedroom. Rec d/c to TCU vs ARU to increase ADL independence and activity tolerance.       "

## 2017-02-17 NOTE — PROGRESS NOTES
Report called to Keerthi Newman RN at 0915. Full report given, including history of patient, current vital signs and current labs. Patient is currently anxious about changing rooms but the mother is at bedside calming/redirecting the patient. RN without any questions/concerns. Patient taken off of telemetry for transport. Oxygen sats remain 92% on room air. Refuses any oxygen for transport but o2 sat is goal and respiratory status is stable and breathing unlabored

## 2017-02-17 NOTE — PROGRESS NOTES
Soft wrist restraints initiated at 1000 and continued to 1400 for patient agitation / refusal to wear N/C with SpO2's consistently reading 85-86% on room air while asleep.  Pt. Would awaken at each attempt to place nasal cannula or any other oxygen device and would grab the tubing to prevent oxygen placement.  Distal pulses remained palpable, skin surrounding restraints remained intact / baseline in color, distal skin temp remained warm.      1400 soft wrist restraints removed as patient was awake and SpO2 on room air remains mid 90's while awake. Patient does however continue to refuse all oxygen therapy.

## 2017-02-17 NOTE — PROGRESS NOTES
OLVIN Per MD note: 22 year old M with PMH of Down Syndrome, hypothyroidism; who was admitted for acute hypoxic respiratory failure. Presumed to be secondary to community acquired pneumonia and left sided pleural effusion. Extubated on 2/15. Chest tube removed 2/15 as well. Stay complicated by ATN.   WOC consult to assess groin and buttocks skin. Patient with erythema and moisture at groins, scrotum and perineal skin. No evidence of pressure. Patient with recent fecal incontinence and sweating  I. 15 minutes face to face with patient assessing skin. Requested AARON antifungal incontinence barrier cream and completed skin care instructions  A. Patient with incontinence and moisture associated irritant dermatitis bilateral groins, scrotum and perineum. No evidence of pressure injury  P. Nursing to apply Aaron antifungal barrier to bilateral groins, scrotum and perineum 2 x daily. No further WOC f/u planned.

## 2017-02-18 ENCOUNTER — APPOINTMENT (OUTPATIENT)
Dept: PHYSICAL THERAPY | Facility: CLINIC | Age: 22
DRG: 870 | End: 2017-02-18
Payer: MEDICARE

## 2017-02-18 ENCOUNTER — APPOINTMENT (OUTPATIENT)
Dept: OCCUPATIONAL THERAPY | Facility: CLINIC | Age: 22
DRG: 870 | End: 2017-02-18
Payer: MEDICARE

## 2017-02-18 LAB
ANION GAP SERPL CALCULATED.3IONS-SCNC: 7 MMOL/L (ref 3–14)
BUN SERPL-MCNC: 18 MG/DL (ref 7–30)
CALCIUM SERPL-MCNC: 8.2 MG/DL (ref 8.5–10.1)
CHLORIDE SERPL-SCNC: 109 MMOL/L (ref 94–109)
CO2 SERPL-SCNC: 27 MMOL/L (ref 20–32)
CREAT SERPL-MCNC: 1.95 MG/DL (ref 0.66–1.25)
ERYTHROCYTE [DISTWIDTH] IN BLOOD BY AUTOMATED COUNT: 16 % (ref 10–15)
GFR SERPL CREATININE-BSD FRML MDRD: 42 ML/MIN/1.7M2
GLUCOSE SERPL-MCNC: 105 MG/DL (ref 70–99)
HCT VFR BLD AUTO: 31.3 % (ref 40–53)
HGB BLD-MCNC: 10.3 G/DL (ref 13.3–17.7)
LACTATE BLD-SCNC: 1.3 MMOL/L (ref 0.7–2.1)
MCH RBC QN AUTO: 29.2 PG (ref 26.5–33)
MCHC RBC AUTO-ENTMCNC: 32.9 G/DL (ref 31.5–36.5)
MCV RBC AUTO: 89 FL (ref 78–100)
PLATELET # BLD AUTO: 608 10E9/L (ref 150–450)
POTASSIUM SERPL-SCNC: 3.4 MMOL/L (ref 3.4–5.3)
RBC # BLD AUTO: 3.53 10E12/L (ref 4.4–5.9)
SODIUM SERPL-SCNC: 143 MMOL/L (ref 133–144)
WBC # BLD AUTO: 14 10E9/L (ref 4–11)

## 2017-02-18 PROCEDURE — A9270 NON-COVERED ITEM OR SERVICE: HCPCS | Mod: GY | Performed by: INTERNAL MEDICINE

## 2017-02-18 PROCEDURE — 36415 COLL VENOUS BLD VENIPUNCTURE: CPT | Performed by: INTERNAL MEDICINE

## 2017-02-18 PROCEDURE — 97530 THERAPEUTIC ACTIVITIES: CPT | Mod: GP

## 2017-02-18 PROCEDURE — 25000132 ZZH RX MED GY IP 250 OP 250 PS 637: Mod: GY | Performed by: INTERNAL MEDICINE

## 2017-02-18 PROCEDURE — 85027 COMPLETE CBC AUTOMATED: CPT | Performed by: INTERNAL MEDICINE

## 2017-02-18 PROCEDURE — 97530 THERAPEUTIC ACTIVITIES: CPT | Mod: GO

## 2017-02-18 PROCEDURE — 97535 SELF CARE MNGMENT TRAINING: CPT | Mod: GO

## 2017-02-18 PROCEDURE — 25000132 ZZH RX MED GY IP 250 OP 250 PS 637: Mod: GY

## 2017-02-18 PROCEDURE — 25000128 H RX IP 250 OP 636: Performed by: INTERNAL MEDICINE

## 2017-02-18 PROCEDURE — 12000008 ZZH R&B INTERMEDIATE UMMC

## 2017-02-18 PROCEDURE — 99233 SBSQ HOSP IP/OBS HIGH 50: CPT | Mod: GC | Performed by: INTERNAL MEDICINE

## 2017-02-18 PROCEDURE — 40000193 ZZH STATISTIC PT WARD VISIT

## 2017-02-18 PROCEDURE — 40000133 ZZH STATISTIC OT WARD VISIT

## 2017-02-18 PROCEDURE — 83605 ASSAY OF LACTIC ACID: CPT | Performed by: INTERNAL MEDICINE

## 2017-02-18 PROCEDURE — 80048 BASIC METABOLIC PNL TOTAL CA: CPT | Performed by: INTERNAL MEDICINE

## 2017-02-18 PROCEDURE — 97116 GAIT TRAINING THERAPY: CPT | Mod: GP

## 2017-02-18 RX ORDER — LOPERAMIDE HCL 2 MG
2 CAPSULE ORAL 4 TIMES DAILY PRN
Status: DISCONTINUED | OUTPATIENT
Start: 2017-02-18 | End: 2017-02-20 | Stop reason: HOSPADM

## 2017-02-18 RX ADMIN — HEPARIN SODIUM 5000 UNITS: 10000 INJECTION, SOLUTION INTRAVENOUS; SUBCUTANEOUS at 09:19

## 2017-02-18 RX ADMIN — LOPERAMIDE HYDROCHLORIDE 2 MG: 2 CAPSULE ORAL at 13:22

## 2017-02-18 RX ADMIN — MULTIPLE VITAMINS W/ MINERALS TAB 1 TABLET: TAB at 09:18

## 2017-02-18 RX ADMIN — MICONAZOLE NITRATE: 20 CREAM TOPICAL at 09:19

## 2017-02-18 RX ADMIN — ESCITALOPRAM OXALATE 20 MG: 20 TABLET ORAL at 09:18

## 2017-02-18 RX ADMIN — LEVOTHYROXINE SODIUM 112 MCG: 112 TABLET ORAL at 09:18

## 2017-02-18 NOTE — PROGRESS NOTES
Medicine Daily Progress Note   02/18/2017    Hany Patel MRN# [id number]   Age: 22 year old YOB: 1995          Assessment and Plan:     Mr. Patel is a 22 year old M with PMH of Down Syndrome, hypothyroidism; who was admitted for acute hypoxic respiratory failure. Presumed to be secondary to community acquired pneumonia and left sided pleural effusion. Extubated on 2/15. Chest tube removed 2/15 as well. Stay complicated by ATN. Renal function improving. Off oxygen.     ## Community Acquired Pneumonia/necrotizing pna:  ## Left parapneumonic effusion:  Presented with fever, tachycardia, and leukocytosis. Found to have large left sided effusion. A pigtail catheter placed by IR on admission 2/7 and was draining 1-2L of fluid daily until it tapered off. Pleural effusion cx 2/7 growing Strep intermedius, pan sensitive. Bronch 2/10 -> no obstructions.  Repeat CT done 2/11 demonstrates decrease in pleural fluid but dense consolidation throughout bilateral lower lobes and some consolidation in upper lobes as well, c/f development of necrotizing pneumonia.  --Continue Zosyn (2/7 -2/17)  --chest tube removed 2/15  --Has been oxygenating well on room air.     ## Agitation: has not been able to tolerate nasal cannula. Flip agitated that he wants to go home. Improvement with low dose zyprexa  --hold on further meds for now.   --Consider zyprexa 2.5 mg if needed    ## Diarrhea: c diff checked and negative  --start loperamide    #GUILLERMINA: Slowly resolving  Cr peaked at 3.1.  Sustained ATN likely 2/2 intermittent hypotension on admission to ICU and vancomycin use. Renal US done 2/11 without evidence of hydronephrosis . Trough vanco level 2/10 wnl however jumped to supratherapeutic on 2/12 at 40. Trough on 2/12 was 25.1.   -- D/Chirag vanc on 2/11  -- Avoid nephrotoxins as possible  --Renal function continues to improve     # Hypothyroidism  - Continue PTA levothyroxine; TSH on 2/9 wnl.     Summary  FEN: difficult to assess  due to cooperating with assessment. Will trial empiric full diet  Thrombo-propholaxis:  Change to heparin given renal function  Code Status: FULL    Dispo: pending respiratory stability and rehab. Ideally, plan for dc to home. Earliest would be Sunday afternoon.        Patient seen and discussed with Dr. Saul Gonzalez  PGY-3   [phone]     Subjective:     Overnight had some abd pain. Assessed by crosscover. No concerning features on exam. Trialed GI cocktail. No further events. Parent's concerned Hany mckeonnot acting like himself this AM. Upon further discussion, depressed mood due to still being in the hospital likely a major factor in this. Denies abd pain. Breathing feels fine. Plan to be working with rehab today.    ROS: 4 system ROS was done. Pertinent positive and negatives noted above.         Physical Exam:   Vitals:  Temp:  [96.6  F (35.9  C)-99.8  F (37.7  C)] 99.2  F (37.3  C)  Pulse:  [] 87  Heart Rate:  [81-98] 98  Resp:  [18-20] 20  BP: (134-144)/(53-76) 135/57  SpO2:  [90 %-94 %] 91 %      Wt Readings from Last 4 Encounters:   02/18/17 111.2 kg (245 lb 3.2 oz)   09/19/16 110 kg (242 lb 8 oz)   09/12/16 112.6 kg (248 lb 4 oz)   10/14/15 112 kg (247 lb)         Intake/Output Summary (Last 24 hours) at 02/16/17 1402  Last data filed at 02/16/17 1000   Gross per 24 hour   Intake             1655 ml   Output              975 ml   Net              680 ml       Gen: alert but head slumped down, sitting in wheelchair, less interactive  HEENT:no conjuncitval icterus or injection  Resp: left side decreased throughout to about 2/3 way up, clear on right  CV: RRR, no m/r/g  Abdominal: Soft; NT, ND  Extremities: wwp, no edema in BLE  Neurological: alert, answers questions appropriately         Laboratory Data:   ROUTINE ICU LABS (Last four results)  CMP  Recent Labs  Lab 02/18/17  0621 02/17/17  0421 02/16/17  0828 02/15/17  0432  02/14/17  0421 02/13/17  0351  02/11/17  1535    144 141 145*   < > 144 142  < > 143   POTASSIUM 3.4 3.7 3.7 3.2*  < > 3.6 3.6  < > 3.7   CHLORIDE 109 110* 108 111*  < > 109 107  < > 108   CO2 27 24 25 23  < > 26 24  < > 29   ANIONGAP 7 10 8 12  < > 9 11  < > 6   * 98 103* 99  < > 100* 86  < > 99   BUN 18 18 19 23  < > 23 25  < > 28   CR 1.95* 2.19* 2.57* 3.17*  < > 3.15* 3.41*  < > 3.33*   GFRESTIMATED 42* 37* 31* 24*  < > 24* 22*  < > 23*   GFRESTBLACK 51* 45* 37* 29*  < > 30* 27*  < > 28*   JUAN 8.2* 8.5 8.0* 8.0*  < > 8.1* 8.0*  < > 7.6*   MAG  --  2.3 2.2 2.4*  --  2.4* 2.4*  < >  --    PHOS  --  3.8  --  2.2*  --  4.5 3.7  --   --    PROTTOTAL  --   --   --   --   --   --   --   --  5.0*   ALBUMIN  --   --   --   --   --   --   --   --  1.4*   BILITOTAL  --   --   --   --   --   --   --   --  0.4   ALKPHOS  --   --   --   --   --   --   --   --  114   AST  --   --   --   --   --   --   --   --  16   ALT  --   --   --   --   --   --   --   --  18   < > = values in this interval not displayed.  CBC    Recent Labs  Lab 02/18/17  0621 02/17/17  0421 02/16/17  0828 02/15/17  0432   WBC 14.0* 13.9* 14.4* 9.1   RBC 3.53* 3.39* 3.33* 3.00*   HGB 10.3* 9.8* 9.7* 8.7*   HCT 31.3* 30.1* 29.5* 26.5*   MCV 89 89 89 88   MCH 29.2 28.9 29.1 29.0   MCHC 32.9 32.6 32.9 32.8   RDW 16.0* 15.7* 15.7* 15.7*   * 555* 487* 395              Imaging:     No new imaging

## 2017-02-18 NOTE — PROGRESS NOTES
Care Coordinator Progress Note     Admission Date/Time:  2/7/2017  Attending MD:  Yeimy Hughes MD     Data  Chart reviewed, discussed with interdisciplinary team.   Patient was admitted for:    Pneumonia  Parapneumonic effusion  Down's syndrome  Hypoxia  Chronic diarrhea.    Concerns with insurance coverage for discharge needs: None.  Current Living Situation: Patient lives with mother Marcella.  Support System: Supportive  Services Involved: [facility]    Transportation: Car-mother will provide transport.  Barriers to Discharge: continues to require acute care    Coordination of Care and Referrals: Provided patient/family with options for Home Care.        Assessment  This CC met with motherMarcella to discuss DC planning, mother felt that patient is just to anxious and would not benefit from going to a TCU and would most likely deteriorate because he is fixated on going home and would not participate with hterapies and may refuse to eat. Mother states she is able to care for at home and he would do better there. Mother is agreeable to using [facility] for physical therapy and would benefit from 1 or 2  SNV to make sure that patient is stable post  discharge.     Referral sent to [facility] for  SNV/PT    [facility]  Phone  [phone]  Fax  [phone]           Plan  Anticipated Discharge Date:  TBD   Anticipated Discharge Plan: Home with services.    Mar LUCIO RN [facility]

## 2017-02-18 NOTE — PLAN OF CARE
Problem: Discharge Planning  Goal: Discharge Planning (Adult, OB, Behavioral, Peds)  AVSS. Patient transferred in from another unit today.  History of down's syndrome. Alert and very talkative.  Up in chair with assist of 2 and gait belt.  Incontinent of B&B. Wound nurse came to see and placed new orders for excoriated areas on the buttocks and groin areas.  Drinking liquids and tolerating fine.  Denies any throat pain as he was extubated on 2/15 and speech therapy is seeing him to monitor his swallowing.  Repeatedly states that he wants to go home. Will continue POC.

## 2017-02-18 NOTE — PROGRESS NOTES
Calorie Counts     Intakes recorded for: 2/17/17 Kcals: 0 Pro: 0g    # Meals recorded: 2 meals ordered from kitchen, no meal intakes recorded.     # Supplements recorded: 0

## 2017-02-18 NOTE — PLAN OF CARE
Problem: Goal Outcome Summary  Goal: Goal Outcome Summary  PT/7D: Pt's PCA present during session to assist with mobility. Pt is making steady progress towards goals. Pt progressed STS transfers to Min A x 1. Ambulated ~35' w/ CGA x 1 demonstrating decreased rachel and short step length however pt demonstrates adequate ft clearance bilaterally, no overt LOB noted. Pt instructed in and performed stair negotiation 4 steps x 2, seated rest break taken between bouts. Progressed from use of BHR > RHR and HHA x 1 utilizing step to pattern. No posterior lean or LOB. Pt w/ increased fatigue after negotiating 4 steps however pt is safe to go up and down steps w/ one HRH and HHA x 1. At this time pt is not safe to negotiate a full flight of stairs. VSS O2 on rm air > 92% throughout.      Recommend DC to home w/ 24/7 assist from family and HHPT to improve strength, gait, and stair negotiation

## 2017-02-18 NOTE — PLAN OF CARE
Problem: Goal Outcome Summary  Goal: Goal Outcome Summary  Outcome: No Change     SIRS triggered, lactic acid =1.3.  Incontinent of urine and loose stools x 2, given imodium x 1 .  Pt was uncooperative with cares, refusing to take medications at first but eventually agreed to take medications.  Pt repeatedly  states that he does not want to be in the hospital and would like to go home. Poor PO intake.  Up with assist of one.  Pt's mother, father and Personal care attendant here during the day.  Possible discharge tomorrow.  Continue POC.

## 2017-02-18 NOTE — PLAN OF CARE
Problem: Goal Outcome Summary  Goal: Goal Outcome Summary     AVSS, afebrile. Ambulated x2 this shift, once with PT. Sat up in chair with A2 and gait belt, basically A1. Incontinent of B&B. C.diff stool sample sent down, results were negative, still in enteric as of now. Aaron antifungal barrier applied to bilateral groins, scrotum and perineum. Upgraded to regular diet, tolerated well; ate half his dinner. Pt refused medications/cares, but eventually agreed for them later on. Repeatedly states that he wants to go home, doesn't like it in the hospital. Reassurance and comforting provided frequently. Will continue POC.

## 2017-02-18 NOTE — PLAN OF CARE
Problem: Goal Outcome Summary  Goal: Goal Outcome Summary  OT 7D: very successful therapy session today. Pt brushed teeth while seated with Hari from Th, needed VCs to improve successful completion of oral cares. Pt required maxA to don and doff new brief and gown after BM, dependent in pericares while standing. Pts standing tolerance has improved greatly. Pt ambulated 75 feet x2 in hallway today with HHA x2 and CGA with w/c follow. Pts family and PCA present and very involved in session, very helpful in motivating pt to optimize participation in therapy session.     REC ARU vs home with 24 hour assist for ADLs and functional mobility and HH PT/OT. Pt improving significantly with IP therapy, but will definitely need further therapy to ensure safety and independence with completion of daily tasks. Per PT note, pts mother states that home would be better transition for pt vs rehab stay. Will monitor progress closely.

## 2017-02-18 NOTE — PLAN OF CARE
Problem: Goal Outcome Summary  Goal: Goal Outcome Summary  AVSS, afebrile. Pt was incontinent of urine and complete linen changed done x2. C.diff negative, enteric precautions discontinued. Antifungal barrier applied to bilateral groins, scrotum area when changed. Pt complained of RLQ abdominal pain. MD notified that pt was in pain. MD came to bedside to evaulaute patient. MD ordered tylenol and for pain but patient later refused and said he was not in pain anymore. Pt refused medications. Repeatedly states that he wants to go home, doesn't like it in the hospital.  Will continue POC. Mother called around 0100 to ask if there was anything else I could do to provide comfort and to help patient rest. Will continue to provide reassurance and comforting words that his mom and dad will be here in the morning.

## 2017-02-19 ENCOUNTER — APPOINTMENT (OUTPATIENT)
Dept: GENERAL RADIOLOGY | Facility: CLINIC | Age: 22
DRG: 870 | End: 2017-02-19
Attending: INTERNAL MEDICINE
Payer: MEDICARE

## 2017-02-19 ENCOUNTER — APPOINTMENT (OUTPATIENT)
Dept: OCCUPATIONAL THERAPY | Facility: CLINIC | Age: 22
DRG: 870 | End: 2017-02-19
Payer: MEDICARE

## 2017-02-19 LAB
ANION GAP SERPL CALCULATED.3IONS-SCNC: 12 MMOL/L (ref 3–14)
ANION GAP SERPL CALCULATED.3IONS-SCNC: 9 MMOL/L (ref 3–14)
BACTERIA SPEC CULT: NO GROWTH
BACTERIA SPEC CULT: NO GROWTH
BUN SERPL-MCNC: 20 MG/DL (ref 7–30)
BUN SERPL-MCNC: 27 MG/DL (ref 7–30)
CALCIUM SERPL-MCNC: 8.3 MG/DL (ref 8.5–10.1)
CALCIUM SERPL-MCNC: 9.1 MG/DL (ref 8.5–10.1)
CHLORIDE SERPL-SCNC: 109 MMOL/L (ref 94–109)
CHLORIDE SERPL-SCNC: 116 MMOL/L (ref 94–109)
CO2 SERPL-SCNC: 20 MMOL/L (ref 20–32)
CO2 SERPL-SCNC: 26 MMOL/L (ref 20–32)
CREAT SERPL-MCNC: 1.65 MG/DL (ref 0.66–1.25)
CREAT SERPL-MCNC: 2.48 MG/DL (ref 0.66–1.25)
ERYTHROCYTE [DISTWIDTH] IN BLOOD BY AUTOMATED COUNT: 16.3 % (ref 10–15)
GFR SERPL CREATININE-BSD FRML MDRD: 32 ML/MIN/1.7M2
GFR SERPL CREATININE-BSD FRML MDRD: 51 ML/MIN/1.7M2
GLUCOSE SERPL-MCNC: 102 MG/DL (ref 70–99)
GLUCOSE SERPL-MCNC: 103 MG/DL (ref 70–99)
HCT VFR BLD AUTO: 35.6 % (ref 40–53)
HGB BLD-MCNC: 11.8 G/DL (ref 13.3–17.7)
LACTATE BLD-SCNC: 1.6 MMOL/L (ref 0.7–2.1)
MCH RBC QN AUTO: 29.6 PG (ref 26.5–33)
MCHC RBC AUTO-ENTMCNC: 33.1 G/DL (ref 31.5–36.5)
MCV RBC AUTO: 89 FL (ref 78–100)
MICRO REPORT STATUS: NORMAL
MICRO REPORT STATUS: NORMAL
PLATELET # BLD AUTO: 600 10E9/L (ref 150–450)
POTASSIUM SERPL-SCNC: 3.9 MMOL/L (ref 3.4–5.3)
POTASSIUM SERPL-SCNC: 6 MMOL/L (ref 3.4–5.3)
RBC # BLD AUTO: 3.99 10E12/L (ref 4.4–5.9)
SODIUM SERPL-SCNC: 144 MMOL/L (ref 133–144)
SODIUM SERPL-SCNC: 148 MMOL/L (ref 133–144)
SPECIMEN SOURCE: NORMAL
SPECIMEN SOURCE: NORMAL
WBC # BLD AUTO: 16.3 10E9/L (ref 4–11)

## 2017-02-19 PROCEDURE — 40000133 ZZH STATISTIC OT WARD VISIT

## 2017-02-19 PROCEDURE — 36415 COLL VENOUS BLD VENIPUNCTURE: CPT | Performed by: INTERNAL MEDICINE

## 2017-02-19 PROCEDURE — 80048 BASIC METABOLIC PNL TOTAL CA: CPT | Performed by: INTERNAL MEDICINE

## 2017-02-19 PROCEDURE — 71020 XR CHEST 2 VW: CPT

## 2017-02-19 PROCEDURE — 25000132 ZZH RX MED GY IP 250 OP 250 PS 637: Mod: GY | Performed by: INTERNAL MEDICINE

## 2017-02-19 PROCEDURE — A9270 NON-COVERED ITEM OR SERVICE: HCPCS | Mod: GY | Performed by: INTERNAL MEDICINE

## 2017-02-19 PROCEDURE — 93010 ELECTROCARDIOGRAM REPORT: CPT | Performed by: INTERNAL MEDICINE

## 2017-02-19 PROCEDURE — 97530 THERAPEUTIC ACTIVITIES: CPT | Mod: GO

## 2017-02-19 PROCEDURE — 85027 COMPLETE CBC AUTOMATED: CPT | Performed by: INTERNAL MEDICINE

## 2017-02-19 PROCEDURE — 97535 SELF CARE MNGMENT TRAINING: CPT | Mod: GO

## 2017-02-19 PROCEDURE — 99233 SBSQ HOSP IP/OBS HIGH 50: CPT | Mod: GC | Performed by: INTERNAL MEDICINE

## 2017-02-19 PROCEDURE — 83605 ASSAY OF LACTIC ACID: CPT | Performed by: INTERNAL MEDICINE

## 2017-02-19 PROCEDURE — 87040 BLOOD CULTURE FOR BACTERIA: CPT | Performed by: INTERNAL MEDICINE

## 2017-02-19 PROCEDURE — 93005 ELECTROCARDIOGRAM TRACING: CPT

## 2017-02-19 PROCEDURE — 12000008 ZZH R&B INTERMEDIATE UMMC

## 2017-02-19 PROCEDURE — 25000128 H RX IP 250 OP 636: Performed by: INTERNAL MEDICINE

## 2017-02-19 PROCEDURE — 25000132 ZZH RX MED GY IP 250 OP 250 PS 637: Mod: GY

## 2017-02-19 RX ORDER — LEVOFLOXACIN 750 MG/1
750 TABLET, FILM COATED ORAL
Status: DISCONTINUED | OUTPATIENT
Start: 2017-02-19 | End: 2017-02-20

## 2017-02-19 RX ADMIN — MICONAZOLE NITRATE: 20 CREAM TOPICAL at 03:21

## 2017-02-19 RX ADMIN — LEVOTHYROXINE SODIUM 112 MCG: 112 TABLET ORAL at 09:11

## 2017-02-19 RX ADMIN — MICONAZOLE NITRATE: 20 CREAM TOPICAL at 21:45

## 2017-02-19 RX ADMIN — LOPERAMIDE HYDROCHLORIDE 2 MG: 2 CAPSULE ORAL at 16:38

## 2017-02-19 RX ADMIN — LEVOFLOXACIN 750 MG: 750 TABLET, FILM COATED ORAL at 13:29

## 2017-02-19 RX ADMIN — MULTIPLE VITAMINS W/ MINERALS TAB 1 TABLET: TAB at 09:11

## 2017-02-19 RX ADMIN — ESCITALOPRAM OXALATE 20 MG: 20 TABLET ORAL at 09:11

## 2017-02-19 RX ADMIN — MICONAZOLE NITRATE: 20 CREAM TOPICAL at 09:11

## 2017-02-19 NOTE — PROGRESS NOTES
Medicine Daily Progress Note   02/19/2017    Hany Patel MRN# [id number]   Age: 22 year old YOB: 1995          Assessment and Plan:     Mr. Patel is a 22 year old M with PMH of Down Syndrome, hypothyroidism; who was admitted for acute hypoxic respiratory failure. Presumed to be secondary to community acquired pneumonia and left sided pleural effusion. Extubated on 2/15. Chest tube removed 2/15 as well. Stay complicated by ATN. Renal function improving. Off oxygen. However, now having fever and WBC increasing.     ## Community Acquired Pneumonia/necrotizing pna:  ## Left parapneumonic effusion:  Presented with fever, tachycardia, and leukocytosis. Found to have large left sided effusion. A pigtail catheter placed by IR on admission 2/7 and was draining 1-2L of fluid daily until it tapered off. Pleural effusion cx 2/7 growing Strep intermedius, pan sensitive. Bronch 2/10 -> no obstructions.  Repeat CT done 2/11 demonstrates decrease in pleural fluid but dense consolidation throughout bilateral lower lobes and some consolidation in upper lobes as well, c/f development of necrotizing pneumonia.  --Continue Zosyn (2/7 -2/17)  --chest tube removed 2/15  --Has been oxygenating well on room air.   --Febrile overnight. Repeat cxr is improved. WBC is slightly up  --Will place on oral levofloxacin given the above  --Monitor overnight    ## Agitation: has not been able to tolerate nasal cannula. Also agitated that he wants to go home. Improvement with low dose zyprexa  --hold on further meds for now.   --Consider zyprexa 2.5 mg if needed    ## Diarrhea: c diff checked and negative  --start loperamide    #GUILLERMINA: Slowly resolving  Cr peaked at 3.1.  Sustained ATN likely 2/2 intermittent hypotension on admission to ICU and vancomycin use. Renal US done 2/11 without evidence of hydronephrosis . Trough vanco level 2/10 wnl however jumped to supratherapeutic on 2/12 at 40. Trough on 2/12 was 25.1.   -- D/Chirag vanc on  2/11  -- Avoid nephrotoxins as possible  --Renal function continues to improve 1.65     # Hypothyroidism  - Continue PTA levothyroxine; TSH on 2/9 wnl.     Summary  FEN: regular diet  Thrombo-propholaxis:  heparin  Code Status: FULL    Dispo: pending clinical improvement    Patient seen and discussed with Dr. Saul Gonzalez  PGY-3 IM  [phone]     Subjective:     Febrile overnight. Labs hemolyzed this AM. Difficult stick. Repeat labs reassuring. Was up and walking in the halls today. Appetite is improved.     ROS: 4 system ROS was done. Pertinent positive and negatives noted above.         Physical Exam:   Vitals:  Temp:  [95.9  F (35.5  C)-100.4  F (38  C)] 97.3  F (36.3  C)  Pulse:  [86-97] 86  Heart Rate:  [93-94] 94  Resp:  [20-30] 30  BP: ()/(45-74) 99/55  SpO2:  [92 %-96 %] 95 %      Wt Readings from Last 4 Encounters:   02/18/17 111.2 kg (245 lb 3.2 oz)   09/19/16 110 kg (242 lb 8 oz)   09/12/16 112.6 kg (248 lb 4 oz)   10/14/15 112 kg (247 lb)       Gen: alert sitting up in chair, no distress  HEENT:no conjuncitval icterus or injection  Resp: left side decreased throughout to about 2/3 way up, clear on right - no change  CV: RRR, no m/r/g  Abdominal: Soft; NT, ND  Extremities: wwp, no edema in BLE  Neurological: alert, answers questions appropriately         Laboratory Data:   ROUTINE ICU LABS (Last four results)  CMP  Recent Labs  Lab 02/19/17  0951 02/18/17  0621 02/17/17  0421 02/16/17  0828 02/15/17  0432  02/14/17  0421 02/13/17  0351   * 143 144 141 145*  < > 144 142   POTASSIUM 6.0* 3.4 3.7 3.7 3.2*  < > 3.6 3.6   CHLORIDE 116* 109 110* 108 111*  < > 109 107   CO2 20 27 24 25 23  < > 26 24   ANIONGAP 12 7 10 8 12  < > 9 11   * 105* 98 103* 99  < > 100* 86   BUN 27 18 18 19 23  < > 23 25   CR 2.48* 1.95* 2.19* 2.57* 3.17*  < > 3.15* 3.41*   GFRESTIMATED 32* 42* 37* 31* 24*  < > 24* 22*   GFRESTBLACK 39* 51* 45* 37* 29*  < > 30* 27*   JUAN 9.1 8.2* 8.5 8.0* 8.0*  < > 8.1*  8.0*   MAG  --   --  2.3 2.2 2.4*  --  2.4* 2.4*   PHOS  --   --  3.8  --  2.2*  --  4.5 3.7   < > = values in this interval not displayed.  CBC    Recent Labs  Lab 02/19/17  0951 02/18/17  0621 02/17/17  0421 02/16/17  0828   WBC 16.3* 14.0* 13.9* 14.4*   RBC 3.99* 3.53* 3.39* 3.33*   HGB 11.8* 10.3* 9.8* 9.7*   HCT 35.6* 31.3* 30.1* 29.5*   MCV 89 89 89 89   MCH 29.6 29.2 28.9 29.1   MCHC 33.1 32.9 32.6 32.9   RDW 16.3* 16.0* 15.7* 15.7*   * 608* 555* 487*              Imaging:     CXR 2/19    IMPRESSION:   1. Reduced left pleural effusion.     2. Continued pulmonary edema and basilar opacities.     3. Lucency projecting anterior to the trachea on the lateral view,  without definite signs of pneumomediastinum on the frontal view.  Likely represents superimposed air-filled esophagus.

## 2017-02-19 NOTE — PLAN OF CARE
Problem: Goal Outcome Summary  Goal: Goal Outcome Summary  T max 100.4 MD notified. BC ordered for AM draw. Pt refuses tylenol. Pt sleeping well between cares. Lung sounds still course with labored breathing at times. O2 is low 90s on room air. Pt stable throughout the night. Bed alarm on will continue to monitor.

## 2017-02-19 NOTE — PLAN OF CARE
Very good arlette. Incontinent of urine and stool x1. Cooperative. Fell asleep at 1000. RR 34, sating 90+% on room air. Continue to monitor.

## 2017-02-19 NOTE — PLAN OF CARE
Problem: Goal Outcome Summary  Goal: Goal Outcome Summary  PT/7D: Pt w/ other provider when attempted to see for therapy, cancel this day

## 2017-02-19 NOTE — DISCHARGE SUMMARY
[facility] Discharge Summary    Hany Patel MRN# [id number]   Age: 22 year old YOB: 1995     Date of Admission:  2/7/2017  Date of Discharge::  2/20/2017  Admitting Physician:  Chandan Bolden MD  Discharge Physician:  Yeimy Hughes MD     Home clinic: PCP Liu Song          Admission Diagnoses:     Parapneumonic effusion [J18.9, J91.8]  Pneumonia [J18.9]  Down's syndrome [Q90.9]  Hypothyroidism          Discharge Diagnosis:     Parapneumonic effusion [J18.9, J91.8]  Pneumonia [J18.9]  Down's syndrome [Q90.9]  Hypothyroidism  ATN, resolving  Diarrhea          Procedures:     Chest tube placement 2/7  Intubated 2/8  Bronchoscopy 2/10          Labs:          Lab Results   Component Value Date     02/20/2017    Lab Results   Component Value Date    CHLORIDE 107 02/20/2017    Lab Results   Component Value Date    BUN 21 02/20/2017      Lab Results   Component Value Date    POTASSIUM 3.9 02/20/2017    Lab Results   Component Value Date    CO2 26 02/20/2017    Lab Results   Component Value Date    CR 1.56 02/20/2017        Lab Results   Component Value Date    WBC 11.3 (H) 02/20/2017    HGB 10.8 (L) 02/20/2017    HCT 33.1 (L) 02/20/2017    MCV 90 02/20/2017     (H) 02/20/2017             Discharge Medications:     Current Discharge Medication List      START taking these medications    Details   levofloxacin (LEVAQUIN) 750 MG tablet Take 1 tablet (750 mg) by mouth every 48 hours  Qty: 3 tablet, Refills: 0    Associated Diagnoses: Parapneumonic effusion; Community acquired pneumonia         CONTINUE these medications which have NOT CHANGED    Details   cyanocobalamin 1000 MCG SUBL Place 1,000 mcg under the tongue daily      omega-3 acid ethyl esters (LOVAZA) 1 G capsule TAKE 2 CAPSULES BY MOUTH TWICE A DAY  Refills: 6      vitamin  B complex with vitamin C (VITAMIN  B COMPLEX) TABS Take 1 tablet by mouth daily      escitalopram (LEXAPRO) 20 MG tablet Take 20 mg by mouth daily     "  loperamide (IMODIUM) 2 MG capsule Take 2 mg by mouth daily (with breakfast) Take two tablets with breakfast      Ascorbic Acid (VITAMIN C) 500 MG CHEW Take by mouth daily      Calcium Carbonate-Vitamin D (CALCIUM + D PO) Daily chewable      metFORMIN (GLUCOPHAGE) 1000 MG tablet Take 1,000 mg by mouth 2 times daily (with meals).      levothyroxine (SYNTHROID, LEVOTHROID) 112 MCG tablet Take 112 mcg by mouth daily.      Multiple Vitamin (MULTI-VITAMIN) per tablet Take 1 tablet by mouth daily. With D3                   Consultations:     Interventional Radiology          Brief History of Illness:     Mr. Patel is a 22 year old M with PMH of Down Syndrome, hypothyroidism; who was admitted for acute hypoxic respiratory failure. Presumed to be secondary to community acquired pneumonia and left sided pleural effusion.     \"In the ED, he was febrile to 101.7. Found to have a leukocytosis to 26. Chest XR showed near complete opacification of L hemithorax. He was started on Zosyn empirically. Underwent IR placement of chest tube. On admission to the floor, he was agitated and refusing to wear oxygen. He was sating high 70- low 80s on room air. Additionally with increasing tachycardia and tachypnea. Received haloperidol and ativan for agitation without any improvement. He was transferred to the ICU for further management and intubated by anesthesia on arrival.\"    Please see H&P from 2/8/2017 for further details.           Hospital Course:     ## Community Acquired Pneumonia/necrotizing pna:  ## Left parapneumonic effusion:  Presented with fever, tachycardia, and leukocytosis. Found to have large left sided effusion. A pigtail catheter placed by IR on admission 2/7. Required transfer to MICU due to low oxygenation and agitation with supplemental oxygenation and was intubated. Chest tube was draining 1-2L of fluid daily until it tapered off. Pleural effusion cx 2/7 growing Strep intermedius, pan sensitive. Bronch 2/10 -> no " obstructions. Repeat CT done 2/11 demonstrated decrease in pleural fluid but dense consolidation throughout bilateral lower lobes and some consolidation in upper lobes as well, c/f development of necrotizing pneumonia. He was extubated and chest tube removed 2/15. Initially requiring supplemental O2 via NC but has been doing well on room air for past several days.  IV antibiotics stopped 2/17. Monitored for a two days and had slight fever and increasing WBC. Follow up CXR 2/19 is improved. But given fever and WBC, placed on oral antibiotics to complete 5 additional days. On day of discharge, afebrile and WBC trending down.   --Continue Zosyn (2/7 -2/17)  --Oral levofloxacin (2/19 -2/24)  --Follow up with PCP this week with repeat CBC      #GUILLERMINA: Resolving. Cr peaked at 3.1. Sustained ATN likely 2/2 intermittent hypotension on admission to ICU and vancomycin use. Renal US done 2/11 without evidence of hydronephrosis . Trough vanco level 2/10 wnl however jumped to supratherapeutic on 2/12 at 40. Trough on 2/12 was 25.1. Vanco discontinued on 2/11. Renal function has been continuing to improve. Cr 1.56 on day of discharge.   --Repeat BMP this week.     ## Diarrhea: c diff checked and negative  --continue home anti-diarrheals      # Hypothyroidism  - Continue PTA levothyroxine; TSH on 2/9 wnl.          Condition at Discharge:     Temp: 98.1  F (36.7  C) Temp src: Oral BP: 123/66 Pulse: 86 Heart Rate: 80 Resp: 30 SpO2: 95 % O2 Device: None (Room air)        General: alert, sitting up in bed, interactive, no distress  HEENT: dry facial skin, mmm, no conjunctival icterus or injection  Resp: Improved lung sounds on the left, but still somewhat decreased at the right. No crackles or wheezes  CV: RRR  Abd: soft, non-tender  Extrem: warm, no LE edema  Neuro: alert, answers questions appropriately          Discharge Instructions and Follow-Up:   Discharge diet: Regular   Discharge activity: Activity as tolerated   Discharge  follow-up: Follow up with primary care provider this week          Discharge Procedure Orders  Home care nursing referral   Referral Type: Home Health Therapies & Aides     Reason for your hospital stay   Order Comments: You were hospitalized for community acquired pneumonia and parapneumonic effusion. You required chest tube placement and intubation in the ICU. Were on several days of IV antibiotics. Have been stable with chest tube out for several days. No need for supplemental oxygen. Follow up xray was improved. Will continue a few more days of oral antibiotics as discharge. Will need follow up with primary care provider this week.     Activity   Order Comments: Your activity upon discharge: activity as tolerated. Would not return to day program for at least one week.   Order Specific Question Answer Comments   Is discharge order? Yes      Monitor and record   Order Comments: Fevers, chills, labored breathing, cough, change in alertness     When to contact your care team   Order Comments: If fevers or chills. Change in activity levels. Labored breathing or cough.     Discharge Instructions   Order Comments: Will need to continue antibiotics this week to complete course. Follow up with primary care provider this week for post hospital follow up with labs.     Adult [facility] Follow-up and recommended labs and tests   Order Comments: Follow up with primary care provider, YULISA SALGADO, this week for hospital follow- up.  The following labs/tests are recommended: BMP and CBC.      Appointments on [place] and/or [facility] (with [facility] or [facility] provider or service). Call [phone] if you haven't heard regarding these appointments within 7 days of discharge.     Follow Up and recommended labs and tests   Order Comments: Follow up CBC and BMP this week with PCP     Full Code     Diet   Order Comments: Follow this diet upon discharge: Orders Placed This Encounter     Snacks/Supplements Adult: Ensure Plus  (Adult); Between Meals     Calorie Counts     Room Service     Regular Diet Adult   Order Specific Question Answer Comments   Is discharge order? Yes               Discharge Disposition:   Discharged to home with family. Plan for home therapy.       Patient was seen and discussed with Dr. Yeimy Hughes    Please feel free to contact me with any questions    Reyna Gonzalez MD  PGY-3 Internal Medicine  [phone]

## 2017-02-19 NOTE — PLAN OF CARE
Problem: Goal Outcome Summary  Goal: Goal Outcome Summary  OT 7D: pt needed extensive encouragement for participation in therapy session, family present and very helpful with motivation and encouragement. Pt ambulated 300 feet total this session with SBA to CGA, broke up into 4 bouts of 75 feet approximately. Pt performed tub transfer with CGA x2, Th educated family to have Ax2 for safe tub transfers in home envrionment until pt regains full strength. Th provided caregiver education on how to properly don/doff gait belt for safety.     REC home with 24 hour assist for ADLs and functional mobility and strongly recommend HH PT/OT. Pt improving significantly with IP therapy, but will definitely need further therapy to ensure safety and independence with completion of daily tasks. Per PT note, pts mother states that home would be better transition for pt vs rehab stay.

## 2017-02-19 NOTE — PROGRESS NOTES
Calorie Counts  Intake recorded for: 2/18  Kcals: 0  Protein: 0  # Meals Recorded: 2 meals ordered from kitchen, no intake recorded.   # Supplements Recorded: no intake recorded.

## 2017-02-19 NOTE — PLAN OF CARE
Problem: Discharge Planning  Goal: Discharge Planning (Adult, OB, Behavioral, Peds)  AVSS. Lowrer grade temp in the 99. Range reported to MD Orellana who gave no new orders.  Patient up in chair. Appetite fair. Walked well with therapy today. Home tomorrow after am therapy if tonight is uneventful.  Mom and dad left around 6:00.  Dad wanted staff to be aware that the patient needs to have a light on at night. No diarrhea on this shift. Will continue POC.

## 2017-02-20 ENCOUNTER — APPOINTMENT (OUTPATIENT)
Dept: PHYSICAL THERAPY | Facility: CLINIC | Age: 22
DRG: 870 | End: 2017-02-20
Payer: MEDICARE

## 2017-02-20 ENCOUNTER — CARE COORDINATION (OUTPATIENT)
Dept: CARDIOLOGY | Facility: CLINIC | Age: 22
End: 2017-02-20

## 2017-02-20 ENCOUNTER — APPOINTMENT (OUTPATIENT)
Dept: OCCUPATIONAL THERAPY | Facility: CLINIC | Age: 22
DRG: 870 | End: 2017-02-20
Payer: MEDICARE

## 2017-02-20 VITALS
DIASTOLIC BLOOD PRESSURE: 64 MMHG | TEMPERATURE: 99 F | BODY MASS INDEX: 43.45 KG/M2 | HEIGHT: 63 IN | HEART RATE: 87 BPM | OXYGEN SATURATION: 94 % | WEIGHT: 245.2 LBS | RESPIRATION RATE: 24 BRPM | SYSTOLIC BLOOD PRESSURE: 124 MMHG

## 2017-02-20 LAB
ANION GAP SERPL CALCULATED.3IONS-SCNC: 10 MMOL/L (ref 3–14)
BASOPHILS # BLD AUTO: 0.1 10E9/L (ref 0–0.2)
BASOPHILS NFR BLD AUTO: 1 %
BUN SERPL-MCNC: 21 MG/DL (ref 7–30)
CALCIUM SERPL-MCNC: 8.6 MG/DL (ref 8.5–10.1)
CHLORIDE SERPL-SCNC: 107 MMOL/L (ref 94–109)
CO2 SERPL-SCNC: 26 MMOL/L (ref 20–32)
CREAT SERPL-MCNC: 1.56 MG/DL (ref 0.66–1.25)
DIFFERENTIAL METHOD BLD: ABNORMAL
EOSINOPHIL # BLD AUTO: 0.4 10E9/L (ref 0–0.7)
EOSINOPHIL NFR BLD AUTO: 3.8 %
ERYTHROCYTE [DISTWIDTH] IN BLOOD BY AUTOMATED COUNT: 16.3 % (ref 10–15)
GFR SERPL CREATININE-BSD FRML MDRD: 55 ML/MIN/1.7M2
GLUCOSE SERPL-MCNC: 98 MG/DL (ref 70–99)
HCT VFR BLD AUTO: 33.1 % (ref 40–53)
HGB BLD-MCNC: 10.8 G/DL (ref 13.3–17.7)
IMM GRANULOCYTES # BLD: 0.2 10E9/L (ref 0–0.4)
IMM GRANULOCYTES NFR BLD: 1.6 %
INTERPRETATION ECG - MUSE: NORMAL
LYMPHOCYTES # BLD AUTO: 1.9 10E9/L (ref 0.8–5.3)
LYMPHOCYTES NFR BLD AUTO: 16.7 %
MAGNESIUM SERPL-MCNC: 2.1 MG/DL (ref 1.6–2.3)
MCH RBC QN AUTO: 29.3 PG (ref 26.5–33)
MCHC RBC AUTO-ENTMCNC: 32.6 G/DL (ref 31.5–36.5)
MCV RBC AUTO: 90 FL (ref 78–100)
MONOCYTES # BLD AUTO: 0.8 10E9/L (ref 0–1.3)
MONOCYTES NFR BLD AUTO: 7.4 %
NEUTROPHILS # BLD AUTO: 7.8 10E9/L (ref 1.6–8.3)
NEUTROPHILS NFR BLD AUTO: 69.5 %
NRBC # BLD AUTO: 0 10*3/UL
NRBC BLD AUTO-RTO: 0 /100
PLATELET # BLD AUTO: 591 10E9/L (ref 150–450)
PLATELET # BLD EST: ABNORMAL 10*3/UL
POTASSIUM SERPL-SCNC: 3.9 MMOL/L (ref 3.4–5.3)
RBC # BLD AUTO: 3.68 10E12/L (ref 4.4–5.9)
SODIUM SERPL-SCNC: 142 MMOL/L (ref 133–144)
WBC # BLD AUTO: 11.3 10E9/L (ref 4–11)

## 2017-02-20 PROCEDURE — 40000133 ZZH STATISTIC OT WARD VISIT

## 2017-02-20 PROCEDURE — 97116 GAIT TRAINING THERAPY: CPT | Mod: GP | Performed by: PHYSICAL THERAPIST

## 2017-02-20 PROCEDURE — 36415 COLL VENOUS BLD VENIPUNCTURE: CPT

## 2017-02-20 PROCEDURE — 25000132 ZZH RX MED GY IP 250 OP 250 PS 637: Mod: GY | Performed by: INTERNAL MEDICINE

## 2017-02-20 PROCEDURE — 80048 BASIC METABOLIC PNL TOTAL CA: CPT | Performed by: INTERNAL MEDICINE

## 2017-02-20 PROCEDURE — 25000132 ZZH RX MED GY IP 250 OP 250 PS 637: Mod: GY

## 2017-02-20 PROCEDURE — 83735 ASSAY OF MAGNESIUM: CPT | Performed by: INTERNAL MEDICINE

## 2017-02-20 PROCEDURE — 97535 SELF CARE MNGMENT TRAINING: CPT | Mod: GO

## 2017-02-20 PROCEDURE — A9270 NON-COVERED ITEM OR SERVICE: HCPCS | Mod: GY | Performed by: INTERNAL MEDICINE

## 2017-02-20 PROCEDURE — 85025 COMPLETE CBC W/AUTO DIFF WBC: CPT | Performed by: INTERNAL MEDICINE

## 2017-02-20 PROCEDURE — 97530 THERAPEUTIC ACTIVITIES: CPT | Mod: GP | Performed by: PHYSICAL THERAPIST

## 2017-02-20 PROCEDURE — 40000193 ZZH STATISTIC PT WARD VISIT: Performed by: PHYSICAL THERAPIST

## 2017-02-20 PROCEDURE — 99239 HOSP IP/OBS DSCHRG MGMT >30: CPT | Mod: GC | Performed by: INTERNAL MEDICINE

## 2017-02-20 RX ORDER — LEVOFLOXACIN 750 MG/1
750 TABLET, FILM COATED ORAL DAILY
Qty: 4 TABLET | Refills: 0
Start: 2017-02-20 | End: 2017-02-23

## 2017-02-20 RX ORDER — LEVOFLOXACIN 750 MG/1
750 TABLET, FILM COATED ORAL EVERY 24 HOURS
Status: DISCONTINUED | OUTPATIENT
Start: 2017-02-20 | End: 2017-02-20 | Stop reason: HOSPADM

## 2017-02-20 RX ORDER — LEVOFLOXACIN 750 MG/1
750 TABLET, FILM COATED ORAL
Qty: 3 TABLET | Refills: 0 | Status: SHIPPED | OUTPATIENT
Start: 2017-02-20 | End: 2017-02-20

## 2017-02-20 RX ADMIN — MICONAZOLE NITRATE: 20 CREAM TOPICAL at 07:55

## 2017-02-20 RX ADMIN — LEVOFLOXACIN 750 MG: 750 TABLET, FILM COATED ORAL at 10:32

## 2017-02-20 RX ADMIN — LEVOTHYROXINE SODIUM 112 MCG: 112 TABLET ORAL at 07:51

## 2017-02-20 RX ADMIN — ESCITALOPRAM OXALATE 20 MG: 20 TABLET ORAL at 07:51

## 2017-02-20 RX ADMIN — MULTIPLE VITAMINS W/ MINERALS TAB 1 TABLET: TAB at 07:51

## 2017-02-20 NOTE — PLAN OF CARE
Problem: Goal Outcome Summary  Goal: Goal Outcome Summary  PT/7D: Patient continues to demonstrate improvements with his mobility. Needed CGA only with all mobility for safety. Ambulated 50' x 3 reps with CGA and steady balance. Performed 4 steps with B hand rails, CGA, steady but does become SOB with this activity. 02 sats 93% upon completion and needed short seated rest break. Family planning on having patient stay on main level until he has better tolerance to performing full flight of stairs. Discussed with family providing SBA-CGA with all mobility upon return to home for safety until patient is back to baseline level of mobility. Recommend d/c to home with 24/7 assist and home health PT in order to progress strength, gait and stair negotiation.     Physical Therapy Discharge Summary    Reason for therapy discharge:    Discharged to home with home therapy.    Progress towards therapy goal(s). See goals on Care Plan in [facility] electronic health record for goal details.  Goals partially met.  Barriers to achieving goals:   discharge from facility. Will have 24/7 assistance.     Therapy recommendation(s):    Continued therapy is recommended.  Rationale/Recommendations:  24/7 assistance and home health PT to progress strength, gait and stair negotiation. .

## 2017-02-20 NOTE — PLAN OF CARE
Problem: Goal Outcome Summary  Goal: Goal Outcome Summary  SLP session cancelled: pt discharged to home. Pt is on a regular texture diet and thin liquids per pt/family preference d/t poor cooperation with SLP.         Speech Language Therapy Discharge Summary     Reason for therapy discharge:    Discharged to home with home therapy.     Progress towards therapy goal(s). See goals on Care Plan in [facility] electronic health record for goal details.  Goals not met.  Barriers to achieving goals:   refusal to participate with SLP.     Therapy recommendation(s):    No further therapy is recommended.

## 2017-02-20 NOTE — PROGRESS NOTES
"[facility]  \"Hello, my name is Mirela Salinas , and I am calling from the [facility].  I want to check in and see how you are doing, after leaving the hospital.  You may also receive a call from your Care Coordinator (care team), but I want to make sure you don t have any urgent needs.  I have a couple questions to review with you:     Post-Discharge Outreach                                                    Hany Patel is a 22 year old male     Follow-up Appointments           Adult [facility] Follow-up and recommended labs and tests       Follow up with primary care provider, LIU SONG, this week for hospital follow- up. The following labs/tests are recommended: BMP and CBC.      Appointments on [place] and/or [facility] (with [facility] or [facility] provider or service). Call [phone] if you haven't heard regarding these appointments within 7 days of discharge.              Follow Up and recommended labs and tests       Follow up CBC and BMP this week with PCP                       Your next 10 appointments already scheduled            Feb 24, 2017 1:00 PM CST   Office Visit with Liu Song PA-C   [facility]               Care Team:    Patient Care Team       Relationship Specialty Notifications Start End    Liu Song PA-C PCP - General Family Practice  [birth year]     Phone: [phone] Fax: [phone]         [address] [address]    Oziel Perry MD MD Pediatrics  9/18/15     Phone: [phone] Fax: [phone]         [place] [address] [address]            Transition of Care Review                                                      Did you have a surgery or procedure during your hospital visit? No   If yes, do you have any of the following:     Signs of infection:  no    Pain:  No     Pain Scale " -NO     Location:     Wound/incision concerns? N/A    Do you have all of your medications/refills?  Yes    Are you having any side effects or questions about your medication(s)? Yes-     Do you have any new or worsening symptoms?  No    Do you have any future appointments scheduled?   yes       Next 5 appointments (look out 90 days)     Feb 23, 2017 10:40 AM CST   Office Visit with Liu Song PA-C   [facility]    [address] [place] [id number] [phone]                    Current Discharge Medication List            START taking these medications     Details   levofloxacin (LEVAQUIN) 750 MG tablet Take 1 tablet (750 mg) by mouth every 48 hours  Qty: 3 tablet, Refills: 0     Associated Diagnoses: Parapneumonic effusion; Community acquired pneumonia             -------->Patient has a question in reference to her son's antibiotics. She was told when discharged to take Levaquin every 48 hrs. But when she  went to the pharmacy they told her due to his labs , he will be taking it everyday until its done. His mother is wondering how should she be giving it to him? Should she listen to the DC instruction or the pharmacist? <---------    Plan                                                      Thanks for your time.  Your Care Coordinator may follow-up within the next couple days.  In the meantime if you have questions, concerns or problems call your care team.        Mirela Salinas

## 2017-02-20 NOTE — PLAN OF CARE
Problem: Goal Outcome Summary  Goal: Goal Outcome Summary     Tmax 99.0 oral, OVSS. Pt denied pain and n/v.  Up with assist of one.  Showered and jorge cares done.  Good PO intake.  Incontinent of urine and stool.  Pt's mother and father at bedside.       Discharge  D: Orders for discharge and outpatient medications written.  I: Home medications and return to clinic schedule reviewed with patient. Discharge instructions and parameters for calling Health Care Provider reviewed. Patient left at 1044 accompanied by his mother and father.   A: Patient/family verbalized understanding and was ready for discharge.   P: Patient instructed to  medications in Pharmacy. Follow up as scheduled TBD, mother plans to reschedule appointment with primary care provider.

## 2017-02-20 NOTE — PLAN OF CARE
Problem: Goal Outcome Summary  Goal: Goal Outcome Summary  Outcome: No Change     [id number]  Triggered SIRS, lactic acid 1.6.  AM labs drawn via finger stick, specimen hemolyzed, false reading for potassium =6.0.  Pt labs redrawn by Vas Acc RN with US, potassium =3.9.  Incontinent of urine and stool x 4.  Good PO intake.  Denied pain and nausea.  Pt is eager to go home and has been cooperative with cares.  Ambulated in hallways x 2 with assist of 2.  Family visited during the day, father is at bedside.  Bed alarm onwhen alone.  Continue POC.

## 2017-02-20 NOTE — PLAN OF CARE
Problem: Goal Outcome Summary  Goal: Goal Outcome Summary  T max temp 99.0 Pt lungs sound clear. Father Nicholas stays at  Bedside. Pt is ready to go home. Possible DC in AM.

## 2017-02-20 NOTE — PROGRESS NOTES
Calorie Counts    Intake Recorded For: 2/19 Kcals: 1104 Protein: 52g    # Meals Recorded: 3 meals    # Supplements Recorded: 1 Ensure Plus

## 2017-02-20 NOTE — PLAN OF CARE
Problem: Goal Outcome Summary  Goal: Goal Outcome Summary  Outcome: Therapy, progress toward functional goals as expected  OT/7D- Pt seen this morning for shower prior to discharge.  Implemented shower chair for increased safety and energy conservation - pt reports liking shower chair.  Educated pt and family on how to obtain for personal use at home.  Pt requires mod A to complete shower for thoroughness.  Min A for whole body dressing.       REC home with 24 hour assist for ADLs and functional mobility and strongly recommend HH PT/OT. Pt improving significantly with IP therapy, but will definitely need further therapy to ensure safety and independence with completion of daily tasks.        Occupational Therapy Discharge Summary     Reason for therapy discharge:    Discharged to home with home therapy. Pt will have 24/7 assist.     Progress towards therapy goal(s). See goals on Care Plan in [facility] electronic health record for goal details.  Goals partially met.  Barriers to achieving goals:   discharge from facility.     Therapy recommendation(s):    Continued therapy is recommended.  Rationale/Recommendations:  increase IND and safety with ADL/IADL.

## 2017-02-21 ENCOUNTER — MYC MEDICAL ADVICE (OUTPATIENT)
Dept: FAMILY MEDICINE | Facility: CLINIC | Age: 22
End: 2017-02-21

## 2017-02-21 LAB
BACTERIA SPEC CULT: NO GROWTH
BACTERIA SPEC CULT: NO GROWTH
MICRO REPORT STATUS: NORMAL
MICRO REPORT STATUS: NORMAL
SPECIMEN SOURCE: NORMAL
SPECIMEN SOURCE: NORMAL

## 2017-02-21 PROCEDURE — 99207 C MD CERTIFICATION HHA PATIENT: CPT | Performed by: FAMILY MEDICINE

## 2017-02-22 ENCOUNTER — TELEPHONE (OUTPATIENT)
Dept: FAMILY MEDICINE | Facility: CLINIC | Age: 22
End: 2017-02-22

## 2017-02-22 NOTE — TELEPHONE ENCOUNTER
I'm fine ordering all of these at the time of the visit. No need to come early - just bring us a list of the labs requested.  Thank you.  Liu Song

## 2017-02-22 NOTE — TELEPHONE ENCOUNTER
Reason for Call: Request for an order or referral:    Order or referral being requested: orders  -PT and OT evaluation and treat  -skilled nursing   -2 times a wk for 3wks  2 as needed     Date needed: as soon as possible    Has the patient been seen by the PCP for this problem? YES    Additional comments: please call [phone]    Phone number Patient can be reached at:      Best Time:  any    Can we leave a detailed message on this number?  YES    Call taken on 2/22/2017 at 1:24 PM by Reyna Rojas

## 2017-02-23 ENCOUNTER — DOCUMENTATION ONLY (OUTPATIENT)
Dept: CARE COORDINATION | Facility: CLINIC | Age: 22
End: 2017-02-23

## 2017-02-23 ENCOUNTER — OFFICE VISIT (OUTPATIENT)
Dept: FAMILY MEDICINE | Facility: CLINIC | Age: 22
End: 2017-02-23
Payer: MEDICARE

## 2017-02-23 VITALS
WEIGHT: 229.25 LBS | HEIGHT: 63 IN | SYSTOLIC BLOOD PRESSURE: 120 MMHG | BODY MASS INDEX: 40.62 KG/M2 | HEART RATE: 89 BPM | TEMPERATURE: 98 F | DIASTOLIC BLOOD PRESSURE: 68 MMHG

## 2017-02-23 DIAGNOSIS — Q90.9 DOWN'S SYNDROME: ICD-10-CM

## 2017-02-23 DIAGNOSIS — J18.9 PARAPNEUMONIC EFFUSION: ICD-10-CM

## 2017-02-23 DIAGNOSIS — J91.8 PARAPNEUMONIC EFFUSION: ICD-10-CM

## 2017-02-23 DIAGNOSIS — J18.9 COMMUNITY ACQUIRED PNEUMONIA: ICD-10-CM

## 2017-02-23 DIAGNOSIS — N17.9 ACUTE RENAL FAILURE, UNSPECIFIED ACUTE RENAL FAILURE TYPE (H): ICD-10-CM

## 2017-02-23 DIAGNOSIS — J18.9 PNEUMONIA OF LEFT LUNG DUE TO INFECTIOUS ORGANISM, UNSPECIFIED PART OF LUNG: Primary | ICD-10-CM

## 2017-02-23 DIAGNOSIS — R09.02 HYPOXIA: ICD-10-CM

## 2017-02-23 DIAGNOSIS — Z13.220 SCREENING CHOLESTEROL LEVEL: ICD-10-CM

## 2017-02-23 DIAGNOSIS — E03.9 HYPOTHYROIDISM, UNSPECIFIED TYPE: ICD-10-CM

## 2017-02-23 LAB
ERYTHROCYTE [DISTWIDTH] IN BLOOD BY AUTOMATED COUNT: 16.7 % (ref 10–15)
HCT VFR BLD AUTO: 36.7 % (ref 40–53)
HGB BLD-MCNC: 11.9 G/DL (ref 13.3–17.7)
MCH RBC QN AUTO: 29 PG (ref 26.5–33)
MCHC RBC AUTO-ENTMCNC: 32.4 G/DL (ref 31.5–36.5)
MCV RBC AUTO: 90 FL (ref 78–100)
PLATELET # BLD AUTO: 589 10E9/L (ref 150–450)
RBC # BLD AUTO: 4.1 10E12/L (ref 4.4–5.9)
WBC # BLD AUTO: 9.2 10E9/L (ref 4–11)

## 2017-02-23 PROCEDURE — 99214 OFFICE O/P EST MOD 30 MIN: CPT | Performed by: PHYSICIAN ASSISTANT

## 2017-02-23 PROCEDURE — 85027 COMPLETE CBC AUTOMATED: CPT | Performed by: PHYSICIAN ASSISTANT

## 2017-02-23 PROCEDURE — 36415 COLL VENOUS BLD VENIPUNCTURE: CPT | Performed by: PHYSICIAN ASSISTANT

## 2017-02-23 PROCEDURE — 84443 ASSAY THYROID STIM HORMONE: CPT | Performed by: PHYSICIAN ASSISTANT

## 2017-02-23 PROCEDURE — 80061 LIPID PANEL: CPT | Performed by: PHYSICIAN ASSISTANT

## 2017-02-23 PROCEDURE — 80053 COMPREHEN METABOLIC PANEL: CPT | Performed by: PHYSICIAN ASSISTANT

## 2017-02-23 RX ORDER — LEVOFLOXACIN 750 MG/1
750 TABLET, FILM COATED ORAL DAILY
Qty: 2 TABLET | Refills: 0 | Status: SHIPPED | OUTPATIENT
Start: 2017-02-23 | End: 2017-03-02

## 2017-02-23 NOTE — PROGRESS NOTES
SUBJECTIVE:                                                    Hany Patel is a 22 year old male who presents to clinic today for the following health issues:    Hospital Follow-up Visit:    ## Left parapneumonic effusion:  Presented with fever, tachycardia, and leukocytosis. Found to have large left sided effusion. A pigtail catheter placed by IR on admission 2/7. Required transfer to MICU due to low oxygenation and agitation with supplemental oxygenation and was intubated. Chest tube was draining 1-2L of fluid daily until it tapered off. Pleural effusion cx 2/7 growing Strep intermedius, pan sensitive. Bronch 2/10 -> no obstructions. Repeat CT done 2/11 demonstrated decrease in pleural fluid but dense consolidation throughout bilateral lower lobes and some consolidation in upper lobes as well, c/f development of necrotizing pneumonia. He was extubated and chest tube removed 2/15. Initially requiring supplemental O2 via NC but has been doing well on room air for past several days. IV antibiotics stopped 2/17. Monitored for a two days and had slight fever and increasing WBC. Follow up CXR 2/19 is improved. But given fever and WBC, placed on oral antibiotics to complete 5 additional days. On day of discharge, afebrile and WBC trending down.   --Continue Zosyn (2/7 -2/17)  --Oral levofloxacin (2/19 -2/24)  --Follow up with PCP this week with repeat CBC        #GUILLERMINA: Resolving. Cr peaked at 3.1. Sustained ATN likely 2/2 intermittent hypotension on admission to ICU and vancomycin use. Renal US done 2/11 without evidence of hydronephrosis . Trough vanco level 2/10 wnl however jumped to supratherapeutic on 2/12 at 40. Trough on 2/12 was 25.1. Vanco discontinued on 2/11. Renal function has been continuing to improve. Cr 1.56 on day of discharge.   --Repeat BMP this week.      ## Diarrhea: c diff checked and negative  --continue home anti-diarrheals      # Hypothyroidism  - Continue PTA levothyroxine; TSH on 2/9 wnl.  "    Lab Results   Component Value Date    BUN 21 02/20/2017        Lab Results   Component Value Date    CR 1.56 02/20/2017        Lab Results   Component Value Date    HGB 11.9 02/23/2017        Lab Results   Component Value Date    WBC 9.2 02/23/2017        Hospital/Nursing Home/IP Rehab Facility: [facility]  Date of Admission: 2/7/17  Date of Discharge: 2/20/17  Reason(s) for Admission: pneumonia            Problems taking medications regularly:  None       Medication changes since discharge: levofloxacin (LEVAQUIN) 750 MG tablet (DONE with this)       Problems adhering to non-medication therapy:  None    Summary of hospitalization:  [facility] discharge summary reviewed  Diagnostic Tests/Treatments reviewed.  Follow up needed: none  Other Healthcare Providers Involved in Patient s Care:         None  Update since discharge: improved.     Post Discharge Medication Reconciliation: discharge medications reconciled, continue medications without change.  Plan of care communicated with patient     Coding guidelines for this visit:  Type of Medical   Decision Making Face-to-Face Visit       within 7 Days of discharge Face-to-Face Visit        within 14 days of discharge   Moderate Complexity 38482 [id number]   High Complexity 10273 49417            Problem list and histories reviewed & adjusted, as indicated.  Additional history: as documented    Problem list, Medication list, Allergies, and Medical/Social/Surgical histories reviewed in EPIC and updated as appropriate.    ROS:  Constitutional, HEENT, cardiovascular, pulmonary, gi and gu systems are negative, except as otherwise noted.    OBJECTIVE:                                                    /68 (BP Location: Right arm, Cuff Size: Adult Large)  Pulse 89  Temp 98  F (36.7  C) (Oral)  Ht 5' 2.99\" (1.6 m)  Wt 229 lb 4 oz (104 kg)  BMI 40.62 kg/m2  Body mass index is 40.62 kg/(m^2).  GENERAL: healthy, alert and no distress  NECK: no " adenopathy, no asymmetry, masses, or scars and thyroid normal to palpation  RESP: lungs clear to auscultation - no rales, rhonchi or wheezes  CV: regular rate and rhythm, normal S1 S2, no S3 or S4, no murmur, click or rub, no peripheral edema and peripheral pulses strong  ABDOMEN: soft, nontender, no hepatosplenomegaly, no masses and bowel sounds normal  MS: no gross musculoskeletal defects noted, no edema  SKIN: no suspicious lesions or rashes    Diagnostic Test Results:  none      ASSESSMENT/PLAN:                                                        ICD-10-CM    1. Pneumonia of left lung due to infectious organism, unspecified part of lung J18.9 CBC with platelets     levofloxacin (LEVAQUIN) 750 MG tablet   2. Down's syndrome Q90.9    3. Acute renal failure, unspecified acute renal failure type (H) N17.9 Comprehensive metabolic panel (BMP + Alb, Alk Phos, ALT, AST, Total. Bili, TP)   4. Hypothyroidism, unspecified type E03.9 CBC with platelets     TSH   5. Hypoxia R09.02    6. Screening cholesterol level Z13.220 Lipid Profile with reflex to direct LDL   7. Parapneumonic effusion J18.9 levofloxacin (LEVAQUIN) 750 MG tablet    J91.8    8. Community acquired pneumonia J18.9 levofloxacin (LEVAQUIN) 750 MG tablet      Status post discharge - improving symptoms, afebrile, last dose of Levaquin was today - the instructions not clear, the discharge suggested take every other day but they took daily and are now out - was supposed to last until this weekend. I will extend 2 more days. Reviewed recheck renal and wbc.    If he gets fevers, I want them to contact me. I will see him in 1 week to recheck lungs and consider repeat x ray if indicated.     Otherwise - he's improving.    Liu Song, MPAS, PA-C

## 2017-02-23 NOTE — MR AVS SNAPSHOT
After Visit Summary   2/23/2017    Hany Patel    MRN: [id number]           Patient Information     Date Of Birth          1995        Visit Information        Provider Department      2/23/2017 10:40 AM Liu Song PA-C [facility]        Today's Diagnoses     Pneumonia of left lung due to infectious organism, unspecified part of lung    -  1    Down's syndrome        Acute renal failure, unspecified acute renal failure type (H)        Hypothyroidism, unspecified type        Hypoxia        Screening cholesterol level        Parapneumonic effusion        Community acquired pneumonia           Follow-ups after your visit        Who to contact     If you have questions or need follow up information about today's clinic visit or your schedule please contact [facility] directly at [phone].  Normal or non-critical lab and imaging results will be communicated to you by Solutionreachhart, letter or phone within 4 business days after the clinic has received the results. If you do not hear from us within 7 days, please contact the clinic through Solutionreachhart or phone. If you have a critical or abnormal lab result, we will notify you by phone as soon as possible.  Submit refill requests through [email or web address] or call your pharmacy and they will forward the refill request to us. Please allow 3 business days for your refill to be completed.          Additional Information About Your Visit        MyCharGreen[employer] Software Information     [email or web address] gives you secure access to your electronic health record. If you see a primary care provider, you can also send messages to your care team and make appointments. If you have questions, please call your primary care clinic.  If you do not have a primary care provider, please call [phone] and they will assist you.        Care EveryWhere ID     This is your Care EveryWhere ID. This could be used by other organizations to access your [facility]  "records  [id number]        Your Vitals Were     Pulse Temperature Height BMI (Body Mass Index)          89 98  F (36.7  C) (Oral) 5' 2.99\" (1.6 m) 40.62 kg/m2         Blood Pressure from Last 3 Encounters:   02/23/17 120/68   02/20/17 124/64   09/19/16 116/74    Weight from Last 3 Encounters:   02/23/17 229 lb 4 oz (104 kg)   02/18/17 245 lb 3.2 oz (111.2 kg)   09/19/16 242 lb 8 oz (110 kg)              We Performed the Following     CBC with platelets     Comprehensive metabolic panel (BMP + Alb, Alk Phos, ALT, AST, Total. Bili, TP)     Lipid Profile with reflex to direct LDL     TSH          Where to get your medicines      These medications were sent to [id number] - [place] - [address] [facility] [address]  [address], [place] [address]     Phone:  [phone]     levofloxacin 750 MG tablet          Primary Care Provider Office Phone # Fax #    Liu Song PA-C [phone]       [facility] [address] [facility] [facility] [address]        Thank you!     Thank you for choosing [facility]  for your care. Our goal is always to provide you with excellent care. Hearing back from our patients is one way we can continue to improve our services. Please take a few minutes to complete the written survey that you may receive in the mail after your visit with us. Thank you!             Your Updated Medication List - Protect others around you: Learn how to safely use, store and throw away your medicines at www.disposemymeds.org.          This list is accurate as of: 2/23/17 11:22 AM.  Always use your most recent med list.                   Brand Name Dispense Instructions for use    CALCIUM + D PO      Daily chewable       cyanocobalamin 1000 MCG Subl sublingual tablet      Place 1,000 mcg under the tongue daily       levofloxacin 750 MG tablet    LEVAQUIN    2 tablet    Take 1 tablet (750 mg) by " mouth daily       levothyroxine 112 MCG tablet    SYNTHROID/LEVOTHROID     Take 112 mcg by mouth daily.       LEXAPRO 20 MG tablet   Generic drug:  escitalopram      Take 20 mg by mouth daily       loperamide 2 MG capsule    IMODIUM     Take 2 mg by mouth daily (with breakfast) Take two tablets with breakfast       metFORMIN 1000 MG tablet    GLUCOPHAGE     Take 1,000 mg by mouth 2 times daily (with meals).       Multi-vitamin Tabs tablet   Generic drug:  multivitamin, therapeutic with minerals      Take 1 tablet by mouth daily. With D3       omega-3 acid ethyl esters 1 G capsule    Lovaza     TAKE 2 CAPSULES BY MOUTH TWICE A DAY       vitamin B complex with vitamin C Tabs tablet      Take 1 tablet by mouth daily       vitamin C 500 MG Chew      Take by mouth daily

## 2017-02-23 NOTE — NURSING NOTE
"Chief Complaint   Patient presents with     Hospital F/U       Initial There were no vitals taken for this visit. Estimated body mass index is 43.45 kg/(m^2) as calculated from the following:    Height as of 2/8/17: 5' 2.99\" (1.6 m).    Weight as of 2/18/17: 245 lb 3.2 oz (111.2 kg).  Medication Reconciliation: complete   Kelly Moreno CMA      "

## 2017-02-23 NOTE — PROGRESS NOTES
[facility] now requests orders and shares plan of care/discharge summaries for some patients through [facility].  Please REPLY TO THIS MESSAGE in order to give authorization for orders when needed.  This is considered a verbal order, you will still receive a faxed copy of orders for signature.  Thank you for your assistance in improving collaboration for our patients.    DISCHARGE SUMMARY//FYI 1x OT HC eval completed 2/23/17  OT Evaluation    Patient is not home bound.  History and living situation//Pt is a 21 y/o male with recent hosp from 2/7/17 to 2/20/17 d/t Hypoxia Parapneumonic effusion and Pneumonia. Chest tube placement 2/7 Intubated 2/8 Bronchoscopy 2/10. PMH//Down's syndrome, Hypothyroidism. Pt lives in [place] [facility] [place] [facility] with multiple steps with his parents and sister.   Previous Functional Status//Pt was going to work program mon through thurs. Working out at the [employer] 1x/wk.  Precautions//  SUBJECTIVE  F2F/Future Appointments//  OBJECTIVE  Blood pressure 120/68 at MD appt today  HR  90  O2 sat 94 percent  Respiratory//improving   Mobility Tool Score//0. safe/Independent.    Functional mobility//No assist with exception of parents assist in/out of tub  Vision WFL/Hearing WFL/  Skin integrity/edema//no new issues  Continence//during illness, decreased incidence since being home  UE function/Hand dominance R//BUE AROM WFL//MMT//not able to test accurately d/t not follow directions accurately  Sensation/Coordination//WFL  ADLs//Dressing s/u/Toileting indep/Bathing has sup since return home/Feeding s/u  IADLs//parents assist with IADL  DME//none  Cognition//not tested, no changes. Has 24 hr sup.  HHA Plan of Care//NA    Skilled services provided today//OT evaluation completed and instruction initiated for HSE. OT assessed tub s/u and pt would benefit from tub safety rail d/t he likes to take a bath and getting up from low wet surface increases his risk for falls. OT called waiver worker Oscar and she states that  this can be covered by his waiver. Oscar will email pt mom on how to get it covered and OT indicated what item is needed and she will order online. Pt demo ability to go up/down stairs to bedroom and basement without difficulty. Pt mom reports he is at prior function with ADL. Pt is anxious to get back to work program and was told he could return next week. OT and PT present during visit and encourage pt to cont with walking outside and using stationary bike to increase his endurance. Pt requires encouragement and prompting from cg to px with eval at times. Pt laughing and able to get down on floor and play with his pets without assist at end of visit. It was determined that no further OT/PT visits are needed at this time.     ASSESSMENT/SUMMARY//Pt was seen for 1x eval. No further OT needed at this time.  Discharge// safe with self cares with appropriate equipment and assistance.    Bozena SIFUENTES/L  [phone]  [email or web address]

## 2017-02-24 LAB
ALBUMIN SERPL-MCNC: 2.6 G/DL (ref 3.4–5)
ALP SERPL-CCNC: 68 U/L (ref 40–150)
ALT SERPL W P-5'-P-CCNC: 46 U/L (ref 0–70)
ANION GAP SERPL CALCULATED.3IONS-SCNC: 10 MMOL/L (ref 3–14)
AST SERPL W P-5'-P-CCNC: 19 U/L (ref 0–45)
BILIRUB SERPL-MCNC: 0.2 MG/DL (ref 0.2–1.3)
BUN SERPL-MCNC: 22 MG/DL (ref 7–30)
CALCIUM SERPL-MCNC: 9.3 MG/DL (ref 8.5–10.1)
CHLORIDE SERPL-SCNC: 109 MMOL/L (ref 94–109)
CHOLEST SERPL-MCNC: 176 MG/DL
CO2 SERPL-SCNC: 24 MMOL/L (ref 20–32)
CREAT SERPL-MCNC: 1.28 MG/DL (ref 0.66–1.25)
GFR SERPL CREATININE-BSD FRML MDRD: 69 ML/MIN/1.7M2
GLUCOSE SERPL-MCNC: 91 MG/DL (ref 70–99)
HDLC SERPL-MCNC: 24 MG/DL
LDLC SERPL CALC-MCNC: 115 MG/DL
NONHDLC SERPL-MCNC: 152 MG/DL
POTASSIUM SERPL-SCNC: 4.1 MMOL/L (ref 3.4–5.3)
PROT SERPL-MCNC: 7.1 G/DL (ref 6.8–8.8)
SODIUM SERPL-SCNC: 143 MMOL/L (ref 133–144)
TRIGL SERPL-MCNC: 187 MG/DL
TSH SERPL DL<=0.05 MIU/L-ACNC: 3.98 MU/L (ref 0.4–4)

## 2017-02-24 NOTE — PROGRESS NOTES
[facility] now requests orders and shares plan of care/discharge summaries for some patients through [facility].  Please REPLY TO THIS MESSAGE in order to give authorization for orders when needed.  This is considered a verbal order, you will still receive a faxed copy of orders for signature.  Thank you for your assistance in improving collaboration for our patients.         DISCHARGE SUMMARY    PT Evaluation / Falls Risk assessment   Patient is not home bound.  History and living situation//Pt is a 23 y/o male with recent hosp from 2/7/17 to 2/20/17 d/t Hypoxia Parapneumonic effusion and Pneumonia. Chest tube placement 2/7 Intubated 2/8 Bronchoscopy 2/10. PMH//Down's syndrome, Hypothyroidism. Pt lives in [facility] with multiple steps with his parents and sister.   Previous Functional Status//Pt was going to work program mon through thurs. Working out at the  1x/wk.  Precautions//  fall    SUBJECTIVE  Upcoming Appointments/  patient will see MD next week   Patient goal/   to return to work     OBJECTIVE  Vitals BP seated     n/a   see OT note         BP standing              HR   87         O2 Sats    95  Skin integrity/  intact  Mobility Tool Score/   0   ROM/strength/   aguila le  5 out of 5 .   UE  wfl   Balance tests/   tinetti  without assist device   22 out of 28 ,  indicates low risk of falling   Gait/posture/  patient ambulates with wide base of support   100 feet x 2.  Patient s  mother states this is patients normal gait   Transfers/  ind with all transfers sit to stand from chair,  bed, and up from the floor,    see OT report for bathroom transfers   Continence/urgency/  reports no problem   Vision/hearing/   reports no problem   Other skilled interventions provided today//  instructed patient in ambulation down the basement stairs , patient ambulated down and up the steps going sideways with holding onto the rail and putting his hand on the opposite wall.  Patient able to safely ambulate on the  stairs  and will plan to start using the exercise bike when able downstairs.    Instructed patient in ambulation on the stairs to the upstairs and patient ambulated with same method safely  and will plan on sleeping upstairs in his own bed.   Instructed patient to ambulate on stairs daily and to walk frequently throughout the house  and to have assist when leaving the house.    Patients parent present at home and will assist patient with walking as needed  and use of exericse bike.    ASSESSMENT  Patient is a  22 year old male with downs syndrome who was recently in the hospital for pneumonia. Patient lives in single family home with parents and sister. Patient doing well and is now able to ambulate independently on the stairs and on level without assist.  Patient has good balance,  tinetti score of 22 out of 28 without assist device.   Patient will start using his exericse bike with assist from family and plans on return to his work program in about a week.    No further PT needs at this time.  Patient discharged from homecare PT.  Goals met.   Goals  Patient has met goals of ind with ambulation on the stairs to the downstairs  so he can use the exercise bike  Patient has met goals of ind with ambulation up the upstairs so he can sleep upstairs .    Rehab potential/  good     Discharge plans/  Patient seen for 1 PT visit only.  Goals met.

## 2017-02-25 LAB
BACTERIA SPEC CULT: NO GROWTH
MICRO REPORT STATUS: NORMAL
SPECIMEN SOURCE: NORMAL

## 2017-03-02 ENCOUNTER — OFFICE VISIT (OUTPATIENT)
Dept: FAMILY MEDICINE | Facility: CLINIC | Age: 22
End: 2017-03-02
Payer: MEDICARE

## 2017-03-02 ENCOUNTER — TRANSFERRED RECORDS (OUTPATIENT)
Dept: HEALTH INFORMATION MANAGEMENT | Facility: CLINIC | Age: 22
End: 2017-03-02

## 2017-03-02 VITALS
HEIGHT: 63 IN | WEIGHT: 226.13 LBS | SYSTOLIC BLOOD PRESSURE: 112 MMHG | HEART RATE: 99 BPM | DIASTOLIC BLOOD PRESSURE: 70 MMHG | BODY MASS INDEX: 40.07 KG/M2 | TEMPERATURE: 97.7 F | OXYGEN SATURATION: 100 %

## 2017-03-02 DIAGNOSIS — R19.7 DIARRHEA, UNSPECIFIED TYPE: ICD-10-CM

## 2017-03-02 DIAGNOSIS — Q90.9 DOWN'S SYNDROME: ICD-10-CM

## 2017-03-02 DIAGNOSIS — J18.9 PNEUMONIA OF LEFT LOWER LOBE DUE TO INFECTIOUS ORGANISM: Primary | ICD-10-CM

## 2017-03-02 PROCEDURE — 99213 OFFICE O/P EST LOW 20 MIN: CPT | Performed by: PHYSICIAN ASSISTANT

## 2017-03-02 RX ORDER — L. ACIDOPHILUS/PECTIN, CITRUS 25MM-100MG
1 TABLET ORAL DAILY
Status: ON HOLD | COMMUNITY
End: 2021-01-29

## 2017-03-02 NOTE — NURSING NOTE
"Chief Complaint   Patient presents with     Cough     f/u pneumonia       Initial /70 (BP Location: Right arm, Cuff Size: Adult Large)  Pulse 99  Temp 97.7  F (36.5  C) (Oral)  Ht 5' 3\" (1.6 m)  Wt 226 lb 2 oz (102.6 kg)  SpO2 100%  BMI 40.06 kg/m2 Estimated body mass index is 40.06 kg/(m^2) as calculated from the following:    Height as of this encounter: 5' 3\" (1.6 m).    Weight as of this encounter: 226 lb 2 oz (102.6 kg).  Medication Reconciliation: complete     Azalea WAETRS, Certified Medical Assistant (AAMA)March 2, 2017 10:38 AM    "

## 2017-03-02 NOTE — PATIENT INSTRUCTIONS
1. Floragen3 - Sold at our pharmacy, 1 pill twice daily is fine     [facility]   Discharged by : Azalea WATERS, Certified Medical Assistant (AAMA)March 2, 2017 11:04 AM    Paper scripts provided to patient : none   If you have any questions regarding to your visit please contact your care team:   [facility] Hours Telephone Number   JUDITH Rausch Dr., PA-C    7am-7pm Monday - Thursday   7am-5pm Fridays  [phone]   (Appointment scheduling available 24/7)   RN Line   [phone] option 2       What options do I have for visits at the clinic other than the traditional office visit?   To expand how we care for you, many of our providers are utilizing electronic visits (e-visits) and telephone visits, when medically appropriate, for interactions with their patients rather than a visit in the clinic. We also offer nurse visits for many medical concerns. Just like any other service, we will bill your insurance company for this type of visit based on time spent on the phone with your provider. Not all insurance companies cover these visits. Please check with your medical insurance if this type of visit is covered. You will be responsible for any charges that are not paid by your insurance.     E-visits via [employer] CRM: generally incur a $35.00 fee.     Telephone visits:   Time spent on the phone: *charged based on time that is spent on the phone in increments of 10 minutes. Estimated cost:   5-10 mins $30.00   11-20 mins. $59.00   21-30 mins. $85.00   Use [employer] (secure email communication and access to your chart) to send your primary care provider a message or make an appointment. Ask someone on your Team how to sign up for [employer].   For a Price Quote for your services, please call our Consumer Price Line at [phone].   As always, Thank you for trusting us with your health care needs!                [facility]  Services:    Scheduling Appointments  [facility]  Call: [phone]    Denise Gaitan [facility]  Call: [phone]    [facility]  Call: [phone]

## 2017-03-02 NOTE — MR AVS SNAPSHOT
After Visit Summary   3/2/2017    Hany Patel    MRN: [id number]           Patient Information     Date Of Birth          1995        Visit Information        Provider Department      3/2/2017 10:20 AM Liu Song PA-C [facility]        Today's Diagnoses     Pneumonia of left lower lobe due to infectious organism    -  1    Diarrhea, unspecified type          Care Instructions    1. Floragen3 - Sold at our pharmacy, 1 pill twice daily is fine     [facility]   Discharged by : Azalea WATERS Certified Medical Assistant (AAMA)March 2, 2017 11:04 AM    Paper scripts provided to patient : none   If you have any questions regarding to your visit please contact your care team:   [facility] Hours Telephone Number   JUDITH Rausch Dr., PA-C    7am-7pm Monday - Thursday   7am-5pm Fridays  [phone]   (Appointment scheduling available 24/7)   RN Line   [phone] option 2       What options do I have for visits at the clinic other than the traditional office visit?   To expand how we care for you, many of our providers are utilizing electronic visits (e-visits) and telephone visits, when medically appropriate, for interactions with their patients rather than a visit in the clinic. We also offer nurse visits for many medical concerns. Just like any other service, we will bill your insurance company for this type of visit based on time spent on the phone with your provider. Not all insurance companies cover these visits. Please check with your medical insurance if this type of visit is covered. You will be responsible for any charges that are not paid by your insurance.     E-visits via Legal[employer]: generally incur a $35.00 fee.     Telephone visits:   Time spent on the phone: *charged based on time that is spent on the phone in increments of 10 minutes. Estimated cost:   5-10 mins $30.00   11-20 mins. $59.00    21-30 mins. $85.00   Use Lumific (secure email communication and access to your chart) to send your primary care provider a message or make an appointment. Ask someone on your Team how to sign up for [employer]t.   For a Price Quote for your services, please call our Consumer Price Line at [phone].   As always, Thank you for trusting us with your health care needs!                [facility]:    Scheduling Appointments  [place], [place], [facility]  Call: [phone]    Denise Gaitan, [facility]  Call: [phone]    [facility]  Call: [phone]                  Follow-ups after your visit        Who to contact     If you have questions or need follow up information about today's clinic visit or your schedule please contact [facility] directly at [phone].  Normal or non-critical lab and imaging results will be communicated to you by On The [employer]hart, letter or phone within 4 business days after the clinic has received the results. If you do not hear from us within 7 days, please contact the clinic through [employer] or phone. If you have a critical or abnormal lab result, we will notify you by phone as soon as possible.  Submit refill requests through Lumific or call your pharmacy and they will forward the refill request to us. Please allow 3 business days for your refill to be completed.          Additional Information About Your Visit        On The [employer]hart Information     Lumific gives you secure access to your electronic health record. If you see a primary care provider, you can also send messages to your care team and make appointments. If you have questions, please call your primary care clinic.  If you do not have a primary care provider, please call [phone] and they will assist you.        Care EveryWhere ID     This is your Care EveryWhere ID. This could be used by other organizations to access your [facility]  "records  [id number]        Your Vitals Were     Pulse Temperature Height Pulse Oximetry BMI (Body Mass Index)       99 97.7  F (36.5  C) (Oral) 5' 3\" (1.6 m) 100% 40.06 kg/m2        Blood Pressure from Last 3 Encounters:   03/02/17 112/70   02/23/17 120/68   02/20/17 124/64    Weight from Last 3 Encounters:   03/02/17 226 lb 2 oz (102.6 kg)   02/23/17 229 lb 4 oz (104 kg)   02/18/17 245 lb 3.2 oz (111.2 kg)              Today, you had the following     No orders found for display       Primary Care Provider Office Phone # Fax #    Liu Song PA-C [phone]       [address] [address]        Thank you!     Thank you for choosing [facility]  for your care. Our goal is always to provide you with excellent care. Hearing back from our patients is one way we can continue to improve our services. Please take a few minutes to complete the written survey that you may receive in the mail after your visit with us. Thank you!             Your Updated Medication List - Protect others around you: Learn how to safely use, store and throw away your medicines at www.disposemymeds.org.          This list is accurate as of: 3/2/17 11:04 AM.  Always use your most recent med list.                   Brand Name Dispense Instructions for use    CALCIUM + D PO      Daily chewable       cyanocobalamin 1000 MCG Subl sublingual tablet      Place 1,000 mcg under the tongue daily       lactobacillus acidophilus Tabs      Take 1 tablet by mouth 3 times daily (before meals)       levothyroxine 112 MCG tablet    SYNTHROID/LEVOTHROID     Take 112 mcg by mouth daily.       LEXAPRO 20 MG tablet   Generic drug:  escitalopram      Take 20 mg by mouth daily       loperamide 2 MG capsule    IMODIUM     Take 2 mg by mouth daily (with breakfast) Take two tablets with breakfast       metFORMIN 1000 MG tablet    GLUCOPHAGE     Take 1,000 mg by mouth 2 times daily " (with meals).       Multi-vitamin Tabs tablet   Generic drug:  multivitamin, therapeutic with minerals      Take 1 tablet by mouth daily. With D3       omega-3 acid ethyl esters 1 G capsule    Lovaza     TAKE 2 CAPSULES BY MOUTH TWICE A DAY       vitamin B complex with vitamin C Tabs tablet      Take 1 tablet by mouth daily       vitamin C 500 MG Chew      Take by mouth daily

## 2017-03-02 NOTE — PROGRESS NOTES
"  SUBJECTIVE:                                                    Hany Patel is a 22 year old male who presents to clinic today for the following health issues:     See previous chart note. Cough resolving. No fevers. Denies fatigue. He had a bit of diarrhea from antibiotics that seems to be improving. His mother denies concerns otherwise.    Patient Active Problem List   Diagnosis     Down's syndrome     Hypothyroidism     Pre-diabetes     Chronic diarrhea     CARDIOVASCULAR SCREENING; LDL GOAL LESS THAN 130     Pervasive developmental disorder     Cystic acne vulgaris     Hypoxia      Current Outpatient Prescriptions   Medication     Lactobacillus Acid-Pectin (LACTOBACILLUS ACIDOPHILUS) TABS     cyanocobalamin 1000 MCG SUBL     omega-3 acid ethyl esters (LOVAZA) 1 G capsule     vitamin  B complex with vitamin C (VITAMIN  B COMPLEX) TABS     escitalopram (LEXAPRO) 20 MG tablet     loperamide (IMODIUM) 2 MG capsule     Ascorbic Acid (VITAMIN C) 500 MG CHEW     Calcium Carbonate-Vitamin D (CALCIUM + D PO)     metFORMIN (GLUCOPHAGE) 1000 MG tablet     levothyroxine (SYNTHROID, LEVOTHROID) 112 MCG tablet     Multiple Vitamin (MULTI-VITAMIN) per tablet     No current facility-administered medications for this visit.         Problem list and histories reviewed & adjusted, as indicated.  Additional history: as documented    Labs reviewed in EPIC    Reviewed and updated as needed this visit by clinical staff       Reviewed and updated as needed this visit by Provider         ROS:  Constitutional, HEENT, cardiovascular, pulmonary, gi and gu systems are negative, except as otherwise noted.    OBJECTIVE:                                                    /70 (BP Location: Right arm, Cuff Size: Adult Large)  Pulse 99  Temp 97.7  F (36.5  C) (Oral)  Ht 5' 3\" (1.6 m)  Wt 226 lb 2 oz (102.6 kg)  SpO2 100%  BMI 40.06 kg/m2  Body mass index is 40.06 kg/(m^2).  GENERAL: healthy, alert and no distress  HENT: ear canals and " TM's normal, nose and mouth without ulcers or lesions  NECK: no adenopathy, no asymmetry, masses, or scars and thyroid normal to palpation  RESP: lungs clear to auscultation - no rales, rhonchi or wheezes  CV: regular rate and rhythm, normal S1 S2, no S3 or S4, no murmur, click or rub, no peripheral edema and peripheral pulses strong    Diagnostic Test Results:  none      ASSESSMENT/PLAN:                                                        (J18.9) Pneumonia of left lower lobe due to infectious organism  (primary encounter diagnosis)  Comment:   Plan: Resolving. Continue self cares. If any cough recurs or fevers, recheck.     (R19.7) Diarrhea, unspecified type  Comment:   Plan: Resolving. Continue self cares. Might need to be checked for C Diff.     (Q90.9) Down's syndrome  Comment:   Plan: Reviewed - mom cares for him     YULISA SALGADO PA-C  [facility]

## 2017-03-02 NOTE — LETTER
[facility]  [address] [id number] [phone]      March 2, 2017      RE: Hany Patel  [address] [address]              To Whom it May Concern:    Hany can return to his day program without restrictions on 3/6/2017. Please let me know if you have questions.     Sincerely,    Liu Song, MPAS, PA-C

## 2017-03-03 ENCOUNTER — TELEPHONE (OUTPATIENT)
Dept: FAMILY MEDICINE | Facility: CLINIC | Age: 22
End: 2017-03-03

## 2017-03-03 RX ORDER — SIMVASTATIN 20 MG
20 TABLET ORAL AT BEDTIME
COMMUNITY
Start: 2017-03-03

## 2017-03-03 NOTE — TELEPHONE ENCOUNTER
[facility] received.  Given to Team Chaparro HUGO for med rec.  Please give to provider for review and signature upon completion.    Please fax forms to [phone] after completion.    Jessy Becker,

## 2017-03-03 NOTE — TELEPHONE ENCOUNTER
"Reconciled and placed in PCP's \"sign me\" folder.      Taniya Cote RN   March 3, 2017 2:05 PM  [facility] Triage   [phone]   "

## 2017-03-15 ENCOUNTER — TELEPHONE (OUTPATIENT)
Dept: FAMILY MEDICINE | Facility: CLINIC | Age: 22
End: 2017-03-15

## 2017-03-15 NOTE — TELEPHONE ENCOUNTER
Reason for Call:  Home Health Care    Candy with [facility] called regarding (reason for call): Discharge of patient today from Homecare    Orders are needed for this patient. N/A    PT: N/A    OT: N/A    Skilled Nursing: N/A    Pt Provider: Liu Song    Phone Number Homecare Nurse can be reached at: [phone]    Can we leave a detailed message on this number? Not Applicable    Phone number patient can be reached at: Other phone number:  N/A    Best Time: Any time    Thank you!  Lola QUINTANILLA  Patient Representative  [facility]      Call taken on 3/15/2017 at 2:50 PM by Lola Rosario

## 2017-03-16 ENCOUNTER — RADIANT APPOINTMENT (OUTPATIENT)
Dept: GENERAL RADIOLOGY | Facility: CLINIC | Age: 22
End: 2017-03-16
Attending: INTERNAL MEDICINE
Payer: MEDICARE

## 2017-03-16 ENCOUNTER — OFFICE VISIT (OUTPATIENT)
Dept: INTERNAL MEDICINE | Facility: CLINIC | Age: 22
End: 2017-03-16
Payer: MEDICARE

## 2017-03-16 ENCOUNTER — TELEPHONE (OUTPATIENT)
Dept: FAMILY MEDICINE | Facility: CLINIC | Age: 22
End: 2017-03-16

## 2017-03-16 VITALS
HEART RATE: 85 BPM | DIASTOLIC BLOOD PRESSURE: 64 MMHG | WEIGHT: 236 LBS | OXYGEN SATURATION: 99 % | TEMPERATURE: 97.2 F | SYSTOLIC BLOOD PRESSURE: 107 MMHG | BODY MASS INDEX: 41.81 KG/M2

## 2017-03-16 DIAGNOSIS — R73.03 PRE-DIABETES: ICD-10-CM

## 2017-03-16 DIAGNOSIS — J06.9 UPPER RESPIRATORY TRACT INFECTION, UNSPECIFIED TYPE: Primary | ICD-10-CM

## 2017-03-16 DIAGNOSIS — R05.9 COUGH: ICD-10-CM

## 2017-03-16 DIAGNOSIS — Q90.9 DOWN'S SYNDROME: ICD-10-CM

## 2017-03-16 PROCEDURE — 99213 OFFICE O/P EST LOW 20 MIN: CPT | Performed by: INTERNAL MEDICINE

## 2017-03-16 PROCEDURE — 71020 XR CHEST 2 VW: CPT

## 2017-03-16 RX ORDER — DOXYCYCLINE 100 MG/1
100 CAPSULE ORAL 2 TIMES DAILY
Qty: 20 CAPSULE | Refills: 0 | Status: SHIPPED | OUTPATIENT
Start: 2017-03-16 | End: 2017-05-12

## 2017-03-16 NOTE — PROGRESS NOTES
SUBJECTIVE:                                                    Hany Patel is a 22 year old male who presents to clinic today for the following health issues:      ENT Symptoms             Symptoms: cc Present Absent Comment   Fever/Chills   x    Fatigue   x    Muscle Aches   x    Eye Irritation   x    Sneezing  x     Nasal Pancho/Drg  x     Sinus Pressure/Pain   x    Loss of smell   x    Dental pain   x    Sore Throat   x    Swollen Glands    unknown   Ear Pain/Fullness   x    Cough  x     Wheeze  x     Chest Pain   x    Shortness of breath  x     Rash   x    Other    Had pneumonia in February in ICU      Symptom duration: 2 days   Symptom severity:  mild-moderate   Treatments tried:  none   Contacts:            patient is here with guardian.  He had severe pneuomia and in ICU last month.  Patient did not have symptoms until suddenly acutely ill at the time.  Now, guardian noticing more coughing and heavy breathing and is worried he might have a recurrence.      Problem list and histories reviewed & adjusted, as indicated.  Additional history: as documented    Patient Active Problem List   Diagnosis     Down's syndrome     Hypothyroidism     Pre-diabetes     Chronic diarrhea     CARDIOVASCULAR SCREENING; LDL GOAL LESS THAN 130     Pervasive developmental disorder     Cystic acne vulgaris     Hypoxia     Past Surgical History   Procedure Laterality Date     Surgical history of -        Ear tubes      Surgical history of -        Adenoid removal      Insert chest tube Left 2/7/2017     Procedure: INSERT CHEST TUBE;  Surgeon: Coco Zambrano MD;  Location:  OR       Social History   Substance Use Topics     Smoking status: Never Smoker     Smokeless tobacco: Never Used     Alcohol use No     Family History   Problem Relation Age of Onset     Breast Cancer Mother      Hypertension Father          Current Outpatient Prescriptions   Medication Sig Dispense Refill     doxycycline (VIBRAMYCIN) 100 MG capsule  Take 1 capsule (100 mg) by mouth 2 times daily 20 capsule 0     simvastatin (ZOCOR) 20 MG tablet Take 1 tablet (20 mg) by mouth At Bedtime       Lactobacillus Acid-Pectin (LACTOBACILLUS ACIDOPHILUS) TABS Take 1 tablet by mouth 2 times daily        cyanocobalamin 1000 MCG SUBL Place 1,000 mcg under the tongue daily       omega-3 acid ethyl esters (LOVAZA) 1 G capsule TAKE 2 CAPSULES BY MOUTH TWICE A DAY  6     vitamin  B complex with vitamin C (VITAMIN  B COMPLEX) TABS Take 1 tablet by mouth daily       escitalopram (LEXAPRO) 20 MG tablet Take 20 mg by mouth daily       loperamide (IMODIUM) 2 MG capsule Take 2 mg by mouth daily (with breakfast) Take two tablets with breakfast       Ascorbic Acid (VITAMIN C) 500 MG CHEW Take by mouth daily       Calcium Carbonate-Vitamin D (CALCIUM + D PO) Daily chewable       metFORMIN (GLUCOPHAGE) 1000 MG tablet Take 1,000 mg by mouth 2 times daily (with meals).       levothyroxine (SYNTHROID, LEVOTHROID) 112 MCG tablet Take 112 mcg by mouth daily.       Multiple Vitamin (MULTI-VITAMIN) per tablet Take 1 tablet by mouth daily. With D3       No Known Allergies  Recent Labs   Lab Test  02/23/17   1125  02/20/17   0615   02/11/17   1535   02/09/17   0454   09/19/16   0938  04/11/16   0955  09/21/15   0922   A1C   --    --    --    --    --    --    --   5.2  5.4  5.3   LDL  115*   --    --    --    --    --    --   165*  155*  169*   HDL  24*   --    --    --    --    --    --   34*  37*  37*   TRIG  187*   --    --    --    --    --    --   203*  200*  179*   ALT  46   --    --   18   --   12   < >  27  31  31   CR  1.28*  1.56*   < >  3.33*   < >  0.62*   < >   --    --    --    GFRESTIMATED  69  55*   < >  23*   < >  >90  Non  GFR Calc     < >   --    --    --    GFRESTBLACK  84  66   < >  28*   < >  >90   GFR Calc     < >   --    --    --    POTASSIUM  4.1  3.9   < >  3.7   < >  3.6   < >   --    --    --    TSH  3.98   --    --    --    --   2.84    --   2.87  2.06  2.46    < > = values in this interval not displayed.      BP Readings from Last 3 Encounters:   03/16/17 107/64   03/02/17 112/70   02/23/17 120/68    Wt Readings from Last 3 Encounters:   03/16/17 236 lb (107 kg)   03/02/17 226 lb 2 oz (102.6 kg)   02/23/17 229 lb 4 oz (104 kg)                  Labs reviewed in EPIC    Reviewed and updated as needed this visit by clinical staff  Tobacco  Allergies  Med Hx  Surg Hx  Fam Hx  Soc Hx      Reviewed and updated as needed this visit by Provider         ROS:  Constitutional no fevers. , HEENT, cardiovascular, pulmonary, gi and gu systems are negative, except as otherwise noted.    OBJECTIVE:                                                    /64 (BP Location: Left arm, Patient Position: Chair, Cuff Size: Adult Large)  Pulse 85  Temp 97.2  F (36.2  C) (Oral)  Wt 236 lb (107 kg)  SpO2 99%  BMI 41.81 kg/m2  Body mass index is 41.81 kg/(m^2).  GENERAL: healthy, alert and no distress  EYES: Eyes grossly normal to inspection, PERRL and conjunctivae and sclerae normal  HENT: ear canals and TM's normal, nose and mouth without ulcers or lesions  HENT: macroglossia with crowded oropharynx.  Down's Syndrome features  NECK: no adenopathy, no asymmetry, masses, or scars and thyroid normal to palpation  RESP: lungs clear to auscultation - no rales, rhonchi or wheezes  RESP: he takes shallow breaths at baseline.   CV: regular rate and rhythm, normal S1 S2, no S3 or S4, no murmur, click or rub, no peripheral edema and peripheral pulses strong  ABDOMEN: soft, nontender, no hepatosplenomegaly, no masses and bowel sounds normal  MS: no gross musculoskeletal defects noted, no edema  SKIN: no suspicious lesions or rashes    Diagnostic Test Results:  Results for orders placed or performed in visit on 02/23/17   Comprehensive metabolic panel (BMP + Alb, Alk Phos, ALT, AST, Total. Bili, TP)   Result Value Ref Range    Sodium 143 133 - 144 mmol/L    Potassium 4.1 3.4 -  5.3 mmol/L    Chloride 109 94 - 109 mmol/L    Carbon Dioxide 24 20 - 32 mmol/L    Anion Gap 10 3 - 14 mmol/L    Glucose 91 70 - 99 mg/dL    Urea Nitrogen 22 7 - 30 mg/dL    Creatinine 1.28 (H) 0.66 - 1.25 mg/dL    GFR Estimate 69 >60 mL/min/1.7m2    GFR Estimate If Black 84 >60 mL/min/1.7m2    Calcium 9.3 8.5 - 10.1 mg/dL    Bilirubin Total 0.2 0.2 - 1.3 mg/dL    Albumin 2.6 (L) 3.4 - 5.0 g/dL    Protein Total 7.1 6.8 - 8.8 g/dL    Alkaline Phosphatase 68 40 - 150 U/L    ALT 46 0 - 70 U/L    AST 19 0 - 45 U/L   Lipid Profile with reflex to direct LDL   Result Value Ref Range    Cholesterol 176 <200 mg/dL    Triglycerides 187 (H) <150 mg/dL    HDL Cholesterol 24 (L) >39 mg/dL    LDL Cholesterol Calculated 115 (H) <100 mg/dL    Non HDL Cholesterol 152 (H) <130 mg/dL   CBC with platelets   Result Value Ref Range    WBC 9.2 4.0 - 11.0 10e9/L    RBC Count 4.10 (L) 4.4 - 5.9 10e12/L    Hemoglobin 11.9 (L) 13.3 - 17.7 g/dL    Hematocrit 36.7 (L) 40.0 - 53.0 %    MCV 90 78 - 100 fl    MCH 29.0 26.5 - 33.0 pg    MCHC 32.4 31.5 - 36.5 g/dL    RDW 16.7 (H) 10.0 - 15.0 %    Platelet Count 589 (H) 150 - 450 10e9/L   TSH   Result Value Ref Range    TSH 3.98 0.40 - 4.00 mU/L        CXR today still shows RML infiltrate.  Best seen on lateral view.  Could be residual infiltrate, healing    ASSESSMENT/PLAN:                                                            ICD-10-CM    1. Upper respiratory tract infection, unspecified type J06.9 doxycycline (VIBRAMYCIN) 100 MG capsule     PULMONARY MEDICINE REFERRAL   2. Cough R05 XR Chest 2 Views     PULMONARY MEDICINE REFERRAL   3. Down's syndrome Q90.9 PULMONARY MEDICINE REFERRAL   4. Pre-diabetes R73.03      Patient poor historian presents with guardian who thinks maybe showing early signs of repiratory symptoms.  His xray may show residual old infection vs recurrent process    Will treat with doxycycline course    Encouraged pulmonary toilet and demonstrated.     Pulmonary consult to  review Pulmonary status.  Consider silent aspiration if this is a recurrent problem.        Naomy Byrd MD  [facility]

## 2017-03-16 NOTE — PATIENT INSTRUCTIONS
Yoga breaths with ABCs at each meal    Doxycycline 100 mg twice daily for 10 days       [facility] [place] [phone]  -- consult re: pneumonia

## 2017-03-16 NOTE — NURSING NOTE
"Chief Complaint   Patient presents with     Cough     Health Maintenance       Initial /64 (BP Location: Left arm, Patient Position: Chair, Cuff Size: Adult Large)  Pulse 85  Temp 97.2  F (36.2  C) (Oral)  Wt 236 lb (107 kg)  SpO2 99%  BMI 41.81 kg/m2 Estimated body mass index is 41.81 kg/(m^2) as calculated from the following:    Height as of 3/2/17: 5' 3\" (1.6 m).    Weight as of this encounter: 236 lb (107 kg).  Medication Reconciliation: complete   Michelle Suarez ma      "

## 2017-03-16 NOTE — TELEPHONE ENCOUNTER
Reason for call:  Patient reporting a symptom    Symptom or request: cough     Duration (how long have symptoms been present):     Have you been treated for this before? Yes    Additional comments: Patient just got over pneumonia in February and is starting to cough again.  Mom would like to know if there's any way he can get in to see Liu Song today 3/16/17.  Mom also stated he was in the ICU when he had pneumonia so she would really to have him checked just to make sure it hasn't come back.     Phone Number patient can be reached at:  [phone]    Best Time:  anytime    Can we leave a detailed message on this number:  YES    Call taken on 3/16/2017 at 8:18 AM by Cony Ortiz

## 2017-03-16 NOTE — TELEPHONE ENCOUNTER
Called and spoke to  Patient's mom. She is very worried given how sick Hany was in February. Denies fever or obvious shortness of breath. RN unable to triage Hany directly on the phone due to developmental disorder. She would really like him seen by a provider today. Appt scheduled at [facility] with Dr. Byrd.    Taniya Cote RN   March 16, 2017 9:05 AM  [facility] Triage   [phone]

## 2017-03-16 NOTE — MR AVS SNAPSHOT
After Visit Summary   3/16/2017    Hany Patel    MRN: [id number]           Patient Information     Date Of Birth          1995        Visit Information        Provider Department      3/16/2017 3:40 PM Naomy Byrd MD [facility]        Today's Diagnoses     Down's syndrome    -  1    Pre-diabetes        Cough        Upper respiratory tract infection, unspecified type          Care Instructions    Yoga breaths with ABCs at each meal    Doxycycline 100 mg twice daily for 10 days       Pulmonary Clinic - [place] [phone]  -- consult re: pneumonia            Follow-ups after your visit        Additional Services     PULMONARY MEDICINE REFERRAL       Your provider has referred you to: [facility]: Lung Disease and Pulmonary Clinic - [place] [phone]   [email or web address]    Please be aware that coverage of these services is subject to the terms and limitations of your health insurance plan.  Call member services at your health plan with any benefit or coverage questions.      Please bring the following with you to your appointment:    (1) Any X-Rays, CTs or MRIs which have been performed.  Contact the facility where they were done to arrange for  prior to your scheduled appointment.    (2) List of current medications   (3) This referral request   (4) Any documents/labs given to you for this referral                  Who to contact     If you have questions or need follow up information about today's clinic visit or your schedule please contact [facility] directly at [phone].  Normal or non-critical lab and imaging results will be communicated to you by MyChart, letter or phone within 4 business days after the clinic has received the results. If you do not hear from us within 7 days, please contact the clinic through MyChart or phone. If you have a critical or abnormal lab  result, we will notify you by phone as soon as possible.  Submit refill requests through Swiftpage or call your pharmacy and they will forward the refill request to us. Please allow 3 business days for your refill to be completed.          Additional Information About Your Visit        [employer] Information     Swiftpage gives you secure access to your electronic health record. If you see a primary care provider, you can also send messages to your care team and make appointments. If you have questions, please call your primary care clinic.  If you do not have a primary care provider, please call [phone] and they will assist you.        Care EveryWhere ID     This is your Care EveryWhere ID. This could be used by other organizations to access your [place] medical records  [id number]        Your Vitals Were     Pulse Temperature Pulse Oximetry BMI (Body Mass Index)          85 97.2  F (36.2  C) (Oral) 99% 41.81 kg/m2         Blood Pressure from Last 3 Encounters:   03/16/17 107/64   03/02/17 112/70   02/23/17 120/68    Weight from Last 3 Encounters:   03/16/17 236 lb (107 kg)   03/02/17 226 lb 2 oz (102.6 kg)   02/23/17 229 lb 4 oz (104 kg)              We Performed the Following     PULMONARY MEDICINE REFERRAL          Today's Medication Changes          These changes are accurate as of: 3/16/17  4:19 PM.  If you have any questions, ask your nurse or doctor.               Start taking these medicines.        Dose/Directions    doxycycline 100 MG capsule   Commonly known as:  VIBRAMYCIN   Used for:  Upper respiratory tract infection, unspecified type   Started by:  Naomy Byrd MD        Dose:  100 mg   Take 1 capsule (100 mg) by mouth 2 times daily   Quantity:  20 capsule   Refills:  0            Where to get your medicines      These medications were sent to [id number] - [place], [place] - [address] AT [facility] [address]  [address], [place]  [address]     Phone:  [phone]     doxycycline 100 MG capsule                Primary Care Provider Office Phone # Fax #    Liu Song PA-C [phone]       [facility] [address] [address]        Thank you!     Thank you for choosing [facility]  for your care. Our goal is always to provide you with excellent care. Hearing back from our patients is one way we can continue to improve our services. Please take a few minutes to complete the written survey that you may receive in the mail after your visit with us. Thank you!             Your Updated Medication List - Protect others around you: Learn how to safely use, store and throw away your medicines at www.disposemymeds.org.          This list is accurate as of: 3/16/17  4:19 PM.  Always use your most recent med list.                   Brand Name Dispense Instructions for use    CALCIUM + D PO      Daily chewable       cyanocobalamin 1000 MCG Subl sublingual tablet      Place 1,000 mcg under the tongue daily       doxycycline 100 MG capsule    VIBRAMYCIN    20 capsule    Take 1 capsule (100 mg) by mouth 2 times daily       lactobacillus acidophilus Tabs      Take 1 tablet by mouth 2 times daily       levothyroxine 112 MCG tablet    SYNTHROID/LEVOTHROID     Take 112 mcg by mouth daily.       LEXAPRO 20 MG tablet   Generic drug:  escitalopram      Take 20 mg by mouth daily       loperamide 2 MG capsule    IMODIUM     Take 2 mg by mouth daily (with breakfast) Take two tablets with breakfast       metFORMIN 1000 MG tablet    GLUCOPHAGE     Take 1,000 mg by mouth 2 times daily (with meals).       Multi-vitamin Tabs tablet   Generic drug:  multivitamin, therapeutic with minerals      Take 1 tablet by mouth daily. With D3       omega-3 acid ethyl esters 1 G capsule    Lovaza     TAKE 2 CAPSULES BY MOUTH TWICE A DAY       simvastatin 20 MG tablet    ZOCOR     Take 1 tablet (20 mg) by  mouth At Bedtime       vitamin B complex with vitamin C Tabs tablet      Take 1 tablet by mouth daily       vitamin C 500 MG Chew      Take by mouth daily

## 2017-03-17 NOTE — PROGRESS NOTES
Hany Patel    One of the infiltrates on this xray may be new, possibly an emerging pneumonia.     Finish the antibiotics and plan to see Dr Song next week as follow up.     Put the pulmonary consultation on your calender for further evaluation.     Sincerely,     DHARMESH FRANCO M.D.     Send note

## 2017-03-23 ENCOUNTER — OFFICE VISIT (OUTPATIENT)
Dept: FAMILY MEDICINE | Facility: CLINIC | Age: 22
End: 2017-03-23
Payer: MEDICARE

## 2017-03-23 VITALS
DIASTOLIC BLOOD PRESSURE: 70 MMHG | BODY MASS INDEX: 41.73 KG/M2 | HEART RATE: 78 BPM | HEIGHT: 63 IN | TEMPERATURE: 98.2 F | OXYGEN SATURATION: 98 % | WEIGHT: 235.5 LBS | SYSTOLIC BLOOD PRESSURE: 118 MMHG

## 2017-03-23 DIAGNOSIS — E03.9 HYPOTHYROIDISM, UNSPECIFIED TYPE: ICD-10-CM

## 2017-03-23 DIAGNOSIS — J18.9 PNEUMONIA DUE TO INFECTIOUS ORGANISM, UNSPECIFIED LATERALITY, UNSPECIFIED PART OF LUNG: Primary | ICD-10-CM

## 2017-03-23 DIAGNOSIS — Q90.9 DOWN'S SYNDROME: ICD-10-CM

## 2017-03-23 PROCEDURE — 99213 OFFICE O/P EST LOW 20 MIN: CPT | Performed by: PHYSICIAN ASSISTANT

## 2017-03-23 NOTE — NURSING NOTE
"Chief Complaint   Patient presents with     Follow Up For     URI       Initial There were no vitals taken for this visit. Estimated body mass index is 41.81 kg/(m^2) as calculated from the following:    Height as of 3/2/17: 5' 3\" (1.6 m).    Weight as of 3/16/17: 236 lb (107 kg).  Medication Reconciliation: complete   Kelly Moreno CMA      "

## 2017-03-23 NOTE — PROGRESS NOTES
"  SUBJECTIVE:                                                    Hany Patel is a 22 year old male who presents to clinic today for the following health issues:      Patient is here today to follow up on pneumonia and then was seen for URI on 3/16/17. Overall, feeling fine. X ray and chart review shows no clear opacification, probably a resolving finding on x ray - he's feeling fine and has no cough, fevers or chills.     Patient Active Problem List   Diagnosis     Down's syndrome     Hypothyroidism     Pre-diabetes     Chronic diarrhea     CARDIOVASCULAR SCREENING; LDL GOAL LESS THAN 130     Pervasive developmental disorder     Cystic acne vulgaris     Hypoxia      Current Outpatient Prescriptions   Medication     doxycycline (VIBRAMYCIN) 100 MG capsule     simvastatin (ZOCOR) 20 MG tablet     Lactobacillus Acid-Pectin (LACTOBACILLUS ACIDOPHILUS) TABS     cyanocobalamin 1000 MCG SUBL     omega-3 acid ethyl esters (LOVAZA) 1 G capsule     vitamin  B complex with vitamin C (VITAMIN  B COMPLEX) TABS     escitalopram (LEXAPRO) 20 MG tablet     loperamide (IMODIUM) 2 MG capsule     Ascorbic Acid (VITAMIN C) 500 MG CHEW     Calcium Carbonate-Vitamin D (CALCIUM + D PO)     metFORMIN (GLUCOPHAGE) 1000 MG tablet     levothyroxine (SYNTHROID, LEVOTHROID) 112 MCG tablet     Multiple Vitamin (MULTI-VITAMIN) per tablet     No current facility-administered medications for this visit.         Problem list and histories reviewed & adjusted, as indicated.  Additional history: as documented    Labs reviewed in EPIC    Reviewed and updated as needed this visit by clinical staff       Reviewed and updated as needed this visit by Provider         ROS:  Constitutional, HEENT, cardiovascular, pulmonary, gi and gu systems are negative, except as otherwise noted.    OBJECTIVE:                                                    /70 (BP Location: Right arm, Cuff Size: Adult Large)  Pulse 78  Temp 98.2  F (36.8  C) (Oral)  Ht 5' 3\" " (1.6 m)  Wt 235 lb 8 oz (106.8 kg)  SpO2 98%  BMI 41.72 kg/m2  Body mass index is 41.72 kg/(m^2).  GENERAL: healthy, alert and no distress  HENT: ear canals and TM's normal, nose and mouth without ulcers or lesions  NECK: no adenopathy, no asymmetry, masses, or scars and thyroid normal to palpation  RESP: lungs clear to auscultation - no rales, rhonchi or wheezes  CV: regular rate and rhythm, normal S1 S2, no S3 or S4, no murmur, click or rub, no peripheral edema and peripheral pulses strong  SKIN: no suspicious lesions or rashes    Diagnostic Test Results:  none      ASSESSMENT/PLAN:                                                      1. Pneumonia due to infectious organism, unspecified laterality, unspecified part of lung  Stable. Symptoms resolved. No concerns. Lungs clear. Warning symptoms of worsening condition discussed and patient shows good understanding.     2. Down's syndrome  Currently managed by Danni. Mom is primary care provider for him. He is in a day program. Well taken care of     3. Hypothyroidism, unspecified type  Lab Results   Component Value Date    TSH 3.98 02/23/2017   Results reviewed     YULISA SALGADO PA-C  [facility]

## 2017-03-23 NOTE — MR AVS SNAPSHOT
"              After Visit Summary   3/23/2017    Hany Patel    MRN: [id number]           Patient Information     Date Of Birth          1995        Visit Information        Provider Department      3/23/2017 2:40 PM Liu Song PA-C [facility]        Today's Diagnoses     Pneumonia due to infectious organism, unspecified laterality, unspecified part of lung    -  1    Down's syndrome        Hypothyroidism, unspecified type           Follow-ups after your visit        Who to contact     If you have questions or need follow up information about today's clinic visit or your schedule please contact [facility] directly at [phone].  Normal or non-critical lab and imaging results will be communicated to you by MyChart, letter or phone within 4 business days after the clinic has received the results. If you do not hear from us within 7 days, please contact the clinic through Madmag[employer]art or phone. If you have a critical or abnormal lab result, we will notify you by phone as soon as possible.  Submit refill requests through [employer] or call your pharmacy and they will forward the refill request to us. Please allow 3 business days for your refill to be completed.          Additional Information About Your Visit        MyChart Information     [employer] gives you secure access to your electronic health record. If you see a primary care provider, you can also send messages to your care team and make appointments. If you have questions, please call your primary care clinic.  If you do not have a primary care provider, please call [phone] and they will assist you.        Care EveryWhere ID     This is your Care EveryWhere ID. This could be used by other organizations to access your [place] medical records  [id number]        Your Vitals Were     Pulse Temperature Height Pulse Oximetry BMI (Body Mass Index)       78 98.2  F (36.8  C) (Oral) 5' 3\" (1.6 m) 98% 41.72 kg/m2     "    Blood Pressure from Last 3 Encounters:   03/23/17 118/70   03/16/17 107/64   03/02/17 112/70    Weight from Last 3 Encounters:   03/23/17 235 lb 8 oz (106.8 kg)   03/16/17 236 lb (107 kg)   03/02/17 226 lb 2 oz (102.6 kg)              Today, you had the following     No orders found for display       Primary Care Provider Office Phone # Fax #    Liu Song PA-C [phone]       [facility] [address] [facility] [address]        Thank you!     Thank you for choosing [facility]  for your care. Our goal is always to provide you with excellent care. Hearing back from our patients is one way we can continue to improve our services. Please take a few minutes to complete the written survey that you may receive in the mail after your visit with us. Thank you!             Your Updated Medication List - Protect others around you: Learn how to safely use, store and throw away your medicines at www.disposemymeds.org.          This list is accurate as of: 3/23/17  3:13 PM.  Always use your most recent med list.                   Brand Name Dispense Instructions for use    CALCIUM + D PO      Daily chewable       cyanocobalamin 1000 MCG Subl sublingual tablet      Place 1,000 mcg under the tongue daily       doxycycline 100 MG capsule    VIBRAMYCIN    20 capsule    Take 1 capsule (100 mg) by mouth 2 times daily       lactobacillus acidophilus Tabs      Take 1 tablet by mouth 2 times daily       levothyroxine 112 MCG tablet    SYNTHROID/LEVOTHROID     Take 112 mcg by mouth daily.       LEXAPRO 20 MG tablet   Generic drug:  escitalopram      Take 20 mg by mouth daily       loperamide 2 MG capsule    IMODIUM     Take 2 mg by mouth daily (with breakfast) Take two tablets with breakfast       metFORMIN 1000 MG tablet    GLUCOPHAGE     Take 1,000 mg by mouth 2 times daily (with meals).       Multi-vitamin Tabs tablet   Generic drug:  multivitamin, therapeutic  with minerals      Take 1 tablet by mouth daily. With D3       omega-3 acid ethyl esters 1 G capsule    Lovaza     TAKE 2 CAPSULES BY MOUTH TWICE A DAY       simvastatin 20 MG tablet    ZOCOR     Take 1 tablet (20 mg) by mouth At Bedtime       vitamin B complex with vitamin C Tabs tablet      Take 1 tablet by mouth daily       vitamin C 500 MG Chew      Take by mouth daily

## 2017-03-30 ENCOUNTER — MYC MEDICAL ADVICE (OUTPATIENT)
Dept: INTERNAL MEDICINE | Facility: CLINIC | Age: 22
End: 2017-03-30

## 2017-03-30 NOTE — TELEPHONE ENCOUNTER
Can d/c consult if he is better.      Suggested Pulmonary consult and review especially if this becomes recurrent:  Then consider aspiration vs other.

## 2017-03-30 NOTE — TELEPHONE ENCOUNTER
Response from [facility] routed to patient/caregiver via [employer].  Alicia Arnold RN  [facility]

## 2017-05-12 ENCOUNTER — OFFICE VISIT (OUTPATIENT)
Dept: FAMILY MEDICINE | Facility: CLINIC | Age: 22
End: 2017-05-12
Payer: MEDICARE

## 2017-05-12 VITALS
WEIGHT: 238 LBS | DIASTOLIC BLOOD PRESSURE: 62 MMHG | HEART RATE: 80 BPM | HEIGHT: 63 IN | SYSTOLIC BLOOD PRESSURE: 104 MMHG | TEMPERATURE: 98.5 F | BODY MASS INDEX: 42.17 KG/M2

## 2017-05-12 DIAGNOSIS — L72.3 SEBACEOUS CYST: ICD-10-CM

## 2017-05-12 DIAGNOSIS — E66.01 MORBID OBESITY, UNSPECIFIED OBESITY TYPE (H): ICD-10-CM

## 2017-05-12 DIAGNOSIS — H10.45 CHRONIC ALLERGIC CONJUNCTIVITIS: Primary | ICD-10-CM

## 2017-05-12 DIAGNOSIS — R63.1 POLYDIPSIA: ICD-10-CM

## 2017-05-12 PROCEDURE — 99214 OFFICE O/P EST MOD 30 MIN: CPT | Performed by: PHYSICIAN ASSISTANT

## 2017-05-12 NOTE — NURSING NOTE
"Chief Complaint   Patient presents with     Hair/Scalp Problem     lump on scalp     Weight Problem     increased thirst     mom would like to discuss his fluid intake     Eye Problem       Initial /62 (Cuff Size: Adult Large)  Pulse 80  Temp 98.5  F (36.9  C) (Oral)  Ht 5' 3\" (1.6 m)  Wt 238 lb (108 kg)  BMI 42.16 kg/m2 Estimated body mass index is 42.16 kg/(m^2) as calculated from the following:    Height as of this encounter: 5' 3\" (1.6 m).    Weight as of this encounter: 238 lb (108 kg).  Medication Reconciliation: complete   Yeimy Ryan, Certified Medical Assistant (AAMA)     "

## 2017-05-12 NOTE — PROGRESS NOTES
"  SUBJECTIVE:                                                    Hany Patel is a 22 year old male who presents to clinic today for the following health issues:      Eye(s) Problem     Onset: unsure    Description:   Location: bilateral  Pain: no, itchy   Redness: YES    Accompanying Signs & Symptoms:  Discharge/mattering: no  Swelling: no  Visual changes: no  Fever: no  Nasal Congestion: no  Bothered by bright lights: YES     History:   Trauma: no   Foreign body exposure: no    Precipitating factors:   Wearing contacts: no    Alleviating factors:  Improved by: none       Therapies Tried and outcome: none    Concern - lump on scalp     Onset: at least 1 month    Description:   Lump on right side of head, little bigger than pea-size    Intensity: hurts when he pushes on it    Progression of Symptoms:  same    Accompanying Signs & Symptoms:  none       Previous history of similar problem:   none    Precipitating factors:   Worsened by: none    Alleviating factors:  Improved by: none       Therapies Tried and outcome: none    Mom would also like to discuss his increased thirst and weight gain. He drinks she was told by his day program 100+ ounces of water a day. This has been ongoing for some time. It seems to mom that he gets bored and wants to leave or walk around and so he gets up to get water as an excuse.     Problem list and histories reviewed & adjusted, as indicated.  Additional history: as documented    Labs reviewed in EPIC    Reviewed and updated as needed this visit by clinical staff  Tobacco  Allergies  Med Hx  Surg Hx  Fam Hx  Soc Hx      Reviewed and updated as needed this visit by Provider         ROS:  Constitutional, HEENT, cardiovascular, pulmonary, gi and gu systems are negative, except as otherwise noted.    OBJECTIVE:                                                    /62 (Cuff Size: Adult Large)  Pulse 80  Temp 98.5  F (36.9  C) (Oral)  Ht 5' 3\" (1.6 m)  Wt 238 lb (108 kg)  BMI " 42.16 kg/m2  Body mass index is 42.16 kg/(m^2).  GENERAL: healthy, alert and no distress  SKIN: Right scalp he has a mobile, rubbery, well defined less than 1 cm nodule        ASSESSMENT/PLAN:                                                      (H10.45) Chronic allergic conjunctivitis  (primary encounter diagnosis)  Comment:   Plan: ketotifen (ZADITOR) 0.025 % SOLN ophthalmic         solution        Will treat.     (E66.01) Morbid obesity, unspecified obesity type (H)  Comment:   Plan: Weight management options reviewed     (R63.1) Polydipsia  Comment:   Plan: Reviewed proper water intake and I also want his day program do do I and O - I would be surprised if he drank 100 ounces a day. His last Glucose was normal, but we can repeat that if it seems suspect.   Lab Results   Component Value Date    GLC 91 02/23/2017      (L72.3) Sebaceous cyst  Comment:   Plan: Classic. Reassurance given     YULISA SALGADO PA-C  [facility]

## 2017-05-12 NOTE — MR AVS SNAPSHOT
"              After Visit Summary   5/12/2017    Hany Patel    MRN: [id number]           Patient Information     Date Of Birth          1995        Visit Information        Provider Department      5/12/2017 2:00 PM Liu Song PA-C [facility]        Today's Diagnoses     Chronic allergic conjunctivitis    -  1    Morbid obesity, unspecified obesity type (H)        Polydipsia           Follow-ups after your visit        Who to contact     If you have questions or need follow up information about today's clinic visit or your schedule please contact [facility] directly at [phone].  Normal or non-critical lab and imaging results will be communicated to you by MyChart, letter or phone within 4 business days after the clinic has received the results. If you do not hear from us within 7 days, please contact the clinic through [employer] or phone. If you have a critical or abnormal lab result, we will notify you by phone as soon as possible.  Submit refill requests through [employer] or call your pharmacy and they will forward the refill request to us. Please allow 3 business days for your refill to be completed.          Additional Information About Your Visit        MyChart Information     [employer] gives you secure access to your electronic health record. If you see a primary care provider, you can also send messages to your care team and make appointments. If you have questions, please call your primary care clinic.  If you do not have a primary care provider, please call [phone] and they will assist you.        Care EveryWhere ID     This is your Care EveryWhere ID. This could be used by other organizations to access your [place] medical records  [id number]        Your Vitals Were     Pulse Temperature Height BMI (Body Mass Index)          80 98.5  F (36.9  C) (Oral) 5' 3\" (1.6 m) 42.16 kg/m2         Blood Pressure from Last 3 Encounters:   05/12/17 104/62 "   03/23/17 118/70   03/16/17 107/64    Weight from Last 3 Encounters:   05/12/17 238 lb (108 kg)   03/23/17 235 lb 8 oz (106.8 kg)   03/16/17 236 lb (107 kg)              Today, you had the following     No orders found for display         Today's Medication Changes          These changes are accurate as of: 5/12/17  2:39 PM.  If you have any questions, ask your nurse or doctor.               Start taking these medicines.        Dose/Directions    ketotifen 0.025 % Soln ophthalmic solution   Commonly known as:  ZADITOR   Used for:  Chronic allergic conjunctivitis   Started by:  Liu Song PA-C        Dose:  1 drop   Place 1 drop into both eyes every 12 hours   Quantity:  1 Bottle   Refills:  3            Where to get your medicines      These medications were sent to [facility] - [place], [place] - [address] AT [facility] [address]  [address], [place] [address]     Phone:  [phone]     ketotifen 0.025 % Soln ophthalmic solution                Primary Care Provider Office Phone # Fax #    Liu Song PA-C [phone]       [facility] [address] [facility] [address]        Thank you!     Thank you for choosing [facility]  for your care. Our goal is always to provide you with excellent care. Hearing back from our patients is one way we can continue to improve our services. Please take a few minutes to complete the written survey that you may receive in the mail after your visit with us. Thank you!             Your Updated Medication List - Protect others around you: Learn how to safely use, store and throw away your medicines at www.disposemymeds.org.          This list is accurate as of: 5/12/17  2:39 PM.  Always use your most recent med list.                   Brand Name Dispense Instructions for use    CALCIUM + D PO      Daily chewable       cyanocobalamin 1000 MCG Subl  sublingual tablet      Place 1,000 mcg under the tongue daily       ketotifen 0.025 % Soln ophthalmic solution    ZADITOR    1 Bottle    Place 1 drop into both eyes every 12 hours       lactobacillus acidophilus Tabs      Take 1 tablet by mouth 2 times daily       levothyroxine 112 MCG tablet    SYNTHROID/LEVOTHROID     Take 112 mcg by mouth daily.       LEXAPRO 20 MG tablet   Generic drug:  escitalopram      Take 20 mg by mouth daily       loperamide 2 MG capsule    IMODIUM     Take 2 mg by mouth daily (with breakfast) Take two tablets with breakfast       metFORMIN 1000 MG tablet    GLUCOPHAGE     Take 1,000 mg by mouth 2 times daily (with meals).       Multi-vitamin Tabs tablet   Generic drug:  multivitamin, therapeutic with minerals      Take 1 tablet by mouth daily. With D3       omega-3 acid ethyl esters 1 G capsule    Lovaza     TAKE 2 CAPSULES BY MOUTH TWICE A DAY       simvastatin 20 MG tablet    ZOCOR     Take 1 tablet (20 mg) by mouth At Bedtime       vitamin B complex with vitamin C Tabs tablet      Take 1 tablet by mouth daily       vitamin C 500 MG Chew      Take by mouth daily

## 2017-06-12 ENCOUNTER — TELEPHONE (OUTPATIENT)
Dept: FAMILY MEDICINE | Facility: CLINIC | Age: 22
End: 2017-06-12

## 2017-06-12 NOTE — TELEPHONE ENCOUNTER
Forms received from [facility]: Medical Information Supplement for Nelson Song PA-C.  Forms placed in provider 'sign me' folder.  Please phone [phone] to  forms after completion.    Jessy Becker,

## 2017-06-15 ENCOUNTER — MYC MEDICAL ADVICE (OUTPATIENT)
Dept: FAMILY MEDICINE | Facility: CLINIC | Age: 22
End: 2017-06-15

## 2017-06-15 NOTE — TELEPHONE ENCOUNTER
Patients mother here to  envelope, ID verified and envelope handed to Marcella patients mother    . Reyna Rojsa  Patient Representative

## 2017-07-07 ENCOUNTER — DOCUMENTATION ONLY (OUTPATIENT)
Dept: LAB | Facility: CLINIC | Age: 22
End: 2017-07-07

## 2017-07-07 DIAGNOSIS — R79.89 ABNORMAL CBC: ICD-10-CM

## 2017-07-07 DIAGNOSIS — E03.9 HYPOTHYROIDISM, UNSPECIFIED TYPE: Primary | ICD-10-CM

## 2017-07-07 DIAGNOSIS — N28.9 DECREASED RENAL FUNCTION: ICD-10-CM

## 2017-07-07 NOTE — PROGRESS NOTES
This patient has a future lab only appointment on 07/10/2017 and needs orders for fasting labs. Thanks derrek

## 2017-07-10 DIAGNOSIS — R73.02 GLUCOSE INTOLERANCE (IMPAIRED GLUCOSE TOLERANCE): ICD-10-CM

## 2017-07-10 DIAGNOSIS — E03.9 HYPOTHYROIDISM: Primary | ICD-10-CM

## 2017-07-10 DIAGNOSIS — E55.9 VITAMIN D DEFICIENCY: ICD-10-CM

## 2017-07-10 DIAGNOSIS — E88.819 INSULIN RESISTANCE: ICD-10-CM

## 2017-07-10 DIAGNOSIS — N28.9 DECREASED RENAL FUNCTION: ICD-10-CM

## 2017-07-10 DIAGNOSIS — E03.9 HYPOTHYROIDISM, UNSPECIFIED TYPE: ICD-10-CM

## 2017-07-10 DIAGNOSIS — R79.89 ABNORMAL CBC: ICD-10-CM

## 2017-07-10 LAB
ERYTHROCYTE [DISTWIDTH] IN BLOOD BY AUTOMATED COUNT: 15.4 % (ref 10–15)
HBA1C MFR BLD: 5.2 % (ref 4.3–6)
HCT VFR BLD AUTO: 46.2 % (ref 40–53)
HGB BLD-MCNC: 15.8 G/DL (ref 13.3–17.7)
MCH RBC QN AUTO: 29.8 PG (ref 26.5–33)
MCHC RBC AUTO-ENTMCNC: 34.2 G/DL (ref 31.5–36.5)
MCV RBC AUTO: 87 FL (ref 78–100)
PLATELET # BLD AUTO: 284 10E9/L (ref 150–450)
RBC # BLD AUTO: 5.3 10E12/L (ref 4.4–5.9)
WBC # BLD AUTO: 5.6 10E9/L (ref 4–11)

## 2017-07-10 PROCEDURE — 84443 ASSAY THYROID STIM HORMONE: CPT | Performed by: PHYSICIAN ASSISTANT

## 2017-07-10 PROCEDURE — 36415 COLL VENOUS BLD VENIPUNCTURE: CPT | Performed by: PHYSICIAN ASSISTANT

## 2017-07-10 PROCEDURE — 83036 HEMOGLOBIN GLYCOSYLATED A1C: CPT

## 2017-07-10 PROCEDURE — 80061 LIPID PANEL: CPT

## 2017-07-10 PROCEDURE — 84439 ASSAY OF FREE THYROXINE: CPT

## 2017-07-10 PROCEDURE — 80053 COMPREHEN METABOLIC PANEL: CPT | Performed by: PHYSICIAN ASSISTANT

## 2017-07-10 PROCEDURE — 85027 COMPLETE CBC AUTOMATED: CPT | Performed by: PHYSICIAN ASSISTANT

## 2017-07-10 PROCEDURE — 82306 VITAMIN D 25 HYDROXY: CPT

## 2017-07-11 LAB
ALBUMIN SERPL-MCNC: 3.5 G/DL (ref 3.4–5)
ALP SERPL-CCNC: 62 U/L (ref 40–150)
ALT SERPL W P-5'-P-CCNC: 33 U/L (ref 0–70)
ANION GAP SERPL CALCULATED.3IONS-SCNC: 9 MMOL/L (ref 3–14)
AST SERPL W P-5'-P-CCNC: 18 U/L (ref 0–45)
BILIRUB SERPL-MCNC: 0.4 MG/DL (ref 0.2–1.3)
BUN SERPL-MCNC: 12 MG/DL (ref 7–30)
CALCIUM SERPL-MCNC: 8.6 MG/DL (ref 8.5–10.1)
CHLORIDE SERPL-SCNC: 107 MMOL/L (ref 94–109)
CO2 SERPL-SCNC: 26 MMOL/L (ref 20–32)
CREAT SERPL-MCNC: 0.69 MG/DL (ref 0.66–1.25)
DEPRECATED CALCIDIOL+CALCIFEROL SERPL-MC: 42 UG/L (ref 20–75)
GFR SERPL CREATININE-BSD FRML MDRD: NORMAL ML/MIN/1.7M2
GLUCOSE SERPL-MCNC: 84 MG/DL (ref 70–99)
POTASSIUM SERPL-SCNC: 4.3 MMOL/L (ref 3.4–5.3)
PROT SERPL-MCNC: 7.3 G/DL (ref 6.8–8.8)
SODIUM SERPL-SCNC: 142 MMOL/L (ref 133–144)
TSH SERPL DL<=0.05 MIU/L-ACNC: 1.16 MU/L (ref 0.4–4)

## 2017-07-13 ENCOUNTER — TRANSFERRED RECORDS (OUTPATIENT)
Dept: HEALTH INFORMATION MANAGEMENT | Facility: CLINIC | Age: 22
End: 2017-07-13

## 2017-07-13 LAB
CHOLEST SERPL-MCNC: 157 MG/DL
HDLC SERPL-MCNC: 46 MG/DL
LDLC SERPL CALC-MCNC: 84 MG/DL
NONHDLC SERPL-MCNC: 111 MG/DL
T4 FREE SERPL-MCNC: 0.99 NG/DL (ref 0.76–1.46)
TRIGL SERPL-MCNC: 135 MG/DL

## 2017-07-21 ENCOUNTER — OFFICE VISIT (OUTPATIENT)
Dept: FAMILY MEDICINE | Facility: CLINIC | Age: 22
End: 2017-07-21
Payer: MEDICARE

## 2017-07-21 VITALS
DIASTOLIC BLOOD PRESSURE: 80 MMHG | HEART RATE: 88 BPM | SYSTOLIC BLOOD PRESSURE: 110 MMHG | TEMPERATURE: 98.9 F | WEIGHT: 245 LBS | HEIGHT: 63 IN | BODY MASS INDEX: 43.41 KG/M2

## 2017-07-21 DIAGNOSIS — E03.9 HYPOTHYROIDISM, UNSPECIFIED TYPE: ICD-10-CM

## 2017-07-21 DIAGNOSIS — Z00.00 ROUTINE GENERAL MEDICAL EXAMINATION AT A HEALTH CARE FACILITY: Primary | ICD-10-CM

## 2017-07-21 DIAGNOSIS — Q90.9 DOWN'S SYNDROME: ICD-10-CM

## 2017-07-21 DIAGNOSIS — F69 BEHAVIOR PROBLEM, ADULT: ICD-10-CM

## 2017-07-21 DIAGNOSIS — E66.01 MORBID OBESITY, UNSPECIFIED OBESITY TYPE (H): ICD-10-CM

## 2017-07-21 DIAGNOSIS — F84.9 PERVASIVE DEVELOPMENTAL DISORDER: ICD-10-CM

## 2017-07-21 PROCEDURE — 99395 PREV VISIT EST AGE 18-39: CPT | Performed by: PHYSICIAN ASSISTANT

## 2017-07-21 NOTE — PROGRESS NOTES
SUBJECTIVE:   CC: Hany Patel is an 22 year old male who presents for preventative health visit.     Healthy Habits:    Do you get at least three servings of calcium containing foods daily (dairy, green leafy vegetables, etc.)? yes    Amount of exercise or daily activities, outside of work: 5 day(s) per week    Problems taking medications regularly No    Medication side effects: No    Have you had an eye exam in the past two years? yes    Do you see a dentist twice per year? yes    Do you have sleep apnea, excessive snoring or daytime drowsiness?yes      Questions about weight loss  Behavior issues - Gardner - gets irritable - mom reports his mood can be troubling. He gets crabby and won't respond.    Today's PHQ-2 Score:   PHQ-2 ( 1999 [employer]) 6/5/2015 10/14/2013   Q1: Little interest or pleasure in doing things 0 0   Q2: Feeling down, depressed or hopeless 1 0   PHQ-2 Score 1 0       Abuse: Current or Past(Physical, Sexual or Emotional)- No  Do you feel safe in your environment - Yes    Social History   Substance Use Topics     Smoking status: Never Smoker     Smokeless tobacco: Never Used     Alcohol use No     The patient does not drink >3 drinks per day nor >7 drinks per week.    Last PSA: No results found for: PSA    Reviewed orders with patient. Reviewed health maintenance and updated orders accordingly - Yes  Labs reviewed in EPIC    Reviewed and updated as needed this visit by clinical staff  Tobacco  Allergies  Med Hx  Surg Hx  Fam Hx  Soc Hx        Reviewed and updated as needed this visit by Provider              ROS:  C: NEGATIVE for fever, chills, change in weight  I: NEGATIVE for worrisome rashes, moles or lesions  E: NEGATIVE for vision changes or irritation  ENT: NEGATIVE for ear, mouth and throat problems  R: NEGATIVE for significant cough or SOB  CV: NEGATIVE for chest pain, palpitations or peripheral edema  GI: NEGATIVE for nausea, abdominal pain, heartburn, or change in bowel habits    "male: negative for dysuria, hematuria, decreased urinary stream, erectile dysfunction, urethral discharge  M: NEGATIVE for significant arthralgias or myalgia  N: NEGATIVE for weakness, dizziness or paresthesias  P: NEGATIVE for changes in mood or affect    OBJECTIVE:   /80 (Cuff Size: Adult Large)  Pulse 88  Temp 98.9  F (37.2  C) (Oral)  Ht 5' 3\" (1.6 m)  Wt 245 lb (111.1 kg)  BMI 43.4 kg/m2  EXAM:  GENERAL: healthy, alert and no distress  EYES: Eyes grossly normal to inspection, PERRL and conjunctivae and sclerae normal  HENT: ear canals and TM's normal, nose and mouth without ulcers or lesions  NECK: no adenopathy, no asymmetry, masses, or scars and thyroid normal to palpation  RESP: lungs clear to auscultation - no rales, rhonchi or wheezes  CV: regular rate and rhythm, normal S1 S2, no S3 or S4, no murmur, click or rub, no peripheral edema and peripheral pulses strong  ABDOMEN: soft, nontender, no hepatosplenomegaly, no masses and bowel sounds normal  MS: no gross musculoskeletal defects noted, no edema  SKIN: no suspicious lesions or rashes  NEURO: Normal strength and tone, mentation intact and speech normal  PSYCH: mentation appears normal, affect normal/bright  LYMPH: no cervical, supraclavicular, axillary, or inguinal adenopathy    ASSESSMENT/PLAN:   (Z00.00) Routine general medical examination at a health care facility  (primary encounter diagnosis)  Comment: Well person  Plan: Diet, exercise, wellness and other preventive recommendations related to health maintenance were discussed.  Follow up as needed for acute issues.  Physical exam in 1 year.     (Q90.9) Down's syndrome  Comment:   Plan: Currently managed by [facility]    (E03.9) Hypothyroidism, unspecified type  Comment:   Plan:   Lab Results   Component Value Date    TSH 1.16 07/10/2017          (E66.01) Morbid obesity, unspecified obesity type (H)  Comment:   Plan: NUTRITION REFERRAL        Counseling on weight " "loss provided and referral given    (F84.9) Pervasive developmental disorder  Comment:   Plan: Downs    (F69) Behavior problem, adult  Comment:   Plan: MENTAL HEALTH REFERRAL        Nate - reassurance and referral for therapy - they might need to go through [facility] for therapy services as I'm not clear if our therapy group is equipped for special needs/Down's cases.     COUNSELING:  Reviewed preventive health counseling, as reflected in patient instructions     reports that he has never smoked. He has never used smokeless tobacco.      Estimated body mass index is 43.4 kg/(m^2) as calculated from the following:    Height as of this encounter: 5' 3\" (1.6 m).    Weight as of this encounter: 245 lb (111.1 kg).         Counseling Resources:  ATP IV Guidelines  Pooled Cohorts Equation Calculator  FRAX Risk Assessment  ICSI Preventive Guidelines  Dietary Guidelines for Americans, 2010  USDA's MyPlate  ASA Prophylaxis  Lung CA Screening    YULISA SALGADO PA-C  [facility]  "

## 2017-07-21 NOTE — NURSING NOTE
"Chief Complaint   Patient presents with     Physical     Forms     Annual physical examination       Initial /80 (Cuff Size: Adult Large)  Pulse 88  Temp 98.9  F (37.2  C) (Oral)  Ht 5' 3\" (1.6 m)  Wt 245 lb (111.1 kg)  BMI 43.4 kg/m2 Estimated body mass index is 43.4 kg/(m^2) as calculated from the following:    Height as of this encounter: 5' 3\" (1.6 m).    Weight as of this encounter: 245 lb (111.1 kg).  Medication Reconciliation: complete   Yeimy Ryan, Certified Medical Assistant (AAMA)     "

## 2017-07-21 NOTE — MR AVS SNAPSHOT
After Visit Summary   7/21/2017    Hany Patel    MRN: [id number]           Patient Information     Date Of Birth          1995        Visit Information        Provider Department      7/21/2017 2:00 PM Liu Song PA-C [facility]        Today's Diagnoses     Routine general medical examination at a health care facility    -  1    Down's syndrome        Hypothyroidism, unspecified type        Morbid obesity, unspecified obesity type (H)        Pervasive developmental disorder        Behavior problem, adult          Care Instructions      Preventive Health Recommendations  Male Ages 18 - 25     Yearly exam:             See your health care provider every year in order to  o   Review health changes.   o   Discuss preventive care.    o   Review your medicines if your doctor has prescribed any.    You should be tested each year for STDs (sexually transmitted diseases).     Talk to your provider about cholesterol testing.      If you are at risk for diabetes, you should have a diabetes test (fasting glucose).    Shots: Get a flu shot each year. Get a tetanus shot every 10 years.     Nutrition:    Eat at least 5 servings of fruits and vegetables daily.     Eat whole-grain bread, whole-wheat pasta and brown rice instead of white grains and rice.     Talk to your provider about calcium and Vitamin D.     Lifestyle    Exercise for at least 150 minutes a week (30 minutes a day, 5 days a week). This will help you control your weight and prevent disease.     Limit alcohol to one drink per day.     No smoking.     Wear sunscreen to prevent skin cancer.     See your dentist every six months for an exam and cleaning.             Follow-ups after your visit        Additional Services     MENTAL HEALTH REFERRAL       Your provider has referred you to: [facility]: [facility] - Counseling (Individual/Couples/Family) - [facility] [phone]    [email or web address]   *Patient will be contacted by [facility]'s scheduling partner, [facility]), to schedule an appointment.  Patients may also call [facility] to schedule.    All scheduling is subject to the client's specific insurance plan & benefits, provider/location availability, and provider clinical specialities.  Please arrive 15 minutes early for your first appointment and bring your completed paperwork.    Please be aware that coverage of these services is subject to the terms and limitations of your health insurance plan.  Call member services at your health plan with any benefit or coverage questions.            NUTRITION REFERRAL       Your provider has referred you to: [facility]: [facility] [phone]   [email or web address]    Please be aware that coverage of these services is subject to the terms and limitations of your health insurance plan.  Call member services at your health plan with any benefit or coverage questions.      Please bring the following with you to your appointment:    (1) This referral request  (2) Any documents given to you regarding this referral  (3) Any specific questions you have about diet and/or food choices                  Who to contact     If you have questions or need follow up information about today's clinic visit or your schedule please contact [facility] directly at [phone].  Normal or non-critical lab and imaging results will be communicated to you by MyChart, letter or phone within 4 business days after the clinic has received the results. If you do not hear from us within 7 days, please contact the clinic through MyChart or phone. If you have a critical or abnormal lab result, we will notify you by phone as soon as possible.  Submit refill requests through [employer] or call your pharmacy and they will forward the refill  "request to us. Please allow 3 business days for your refill to be completed.          Additional Information About Your Visit        MyChart Information     [employer] gives you secure access to your electronic health record. If you see a primary care provider, you can also send messages to your care team and make appointments. If you have questions, please call your primary care clinic.  If you do not have a primary care provider, please call [phone] and they will assist you.        Care EveryWhere ID     This is your Care EveryWhere ID. This could be used by other organizations to access your [facility] medical records  [id number]        Your Vitals Were     Pulse Temperature Height BMI (Body Mass Index)          88 98.9  F (37.2  C) (Oral) 5' 3\" (1.6 m) 43.4 kg/m2         Blood Pressure from Last 3 Encounters:   07/21/17 110/80   05/12/17 104/62   03/23/17 118/70    Weight from Last 3 Encounters:   07/21/17 245 lb (111.1 kg)   05/12/17 238 lb (108 kg)   03/23/17 235 lb 8 oz (106.8 kg)              We Performed the Following     MENTAL HEALTH REFERRAL     NUTRITION REFERRAL        Primary Care Provider Office Phone # Fax #    Liu Song PA-C [phone]       [address] [address]        Equal Access to Services     JUWAN HOLT : Hadii aad ku hadasho Soomaali, waaxda luqadaha, qaybta kaalmada adeegyada, penelope peacock. So [facility] [phone].    ATENCIÓN: Si habla español, tiene a kuo disposición servicios gratuitos de asistencia lingüística. Llame al [phone].    We comply with applicable federal civil rights laws and [place] laws. We do not discriminate on the basis of race, color, national origin, age, disability sex, sexual orientation or gender identity.            Thank you!     Thank you for choosing [facility]  for your care. Our goal is always to provide you with excellent care. " Hearing back from our patients is one way we can continue to improve our services. Please take a few minutes to complete the written survey that you may receive in the mail after your visit with us. Thank you!             Your Updated Medication List - Protect others around you: Learn how to safely use, store and throw away your medicines at www.disposemymeds.org.          This list is accurate as of: 7/21/17  2:49 PM.  Always use your most recent med list.                   Brand Name Dispense Instructions for use Diagnosis    CALCIUM + D PO      Daily chewable        cyanocobalamin 1000 MCG Subl sublingual tablet      Place 1,000 mcg under the tongue daily        ketotifen 0.025 % Soln ophthalmic solution    ZADITOR    1 Bottle    Place 1 drop into both eyes every 12 hours    Chronic allergic conjunctivitis       lactobacillus acidophilus Tabs      Take 1 tablet by mouth 2 times daily        levothyroxine 112 MCG tablet    SYNTHROID/LEVOTHROID     Take 112 mcg by mouth daily.        LEXAPRO 20 MG tablet   Generic drug:  escitalopram      Take 20 mg by mouth daily        loperamide 2 MG capsule    IMODIUM     Take 2 mg by mouth daily (with breakfast) Take two tablets with breakfast        metFORMIN 1000 MG tablet    GLUCOPHAGE     Take 1,000 mg by mouth 2 times daily (with meals).        Multi-vitamin Tabs tablet   Generic drug:  multivitamin, therapeutic with minerals      Take 1 tablet by mouth daily. With D3        omega-3 acid ethyl esters 1 G capsule    Lovaza     TAKE 2 CAPSULES BY MOUTH TWICE A DAY        simvastatin 20 MG tablet    ZOCOR     Take 1 tablet (20 mg) by mouth At Bedtime        vitamin B complex with vitamin C Tabs tablet      Take 1 tablet by mouth daily        vitamin C 500 MG Chew      Take by mouth daily

## 2017-08-01 ENCOUNTER — MYC MEDICAL ADVICE (OUTPATIENT)
Dept: FAMILY MEDICINE | Facility: CLINIC | Age: 22
End: 2017-08-01

## 2017-08-02 NOTE — TELEPHONE ENCOUNTER
Spoke with Marcella,    She will contact [place] to verify coverage and will reschedule appointment if needed.    Jessy Becker,

## 2017-09-01 ENCOUNTER — OFFICE VISIT (OUTPATIENT)
Dept: NUTRITION | Facility: CLINIC | Age: 22
End: 2017-09-01
Payer: MEDICARE

## 2017-09-01 VITALS — BODY MASS INDEX: 41.52 KG/M2 | WEIGHT: 243.2 LBS | HEIGHT: 64 IN

## 2017-09-01 DIAGNOSIS — E66.01 MORBID OBESITY (H): ICD-10-CM

## 2017-09-01 DIAGNOSIS — Z13.6 CARDIOVASCULAR SCREENING; LDL GOAL LESS THAN 130: ICD-10-CM

## 2017-09-01 DIAGNOSIS — R73.03 PRE-DIABETES: ICD-10-CM

## 2017-09-01 DIAGNOSIS — Q90.9 DOWN'S SYNDROME: Primary | ICD-10-CM

## 2017-09-01 PROCEDURE — 97802 MEDICAL NUTRITION INDIV IN: CPT | Performed by: DIETITIAN, REGISTERED

## 2017-09-01 NOTE — MR AVS SNAPSHOT
After Visit Summary   9/1/2017    Hany Patel    MRN: [id number]           Patient Information     Date Of Birth          1995        Visit Information        Provider Department      9/1/2017 1:00 PM NE NUTRITION RESOURCE [facility]        Care Instructions    Goals:     1. At lunch time, try to reduce the cookie to 1 small and cut out the chips since he often eats chips later in the day.      2. Cut out the Vitamin water.  Unsweetened iced tea would be fine to drink    3. Ideal activity: 60 minutes a day.    4. Aim for 5 cups fruits and vegetables and day and 3 servings of dairy a day.    5. Plate method at meals:  1/2-1 cup grains or 1-2 pc bread (try more whole grains if he is feeling hungry), 1/2 meal fruits and vegetables, , small serving of fat and 3-4oz protein at meals      6. Limit the sweets/salty snacks to 100 calories and try to limit 1 time a day.    FOLLOW UP: Friday, October 13th at 10am at [facility]    Arabella Moreno RD, LD, CDE   [phone]          Follow-ups after your visit        Your next 10 appointments already scheduled     Oct 13, 2017 10:00 AM CDT   Diabetic Education with [facility]   [facility]    [address] [place] [id number] [phone]              Who to contact     If you have questions or need follow up information about today's clinic visit or your schedule please contact [facility] directly at [phone].  Normal or non-critical lab and imaging results will be communicated to you by MyChart, letter or phone within 4 business days after the clinic has received the results. If you do not hear from us within 7 days, please contact the clinic through [employer] or phone. If you have a critical or abnormal lab result, we will notify you by phone as soon as possible.  Submit refill requests through [employer] or call your pharmacy and  "they will forward the refill request to us. Please allow 3 business days for your refill to be completed.          Additional Information About Your Visit        MyChart Information     [employer] gives you secure access to your electronic health record. If you see a primary care provider, you can also send messages to your care team and make appointments. If you have questions, please call your primary care clinic.  If you do not have a primary care provider, please call [phone] and they will assist you.        Care EveryWhere ID     This is your Care EveryWhere ID. This could be used by other organizations to access your [facility] medical records  [id number]        Your Vitals Were     Height BMI (Body Mass Index)                1.619 m (5' 3.75\") 42.07 kg/m2           Blood Pressure from Last 3 Encounters:   07/21/17 110/80   05/12/17 104/62   03/23/17 118/70    Weight from Last 3 Encounters:   09/01/17 110.3 kg (243 lb 3.2 oz)   07/21/17 111.1 kg (245 lb)   05/12/17 108 kg (238 lb)              Today, you had the following     No orders found for display       Primary Care Provider Office Phone # Fax #    Liu Song PA-C [phone]       [address] [address]        Equal Access to Services     JUWAN HOLT : Hadii andrew ku hadasho Soomaali, waaxda luqadaha, qaybta kaalmada adeegyada, waxay idiin hayaan jon rivers . So [facility] [phone].    ATENCIÓN: Si habla español, tiene a kuo disposición servicios gratuitos de asistencia lingüística. Llame al [phone].    We comply with applicable federal civil rights laws and [place] laws. We do not discriminate on the basis of race, color, national origin, age, disability sex, sexual orientation or gender identity.            Thank you!     Thank you for choosing [facility]  for your care. Our goal is always to provide you with excellent care. Hearing back from our patients is one way we can " continue to improve our services. Please take a few minutes to complete the written survey that you may receive in the mail after your visit with us. Thank you!             Your Updated Medication List - Protect others around you: Learn how to safely use, store and throw away your medicines at www.disposemymeds.org.          This list is accurate as of: 9/1/17  2:01 PM.  Always use your most recent med list.                   Brand Name Dispense Instructions for use Diagnosis    CALCIUM + D PO      Daily chewable        cyanocobalamin 1000 MCG Subl sublingual tablet      Place 1,000 mcg under the tongue daily        ketotifen 0.025 % Soln ophthalmic solution    ZADITOR    1 Bottle    Place 1 drop into both eyes every 12 hours    Chronic allergic conjunctivitis       lactobacillus acidophilus Tabs      Take 1 tablet by mouth 2 times daily        levothyroxine 112 MCG tablet    SYNTHROID/LEVOTHROID     Take 112 mcg by mouth daily.        LEXAPRO 20 MG tablet   Generic drug:  escitalopram      Take 20 mg by mouth daily        loperamide 2 MG capsule    IMODIUM     Take 2 mg by mouth daily (with breakfast) Take two tablets with breakfast        metFORMIN 1000 MG tablet    GLUCOPHAGE     Take 1,000 mg by mouth 2 times daily (with meals).        Multi-vitamin Tabs tablet   Generic drug:  multivitamin, therapeutic with minerals      Take 1 tablet by mouth daily. With D3        omega-3 acid ethyl esters 1 G capsule    Lovaza     TAKE 2 CAPSULES BY MOUTH TWICE A DAY        simvastatin 20 MG tablet    ZOCOR     Take 1 tablet (20 mg) by mouth At Bedtime        vitamin B complex with vitamin C Tabs tablet      Take 1 tablet by mouth daily        vitamin C 500 MG Chew      Take by mouth daily

## 2017-09-01 NOTE — PROGRESS NOTES
"Medical Nutrition Therapy  Visit Type:Initial assessment and intervention    Hany Patel presents today for MNT and education related to weight management.   He is accompanied by mother and father.     ASSESSMENT:   Patient comments/concerns relating to nutrition: Per mom, wants to reinforce with Hany that diet soda more than a couple time a week and eating a lot of food and worried it will lead to diabetes.  Says Hany has prediabetes and is taking Metformin.  Says he is now bullying when he does not get what he wants; will give in because weary.  Looking for reinforcement for healthy eating changes.      Per dad, feels activity important.  Goes to [employer] 1 time a week plus has stationary bike at home.     NUTRITION HISTORY:  Parents helping with all the meals.    Wakes: 6:30am on days going to work program - 4 days a week.  Breakfast: 7:15am: 1 cup yogurt and 6 oz OJ OR 1 cup yogurt, 6 oz OJ and 4 oz milk.  Takes probiotic  AM: not that parents know  Lunch: 11:30am: Deborah drink, 6\" 3-meat/cheese club sandwich (1/2 - 1 sandwich), small (1/4 bag) chips, 2 mini chocolate chip cookies, 6 carrots or small salad, 1 Tbsp ranch dip, cheese stick, 6-8oz milk and 5.5 oz can LS V8   PM: Zevia (8.4oz), Vitamin water (20 oz), unsweetened iced tea OR 2 handful Fritos, Vitamin drink and iced tea OR Diet Mt. Dew, small Melchor's  Piece, handful Fritos, Vitamin water, iced tea (unsweetened)  Dinner: 6:30pm: 1/2 salad, cup sopu, glass milk, 4 pc brushetta appetizer, 1 small bowl sherbet OR 1 BBQ chicken, diet soda and sherbet OR Quinoa with carrots and kale, brats, cranberry, walnut, feta, green saald with poppy seed dressing  Snacks: small bowl sherbet  Beverages: Pop (Diet) 2x/week, Tea (unsweet) 1x/day, Water all day and Vitamin water 1x/day    Misses meals? none  Eats out:  3 meals/per week (family and friends)    Previous diet education:  No     Likes: cheeseburger, hot dog, brats, chicken, mashed potatoes  Dislikes: none    Food " allergies/intolerances: None    Diet is high in: calories  Diet is low in: calcium, fiber, fruits and vegetables    EXERCISE: [employer] 1 time a week, walks dog 2-3 times a week and rides bike and treadmill 2-3 times a week.    SOCIO/ECONOMIC:   Lives with: mother, father and siblings    MEDICATIONS:  Current Outpatient Prescriptions   Medication     ketotifen (ZADITOR) 0.025 % SOLN ophthalmic solution     simvastatin (ZOCOR) 20 MG tablet     Lactobacillus Acid-Pectin (LACTOBACILLUS ACIDOPHILUS) TABS     cyanocobalamin 1000 MCG SUBL     omega-3 acid ethyl esters (LOVAZA) 1 G capsule     vitamin  B complex with vitamin C (VITAMIN  B COMPLEX) TABS     escitalopram (LEXAPRO) 20 MG tablet     loperamide (IMODIUM) 2 MG capsule     Ascorbic Acid (VITAMIN C) 500 MG CHEW     Calcium Carbonate-Vitamin D (CALCIUM + D PO)     metFORMIN (GLUCOPHAGE) 1000 MG tablet     levothyroxine (SYNTHROID, LEVOTHROID) 112 MCG tablet     Multiple Vitamin (MULTI-VITAMIN) per tablet     No current facility-administered medications for this visit.        LABS:  Last Basic Metabolic Panel:  Lab Results   Component Value Date     07/10/2017      Lab Results   Component Value Date    POTASSIUM 4.3 07/10/2017     Lab Results   Component Value Date    CHLORIDE 107 07/10/2017     Lab Results   Component Value Date    JUAN 8.6 07/10/2017     Lab Results   Component Value Date    CO2 26 07/10/2017     Lab Results   Component Value Date    BUN 12 07/10/2017     Lab Results   Component Value Date    CR 0.69 07/10/2017     Lab Results   Component Value Date    GLC 84 07/10/2017     Recent Labs   Lab Test  07/10/17   0848  02/23/17   1125   09/21/15   0922  03/06/15   1031   CHOL  157  176   < >  242*  202*   HDL  46  24*   < >  37*  36*   LDL  84  115*   < >  169*  141*   TRIG  135  187*   < >  179*  124   CHOLHDLRATIO   --    --    --   6.6*  5.6*    < > = values in this interval not displayed.       TSH   Date Value Ref Range Status   07/10/2017 1.16  "0.40 - 4.00 mU/L Final   ]    ANTHROPOMETRICS:  Vitals: Ht 1.619 m (5' 3.75\")  Wt 110.3 kg (243 lb 3.2 oz)  BMI 42.07 kg/m2  Body mass index is 42.07 kg/(m^2).      Wt Readings from Last 5 Encounters:   07/21/17 111.1 kg (245 lb)   05/12/17 108 kg (238 lb)   03/23/17 106.8 kg (235 lb 8 oz)   03/16/17 107 kg (236 lb)   03/02/17 102.6 kg (226 lb 2 oz)       Weight Change: Lost 1.8 lbs in the past 6 weeks    ESTIMATED KCAL REQUIREMENTS:  2400 kcal per day    NUTRITION DIAGNOSIS: Excessive energy intake related to higher calorie foods and larger portions as evidenced by diet discussion and eating out 3 times a week.    NUTRITION INTERVENTION:  Nutrition Prescription: Energy Intake: 2000 kcal/day for weight loss  Education given to support: general nutrition guidelines, weight reduction, consistent meals, artificial sweeteners, fat modification, exercise, fiber, behavior modification, portion control and heart healthy diet  Education Materials Provided: My Plate Planner/Choose My Plate, 1800 Calorie 5-day Sample Menus, 100 Calorie Snacks, Weight Loss Tips and Cooking Tips for Weight Loss  Motivational Interviewing    Discussion: Patient is cognitively impaired so used the plate method to help encouraged increasing fruits and vegetables at meals and recommended he limit meat to size of palm of hand, grains to fist size and added fat/dressing to pinky size.  Patient seemed to enjoy the plate method initially.      Answered parent's questions on food and healthy choices.  Commended them on watching sweetened beverages.  Advised them a diet soda 2 times a week is moderate and if patient is persistent, is a better option than regular soda since it won't raise blood glucose or add extra calories.  Did discuss how it may make him feel hungry and discussed other options (water, unsweetened tea) if they think or he thinks this is happening.  Discussed ways to reduce carbs at lunch, provide healthier options around the house and " try to avoid buying problem foods.  Recommended trying to limit sweets or salty snack foods to 1 time a day and continue small portions.  Healthy snack ideas and sample menu also provided.  Pt verbalized understanding of concepts discussed and recommendations provided.       PATIENT'S BEHAVIOR CHANGE GOALS:   See Patient Instructions for patient stated behavior change goals. AVS was printed and given to patient at today's appointment.    MONITOR / EVALUATE:  RD will monitor/evaluate:  Weight change    FOLLOW-UP:  Call RD with questions/concerns.   Follow-up appointment scheduled on 10/13/17.     Arabella Moreno RD, LD, CDE   Time spent in minutes: 60 minutes  Encounter: Individual

## 2017-09-01 NOTE — PATIENT INSTRUCTIONS
Goals:     1. At lunch time, try to reduce the cookie to 1 small and cut out the chips since he often eats chips later in the day.      2. Cut out the Vitamin water.  Unsweetened iced tea would be fine to drink    3. Ideal activity: 60 minutes a day.    4. Aim for 5 cups fruits and vegetables and day and 3 servings of dairy a day.    5. Plate method at meals:  1/2-1 cup grains or 1-2 pc bread (try more whole grains if he is feeling hungry), 1/2 meal fruits and vegetables, , small serving of fat and 3-4oz protein at meals      6. Limit the sweets/salty snacks to 100 calories and try to limit 1 time a day.    FOLLOW UP: Friday, October 13th at 10am at [facility]    Arabella Moreno RD, ED, CDE   [phone]

## 2017-10-02 ENCOUNTER — OFFICE VISIT (OUTPATIENT)
Dept: FAMILY MEDICINE | Facility: CLINIC | Age: 22
End: 2017-10-02
Payer: MEDICARE

## 2017-10-02 VITALS
SYSTOLIC BLOOD PRESSURE: 115 MMHG | DIASTOLIC BLOOD PRESSURE: 66 MMHG | HEART RATE: 100 BPM | TEMPERATURE: 98.3 F | WEIGHT: 241 LBS | OXYGEN SATURATION: 96 % | BODY MASS INDEX: 41.69 KG/M2

## 2017-10-02 DIAGNOSIS — Q90.9 DOWN'S SYNDROME: ICD-10-CM

## 2017-10-02 DIAGNOSIS — H92.12 OTORRHEA, LEFT: Primary | ICD-10-CM

## 2017-10-02 PROCEDURE — 99213 OFFICE O/P EST LOW 20 MIN: CPT | Performed by: FAMILY MEDICINE

## 2017-10-02 RX ORDER — AMOXICILLIN 875 MG
875 TABLET ORAL 2 TIMES DAILY
Qty: 20 TABLET | Refills: 0 | Status: SHIPPED | OUTPATIENT
Start: 2017-10-02 | End: 2018-04-06

## 2017-10-02 RX ORDER — OFLOXACIN 3 MG/ML
10 SOLUTION AURICULAR (OTIC) 2 TIMES DAILY
Qty: 10 ML | Refills: 0 | Status: SHIPPED | OUTPATIENT
Start: 2017-10-02 | End: 2017-10-09

## 2017-10-02 NOTE — PROGRESS NOTES
SUBJECTIVE:   Hany Patel is a 22 year old male who presents to clinic today for the following health issues:    ENT Symptoms             Symptoms: cc Present Absent Comment   Fever/Chills   x    Fatigue   x    Muscle Aches   x    Eye Irritation   x    Sneezing   x    Nasal Pancho/Drg   x    Sinus Pressure/Pain   x    Loss of smell   x    Dental pain   x    Sore Throat   x    Swollen Glands   x    Ear Pain/Fullness  x  L ear pain , left ear drainage.    Cough   x    Wheeze   x    Chest Pain   x    Shortness of breath   x    Rash   x    Other    Congested      Symptom duration:  3-4 days    Symptom severity:  moderate   Treatments tried:  none   Contacts:  none      Per mom, has had ear surgeries don on his ears. He sees ENT outside [place].     Problem list and histories reviewed & adjusted, as indicated.  Additional history: as documented    Patient Active Problem List   Diagnosis     Down's syndrome     Hypothyroidism     Pre-diabetes     Chronic diarrhea     CARDIOVASCULAR SCREENING; LDL GOAL LESS THAN 130     Pervasive developmental disorder     Cystic acne vulgaris     Hypoxia     Past Surgical History:   Procedure Laterality Date     INSERT CHEST TUBE Left 2/7/2017    Procedure: INSERT CHEST TUBE;  Surgeon: Coco Zambrano MD;  Location:  OR     SURGICAL HISTORY OF -       Ear tubes      SURGICAL HISTORY OF -       Adenoid removal        Social History   Substance Use Topics     Smoking status: Never Smoker     Smokeless tobacco: Never Used     Alcohol use No     Family History   Problem Relation Age of Onset     Breast Cancer Mother      Hypertension Father          Current Outpatient Prescriptions   Medication Sig Dispense Refill     ketotifen (ZADITOR) 0.025 % SOLN ophthalmic solution Place 1 drop into both eyes every 12 hours 1 Bottle 3     simvastatin (ZOCOR) 20 MG tablet Take 1 tablet (20 mg) by mouth At Bedtime       Lactobacillus Acid-Pectin (LACTOBACILLUS ACIDOPHILUS) TABS Take 1 tablet  by mouth 2 times daily        omega-3 acid ethyl esters (LOVAZA) 1 G capsule TAKE 2 CAPSULES BY MOUTH TWICE A DAY  6     vitamin  B complex with vitamin C (VITAMIN  B COMPLEX) TABS Take 1 tablet by mouth daily       escitalopram (LEXAPRO) 20 MG tablet Take 20 mg by mouth daily       loperamide (IMODIUM) 2 MG capsule Take 2 mg by mouth daily (with breakfast) Take two tablets with breakfast       Ascorbic Acid (VITAMIN C) 500 MG CHEW Take by mouth daily       Calcium Carbonate-Vitamin D (CALCIUM + D PO) Daily chewable       metFORMIN (GLUCOPHAGE) 1000 MG tablet Take 1,000 mg by mouth 2 times daily (with meals).       levothyroxine (SYNTHROID, LEVOTHROID) 112 MCG tablet Take 112 mcg by mouth daily.       Multiple Vitamin (MULTI-VITAMIN) per tablet Take 1 tablet by mouth daily. With D3       BP Readings from Last 3 Encounters:   10/02/17 115/66   07/21/17 110/80   05/12/17 104/62    Wt Readings from Last 3 Encounters:   10/02/17 241 lb (109.3 kg)   09/01/17 243 lb 3.2 oz (110.3 kg)   07/21/17 245 lb (111.1 kg)                  Labs reviewed in EPIC        Reviewed and updated as needed this visit by clinical staffTobacco  Allergies  Meds  Med Hx  Surg Hx  Fam Hx  Soc Hx      Reviewed and updated as needed this visit by Provider         ROS:  Constitutional, HEENT, cardiovascular, pulmonary, gi and gu systems are negative, except as otherwise noted.      OBJECTIVE:   /66  Pulse 100  Temp 98.3  F (36.8  C) (Oral)  Wt 241 lb (109.3 kg)  SpO2 96%  BMI 41.69 kg/m2  Body mass index is 41.69 kg/(m^2).  GENERAL: healthy, alert and no distress  HENT: right EAC: normal. TM: erythema +, does not look normal.   Left ear: EAC: yellowish white discharge +, TM: part of TM seen, that was w/o erythema.   NECK: no adenopathy  SINUSES: nontender.   RESP: lungs clear to auscultation - no rales, rhonchi or wheezes  CV: regular rate and rhythm, normal S1 S2, no S3 or S4    ASSESSMENT/PLAN:       ICD-10-CM    1. Otorrhea,  left H92.12 ofloxacin (FLOXIN) 0.3 % otic solution     amoxicillin (AMOXIL) 875 MG tablet   2. Down's syndrome Q90.9      New to me. Limited history provided by pt. His right TM exam was not normal, unsure how acute his changes are.   Per mom he had ear surgeries done in past and has ENT provider.   Topical and and oral antibiotics as above. Call and schedule appointment with ENT.     Genesis Olmedo MD  [facility]

## 2017-10-02 NOTE — NURSING NOTE
"Chief Complaint   Patient presents with     Ear Problem     L ear pain        Initial /66  Pulse 100  Temp 98.3  F (36.8  C) (Oral)  Wt 241 lb (109.3 kg)  SpO2 96%  BMI 41.69 kg/m2 Estimated body mass index is 41.69 kg/(m^2) as calculated from the following:    Height as of 9/1/17: 5' 3.75\" (1.619 m).    Weight as of this encounter: 241 lb (109.3 kg).  Medication Reconciliation: complete  Kristine Tinsley MA    "

## 2017-10-02 NOTE — MR AVS SNAPSHOT
After Visit Summary   10/2/2017    Hany Patel    MRN: [id number]           Patient Information     Date Of Birth          1995        Visit Information        Provider Department      10/2/2017 2:00 PM Genesis Olmedo MD [facility]        Today's Diagnoses     Otorrhea, left    -  1       Follow-ups after your visit        Your next 10 appointments already scheduled     Nov 03, 2017 12:00 PM CDT   Diabetic Education with [facility]   [facility]    [address] [place] [id number] [phone]              Who to contact     If you have questions or need follow up information about today's clinic visit or your schedule please contact [facility] directly at [phone].  Normal or non-critical lab and imaging results will be communicated to you by MyChart, letter or phone within 4 business days after the clinic has received the results. If you do not hear from us within 7 days, please contact the clinic through MyChart or phone. If you have a critical or abnormal lab result, we will notify you by phone as soon as possible.  Submit refill requests through [employer] or call your pharmacy and they will forward the refill request to us. Please allow 3 business days for your refill to be completed.          Additional Information About Your Visit        MyChart Information     [employer] gives you secure access to your electronic health record. If you see a primary care provider, you can also send messages to your care team and make appointments. If you have questions, please call your primary care clinic.  If you do not have a primary care provider, please call [phone] and they will assist you.        Care EveryWhere ID     This is your Care EveryWhere ID. This could be used by other organizations to access your [facility] medical records  [id number]        Your  Vitals Were     Pulse Temperature Pulse Oximetry BMI (Body Mass Index)          100 98.3  F (36.8  C) (Oral) 96% 41.69 kg/m2         Blood Pressure from Last 3 Encounters:   10/02/17 115/66   07/21/17 110/80   05/12/17 104/62    Weight from Last 3 Encounters:   10/02/17 241 lb (109.3 kg)   09/01/17 243 lb 3.2 oz (110.3 kg)   07/21/17 245 lb (111.1 kg)              Today, you had the following     No orders found for display         Today's Medication Changes          These changes are accurate as of: 10/2/17  2:24 PM.  If you have any questions, ask your nurse or doctor.               Start taking these medicines.        Dose/Directions    amoxicillin 875 MG tablet   Commonly known as:  AMOXIL   Used for:  Otorrhea, left   Started by:  Genesis Olmedo MD        Dose:  875 mg   Take 1 tablet (875 mg) by mouth 2 times daily   Quantity:  20 tablet   Refills:  0       ofloxacin 0.3 % otic solution   Commonly known as:  FLOXIN   Used for:  Otorrhea, left   Started by:  Genesis Olmedo MD        Dose:  10 drop   Place 10 drops Into the left ear 2 times daily for 7 days   Quantity:  10 mL   Refills:  0            Where to get your medicines      These medications were sent to [id number] - [place], [place] - [address] AT [facility] [address]  [address], [place] [address]     Phone:  [phone]     amoxicillin 875 MG tablet    ofloxacin 0.3 % otic solution                Primary Care Provider Office Phone # Fax #    Liu Song PA-C [phone]       [address] [address]        Equal Access to Services     JUWAN HOLT AH: Denise Ospina, sury salvador, qajaimeta kaalmasamuel sue, penelope peacock. So [place] [phone].    ATENCIÓN: Si habla español, tiene a kuo disposición servicios gratuitos de asistencia lingüística. Llame al [phone].    We comply with  applicable federal civil rights laws and [place] laws. We do not discriminate on the basis of race, color, national origin, age, disability, sex, sexual orientation, or gender identity.            Thank you!     Thank you for choosing [facility]  for your care. Our goal is always to provide you with excellent care. Hearing back from our patients is one way we can continue to improve our services. Please take a few minutes to complete the written survey that you may receive in the mail after your visit with us. Thank you!             Your Updated Medication List - Protect others around you: Learn how to safely use, store and throw away your medicines at www.disposemymeds.org.          This list is accurate as of: 10/2/17  2:24 PM.  Always use your most recent med list.                   Brand Name Dispense Instructions for use Diagnosis    amoxicillin 875 MG tablet    AMOXIL    20 tablet    Take 1 tablet (875 mg) by mouth 2 times daily    Otorrhea, left       CALCIUM + D PO      Daily chewable        ketotifen 0.025 % Soln ophthalmic solution    ZADITOR    1 Bottle    Place 1 drop into both eyes every 12 hours    Chronic allergic conjunctivitis       lactobacillus acidophilus Tabs      Take 1 tablet by mouth 2 times daily        levothyroxine 112 MCG tablet    SYNTHROID/LEVOTHROID     Take 112 mcg by mouth daily.        LEXAPRO 20 MG tablet   Generic drug:  escitalopram      Take 20 mg by mouth daily        loperamide 2 MG capsule    IMODIUM     Take 2 mg by mouth daily (with breakfast) Take two tablets with breakfast        metFORMIN 1000 MG tablet    GLUCOPHAGE     Take 1,000 mg by mouth 2 times daily (with meals).        Multi-vitamin Tabs tablet   Generic drug:  multivitamin, therapeutic with minerals      Take 1 tablet by mouth daily. With D3        ofloxacin 0.3 % otic solution    FLOXIN    10 mL    Place 10 drops Into the left ear 2 times daily for 7 days    Otorrhea, left        omega-3 acid ethyl esters 1 G capsule    Lovaza     TAKE 2 CAPSULES BY MOUTH TWICE A DAY        simvastatin 20 MG tablet    ZOCOR     Take 1 tablet (20 mg) by mouth At Bedtime        vitamin B complex with vitamin C Tabs tablet      Take 1 tablet by mouth daily        vitamin C 500 MG Chew      Take by mouth daily

## 2017-10-27 NOTE — TELEPHONE ENCOUNTER
I don't believe I've ever filled for him. Get more details on who is and they should be filling. I'm not particularly clear on why he needs to be on a statin unless his Down Syndrome clinic was filling/wants him on.  Thanks.  Nelson

## 2017-10-27 NOTE — TELEPHONE ENCOUNTER
Unable to refill per RN protocol. Medication is not linked to a problem. Medication is historical.  Rubens Disla RN

## 2017-10-30 RX ORDER — SIMVASTATIN 20 MG
TABLET ORAL
Qty: 60 TABLET | Refills: 0 | OUTPATIENT
Start: 2017-10-30

## 2017-10-30 NOTE — TELEPHONE ENCOUNTER
Spoke to nurse at [facility] she will get refills done and let patients mom know.  Lilly Wright,Clinic Rn  [place]

## 2017-11-02 ENCOUNTER — TELEPHONE (OUTPATIENT)
Dept: FAMILY MEDICINE | Facility: CLINIC | Age: 22
End: 2017-11-02

## 2017-11-02 ENCOUNTER — MYC MEDICAL ADVICE (OUTPATIENT)
Dept: FAMILY MEDICINE | Facility: CLINIC | Age: 22
End: 2017-11-02

## 2017-11-02 NOTE — TELEPHONE ENCOUNTER
Reason for Call:  Other Diagnosis Code    Detailed comments: Please call mother with Diagnosis Code. Mother needs this to complete form for [facility].    Phone Number Patient can be reached at: Other phone number:  [phone]     Best Time: Any    Can we leave a detailed message on this number? YES    Call taken on 11/2/2017 at 1:47 PM by Davon Knutson

## 2017-11-03 ENCOUNTER — ALLIED HEALTH/NURSE VISIT (OUTPATIENT)
Dept: NUTRITION | Facility: CLINIC | Age: 22
End: 2017-11-03
Payer: MEDICARE

## 2017-11-03 VITALS — BODY MASS INDEX: 42.07 KG/M2 | WEIGHT: 243.2 LBS

## 2017-11-03 DIAGNOSIS — R73.03 PRE-DIABETES: Primary | ICD-10-CM

## 2017-11-03 DIAGNOSIS — Q90.9 DOWN'S SYNDROME: ICD-10-CM

## 2017-11-03 PROCEDURE — 97803 MED NUTRITION INDIV SUBSEQ: CPT | Mod: GA | Performed by: DIETITIAN, REGISTERED

## 2017-11-03 NOTE — PATIENT INSTRUCTIONS
Goals:    1. Work up to 60 minutes activity a day -walking, sit ups, push ups, sledding, biking, walking the dog    2. Watch possible calorie in beverages- check that Alysha and Vitamin Water are low in calories    3. Keep offering fruits and vegetables - lower in calories and higher fiber so he can eat more and keep him feeling full    4. Doing a great job with food related changes- keep it going!    FOLLOW UP: Friday, January 12th at 12pm (noon) at [facility]    Arabella Moreno RD, ED, CDE   [phone]

## 2017-11-03 NOTE — MR AVS SNAPSHOT
After Visit Summary   11/3/2017    Hany Patel    MRN: [id number]           Patient Information     Date Of Birth          1995        Visit Information        Provider Department      11/3/2017 12:00 PM NE DIABETIC ED RESOURCE [facility]        Care Instructions    Goals:    1. Work up to 60 minutes activity a day -walking, sit ups, push ups, sledding, biking, walking the dog    2. Watch possible calorie in beverages- check that Alysha and Vitamin Water are low in calories    3. Keep offering fruits and vegetables - lower in calories and higher fiber so he can eat more and keep him feeling full    4. Doing a great job with food related changes- keep it going!    FOLLOW UP: Friday, January 12th at 12pm (noon) at [facility]    Arabella Moreno RD, LD, CDE   [phone]          Follow-ups after your visit        Your next 10 appointments already scheduled     Jan 12, 2018 12:00 PM CST   Diabetic Education with NE DIABETIC ED RESOURCE   [facility] ([facility]    [address] [place] [id number] [phone]              Who to contact     If you have questions or need follow up information about today's clinic visit or your schedule please contact [facility] directly at [phone].  Normal or non-critical lab and imaging results will be communicated to you by MyChart, letter or phone within 4 business days after the clinic has received the results. If you do not hear from us within 7 days, please contact the clinic through MyChart or phone. If you have a critical or abnormal lab result, we will notify you by phone as soon as possible.  Submit refill requests through [employer] or call your pharmacy and they will forward the refill request to us. Please allow 3 business days for your refill to be completed.          Additional Information About Your Visit        [employer] Information     [employer] gives  you secure access to your electronic health record. If you see a primary care provider, you can also send messages to your care team and make appointments. If you have questions, please call your primary care clinic.  If you do not have a primary care provider, please call [phone] and they will assist you.        Care EveryWhere ID     This is your Care EveryWhere ID. This could be used by other organizations to access your [facility] medical records  [id number]        Your Vitals Were     BMI (Body Mass Index)                   42.07 kg/m2            Blood Pressure from Last 3 Encounters:   10/02/17 115/66   07/21/17 110/80   05/12/17 104/62    Weight from Last 3 Encounters:   11/03/17 110.3 kg (243 lb 3.2 oz)   10/02/17 109.3 kg (241 lb)   09/01/17 110.3 kg (243 lb 3.2 oz)              Today, you had the following     No orders found for display       Primary Care Provider Office Phone # Fax #    Liu Song PA-C [phone]       [address] [facility] [address]        Equal Access to Services     LUIS HOLT : Hadii aad ku hadasho Soomaali, waaxda luqadaha, qaybta kaalmada adeegyada, penelope hernandez haylobo rivers . So [facility] [phone].    ATENCIÓN: Si habla español, tiene a kuo disposición servicios gratuitos de asistencia lingüística. [facility] [phone].    We comply with applicable federal civil rights laws and [place] laws. We do not discriminate on the basis of race, color, national origin, age, disability, sex, sexual orientation, or gender identity.            Thank you!     Thank you for choosing [facility]  for your care. Our goal is always to provide you with excellent care. Hearing back from our patients is one way we can continue to improve our services. Please take a few minutes to complete the written survey that you may receive in the mail after your visit with us. Thank you!             Your Updated Medication List - Protect  others around you: Learn how to safely use, store and throw away your medicines at www.disposemymeds.org.          This list is accurate as of: 11/3/17 12:40 PM.  Always use your most recent med list.                   Brand Name Dispense Instructions for use Diagnosis    amoxicillin 875 MG tablet    AMOXIL    20 tablet    Take 1 tablet (875 mg) by mouth 2 times daily    Otorrhea, left       CALCIUM + D PO      Daily chewable        ketotifen 0.025 % Soln ophthalmic solution    ZADITOR    1 Bottle    Place 1 drop into both eyes every 12 hours    Chronic allergic conjunctivitis       lactobacillus acidophilus Tabs      Take 1 tablet by mouth 2 times daily        levothyroxine 112 MCG tablet    SYNTHROID/LEVOTHROID     Take 112 mcg by mouth daily.        LEXAPRO 20 MG tablet   Generic drug:  escitalopram      Take 20 mg by mouth daily        loperamide 2 MG capsule    IMODIUM     Take 2 mg by mouth daily (with breakfast) Take two tablets with breakfast        metFORMIN 1000 MG tablet    GLUCOPHAGE     Take 1,000 mg by mouth 2 times daily (with meals).        Multi-vitamin Tabs tablet   Generic drug:  multivitamin, therapeutic with minerals      Take 1 tablet by mouth daily. With D3        omega-3 acid ethyl esters 1 G capsule    Lovaza     TAKE 2 CAPSULES BY MOUTH TWICE A DAY        simvastatin 20 MG tablet    ZOCOR     Take 1 tablet (20 mg) by mouth At Bedtime        vitamin B complex with vitamin C Tabs tablet      Take 1 tablet by mouth daily        vitamin C 500 MG Chew      Take by mouth daily

## 2017-11-03 NOTE — PROGRESS NOTES
Medical Nutrition Therapy  Visit Type:Reassessment and intervention    Hany Patel presents today for MNT and education related to pre-diabetes and weight management.   He is accompanied by mother and father.     ASSESSMENT:   Patient comments/concerns relating to nutrition: Says continues to be a powers- tries to reinforce recommendations and finding it helpful to say it is coming from someone else to reinforce.  Hany says he is tired.  Per mom, feels energized after a workout.      Says works out at the [facility] for 1 hour on Friday.  He may be going on the treadmill at work.  Says not doing 1 hour a day.  Motivation and cooperation would be helpful.      Barriers: pre-occupation with soda and obsession with food.  Per dad, trying to eliminate 2nds, grabbing food from stove to the table so away from food and watch Hany's portion for lunch.      NUTRITION HISTORY:    Breakfast: glass OJ OR 1 cup yogurt and granola bar  Lunch: crab salad from [employer]/sandwich, 4-5 mini carrots/salad and low-calorie ranch dressing and small thermos of milk and Low-sodium V-8, can Alysha water and 2 mini cookies and cheese stick  PM: Blue chips with corn and bean salsa OR almonds  Dinner: egg salad sandwich, bowl soup (tomato bisque) and small portion chips and 1 cup/day  Snacks: None OR small portion sherbet  Beverages: Milk 2.5 cups/day, Tea unsweetened with lemon  1x/day, Water all day and Vitamin water /Alysha water 1x/day    Misses meals? none  Eats out:  1-2 meals/per week (says known by servers and get smaller portions)    Diet is high in: calories  Diet is low in: fiber, fruits and vegetables    EXERCISE: Likes treadmill - will go for 30-60 minutes on Friday.  Has treadmill at home and stationary bike. Will sometimes put on TV.  Walk the dog    MEDICATIONS:  Current Outpatient Prescriptions   Medication     amoxicillin (AMOXIL) 875 MG tablet     ketotifen (ZADITOR) 0.025 % SOLN ophthalmic solution     simvastatin (ZOCOR) 20 MG tablet      Lactobacillus Acid-Pectin (LACTOBACILLUS ACIDOPHILUS) TABS     omega-3 acid ethyl esters (LOVAZA) 1 G capsule     vitamin  B complex with vitamin C (VITAMIN  B COMPLEX) TABS     escitalopram (LEXAPRO) 20 MG tablet     loperamide (IMODIUM) 2 MG capsule     Ascorbic Acid (VITAMIN C) 500 MG CHEW     Calcium Carbonate-Vitamin D (CALCIUM + D PO)     metFORMIN (GLUCOPHAGE) 1000 MG tablet     levothyroxine (SYNTHROID, LEVOTHROID) 112 MCG tablet     Multiple Vitamin (MULTI-VITAMIN) per tablet     No current facility-administered medications for this visit.        LABS:  Last Basic Metabolic Panel:  Lab Results   Component Value Date     07/10/2017      Lab Results   Component Value Date    POTASSIUM 4.3 07/10/2017     Lab Results   Component Value Date    CHLORIDE 107 07/10/2017     Lab Results   Component Value Date    JUAN 8.6 07/10/2017     Lab Results   Component Value Date    CO2 26 07/10/2017     Lab Results   Component Value Date    BUN 12 07/10/2017     Lab Results   Component Value Date    CR 0.69 07/10/2017     Lab Results   Component Value Date    GLC 84 07/10/2017     Recent Labs   Lab Test  07/10/17   0848  02/23/17   1125   09/21/15   0922  03/06/15   1031   CHOL  157  176   < >  242*  202*   HDL  46  24*   < >  37*  36*   LDL  84  115*   < >  169*  141*   TRIG  135  187*   < >  179*  124   CHOLHDLRATIO   --    --    --   6.6*  5.6*    < > = values in this interval not displayed.     Lab Results   Component Value Date    A1C 5.2 07/10/2017    A1C 5.2 09/19/2016    A1C 5.4 04/11/2016    A1C 5.3 09/21/2015    A1C 5.4 03/06/2015     TSH   Date Value Ref Range Status   07/10/2017 1.16 0.40 - 4.00 mU/L Final   ]    ANTHROPOMETRICS:  Vitals: Wt 110.3 kg (243 lb 3.2 oz)  BMI 42.07 kg/m2  Body mass index is 42.07 kg/(m^2).      Wt Readings from Last 5 Encounters:   10/02/17 109.3 kg (241 lb)   09/01/17 110.3 kg (243 lb 3.2 oz)   07/21/17 111.1 kg (245 lb)   05/12/17 108 kg (238 lb)   03/23/17 106.8 kg (235  lb 8 oz)       Weight Change: no change from last Nutrition visit (9/1/17)    ESTIMATED KCAL REQUIREMENTS:  2400 kcal per day    NUTRITION DIAGNOSIS: Overweight/Obesity related to excessive energy intake and limited physical activity as evidenced by BMI>40    NUTRITION INTERVENTION:  Education given to support: weight reduction, consistent meals, exercise, fiber, behavior modification, portion control and heart healthy diet  Motivational Interviewing    Discussion: Commended Hany and his parents on working on food changes to reduce portions and help make healthier food choices.  They indicate they are continuing to work on changes recommended from last time.  Made changes to reduce cookies to 2 mini and cut out the chips at lunch. They tried the unsweetened iced tea and with lemon, was tolerated by Hany.  They still include Vitamin and Alysha water and encouraged to low for low-calorie beverages to minimize any added sugar with pre-diabetes. They were encouraged to continue to work on include fruits and vegetables at meals and trying to incorporate as part of snacks if Hany is feeling hungry to help with satiety.  The one area his dad feels they can improve is exercising 1 hour a day.  Reviewed health benefits of exercise and encouraged them to increase as tolerated. Discussed different ideas to help keep it varied to prevent boredom for Hany. Pt and parents verbalized understanding of concepts discussed and recommendations provided.       PATIENT'S BEHAVIOR CHANGE GOALS:   See Patient Instructions for patient stated behavior change goals. AVS was printed and given to patient at today's appointment.    MONITOR / EVALUATE:  RD will monitor/evaluate:  Weight change    FOLLOW-UP:  Call RD with questions/concerns.   Follow-up appointment scheduled on 1/12/18.     Arabella Moreno RD, LD, CDE   Time spent in minutes: 35 minutes.  ABN signed today.  Encounter: Individual

## 2017-11-19 ENCOUNTER — MYC MEDICAL ADVICE (OUTPATIENT)
Dept: FAMILY MEDICINE | Facility: CLINIC | Age: 22
End: 2017-11-19

## 2017-11-20 NOTE — TELEPHONE ENCOUNTER
Forms received from [place] Dept [employer] Human Services: [facility] membership for Nelson Song PA-C.  Forms placed in provider 'sign me' folder.  Please mail forms to home address on file after completion.    Jessy Becker,

## 2017-11-21 NOTE — TELEPHONE ENCOUNTER
Forms completed, signed, copy made for chart and mailed to home address on file.    Jessy Becker,

## 2018-04-06 ENCOUNTER — OFFICE VISIT (OUTPATIENT)
Dept: FAMILY MEDICINE | Facility: CLINIC | Age: 23
End: 2018-04-06
Payer: MEDICARE

## 2018-04-06 VITALS
HEART RATE: 84 BPM | HEIGHT: 64 IN | TEMPERATURE: 98.3 F | SYSTOLIC BLOOD PRESSURE: 120 MMHG | DIASTOLIC BLOOD PRESSURE: 72 MMHG | BODY MASS INDEX: 41.32 KG/M2 | WEIGHT: 242 LBS | OXYGEN SATURATION: 97 %

## 2018-04-06 DIAGNOSIS — Z96.22 STATUS POST MYRINGOTOMY WITH TUBE PLACEMENT OF BOTH EARS: ICD-10-CM

## 2018-04-06 DIAGNOSIS — H10.45 CHRONIC ALLERGIC CONJUNCTIVITIS: ICD-10-CM

## 2018-04-06 DIAGNOSIS — L21.9 SEBORRHEIC DERMATITIS: ICD-10-CM

## 2018-04-06 DIAGNOSIS — L72.3 SEBACEOUS CYST: ICD-10-CM

## 2018-04-06 DIAGNOSIS — L71.9 ROSACEA: Primary | ICD-10-CM

## 2018-04-06 DIAGNOSIS — Q90.9 DOWN'S SYNDROME: ICD-10-CM

## 2018-04-06 PROCEDURE — 99214 OFFICE O/P EST MOD 30 MIN: CPT | Performed by: PHYSICIAN ASSISTANT

## 2018-04-06 RX ORDER — METRONIDAZOLE 7.5 MG/G
GEL TOPICAL 2 TIMES DAILY
Qty: 45 G | Refills: 11 | Status: SHIPPED | OUTPATIENT
Start: 2018-04-06 | End: 2020-03-03

## 2018-04-06 RX ORDER — KETOCONAZOLE 20 MG/ML
SHAMPOO TOPICAL
Qty: 120 ML | Refills: 1 | Status: SHIPPED | OUTPATIENT
Start: 2018-04-06 | End: 2020-03-03

## 2018-04-06 NOTE — PROGRESS NOTES
SUBJECTIVE:   Hany Patel is a 23 year old male who presents to clinic today for the following health issues:    Rash  Onset: at least over the last couple weeks on face    Description:   Location: face  Character: raised, flakey, painful, red  Itching (Pruritis): YES    Progression of Symptoms:  worsening and constant    Accompanying Signs & Symptoms:  Fever: no   Body aches or joint pain: no   Sore throat symptoms: no   Recent cold symptoms: no     History:   Previous similar rash: no     Precipitating factors:   Exposure to similar rash: not sure   New exposures: None   Recent travel: no     Alleviating factors:  none    Therapies Tried and outcome: none      Patient also has a couple bumps on top of his head that mom would like checked today.    ALso, would like his ears checked, has been trying to keep them clean at home.    Would like referrals for dermatology and behavior specialist.    Patient Active Problem List   Diagnosis     Down's syndrome     Hypothyroidism     Pre-diabetes     Chronic diarrhea     CARDIOVASCULAR SCREENING; LDL GOAL LESS THAN 130     Pervasive developmental disorder     Cystic acne vulgaris     Hypoxia      Current Outpatient Prescriptions   Medication     ketotifen (ZADITOR) 0.025 % SOLN ophthalmic solution     metroNIDAZOLE (METROGEL) 0.75 % topical gel     ketoconazole (NIZORAL) 2 % shampoo     simvastatin (ZOCOR) 20 MG tablet     Lactobacillus Acid-Pectin (LACTOBACILLUS ACIDOPHILUS) TABS     omega-3 acid ethyl esters (LOVAZA) 1 G capsule     vitamin  B complex with vitamin C (VITAMIN  B COMPLEX) TABS     escitalopram (LEXAPRO) 20 MG tablet     loperamide (IMODIUM) 2 MG capsule     Ascorbic Acid (VITAMIN C) 500 MG CHEW     Calcium Carbonate-Vitamin D (CALCIUM + D PO)     metFORMIN (GLUCOPHAGE) 1000 MG tablet     levothyroxine (SYNTHROID, LEVOTHROID) 112 MCG tablet     Multiple Vitamin (MULTI-VITAMIN) per tablet     [DISCONTINUED] ketotifen (ZADITOR) 0.025 % SOLN ophthalmic  "solution     No current facility-administered medications for this visit.         Problem list and histories reviewed & adjusted, as indicated.  Additional history: as documented    Labs reviewed in EPIC    Reviewed and updated as needed this visit by clinical staff       Reviewed and updated as needed this visit by Provider         ROS:  Constitutional, HEENT, cardiovascular, pulmonary, gi and gu systems are negative, except as otherwise noted.    OBJECTIVE:     /72 (BP Location: Right arm, Cuff Size: Adult Large)  Pulse 84  Temp 98.3  F (36.8  C) (Oral)  Ht 5' 3.75\" (1.619 m)  Wt 242 lb (109.8 kg)  SpO2 97%  BMI 41.87 kg/m2  Body mass index is 41.87 kg/(m^2).  GENERAL: healthy, alert and no distress  HENT: bilateral tubes without discharge, otherwise ear canals and TM's normal, nose and mouth without ulcers or lesions  NECK: no adenopathy, no asymmetry, masses, or scars and thyroid normal to palpation  RESP: lungs clear to auscultation - no rales, rhonchi or wheezes  CV: regular rate and rhythm, normal S1 S2, no S3 or S4, no murmur, click or rub, no peripheral edema and peripheral pulses strong  SKIN: Face has erythema and some erythematous papules around the chin, ears and around hairline he has multiple areas of keratosis, scalp, he has a few areas of loss of hair on his scalp     ASSESSMENT/PLAN:     (L71.9) Rosacea  (primary encounter diagnosis)  Comment:   Plan: metroNIDAZOLE (METROGEL) 0.75 % topical gel,         DERMATOLOGY REFERRAL        Facial. Treatment as noted. Refer to derm    (H10.45) Chronic allergic conjunctivitis  Comment:   Plan: ketotifen (ZADITOR) 0.025 % SOLN ophthalmic         solution        Refills    (L21.9) Seborrheic dermatitis  Comment:   Plan: ketoconazole (NIZORAL) 2 % shampoo, DERMATOLOGY        REFERRAL        Treat     (L72.3) Sebaceous cyst  Comment:   Plan: DERMATOLOGY REFERRAL        On scalp. Refer to derm     (Z96.22) Status post myringotomy with tube placement of " both ears  Comment:   Plan: Stable     (Q90.9) Down's syndrome  Comment:   Plan: Mom wants some help with his behaviors / I advised she re-contact [facility] as he has a specialist through them.

## 2018-04-06 NOTE — MR AVS SNAPSHOT
After Visit Summary   4/6/2018    Hany Patel    MRN: [id number]           Patient Information     Date Of Birth          1995        Visit Information        Provider Department      4/6/2018 1:20 PM Liu Song PA-C [facility]        Today's Diagnoses     Rosacea    -  1    Chronic allergic conjunctivitis        Seborrheic dermatitis        Sebaceous cyst        Status post myringotomy with tube placement of both ears        Down's syndrome           Follow-ups after your visit        Additional Services     DERMATOLOGY REFERRAL       Your provider has referred you to: N: [facility]  [place] [phone]   [email or web address]    Please be aware that coverage of these services is subject to the terms and limitations of your health insurance plan.  Call member services at your health plan with any benefit or coverage questions.      Please bring the following with you to your appointment:    (1) Any X-Rays, CTs or MRIs which have been performed.  Contact the facility where they were done to arrange for  prior to your scheduled appointment.    (2) List of current medications  (3) This referral request   (4) Any documents/labs given to you for this referral                  Who to contact     If you have questions or need follow up information about today's clinic visit or your schedule please contact [facility] directly at [phone].  Normal or non-critical lab and imaging results will be communicated to you by MyChart, letter or phone within 4 business days after the clinic has received the results. If you do not hear from us within 7 days, please contact the clinic through MyChart or phone. If you have a critical or abnormal lab result, we will notify you by phone as soon as possible.  Submit refill requests through [employer] or call your pharmacy and they will forward the refill request to us. Please allow 3  "business days for your refill to be completed.          Additional Information About Your Visit        MyChart Information     [employer] gives you secure access to your electronic health record. If you see a primary care provider, you can also send messages to your care team and make appointments. If you have questions, please call your primary care clinic.  If you do not have a primary care provider, please call [phone] and they will assist you.        Care EveryWhere ID     This is your Care EveryWhere ID. This could be used by other organizations to access your [place] medical records  [id number]        Your Vitals Were     Pulse Temperature Height Pulse Oximetry BMI (Body Mass Index)       84 98.3  F (36.8  C) (Oral) 5' 3.75\" (1.619 m) 97% 41.87 kg/m2        Blood Pressure from Last 3 Encounters:   04/06/18 120/72   10/02/17 115/66   07/21/17 110/80    Weight from Last 3 Encounters:   04/06/18 242 lb (109.8 kg)   11/03/17 243 lb 3.2 oz (110.3 kg)   10/02/17 241 lb (109.3 kg)              We Performed the Following     DERMATOLOGY REFERRAL          Today's Medication Changes          These changes are accurate as of 4/6/18  2:01 PM.  If you have any questions, ask your nurse or doctor.               Start taking these medicines.        Dose/Directions    ketoconazole 2 % shampoo   Commonly known as:  NIZORAL   Used for:  Seborrheic dermatitis   Started by:  Liu Song PA-C        Apply to the affected area and wash off after 5 minutes.   Quantity:  120 mL   Refills:  1       metroNIDAZOLE 0.75 % topical gel   Commonly known as:  METROGEL   Used for:  Rosacea   Started by:  Liu Song PA-C        Apply topically 2 times daily   Quantity:  45 g   Refills:  11            Where to get your medicines      These medications were sent to [id number] - [place], [place] - [address] AT [facility] [address], [place] [address]     " Phone:  [phone]     ketoconazole 2 % shampoo    ketotifen 0.025 % Soln ophthalmic solution    metroNIDAZOLE 0.75 % topical gel                Primary Care Provider Office Phone # Fax #    Liu Song PA-C [phone] [address] [facility] [address]        Equal Access to Services     [facility] Hospital[facility]ICOLA : Hadii aad ku hadasho Soomaali, waaxda luqadaha, qaybta kaalmada adeegyada, waxay idiin hayaan adeeg khikersh laAdriellobo . So [facility] [phone].    ATENCIÓN: Si habla español, tiene a kuo disposición servicios gratuitos de asistencia lingüística. Mari al [phone].    We comply with applicable federal civil rights laws and [place] laws. We do not discriminate on the basis of race, color, national origin, age, disability, sex, sexual orientation, or gender identity.            Thank you!     Thank you for choosing [facility]  for your care. Our goal is always to provide you with excellent care. Hearing back from our patients is one way we can continue to improve our services. Please take a few minutes to complete the written survey that you may receive in the mail after your visit with us. Thank you!             Your Updated Medication List - Protect others around you: Learn how to safely use, store and throw away your medicines at www.disposemymeds.org.          This list is accurate as of 4/6/18  2:01 PM.  Always use your most recent med list.                   Brand Name Dispense Instructions for use Diagnosis    CALCIUM + D PO      Daily chewable        ketoconazole 2 % shampoo    NIZORAL    120 mL    Apply to the affected area and wash off after 5 minutes.    Seborrheic dermatitis       ketotifen 0.025 % Soln ophthalmic solution    ZADITOR    1 Bottle    Place 1 drop into both eyes every 12 hours    Chronic allergic conjunctivitis       lactobacillus acidophilus Tabs      Take 1 tablet by mouth 2 times daily        levothyroxine 112 MCG tablet     SYNTHROID/LEVOTHROID     Take 112 mcg by mouth daily.        LEXAPRO 20 MG tablet   Generic drug:  escitalopram      Take 20 mg by mouth daily        loperamide 2 MG capsule    IMODIUM     Take 2 mg by mouth daily (with breakfast) Take two tablets with breakfast        metFORMIN 1000 MG tablet    GLUCOPHAGE     Take 1,000 mg by mouth 2 times daily (with meals).        metroNIDAZOLE 0.75 % topical gel    METROGEL    45 g    Apply topically 2 times daily    Rosacea       Multi-vitamin Tabs tablet   Generic drug:  multivitamin, therapeutic with minerals      Take 1 tablet by mouth daily. With D3        omega-3 acid ethyl esters 1 G capsule    Lovaza     TAKE 2 CAPSULES BY MOUTH TWICE A DAY        simvastatin 20 MG tablet    ZOCOR     Take 1 tablet (20 mg) by mouth At Bedtime        vitamin B complex with vitamin C Tabs tablet      Take 1 tablet by mouth daily        vitamin C 500 MG Chew      Take by mouth daily

## 2018-05-18 DIAGNOSIS — E88.819 INSULIN RESISTANCE: ICD-10-CM

## 2018-05-18 DIAGNOSIS — E66.01 OBESITIES, MORBID (H): ICD-10-CM

## 2018-05-18 DIAGNOSIS — R73.02 GLUCOSE INTOLERANCE (IMPAIRED GLUCOSE TOLERANCE): ICD-10-CM

## 2018-05-18 DIAGNOSIS — E03.9 HYPOTHYROIDISM: Primary | ICD-10-CM

## 2018-05-18 LAB
ALT SERPL W P-5'-P-CCNC: 35 U/L (ref 0–70)
CHOLEST SERPL-MCNC: 150 MG/DL
HBA1C MFR BLD: 5.2 % (ref 0–5.6)
HDLC SERPL-MCNC: 37 MG/DL
LDLC SERPL CALC-MCNC: 80 MG/DL
NONHDLC SERPL-MCNC: 113 MG/DL
T4 FREE SERPL-MCNC: 0.89 NG/DL (ref 0.76–1.46)
TRIGL SERPL-MCNC: 163 MG/DL

## 2018-05-18 PROCEDURE — 84460 ALANINE AMINO (ALT) (SGPT): CPT

## 2018-05-18 PROCEDURE — 80061 LIPID PANEL: CPT

## 2018-05-18 PROCEDURE — 84403 ASSAY OF TOTAL TESTOSTERONE: CPT

## 2018-05-18 PROCEDURE — 83036 HEMOGLOBIN GLYCOSYLATED A1C: CPT

## 2018-05-18 PROCEDURE — 82306 VITAMIN D 25 HYDROXY: CPT

## 2018-05-18 PROCEDURE — 36415 COLL VENOUS BLD VENIPUNCTURE: CPT

## 2018-05-18 PROCEDURE — 84439 ASSAY OF FREE THYROXINE: CPT

## 2018-05-18 PROCEDURE — 84270 ASSAY OF SEX HORMONE GLOBUL: CPT

## 2018-05-19 LAB
SHBG SERPL-SCNC: 17 NMOL/L (ref 11–80)
TESTOST FREE SERPL-MCNC: 7.41 NG/DL (ref 4.7–24.4)
TESTOST SERPL-MCNC: 274 NG/DL (ref 240–950)

## 2018-05-20 LAB — DEPRECATED CALCIDIOL+CALCIFEROL SERPL-MC: 39 UG/L (ref 20–75)

## 2018-07-06 ENCOUNTER — RADIANT APPOINTMENT (OUTPATIENT)
Dept: GENERAL RADIOLOGY | Facility: CLINIC | Age: 23
End: 2018-07-06
Attending: NURSE PRACTITIONER
Payer: MEDICARE

## 2018-07-06 ENCOUNTER — OFFICE VISIT (OUTPATIENT)
Dept: URGENT CARE | Facility: URGENT CARE | Age: 23
End: 2018-07-06
Payer: MEDICARE

## 2018-07-06 VITALS
WEIGHT: 246 LBS | TEMPERATURE: 97.5 F | SYSTOLIC BLOOD PRESSURE: 102 MMHG | DIASTOLIC BLOOD PRESSURE: 64 MMHG | HEART RATE: 99 BPM | OXYGEN SATURATION: 94 % | HEIGHT: 62 IN | BODY MASS INDEX: 45.27 KG/M2

## 2018-07-06 DIAGNOSIS — J18.9 PNEUMONIA OF LEFT LOWER LOBE DUE TO INFECTIOUS ORGANISM: Primary | ICD-10-CM

## 2018-07-06 PROCEDURE — 71046 X-RAY EXAM CHEST 2 VIEWS: CPT

## 2018-07-06 PROCEDURE — 99214 OFFICE O/P EST MOD 30 MIN: CPT | Performed by: NURSE PRACTITIONER

## 2018-07-06 RX ORDER — AZITHROMYCIN 500 MG/1
500 TABLET, FILM COATED ORAL DAILY
Qty: 3 TABLET | Refills: 0 | Status: SHIPPED | OUTPATIENT
Start: 2018-07-06 | End: 2018-07-09

## 2018-07-06 RX ORDER — ALBUTEROL SULFATE 90 UG/1
2 AEROSOL, METERED RESPIRATORY (INHALATION) EVERY 4 HOURS PRN
Qty: 1 INHALER | Refills: 0 | Status: SHIPPED | OUTPATIENT
Start: 2018-07-06 | End: 2018-07-06

## 2018-07-06 RX ORDER — AZITHROMYCIN 500 MG/1
500 TABLET, FILM COATED ORAL DAILY
Qty: 3 TABLET | Refills: 0 | Status: SHIPPED | OUTPATIENT
Start: 2018-07-06 | End: 2018-07-06

## 2018-07-06 RX ORDER — ALBUTEROL SULFATE 90 UG/1
2 AEROSOL, METERED RESPIRATORY (INHALATION) EVERY 4 HOURS PRN
Qty: 1 INHALER | Refills: 0 | Status: ON HOLD | OUTPATIENT
Start: 2018-07-06 | End: 2019-10-07

## 2018-07-06 ASSESSMENT — ENCOUNTER SYMPTOMS
ABDOMINAL PAIN: 1
RHINORRHEA: 1
SHORTNESS OF BREATH: 1
VOMITING: 1
HEADACHES: 0
CHILLS: 0
CLAUDICATION: 0
DIAPHORESIS: 1
SWEATS: 1
COUGH: 1
SORE THROAT: 0
MYALGIAS: 0

## 2018-07-06 NOTE — MR AVS SNAPSHOT
After Visit Summary   7/6/2018    Hany Patel    MRN: [id number]           Patient Information     Date Of Birth          1995        Visit Information        Provider Department      7/6/2018 5:05 PM Floyd Newberry NP [facility]        Today's Diagnoses     Pneumonia of left lower lobe due to infectious organism (H)    -  1      Care Instructions      Pneumonia (Adult)  Pneumonia is an infection deep within the lungs. It is in the small air sacs (alveoli). Pneumonia may be caused by a virus or bacteria. Pneumonia caused by bacteria is usually treated with an antibiotic. Severe cases may need to be treated in the hospital. Milder cases can be treated at home. Symptoms usually start to get better during the first 2 days of treatment.    Home care  Follow these guidelines when caring for yourself at home:    Rest at home for the first 2 to 3 days, or until you feel stronger. Don t let yourself get overly tired when you go back to your activities.    Stay away from cigarette smoke - yours or other people s.    You may use acetaminophen or ibuprofen to control fever or pain, unless another medicine was prescribed. If you have chronic liver or kidney disease, talk with your healthcare provider before using these medicines. Also talk with your provider if you ve had a stomach ulcer or gastrointestinal bleeding. Don t give aspirin to anyone younger than 18 years of age who is ill with a fever. It may cause severe liver damage.    Your appetite may be poor, so a light diet is fine.    Drink 6 to 8 glasses of fluids every day to make sure you are getting enough fluids. Beverages can include water, sport drinks, sodas without caffeine, juices, tea, or soup. Fluids will help loosen secretions in the lung. This will make it easier for you to cough up the phlegm (sputum). If you also have heart or kidney disease, check with your healthcare provider before you drink extra  fluids.    Take antibiotic medicine prescribed until it is all gone, even if you are feeling better after a few days.  Follow-up care  Follow up with your healthcare provider in the next 2 to 3 days, or as advised. This is to be sure the medicine is helping you get better.  If you are 65 or older, you should get a pneumococcal vaccine and a yearly flu (influenza) shot. You should also get these vaccines if you have chronic lung disease like asthma, emphysema, or COPD. Recently, a second type of pneumonia vaccine has become available for everyone over 65 years old. This is in addition to the previous vaccine. Ask your provider about this.  When to seek medical advice  Call your healthcare provider right away if any of these occur:    You don t get better within the first 48 hours of treatment    Shortness of breath gets worse    Rapid breathing (more than 25 breaths per minute)    Coughing up blood    Chest pain gets worse with breathing    Fever of 100.4 F (38 C) or higher that doesn t get better with fever medicine    Weakness, dizziness, or fainting that gets worse    Thirst or dry mouth that gets worse    Sinus pain, headache, or a stiff neck    Chest pain not caused by coughing  Date Last Reviewed: 1/1/2017 2000-2017 [employer]. [address] [place], [place] [id number]. All rights reserved. This information is not intended as a substitute for professional medical care. Always follow your healthcare professional's instructions.                Follow-ups after your visit        Your next 10 appointments already scheduled     Jul 26, 2018 12:20 PM CDT   PHYSICAL with Liu Song PA-C   [facility]    [address] [place] [id number] [phone]              Who to contact     If you have questions or need follow up information about today's clinic visit or your schedule please contact [facility]  "directly at [phone].  Normal or non-critical lab and imaging results will be communicated to you by [employer], letter or phone within 4 business days after the clinic has received the results. If you do not hear from us within 7 days, please contact the clinic through [employer] or phone. If you have a critical or abnormal lab result, we will notify you by phone as soon as possible.  Submit refill requests through [employer]Xmatch or call your pharmacy and they will forward the refill request to us. Please allow 3 business days for your refill to be completed.          Additional Information About Your Visit        [employer] Information     [employer]match gives you secure access to your electronic health record. If you see a primary care provider, you can also send messages to your care team and make appointments. If you have questions, please call your primary care clinic.  If you do not have a primary care provider, please call [phone] and they will assist you.        Care EveryWhere ID     This is your Care EveryWhere ID. This could be used by other organizations to access your [facility] medical records  [id number]        Your Vitals Were     Pulse Temperature Height Pulse Oximetry BMI (Body Mass Index)       99 97.5  F (36.4  C) (Tympanic) 5' 2.25\" (1.581 m) 94% 44.63 kg/m2        Blood Pressure from Last 3 Encounters:   07/06/18 102/64   04/06/18 120/72   10/02/17 115/66    Weight from Last 3 Encounters:   07/06/18 246 lb (111.6 kg)   04/06/18 242 lb (109.8 kg)   11/03/17 243 lb 3.2 oz (110.3 kg)              We Performed the Following     XR Chest 2 Views          Today's Medication Changes          These changes are accurate as of 7/6/18  6:40 PM.  If you have any questions, ask your nurse or doctor.               Start taking these medicines.        Dose/Directions    albuterol 108 (90 Base) MCG/ACT Inhaler   Commonly known as:  PROAIR HFA/PROVENTIL HFA/VENTOLIN HFA   Used for:  Pneumonia of left lower lobe due to " infectious organism (H)   Started by:  Floyd Newberry, PHILIP        Dose:  2 puff   Inhale 2 puffs into the lungs every 4 hours as needed for shortness of breath / dyspnea or wheezing   Quantity:  1 Inhaler   Refills:  0       azithromycin 500 MG tablet   Commonly known as:  ZITHROMAX   Used for:  Pneumonia of left lower lobe due to infectious organism (H)   Started by:  Floyd Newberry NP        Dose:  500 mg   Take 1 tablet (500 mg) by mouth daily   Quantity:  3 tablet   Refills:  0            Where to get your medicines      These medications were sent to [id number] - [place], [place] - [address] AT [facility] [address], [place] [address]     Phone:  [phone]     albuterol 108 (90 Base) MCG/ACT Inhaler    azithromycin 500 MG tablet                Primary Care Provider Office Phone # Fax #    Liu Song PA-C [phone]       [address] [facility] [address]        Equal Access to Services     [facility] : Hadii aad ku hadasho Soomaali, waaxda luqadaha, qaybta kaalmada adekarolynyada, [place] . So [facility] [phone].    ATENCIÓN: Si habla español, tiene a kuo disposición servicios gratuitos de asistencia lingüística. [facility] [phone].    We comply with applicable federal civil rights laws and [place] laws. We do not discriminate on the basis of race, color, national origin, age, disability, sex, sexual orientation, or gender identity.            Thank you!     Thank you for choosing [facility]  for your care. Our goal is always to provide you with excellent care. Hearing back from our patients is one way we can continue to improve our services. Please take a few minutes to complete the written survey that you may receive in the mail after your visit with us. Thank you!             Your Updated Medication List - Protect others  around you: Learn how to safely use, store and throw away your medicines at www.disposemymeds.org.          This list is accurate as of 7/6/18  6:40 PM.  Always use your most recent med list.                   Brand Name Dispense Instructions for use Diagnosis    albuterol 108 (90 Base) MCG/ACT Inhaler    PROAIR HFA/PROVENTIL HFA/VENTOLIN HFA    1 Inhaler    Inhale 2 puffs into the lungs every 4 hours as needed for shortness of breath / dyspnea or wheezing    Pneumonia of left lower lobe due to infectious organism (H)       azithromycin 500 MG tablet    ZITHROMAX    3 tablet    Take 1 tablet (500 mg) by mouth daily    Pneumonia of left lower lobe due to infectious organism (H)       CALCIUM + D PO      Daily chewable        ketoconazole 2 % shampoo    NIZORAL    120 mL    Apply to the affected area and wash off after 5 minutes.    Seborrheic dermatitis       ketotifen 0.025 % Soln ophthalmic solution    ZADITOR    1 Bottle    Place 1 drop into both eyes every 12 hours    Chronic allergic conjunctivitis       lactobacillus acidophilus Tabs      Take 1 tablet by mouth 2 times daily        levothyroxine 112 MCG tablet    SYNTHROID/LEVOTHROID     Take 112 mcg by mouth daily.        LEXAPRO 20 MG tablet   Generic drug:  escitalopram      Take 20 mg by mouth daily        loperamide 2 MG capsule    IMODIUM     Take 2 mg by mouth daily (with breakfast) Take two tablets with breakfast        metFORMIN 1000 MG tablet    GLUCOPHAGE     Take 1,000 mg by mouth 2 times daily (with meals).        metroNIDAZOLE 0.75 % topical gel    METROGEL    45 g    Apply topically 2 times daily    Rosacea       Multi-vitamin Tabs tablet   Generic drug:  multivitamin, therapeutic with minerals      Take 1 tablet by mouth daily. With D3        omega-3 acid ethyl esters 1 g capsule    Lovaza     TAKE 2 CAPSULES BY MOUTH TWICE A DAY        simvastatin 20 MG tablet    ZOCOR     Take 1 tablet (20 mg) by mouth At Bedtime        vitamin B complex with  vitamin C Tabs tablet      Take 1 tablet by mouth daily        vitamin C 500 MG Chew      Take by mouth daily

## 2018-07-06 NOTE — PROGRESS NOTES
SUBJECTIVE:                                                    Hany Patel is a 23 year old male who presents to clinic today for the following health issues:      HPI Comments: Pt c/o worsening shortness of breath and coughing spells. Was unable to participate in exercise program this afternoon due to shortness of breath and cough. Mother/Caregiver desires chest xray because was hospitalized with pneumonia last year.    Cough   This is a new problem. The current episode started yesterday. The problem occurs constantly. The problem has been rapidly worsening. The cough is productive of sputum (clear sputum). Maximum temperature: felt warm. The fever has been present for less than 1 day. Associated symptoms include chest pain (with cough), sweats, rhinorrhea and shortness of breath. Pertinent negatives include no chills, no ear congestion, no ear pain, no headaches, no sore throat and no myalgias. He has tried cough syrup and decongestants for the symptoms. The treatment provided moderate relief. His past medical history is significant for pneumonia.       Problem list and histories reviewed & adjusted, as indicated.      Patient Active Problem List   Diagnosis     Down's syndrome     Hypothyroidism     Pre-diabetes     Chronic diarrhea     CARDIOVASCULAR SCREENING; LDL GOAL LESS THAN 130     Pervasive developmental disorder     Cystic acne vulgaris     Hypoxia     Past Surgical History:   Procedure Laterality Date     INSERT CHEST TUBE Left 2/7/2017    Procedure: INSERT CHEST TUBE;  Surgeon: Coco Zambrano MD;  Location: [facility] OR     SURGICAL HISTORY OF -       Ear tubes      SURGICAL HISTORY OF -       Adenoid removal        Social History   Substance Use Topics     Smoking status: Never Smoker     Smokeless tobacco: Never Used     Alcohol use No     Family History   Problem Relation Age of Onset     Breast Cancer Mother      Hypertension Father          Current Outpatient Prescriptions   Medication Sig  Dispense Refill     albuterol (PROAIR HFA/PROVENTIL HFA/VENTOLIN HFA) 108 (90 Base) MCG/ACT Inhaler Inhale 2 puffs into the lungs every 4 hours as needed for shortness of breath / dyspnea or wheezing 1 Inhaler 0     Ascorbic Acid (VITAMIN C) 500 MG CHEW Take by mouth daily       azithromycin (ZITHROMAX) 500 MG tablet Take 1 tablet (500 mg) by mouth daily 3 tablet 0     Calcium Carbonate-Vitamin D (CALCIUM + D PO) Daily chewable       escitalopram (LEXAPRO) 20 MG tablet Take 20 mg by mouth daily       ketoconazole (NIZORAL) 2 % shampoo Apply to the affected area and wash off after 5 minutes. 120 mL 1     Lactobacillus Acid-Pectin (LACTOBACILLUS ACIDOPHILUS) TABS Take 1 tablet by mouth 2 times daily        levothyroxine (SYNTHROID, LEVOTHROID) 112 MCG tablet Take 112 mcg by mouth daily.       loperamide (IMODIUM) 2 MG capsule Take 2 mg by mouth daily (with breakfast) Take two tablets with breakfast       metFORMIN (GLUCOPHAGE) 1000 MG tablet Take 1,000 mg by mouth 2 times daily (with meals).       Multiple Vitamin (MULTI-VITAMIN) per tablet Take 1 tablet by mouth daily. With D3       omega-3 acid ethyl esters (LOVAZA) 1 G capsule TAKE 2 CAPSULES BY MOUTH TWICE A DAY  6     simvastatin (ZOCOR) 20 MG tablet Take 1 tablet (20 mg) by mouth At Bedtime       vitamin  B complex with vitamin C (VITAMIN  B COMPLEX) TABS Take 1 tablet by mouth daily       ketotifen (ZADITOR) 0.025 % SOLN ophthalmic solution Place 1 drop into both eyes every 12 hours (Patient not taking: Reported on 7/6/2018) 1 Bottle 3     metroNIDAZOLE (METROGEL) 0.75 % topical gel Apply topically 2 times daily (Patient not taking: Reported on 7/6/2018) 45 g 11     [DISCONTINUED] albuterol (PROAIR HFA/PROVENTIL HFA/VENTOLIN HFA) 108 (90 Base) MCG/ACT Inhaler Inhale 2 puffs into the lungs every 4 hours as needed for shortness of breath / dyspnea or wheezing 1 Inhaler 0     Allergies   Allergen Reactions     Seasonal Allergies        Review of Systems  "  Constitutional: Positive for diaphoresis and malaise/fatigue. Negative for chills.   HENT: Positive for congestion and rhinorrhea. Negative for ear pain and sore throat.    Respiratory: Positive for cough and shortness of breath.    Cardiovascular: Positive for chest pain (with cough). Negative for claudication and leg swelling.   Gastrointestinal: Positive for abdominal pain and vomiting (emesis x 1 earlier this week).   Musculoskeletal: Negative for myalgias.   Neurological: Negative for headaches.         OBJECTIVE:     /64 (BP Location: Left arm, Patient Position: Sitting, Cuff Size: Adult Large)  Pulse 99  Temp 97.5  F (36.4  C) (Tympanic)  Ht 5' 2.25\" (1.581 m)  Wt 246 lb (111.6 kg)  SpO2 94%  BMI 44.63 kg/m2  Body mass index is 44.63 kg/(m^2).  Physical Exam   Constitutional: No distress.   HENT:   Head: Normocephalic.   Right Ear: External ear normal.   Left Ear: External ear normal.   Nose: Mucosal edema and rhinorrhea present.   Mouth/Throat: Oropharynx is clear and moist.   Eyes: Conjunctivae are normal.   Neck: Neck supple. No thyromegaly present.   Cardiovascular: Normal rate, regular rhythm, normal heart sounds and intact distal pulses.    Pulmonary/Chest: Effort normal and breath sounds normal.   Lymphadenopathy:     He has no cervical adenopathy.   Neurological: He is alert.   Psychiatric: He has a normal mood and affect. His speech is delayed. He is slowed. Cognition and memory are impaired. He expresses inappropriate judgment.         Diagnostic Test Results:  Results for orders placed or performed in visit on 07/06/18 (from the past 24 hour(s))   XR Chest 2 Views    Narrative    XR CHEST TWO VIEWS   7/6/2018 6:12 PM     HISTORY: Rule out pneumonia. Cough.    COMPARISON: Chest x-rays dated 3/16/2017.    FINDINGS: The study is limited by respiratory motion artifact. There  does appear to be opacity in the left base which could be secondary to  pneumonia, but could also represent " artifact from respiratory motion.  Right lung is clear. Heart size and pulmonary vascularity are within  normal limits. No pneumothorax or significant pleural fluid collection  is identified.      Impression    IMPRESSION: Patchy opacity in the mid to lower left lung could  represent pneumonia given the patient's history. This is difficult to  evaluate given respiratory motion artifact which significantly limits  the study.    TEETEE VENTURA MD       ASSESSMENT/PLAN:       ICD-10-CM    1. Pneumonia of left lower lobe due to infectious organism (H) J18.1 XR Chest 2 Views     azithromycin (ZITHROMAX) 500 MG tablet     albuterol (PROAIR HFA/PROVENTIL HFA/VENTOLIN HFA) 108 (90 Base) MCG/ACT Inhaler     DISCONTINUED: azithromycin (ZITHROMAX) 500 MG tablet     DISCONTINUED: albuterol (PROAIR HFA/PROVENTIL HFA/VENTOLIN HFA) 108 (90 Base) MCG/ACT Inhaler       Medical Decision Making:    Differential Diagnosis:  URI Adult/Peds:  Allergic rhinitis, Bronchitis-viral and Viral upper respiratory illness    Serious Comorbid Conditions:  Adult:  None    PLAN:    URI Adult:  Tylenol, Ibuprofen, Fluids, Rest and Rx pneumonia - healthy  Azithromycin Z-fredis     Followup:    If not improving or if condition worsens, follow up with your Primary Care Provider, If not improving or if conditions worsens over the next 12-24 hours, go to the Emergency Department    MAGNUS Robertson, ARNP, FNP-C  [facility]

## 2018-07-06 NOTE — PATIENT INSTRUCTIONS

## 2018-07-07 ENCOUNTER — MYC MEDICAL ADVICE (OUTPATIENT)
Dept: FAMILY MEDICINE | Facility: CLINIC | Age: 23
End: 2018-07-07

## 2018-07-09 ENCOUNTER — OFFICE VISIT (OUTPATIENT)
Dept: FAMILY MEDICINE | Facility: CLINIC | Age: 23
End: 2018-07-09
Payer: MEDICARE

## 2018-07-09 VITALS
TEMPERATURE: 98.1 F | WEIGHT: 244 LBS | DIASTOLIC BLOOD PRESSURE: 60 MMHG | BODY MASS INDEX: 44.27 KG/M2 | SYSTOLIC BLOOD PRESSURE: 100 MMHG | HEART RATE: 85 BPM | OXYGEN SATURATION: 95 %

## 2018-07-09 DIAGNOSIS — J18.9 PNEUMONIA OF LEFT LOWER LOBE DUE TO INFECTIOUS ORGANISM: Primary | ICD-10-CM

## 2018-07-09 PROCEDURE — 99213 OFFICE O/P EST LOW 20 MIN: CPT | Performed by: NURSE PRACTITIONER

## 2018-07-09 RX ORDER — AZITHROMYCIN 500 MG/1
500 TABLET, FILM COATED ORAL DAILY
Qty: 3 TABLET | Refills: 0 | Status: SHIPPED | OUTPATIENT
Start: 2018-07-09 | End: 2018-07-13

## 2018-07-09 RX ORDER — GUAIFENESIN 600 MG/1
600 TABLET, EXTENDED RELEASE ORAL 2 TIMES DAILY PRN
Qty: 20 TABLET | Refills: 0 | Status: SHIPPED | OUTPATIENT
Start: 2018-07-09 | End: 2018-07-26

## 2018-07-09 ASSESSMENT — PAIN SCALES - GENERAL: PAINLEVEL: WORST PAIN (10)

## 2018-07-09 NOTE — PROGRESS NOTES
SUBJECTIVE:   Hany Patel is a 23 year old male who presents to clinic today for the following health issues:      ED/UC Followup:    Facility:    Date of visit: 7/6/18  Reason for visit: URI  Current Status: Pneumonia     He was seen at [facility] 7/6/18 for cough  Dx with LLL pneumonia and treated with azithromycin 500 mg qd x3 days and albuterol PRN  He is here today with his mom who reports he is improving  Still productive cough  Patient denies SOB  Mom states he does not appear SOB  He was hospitalized 2017 for pneumonia so they are quite concerned about this      Problem list and histories reviewed & adjusted, as indicated.  Additional history: none    Patient Active Problem List   Diagnosis     Down's syndrome     Hypothyroidism     Pre-diabetes     Chronic diarrhea     CARDIOVASCULAR SCREENING; LDL GOAL LESS THAN 130     Pervasive developmental disorder     Cystic acne vulgaris     Hypoxia     Past Surgical History:   Procedure Laterality Date     INSERT CHEST TUBE Left 2/7/2017    Procedure: INSERT CHEST TUBE;  Surgeon: Coco Zambrano MD;  Location:  OR     SURGICAL HISTORY OF -       Ear tubes      SURGICAL HISTORY OF -       Adenoid removal        Social History   Substance Use Topics     Smoking status: Never Smoker     Smokeless tobacco: Never Used     Alcohol use No     Family History   Problem Relation Age of Onset     Breast Cancer Mother      Hypertension Father            Reviewed and updated as needed this visit by clinical staff  Tobacco  Allergies  Meds  Med Hx  Surg Hx  Fam Hx  Soc Hx      Reviewed and updated as needed this visit by Provider         ROS:  Constitutional, HEENT, cardiovascular, pulmonary, gi and gu systems are negative, except as otherwise noted.    OBJECTIVE:     /60 (BP Location: Right arm, Patient Position: Chair, Cuff Size: Adult Large)  Pulse 85  Temp 98.1  F (36.7  C) (Oral)  Wt 244 lb (110.7 kg)  SpO2 95%  BMI 44.27 kg/m2  Body  mass index is 44.27 kg/(m^2).  GENERAL: healthy, alert and no distress  HENT: normal cephalic/atraumatic, ear canals and TM's normal, nose and mouth without ulcers or lesions, pharyngeal erythema without tonsillar hypertrophy or exudate and oral mucous membranes moist  NECK: no adenopathy, no asymmetry, masses, or scars and thyroid normal to palpation  RESP: Lung sounds shallow but clear. Productive coughing during visit  CV: regular rate and rhythm, normal S1 S2, no S3 or S4, no murmur, click or rub  Diagnostic Test Results:  Results for orders placed or performed in visit on 07/06/18   XR Chest 2 Views    Narrative    XR CHEST TWO VIEWS   7/6/2018 6:12 PM     HISTORY: Rule out pneumonia. Cough.    COMPARISON: Chest x-rays dated 3/16/2017.    FINDINGS: The study is limited by respiratory motion artifact. There  does appear to be opacity in the left base which could be secondary to  pneumonia, but could also represent artifact from respiratory motion.  Right lung is clear. Heart size and pulmonary vascularity are within  normal limits. No pneumothorax or significant pleural fluid collection  is identified.      Impression    IMPRESSION: Patchy opacity in the mid to lower left lung could  represent pneumonia given the patient's history. This is difficult to  evaluate given respiratory motion artifact which significantly limits  the study.    TEETEE VENTURA MD       ASSESSMENT/PLAN:       ICD-10-CM    1. Pneumonia of left lower lobe due to infectious organism (H) J18.1 azithromycin (ZITHROMAX) 500 MG tablet     guaiFENesin (MUCINEX) 600 MG 12 hr tablet       Symptoms are improving. Will extend macrolide for another 3 days for total treatment of 6 days  Reviewed self cares  Can continue with PAN PRN  Reviewed concerning S/S and when to return to clinic    TAMEKA Bailey [facility]

## 2018-07-09 NOTE — MR AVS SNAPSHOT
After Visit Summary   7/9/2018    Hany Patel    MRN: [id number]           Patient Information     Date Of Birth          1995        Visit Information        Provider Department      7/9/2018 10:20 AM Shaunna Astorga APRN CNP [facility]        Today's Diagnoses     Pneumonia of left lower lobe due to infectious organism (H)    -  1       Follow-ups after your visit        Your next 10 appointments already scheduled     Jul 26, 2018 12:20 PM CDT   PHYSICAL with Liu Song PA-C   [facility] ([facility])    [address] [place] [id number] [phone]              Who to contact     If you have questions or need follow up information about today's clinic visit or your schedule please contact [facility] directly at [phone].  Normal or non-critical lab and imaging results will be communicated to you by MyChart, letter or phone within 4 business days after the clinic has received the results. If you do not hear from us within 7 days, please contact the clinic through PakSensehart or phone. If you have a critical or abnormal lab result, we will notify you by phone as soon as possible.  Submit refill requests through Litebi or call your pharmacy and they will forward the refill request to us. Please allow 3 business days for your refill to be completed.          Additional Information About Your Visit        MyChart Information     Litebi gives you secure access to your electronic health record. If you see a primary care provider, you can also send messages to your care team and make appointments. If you have questions, please call your primary care clinic.  If you do not have a primary care provider, please call [phone] and they will assist you.        Care EveryWhere ID     This is your Care EveryWhere ID. This could be used by other organizations to access your [facility]  medical records  [id number]        Your Vitals Were     Pulse Temperature Pulse Oximetry BMI (Body Mass Index)          85 98.1  F (36.7  C) (Oral) 95% 44.27 kg/m2         Blood Pressure from Last 3 Encounters:   07/09/18 100/60   07/06/18 102/64   04/06/18 120/72    Weight from Last 3 Encounters:   07/09/18 244 lb (110.7 kg)   07/06/18 246 lb (111.6 kg)   04/06/18 242 lb (109.8 kg)              Today, you had the following     No orders found for display         Today's Medication Changes          These changes are accurate as of 7/9/18 10:49 AM.  If you have any questions, ask your nurse or doctor.               Start taking these medicines.        Dose/Directions    azithromycin 500 MG tablet   Commonly known as:  ZITHROMAX   Used for:  Pneumonia of left lower lobe due to infectious organism (H)   Started by:  Shaunna Astorga APRN CNP        Dose:  500 mg   Take 1 tablet (500 mg) by mouth daily   Quantity:  3 tablet   Refills:  0       guaiFENesin 600 MG 12 hr tablet   Commonly known as:  MUCINEX   Used for:  Pneumonia of left lower lobe due to infectious organism (H)   Started by:  Shaunna Astorga APRN CNP        Dose:  600 mg   Take 1 tablet (600 mg) by mouth 2 times daily as needed for congestion   Quantity:  20 tablet   Refills:  0            Where to get your medicines      These medications were sent to [id number] - [place], [place] - [address] AT [facility] [address]  [address], [place] [address]     Phone:  [phone]     azithromycin 500 MG tablet    guaiFENesin 600 MG 12 hr tablet                Primary Care Provider Office Phone # Fax #    Liu Song PA-C [phone] [address] [facility] [address]        Equal Access to Services     JUWAN HOLT AH: Denise Ospina, waaxda luqadaha, qaybta kaalmada jonyasamuel, [place] . So [facility]  [phone].    ATENCIÓN: Si christian brasher, tiene a kuo disposición servicios gratuitos de asistencia lingüística. Mari simpson [phone].    We comply with applicable federal civil rights laws and [place] laws. We do not discriminate on the basis of race, color, national origin, age, disability, sex, sexual orientation, or gender identity.            Thank you!     Thank you for choosing [facility]  for your care. Our goal is always to provide you with excellent care. Hearing back from our patients is one way we can continue to improve our services. Please take a few minutes to complete the written survey that you may receive in the mail after your visit with us. Thank you!             Your Updated Medication List - Protect others around you: Learn how to safely use, store and throw away your medicines at www.disposemymeds.org.          This list is accurate as of 7/9/18 10:49 AM.  Always use your most recent med list.                   Brand Name Dispense Instructions for use Diagnosis    albuterol 108 (90 Base) MCG/ACT Inhaler    PROAIR HFA/PROVENTIL HFA/VENTOLIN HFA    1 Inhaler    Inhale 2 puffs into the lungs every 4 hours as needed for shortness of breath / dyspnea or wheezing    Pneumonia of left lower lobe due to infectious organism (H)       azithromycin 500 MG tablet    ZITHROMAX    3 tablet    Take 1 tablet (500 mg) by mouth daily    Pneumonia of left lower lobe due to infectious organism (H)       CALCIUM + D PO      Daily chewable        guaiFENesin 600 MG 12 hr tablet    MUCINEX    20 tablet    Take 1 tablet (600 mg) by mouth 2 times daily as needed for congestion    Pneumonia of left lower lobe due to infectious organism (H)       ketoconazole 2 % shampoo    NIZORAL    120 mL    Apply to the affected area and wash off after 5 minutes.    Seborrheic dermatitis       ketotifen 0.025 % Soln ophthalmic solution    ZADITOR    1 Bottle    Place 1 drop into both eyes every 12 hours     Chronic allergic conjunctivitis       lactobacillus acidophilus Tabs      Take 1 tablet by mouth 2 times daily        levothyroxine 112 MCG tablet    SYNTHROID/LEVOTHROID     Take 112 mcg by mouth daily.        LEXAPRO 20 MG tablet   Generic drug:  escitalopram      Take 20 mg by mouth daily        loperamide 2 MG capsule    IMODIUM     Take 2 mg by mouth daily (with breakfast) Take two tablets with breakfast        metFORMIN 1000 MG tablet    GLUCOPHAGE     Take 1,000 mg by mouth 2 times daily (with meals).        metroNIDAZOLE 0.75 % topical gel    METROGEL    45 g    Apply topically 2 times daily    Rosacea       Multi-vitamin Tabs tablet   Generic drug:  multivitamin, therapeutic with minerals      Take 1 tablet by mouth daily. With D3        omega-3 acid ethyl esters 1 g capsule    Lovaza     TAKE 2 CAPSULES BY MOUTH TWICE A DAY        simvastatin 20 MG tablet    ZOCOR     Take 1 tablet (20 mg) by mouth At Bedtime        vitamin B complex with vitamin C Tabs tablet      Take 1 tablet by mouth daily        vitamin C 500 MG Chew      Take by mouth daily

## 2018-07-13 ENCOUNTER — OFFICE VISIT (OUTPATIENT)
Dept: FAMILY MEDICINE | Facility: CLINIC | Age: 23
End: 2018-07-13
Payer: MEDICARE

## 2018-07-13 ENCOUNTER — RADIANT APPOINTMENT (OUTPATIENT)
Dept: GENERAL RADIOLOGY | Facility: CLINIC | Age: 23
End: 2018-07-13
Attending: NURSE PRACTITIONER
Payer: MEDICARE

## 2018-07-13 ENCOUNTER — TELEPHONE (OUTPATIENT)
Dept: FAMILY MEDICINE | Facility: CLINIC | Age: 23
End: 2018-07-13

## 2018-07-13 VITALS
DIASTOLIC BLOOD PRESSURE: 70 MMHG | WEIGHT: 247 LBS | OXYGEN SATURATION: 96 % | BODY MASS INDEX: 45.45 KG/M2 | SYSTOLIC BLOOD PRESSURE: 124 MMHG | HEART RATE: 80 BPM | HEIGHT: 62 IN | TEMPERATURE: 98 F

## 2018-07-13 DIAGNOSIS — R46.89 CHANGE IN BEHAVIOR: ICD-10-CM

## 2018-07-13 DIAGNOSIS — J18.9 PNEUMONIA OF LEFT LOWER LOBE DUE TO INFECTIOUS ORGANISM: Primary | ICD-10-CM

## 2018-07-13 DIAGNOSIS — J18.9 PNEUMONIA OF LEFT LOWER LOBE DUE TO INFECTIOUS ORGANISM: ICD-10-CM

## 2018-07-13 PROBLEM — E66.01 MORBID OBESITY (H): Status: ACTIVE | Noted: 2018-07-13

## 2018-07-13 PROCEDURE — 99213 OFFICE O/P EST LOW 20 MIN: CPT | Performed by: NURSE PRACTITIONER

## 2018-07-13 PROCEDURE — 71046 X-RAY EXAM CHEST 2 VIEWS: CPT | Mod: FY

## 2018-07-13 RX ORDER — PREDNISONE 20 MG/1
40 TABLET ORAL DAILY
Qty: 10 TABLET | Refills: 0 | Status: SHIPPED | OUTPATIENT
Start: 2018-07-13 | End: 2018-07-26

## 2018-07-13 NOTE — TELEPHONE ENCOUNTER
Reason for Call:  Request for results:    Name of test or procedure: Chest x ray     Date of test of procedure: 07/13/18    [place]    OK to leave the result message on voice mail or with a family member? YES    Phone number Patient can be reached at:  Home number on file [phone] (home)    Additional comments: Mom called wanting to know what her son's xray results are from today...Mom would really like to know before the weekend    Call taken on 7/13/2018 at 4:26 PM by Nara Snyder

## 2018-07-13 NOTE — PROGRESS NOTES
"  SUBJECTIVE:   Hany Patel is a 23 year old male who presents to clinic today for the following health issues:    Patient was seen at urgent care 7/6/18 and then followed up with Shaunna Astorga CNP on 7/9/18 due pneumonia of left lower lobe.  Patient is still dealing with cough, sounds plegmy per mother. Had severe pneumonia that \"came out of the blue\" last year and was hospitalized for 2 weeks (was in the intensive care).  Mother admits she is anxious regarding Hany and wants to do everything to prevent him from getting worse.    He finished doses of Zithromax (6 day course total).   Patient's mother would like another chest X-ray today.  He has Down's syndrome and having some behavior change in clinic today.  Angry, yelling \"no,\", not cooperative with exam and mother states this is unusual behavior for him.  Gets mad if he hears the word \"pneumonia\" mother suspects due to having a bad experience with hospitalization for pneumonia in the past.    He has Albuterol inhaler but per mother he is not really able to participate well in inhaling it.  She does not think it works for him.  She is asking if he can continue the Mucinex.    No fever.  Per mother his behavior at home has been per his usual.    CHEST X-RAY from 7/6/18:  \"IMPRESSION: Patchy opacity in the mid to lower left lung could  represent pneumonia given the patient's history. This is difficult to  evaluate given respiratory motion artifact which significantly limits  the study.\"    Problem list and histories reviewed & adjusted, as indicated.  Additional history: as documented    Patient Active Problem List   Diagnosis     Down's syndrome     Hypothyroidism     Pre-diabetes     Chronic diarrhea     CARDIOVASCULAR SCREENING; LDL GOAL LESS THAN 130     Pervasive developmental disorder     Cystic acne vulgaris     Hypoxia     Morbid obesity (H)     Past Surgical History:   Procedure Laterality Date     INSERT CHEST TUBE Left 2/7/2017    Procedure: INSERT " CHEST TUBE;  Surgeon: Coco Zambrano MD;  Location: [facility]     SURGICAL HISTORY OF -       Ear tubes      SURGICAL HISTORY OF -       Adenoid removal        Social History   Substance Use Topics     Smoking status: Never Smoker     Smokeless tobacco: Never Used     Alcohol use No     Family History   Problem Relation Age of Onset     Breast Cancer Mother      Hypertension Father          Current Outpatient Prescriptions   Medication Sig Dispense Refill     albuterol (PROAIR HFA/PROVENTIL HFA/VENTOLIN HFA) 108 (90 Base) MCG/ACT Inhaler Inhale 2 puffs into the lungs every 4 hours as needed for shortness of breath / dyspnea or wheezing 1 Inhaler 0     Ascorbic Acid (VITAMIN C) 500 MG CHEW Take by mouth daily       Calcium Carbonate-Vitamin D (CALCIUM + D PO) Daily chewable       escitalopram (LEXAPRO) 20 MG tablet Take 20 mg by mouth daily       guaiFENesin (MUCINEX) 600 MG 12 hr tablet Take 1 tablet (600 mg) by mouth 2 times daily as needed for congestion 20 tablet 0     ketoconazole (NIZORAL) 2 % shampoo Apply to the affected area and wash off after 5 minutes. 120 mL 1     ketotifen (ZADITOR) 0.025 % SOLN ophthalmic solution Place 1 drop into both eyes every 12 hours 1 Bottle 3     Lactobacillus Acid-Pectin (LACTOBACILLUS ACIDOPHILUS) TABS Take 1 tablet by mouth 2 times daily        levothyroxine (SYNTHROID, LEVOTHROID) 112 MCG tablet Take 112 mcg by mouth daily.       loperamide (IMODIUM) 2 MG capsule Take 2 mg by mouth daily (with breakfast) Take two tablets with breakfast       metFORMIN (GLUCOPHAGE) 1000 MG tablet Take 1,000 mg by mouth 2 times daily (with meals).       metroNIDAZOLE (METROGEL) 0.75 % topical gel Apply topically 2 times daily 45 g 11     Multiple Vitamin (MULTI-VITAMIN) per tablet Take 1 tablet by mouth daily. With D3       omega-3 acid ethyl esters (LOVAZA) 1 G capsule TAKE 2 CAPSULES BY MOUTH TWICE A DAY  6     simvastatin (ZOCOR) 20 MG tablet Take 1 tablet (20 mg) by mouth At Bedtime   "     vitamin  B complex with vitamin C (VITAMIN  B COMPLEX) TABS Take 1 tablet by mouth daily       Allergies   Allergen Reactions     Seasonal Allergies      BP Readings from Last 3 Encounters:   07/13/18 124/70   07/09/18 100/60   07/06/18 102/64    Wt Readings from Last 3 Encounters:   07/13/18 247 lb (112 kg)   07/09/18 244 lb (110.7 kg)   07/06/18 246 lb (111.6 kg)              Reviewed and updated as needed this visit by clinical staff  Tobacco  Allergies  Meds  Problems  Med Hx  Surg Hx  Fam Hx  Soc Hx        Reviewed and updated as needed this visit by Provider  Allergies  Meds  Problems         ROS:  Constitutional, HEENT, cardiovascular, pulmonary, gi and gu systems are negative, except as otherwise noted.    OBJECTIVE:     /70  Pulse 80  Temp 98  F (36.7  C) (Oral)  Ht 5' 2.25\" (1.581 m)  Wt 247 lb (112 kg)  SpO2 96%  BMI 44.81 kg/m2  Body mass index is 44.81 kg/(m^2).  GENERAL: healthy, alert, no distress, obese  EYES: Eyes grossly normal to inspection  HENT: both ears: ear tubes bilaterally and cerumen and flaking in external canals bilaterally  NECK: no asymmetry, masses, or scars  RESP: decreased breath sounds bilaterally but patient not having deep breaths due to lack of cooperation  CV: regular rate and rhythm, normal S1 S2, no S3 or S4, no murmur, click or rub, no peripheral edema and peripheral pulses strong  PSYCH: His speech is delayed.  He is slowed.  Cognition and memory are impaired.  He expresses inappropriate judgement.  Patient angered when mother not able to understand what he says and yells \"no.\"    Diagnostic Test Results:  CXR - Radiologist read:  \"IMPRESSION: Left lower lobe infiltrate has decreased but is still  present behind the heart to a slight extent. Right lung is clear.  Normal heart size and pulmonary vascularity.\"    ASSESSMENT/PLAN:     1. Pneumonia of left lower lobe due to infectious organism (H)  Improving.  - XR Chest 2 Views; Future  - predniSONE " (DELTASONE) 20 MG tablet; Take 2 tablets (40 mg) by mouth daily  Dispense: 10 tablet; Refill: 0  - May continue Mucinex as prescribed.    2. Change in behavior in clinic  Suspect this is due to his previous bad experience in the hospital with pneumonia 1 year ago.  Mother states at home his behavior is at baseline.      Return to clinic if symptoms do not completely resolve with treatment plan and as needed for any health concerns.    Nora Newberry, NP  [facility]

## 2018-07-13 NOTE — TELEPHONE ENCOUNTER
I called and spoke with patient's mother about the results of the CHEST X-RAY that shows the infiltrate (pneumonia) has decreased.  Nora Newberry, DNP, APRN, CNP

## 2018-07-13 NOTE — MR AVS SNAPSHOT
After Visit Summary   7/13/2018    Hany Patel    MRN: [id number]           Patient Information     Date Of Birth          1995        Visit Information        Provider Department      7/13/2018 9:40 AM Nora Newberry NP [facility]        Today's Diagnoses     Pneumonia of left lower lobe due to infectious organism (H)    -  1    Change in behavior           Follow-ups after your visit        Your next 10 appointments already scheduled     Jul 26, 2018 12:20 PM CDT   PHYSICAL with Liu Song PA-C   [facility] ([facility])    [address] [place] [id number] [phone]              Who to contact     If you have questions or need follow up information about today's clinic visit or your schedule please contact [facility] directly at [phone].  Normal or non-critical lab and imaging results will be communicated to you by MyChart, letter or phone within 4 business days after the clinic has received the results. If you do not hear from us within 7 days, please contact the clinic through Coupmonhart or phone. If you have a critical or abnormal lab result, we will notify you by phone as soon as possible.  Submit refill requests through Dotstudioz or call your pharmacy and they will forward the refill request to us. Please allow 3 business days for your refill to be completed.          Additional Information About Your Visit        MyChart Information     Dotstudioz gives you secure access to your electronic health record. If you see a primary care provider, you can also send messages to your care team and make appointments. If you have questions, please call your primary care clinic.  If you do not have a primary care provider, please call [phone] and they will assist you.        Care EveryWhere ID     This is your Care EveryWhere ID. This could be used by other organizations to access your  "[facility] medical records  [id number]        Your Vitals Were     Pulse Temperature Height Pulse Oximetry BMI (Body Mass Index)       80 98  F (36.7  C) (Oral) 5' 2.25\" (1.581 m) 96% 44.81 kg/m2        Blood Pressure from Last 3 Encounters:   07/13/18 124/70   07/09/18 100/60   07/06/18 102/64    Weight from Last 3 Encounters:   07/13/18 247 lb (112 kg)   07/09/18 244 lb (110.7 kg)   07/06/18 246 lb (111.6 kg)                 Today's Medication Changes          These changes are accurate as of 7/13/18 11:59 PM.  If you have any questions, ask your nurse or doctor.               Start taking these medicines.        Dose/Directions    predniSONE 20 MG tablet   Commonly known as:  DELTASONE   Used for:  Pneumonia of left lower lobe due to infectious organism (H)   Started by:  Nora Newberry NP        Dose:  40 mg   Take 2 tablets (40 mg) by mouth daily   Quantity:  10 tablet   Refills:  0            Where to get your medicines      These medications were sent to [id number] - [place], [place] - [address] AT [facility] [address]  [address], [place] [address]     Phone:  [phone]     predniSONE 20 MG tablet                Primary Care Provider Office Phone # Fax #    Liu Song PA-C [phone] [address] [facility] [address]        Equal Access to Services     LUIS HOLT AH: Hadii andrew ku hadasho Soomaali, waaxda luqadaha, qaybta kaalmada adeegyada, waxay mary peacock. So [facility] [phone].    ATENCIÓN: Si habla español, tiene a kuo disposición servicios gratuitos de asistencia lingüística. Llame al [phone].    We comply with applicable federal civil rights laws and [place] laws. We do not discriminate on the basis of race, color, national origin, age, disability, sex, sexual orientation, or gender identity.            Thank you!     Thank you for choosing [facility]" for your care. Our goal is always to provide you with excellent care. Hearing back from our patients is one way we can continue to improve our services. Please take a few minutes to complete the written survey that you may receive in the mail after your visit with us. Thank you!             Your Updated Medication List - Protect others around you: Learn how to safely use, store and throw away your medicines at www.disposemymeds.org.          This list is accurate as of 7/13/18 11:59 PM.  Always use your most recent med list.                   Brand Name Dispense Instructions for use Diagnosis    albuterol 108 (90 Base) MCG/ACT Inhaler    PROAIR HFA/PROVENTIL HFA/VENTOLIN HFA    1 Inhaler    Inhale 2 puffs into the lungs every 4 hours as needed for shortness of breath / dyspnea or wheezing    Pneumonia of left lower lobe due to infectious organism (H)       CALCIUM + D PO      Daily chewable        guaiFENesin 600 MG 12 hr tablet    MUCINEX    20 tablet    Take 1 tablet (600 mg) by mouth 2 times daily as needed for congestion    Pneumonia of left lower lobe due to infectious organism (H)       ketoconazole 2 % shampoo    NIZORAL    120 mL    Apply to the affected area and wash off after 5 minutes.    Seborrheic dermatitis       ketotifen 0.025 % Soln ophthalmic solution    ZADITOR    1 Bottle    Place 1 drop into both eyes every 12 hours    Chronic allergic conjunctivitis       lactobacillus acidophilus Tabs      Take 1 tablet by mouth 2 times daily        levothyroxine 112 MCG tablet    SYNTHROID/LEVOTHROID     Take 112 mcg by mouth daily.        LEXAPRO 20 MG tablet   Generic drug:  escitalopram      Take 20 mg by mouth daily        loperamide 2 MG capsule    IMODIUM     Take 2 mg by mouth daily (with breakfast) Take two tablets with breakfast        metFORMIN 1000 MG tablet    GLUCOPHAGE     Take 1,000 mg by mouth 2 times daily (with meals).        metroNIDAZOLE 0.75 % topical gel    METROGEL    45 g    Apply  topically 2 times daily    Rosacea       Multi-vitamin Tabs tablet   Generic drug:  multivitamin, therapeutic with minerals      Take 1 tablet by mouth daily. With D3        omega-3 acid ethyl esters 1 g capsule    Lovaza     TAKE 2 CAPSULES BY MOUTH TWICE A DAY        predniSONE 20 MG tablet    DELTASONE    10 tablet    Take 2 tablets (40 mg) by mouth daily    Pneumonia of left lower lobe due to infectious organism (H)       simvastatin 20 MG tablet    ZOCOR     Take 1 tablet (20 mg) by mouth At Bedtime        vitamin B complex with vitamin C Tabs tablet      Take 1 tablet by mouth daily        vitamin C 500 MG Chew      Take by mouth daily

## 2018-07-16 ENCOUNTER — MYC MEDICAL ADVICE (OUTPATIENT)
Dept: FAMILY MEDICINE | Facility: CLINIC | Age: 23
End: 2018-07-16

## 2018-07-24 ENCOUNTER — TELEPHONE (OUTPATIENT)
Dept: FAMILY MEDICINE | Facility: CLINIC | Age: 23
End: 2018-07-24

## 2018-07-24 NOTE — TELEPHONE ENCOUNTER
Reason for Call:  Form, our goal is to have forms completed with 72 hours, however, some forms may require a visit or additional information.    Type of letter, form or note:  medical    Who is the form from?: Patient    Where did the form come from: Patient or family brought in       What clinic location was the form placed at?: [place] ([place])    Where the form was placed: 's Box    What number is listed as a contact on the form?:        Additional comments: Patient has an appointment on Thursday and will want to  forms them    Call taken on 7/24/2018 at 9:34 AM by Nara Snyder

## 2018-07-24 NOTE — TELEPHONE ENCOUNTER
Forms received from [employer] for Nelson Song PA-C.  Placed in Nelson Song PA-C MA folder for review prior to being given to provider for signature.  Patient has upcoming physical appointment 7/24/2018, patient mother, Marcella, would like to receive completed forms at this time.      Kaylyn Gayle

## 2018-07-26 ENCOUNTER — OFFICE VISIT (OUTPATIENT)
Dept: FAMILY MEDICINE | Facility: CLINIC | Age: 23
End: 2018-07-26
Payer: MEDICARE

## 2018-07-26 VITALS
SYSTOLIC BLOOD PRESSURE: 117 MMHG | DIASTOLIC BLOOD PRESSURE: 71 MMHG | HEART RATE: 87 BPM | TEMPERATURE: 98.1 F | WEIGHT: 252 LBS | BODY MASS INDEX: 46.38 KG/M2 | HEIGHT: 62 IN

## 2018-07-26 DIAGNOSIS — E66.01 MORBID OBESITY (H): ICD-10-CM

## 2018-07-26 DIAGNOSIS — F84.9 PERVASIVE DEVELOPMENTAL DISORDER: ICD-10-CM

## 2018-07-26 DIAGNOSIS — R06.83 SNORING: ICD-10-CM

## 2018-07-26 DIAGNOSIS — R73.03 PRE-DIABETES: ICD-10-CM

## 2018-07-26 DIAGNOSIS — E03.9 HYPOTHYROIDISM, UNSPECIFIED TYPE: ICD-10-CM

## 2018-07-26 DIAGNOSIS — F69 BEHAVIOR PROBLEM, ADULT: ICD-10-CM

## 2018-07-26 DIAGNOSIS — Q90.9 DOWN'S SYNDROME: ICD-10-CM

## 2018-07-26 DIAGNOSIS — Z00.00 ROUTINE GENERAL MEDICAL EXAMINATION AT A HEALTH CARE FACILITY: Primary | ICD-10-CM

## 2018-07-26 PROCEDURE — 99213 OFFICE O/P EST LOW 20 MIN: CPT | Mod: 25 | Performed by: PHYSICIAN ASSISTANT

## 2018-07-26 PROCEDURE — G0439 PPPS, SUBSEQ VISIT: HCPCS | Performed by: PHYSICIAN ASSISTANT

## 2018-07-26 NOTE — MR AVS SNAPSHOT
After Visit Summary   7/26/2018    Hany Patel    MRN: [id number]           Patient Information     Date Of Birth          1995        Visit Information        Provider Department      7/26/2018 12:20 PM Liu Song PA-C [facility]        Today's Diagnoses     Routine general medical examination at a health care facility    -  1    Morbid obesity (H)        Down's syndrome        Behavior problem, adult        Pre-diabetes        Hypothyroidism, unspecified type        Pervasive developmental disorder        Snoring          Care Instructions      1. Consider checking ear nose throat to talk about snoring  2. Placed referral to sleep clinic - evaluate sleep apnea   3. Talk to his  about a different work situation   4.     Preventive Health Recommendations:       Male Ages 65 and over    Yearly exam:             See your health care provider every year in order to  o   Review health changes.   o   Discuss preventive care.    o   Review your medicines if your doctor has prescribed any.    Talk with your health care provider about whether you should have a test to screen for prostate cancer (PSA).    Every 3 years, have a diabetes test (fasting glucose). If you are at risk for diabetes, you should have this test more often.    Every 5 years, have a cholesterol test. Have this test more often if you are at risk for high cholesterol or heart disease.     Every 10 years, have a colonoscopy. Or, have a yearly FIT test (stool test). These exams will check for colon cancer.    Talk to with your health care provider about screening for Abdominal Aortic Aneurysm if you have a family history of AAA or have a history of smoking.  Shots:     Get a flu shot each year.     Get a tetanus shot every 10 years.     Talk to your doctor about your pneumonia vaccines. There are now two you should receive - Pneumovax (PPSV 23) and Prevnar (PCV 13).    Talk to your pharmacist about  a shingles vaccine.     Talk to your doctor about the hepatitis B vaccine.  Nutrition:     Eat at least 5 servings of fruits and vegetables each day.     Eat whole-grain bread, whole-wheat pasta and brown rice instead of white grains and rice.     Get adequate Calcium and Vitamin D.   Lifestyle    Exercise for at least 150 minutes a week (30 minutes a day, 5 days a week). This will help you control your weight and prevent disease.     Limit alcohol to one drink per day.     No smoking.     Wear sunscreen to prevent skin cancer.     See your dentist every six months for an exam and cleaning.     See your eye doctor every 1 to 2 years to screen for conditions such as glaucoma, macular degeneration and cataracts.          Follow-ups after your visit        Additional Services     SLEEP EVALUATION & MANAGEMENT REFERRAL - [facility]  [phone] (Age 2 and up)       Please be aware that coverage of these services is subject to the terms and limitations of your health insurance plan.  Call member services at your health plan with any benefit or coverage questions.      Please bring the following to your appointment:    >>   List of current medications   >>   This referral request   >>   Any documents/labs given to you for this referral                      Future tests that were ordered for you today     Open Future Orders        Priority Expected Expires Ordered    SLEEP EVALUATION & MANAGEMENT REFERRAL - [facility]  [phone] (Age 2 and up) Routine  7/26/2019 7/26/2018            Who to contact     If you have questions or need follow up information about today's clinic visit or your schedule please contact [facility] directly at [phone].  Normal or non-critical lab and imaging results will be communicated to you by MyChart, letter or phone within 4 business days after the clinic  "has received the results. If you do not hear from us within 7 days, please contact the clinic through [employer] or phone. If you have a critical or abnormal lab result, we will notify you by phone as soon as possible.  Submit refill requests through [employer] or call your pharmacy and they will forward the refill request to us. Please allow 3 business days for your refill to be completed.          Additional Information About Your Visit        Work [employer] Information     [employer] gives you secure access to your electronic health record. If you see a primary care provider, you can also send messages to your care team and make appointments. If you have questions, please call your primary care clinic.  If you do not have a primary care provider, please call [phone] and they will assist you.        Care EveryWhere ID     This is your Care EveryWhere ID. This could be used by other organizations to access your [place] medical records  [id number]        Your Vitals Were     Pulse Temperature Height BMI (Body Mass Index)          87 98.1  F (36.7  C) (Oral) 5' 2.25\" (1.581 m) 45.72 kg/m2         Blood Pressure from Last 3 Encounters:   07/26/18 117/71   07/13/18 124/70   07/09/18 100/60    Weight from Last 3 Encounters:   07/26/18 252 lb (114.3 kg)   07/13/18 247 lb (112 kg)   07/09/18 244 lb (110.7 kg)                 Today's Medication Changes          These changes are accurate as of 7/26/18  1:03 PM.  If you have any questions, ask your nurse or doctor.               Stop taking these medicines if you haven't already. Please contact your care team if you have questions.     guaiFENesin 600 MG 12 hr tablet   Commonly known as:  MUCINEX   Stopped by:  Liu Song PA-C                    Primary Care Provider Office Phone # Fax #    Liu Song PA-C [phone] [address] [facility] [address]        Equal Access to Services     JUWAN HOLT AH: Hadii andrew Ospina, " wazunildada luqadaha, qaybta kaallex sue, penelope craigaan ah. So [facility] [phone].    ATENCIÓN: Si christian brasher, tiene a kuo disposición servicios gratuitos de asistencia lingüística. Mari al [phone].    We comply with applicable federal civil rights laws and [place] laws. We do not discriminate on the basis of race, color, national origin, age, disability, sex, sexual orientation, or gender identity.            Thank you!     Thank you for choosing [facility]  for your care. Our goal is always to provide you with excellent care. Hearing back from our patients is one way we can continue to improve our services. Please take a few minutes to complete the written survey that you may receive in the mail after your visit with us. Thank you!             Your Updated Medication List - Protect others around you: Learn how to safely use, store and throw away your medicines at www.disposemymeds.org.          This list is accurate as of 7/26/18  1:03 PM.  Always use your most recent med list.                   Brand Name Dispense Instructions for use Diagnosis    albuterol 108 (90 Base) MCG/ACT Inhaler    PROAIR HFA/PROVENTIL HFA/VENTOLIN HFA    1 Inhaler    Inhale 2 puffs into the lungs every 4 hours as needed for shortness of breath / dyspnea or wheezing    Pneumonia of left lower lobe due to infectious organism (H)       CALCIUM + D PO      Daily chewable        ketoconazole 2 % shampoo    NIZORAL    120 mL    Apply to the affected area and wash off after 5 minutes.    Seborrheic dermatitis       ketotifen 0.025 % Soln ophthalmic solution    ZADITOR    1 Bottle    Place 1 drop into both eyes every 12 hours    Chronic allergic conjunctivitis       lactobacillus acidophilus Tabs      Take 1 tablet by mouth 2 times daily        levothyroxine 112 MCG tablet    SYNTHROID/LEVOTHROID     Take 112 mcg by mouth daily.        LEXAPRO 20 MG tablet   Generic drug:  escitalopram       Take 20 mg by mouth daily        loperamide 2 MG capsule    IMODIUM     Take 2 mg by mouth daily (with breakfast) Take two tablets with breakfast        metFORMIN 1000 MG tablet    GLUCOPHAGE     Take 1,000 mg by mouth 2 times daily (with meals).        metroNIDAZOLE 0.75 % topical gel    METROGEL    45 g    Apply topically 2 times daily    Rosacea       Multi-vitamin Tabs tablet   Generic drug:  multivitamin, therapeutic with minerals      Take 1 tablet by mouth daily. With D3        omega-3 acid ethyl esters 1 g capsule    Lovaza     TAKE 2 CAPSULES BY MOUTH TWICE A DAY        simvastatin 20 MG tablet    ZOCOR     Take 1 tablet (20 mg) by mouth At Bedtime        vitamin B complex with vitamin C Tabs tablet      Take 1 tablet by mouth daily        vitamin C 500 MG Chew      Take by mouth daily

## 2018-07-26 NOTE — PROGRESS NOTES
SUBJECTIVE:   Hany Patel is a 23 year old male who presents for Preventive Visit.    Are you in the first 12 months of your Medicare coverage?  No    Physical   Annual:     Getting at least 3 servings of Calcium per day:  Yes    Bi-annual eye exam:  Yes    Dental care twice a year:  Yes    Sleep apnea or symptoms of sleep apnea:  Excessive snoring    Diet:  Other    Frequency of exercise:  2-3 days/week    Duration of exercise:  15-30 minutes    Taking medications regularly:  Yes    Medication side effects:  None    Additional concerns today:  YES    Fall risk:  Fallen 2 or more times in the past year?: No  Any fall with injury in the past year?: No    1. Behavior issues - this is an ongoing problem - mom reports that at the day program / work program that he is acting out, taking food from others, etc. Reports that he's been poor at listening. This is a common theme for Hany. He has a new worker. He is not actively in therapy or other Down's related treatments. He apparently feels fine. He does snore a lot. He actively naps in clinic here today in our visit.     Reviewed and updated as needed this visit by clinical staff  Tobacco  Allergies  Meds  Med Hx  Surg Hx  Fam Hx  Soc Hx        Reviewed and updated as needed this visit by Provider  Allergies        Social History   Substance Use Topics     Smoking status: Never Smoker     Smokeless tobacco: Never Used     Alcohol use No       Alcohol Use 7/26/2018   If you drink alcohol do you typically have greater than 3 drinks per day OR greater than 7 drinks per week? Not Applicable     Would like lungs to be looked at as he had a mild case of pneumonia.    Today's PHQ-2 Score:   PHQ-2 ( 1999 Pfizer) 7/26/2018   Q1: Little interest or pleasure in doing things 1   Q2: Feeling down, depressed or hopeless 1   PHQ-2 Score 2   Q1: Little interest or pleasure in doing things Several days   Q2: Feeling down, depressed or hopeless Several days   PHQ-2 Score 2       Do  "you feel safe in your environment - NOT APPLICABLE    Do you have a Health Care Directive?: Yes: Advance Directive has been received and scanned.    Current providers sharing in care for this patient include:   Patient Care Team:  Liu Song PA-C as PCP - General (Family Practice)  Oziel Perry MD as MD (Pediatrics)    The following health maintenance items are reviewed in Epic and correct as of today:  Health Maintenance   Topic Date Due     PHQ-2 Q1 YR  02/06/2005     HIV SCREEN (SYSTEM ASSIGNED)  02/06/2011     INFLUENZA VACCINE (1) 09/01/2018     TETANUS IMMUNIZATION (SYSTEM ASSIGNED)  09/17/2018     HPV IMMUNIZATION  Aged Out     Labs reviewed in EPIC      Review of Systems  Constitutional, HEENT, cardiovascular, pulmonary, GI, , musculoskeletal, neuro, skin, endocrine and psych systems are negative, except as otherwise noted.    OBJECTIVE:   /71 (BP Location: Right arm, Patient Position: Chair, Cuff Size: Adult Large)  Pulse 87  Temp 98.1  F (36.7  C) (Oral)  Ht 5' 2.25\" (1.581 m)  Wt 252 lb (114.3 kg)  BMI 45.72 kg/m2 Estimated body mass index is 45.72 kg/(m^2) as calculated from the following:    Height as of this encounter: 5' 2.25\" (1.581 m).    Weight as of this encounter: 252 lb (114.3 kg).  Physical Exam  GENERAL: healthy, alert and no distress  EYES: Eyes grossly normal to inspection, PERRL and conjunctivae and sclerae normal  HENT: ear canals and TM's normal, nose and mouth without ulcers or lesions  NECK: no adenopathy, no asymmetry, masses, or scars and thyroid normal to palpation  RESP: lungs clear to auscultation - no rales, rhonchi or wheezes  CV: regular rate and rhythm, normal S1 S2, no S3 or S4, no murmur, click or rub, no peripheral edema and peripheral pulses strong  ABDOMEN: soft, nontender, no hepatosplenomegaly, no masses and bowel sounds normal  MS: no gross musculoskeletal defects noted, no edema  SKIN: no suspicious lesions or rashes  NEURO: Normal strength " "and tone, mentation intact and speech normal  PSYCH: mentation appears normal, affect normal/bright  LYMPH: no cervical, supraclavicular, axillary, or inguinal adenopathy    ASSESSMENT / PLAN:   (Z00.00) Routine general medical examination at a health care facility  (primary encounter diagnosis)  Comment: Well person   Plan: Diet, exercise, wellness and other preventive recommendations related to health maintenance were discussed.  Follow up as needed for acute issues.  Physical exam in 1 year.     (E66.01) Morbid obesity (H)  Comment:   Plan: Reviewed / counseling given     (Q90.9) Down's syndrome  Comment:   Plan: Reviewed options related to behavior, counseling on med options, reviewed county / city options for him to get care, recommend with some of the behavioral issues to consider sleep apnea eval - referral placed     (F69) Behavior problem, adult  Comment:   Plan: As noted - mom declines meds for now. Mom is also burnt out - I recommended (again) that they consider day programs / Respite programs     (R73.03) Pre-diabetes  Comment:   Plan: Recheck labs -   Lab Results   Component Value Date    A1C 5.2 05/18/2018          (E03.9) Hypothyroidism, unspecified type  Comment:   Plan:   Lab Results   Component Value Date    GLC 84 07/10/2017          (F84.9) Pervasive developmental disorder  Comment:   Plan: As noted     (R06.83) Snoring  Comment:   Plan: SLEEP EVALUATION & MANAGEMENT REFERRAL - ADULT         -[facility] / [facility]  [phone] (Age 2 and up)        Referral given.     End of Life Planning:  Patient currently has an advanced directive: Yes.  Practitioner is supportive of decision.    COUNSELING:  Reviewed preventive health counseling, as reflected in patient instructions    BP Readings from Last 1 Encounters:   07/26/18 117/71     Estimated body mass index is 45.72 kg/(m^2) as calculated from the following:    Height as of this encounter: 5' 2.25\" " (1.581 m).    Weight as of this encounter: 252 lb (114.3 kg).       reports that he has never smoked. He has never used smokeless tobacco.      Appropriate preventive services were discussed with this patient, including applicable screening as appropriate for cardiovascular disease, diabetes, osteopenia/osteoporosis, and glaucoma.  As appropriate for age/gender, discussed screening for colorectal cancer, prostate cancer, breast cancer, and cervical cancer. Checklist reviewing preventive services available has been given to the patient.    Reviewed patients plan of care and provided an AVS. The Basic Care Plan (routine screening as documented in Health Maintenance) for Hany meets the Care Plan requirement. This Care Plan has been established and reviewed with the Patient.    Counseling Resources:  ATP IV Guidelines  Pooled Cohorts Equation Calculator  Breast Cancer Risk Calculator  FRAX Risk Assessment  ICSI Preventive Guidelines  Dietary Guidelines for Americans, 2010  USDA's MyPlate  ASA Prophylaxis  Lung CA Screening    YULISA SALGADO PA-C  [facility]  Answers for HPI/ROS submitted by the patient on 7/26/2018   PHQ-2 Score: 2

## 2018-07-26 NOTE — PATIENT INSTRUCTIONS
1. Consider checking ear nose throat to talk about snoring  2. Placed referral to sleep clinic - evaluate sleep apnea   3. Talk to his  about a different work situation   4.     Preventive Health Recommendations:       Male Ages 65 and over    Yearly exam:             See your health care provider every year in order to  o   Review health changes.   o   Discuss preventive care.    o   Review your medicines if your doctor has prescribed any.    Talk with your health care provider about whether you should have a test to screen for prostate cancer (PSA).    Every 3 years, have a diabetes test (fasting glucose). If you are at risk for diabetes, you should have this test more often.    Every 5 years, have a cholesterol test. Have this test more often if you are at risk for high cholesterol or heart disease.     Every 10 years, have a colonoscopy. Or, have a yearly FIT test (stool test). These exams will check for colon cancer.    Talk to with your health care provider about screening for Abdominal Aortic Aneurysm if you have a family history of AAA or have a history of smoking.  Shots:     Get a flu shot each year.     Get a tetanus shot every 10 years.     Talk to your doctor about your pneumonia vaccines. There are now two you should receive - Pneumovax (PPSV 23) and Prevnar (PCV 13).    Talk to your pharmacist about a shingles vaccine.     Talk to your doctor about the hepatitis B vaccine.  Nutrition:     Eat at least 5 servings of fruits and vegetables each day.     Eat whole-grain bread, whole-wheat pasta and brown rice instead of white grains and rice.     Get adequate Calcium and Vitamin D.   Lifestyle    Exercise for at least 150 minutes a week (30 minutes a day, 5 days a week). This will help you control your weight and prevent disease.     Limit alcohol to one drink per day.     No smoking.     Wear sunscreen to prevent skin cancer.     See your dentist every six months for an exam and cleaning.      See your eye doctor every 1 to 2 years to screen for conditions such as glaucoma, macular degeneration and cataracts.

## 2018-08-03 NOTE — TELEPHONE ENCOUNTER
Per Nelson Song, form was given to patient and his mother at their OV on 7/26/18.    Kelly Moreno, CMA

## 2018-08-03 NOTE — TELEPHONE ENCOUNTER
Nelson, do you remember if you gave the patients mother this form for [facility] when he was in clinic on 7/26/18?    Kelly Moreno, CMA

## 2018-11-02 ENCOUNTER — OFFICE VISIT (OUTPATIENT)
Dept: FAMILY MEDICINE | Facility: CLINIC | Age: 23
End: 2018-11-02
Payer: MEDICARE

## 2018-11-02 VITALS
TEMPERATURE: 98.2 F | BODY MASS INDEX: 46.19 KG/M2 | HEIGHT: 62 IN | DIASTOLIC BLOOD PRESSURE: 66 MMHG | HEART RATE: 84 BPM | WEIGHT: 251 LBS | SYSTOLIC BLOOD PRESSURE: 116 MMHG

## 2018-11-02 DIAGNOSIS — Q90.9 DOWN'S SYNDROME: Primary | ICD-10-CM

## 2018-11-02 DIAGNOSIS — L02.92 FURUNCLE OF SKIN OR SUBCUTANEOUS TISSUE: ICD-10-CM

## 2018-11-02 DIAGNOSIS — F69 BEHAVIOR PROBLEM, ADULT: ICD-10-CM

## 2018-11-02 DIAGNOSIS — Z23 NEED FOR PROPHYLACTIC VACCINATION AND INOCULATION AGAINST INFLUENZA: ICD-10-CM

## 2018-11-02 PROCEDURE — 90686 IIV4 VACC NO PRSV 0.5 ML IM: CPT | Performed by: PHYSICIAN ASSISTANT

## 2018-11-02 PROCEDURE — G0008 ADMIN INFLUENZA VIRUS VAC: HCPCS | Performed by: PHYSICIAN ASSISTANT

## 2018-11-02 PROCEDURE — 99214 OFFICE O/P EST MOD 30 MIN: CPT | Mod: 25 | Performed by: PHYSICIAN ASSISTANT

## 2018-11-02 NOTE — PROGRESS NOTES
SUBJECTIVE:   Hany Patel is a 23 year old male who presents to clinic today for the following health issues:    Behavior Problem - Behavior problems have not improved. Mother states it is getting worse. He is in his day program and will sometimes refuse to follow staff directions. Will also sometimes take other's food. Often times will bother other members. Mom reports that he's not aggressive. Hany won't give me details on why he does this. He just notes that he feels like he wants to.      Concern - Boil   Onset: Af few days     Description:   Boil on left side of face. Also has a large red sore on left thigh     Intensity: moderate    Progression of Symptoms:  worsening    Accompanying Signs & Symptoms:  Redness     Previous history of similar problem:   None     Precipitating factors:   Worsened by: NA     Alleviating factors:  Improved by: NA     Therapies Tried and outcome: Metrogel       Patient Active Problem List   Diagnosis     Down's syndrome     Hypothyroidism     Pre-diabetes     Chronic diarrhea     CARDIOVASCULAR SCREENING; LDL GOAL LESS THAN 130     Pervasive developmental disorder     Cystic acne vulgaris     Hypoxia     Morbid obesity (H)      Current Outpatient Prescriptions   Medication     amoxicillin-clavulanate (AUGMENTIN) 875-125 MG per tablet     Ascorbic Acid (VITAMIN C) 500 MG CHEW     Calcium Carbonate-Vitamin D (CALCIUM + D PO)     escitalopram (LEXAPRO) 20 MG tablet     ketoconazole (NIZORAL) 2 % shampoo     ketotifen (ZADITOR) 0.025 % SOLN ophthalmic solution     Lactobacillus Acid-Pectin (LACTOBACILLUS ACIDOPHILUS) TABS     levothyroxine (SYNTHROID, LEVOTHROID) 112 MCG tablet     loperamide (IMODIUM) 2 MG capsule     metFORMIN (GLUCOPHAGE) 1000 MG tablet     metroNIDAZOLE (METROGEL) 0.75 % topical gel     Multiple Vitamin (MULTI-VITAMIN) per tablet     omega-3 acid ethyl esters (LOVAZA) 1 G capsule     simvastatin (ZOCOR) 20 MG tablet     vitamin  B complex with vitamin C (VITAMIN  " B COMPLEX) TABS     albuterol (PROAIR HFA/PROVENTIL HFA/VENTOLIN HFA) 108 (90 Base) MCG/ACT Inhaler     No current facility-administered medications for this visit.         Problem list and histories reviewed & adjusted, as indicated.  Additional history: as documented    Labs reviewed in EPIC    Reviewed and updated as needed this visit by clinical staff       Reviewed and updated as needed this visit by Provider         ROS:  Constitutional, HEENT, cardiovascular, pulmonary, gi and gu systems are negative, except as otherwise noted.    OBJECTIVE:     /66 (Cuff Size: Adult Large)  Pulse 84  Temp 98.2  F (36.8  C) (Oral)  Ht 5' 2.25\" (1.581 m)  Wt 251 lb (113.9 kg)  BMI 45.54 kg/m2  Body mass index is 45.54 kg/(m^2).  GENERAL: healthy, alert and no distress  SKIN: Left lateral thigh he has a small 3 mm erythematous pustular papule, left side face he has a 1 cm erythematous nodule with a central pustule.    Psych: Appropriate appearance for baseline Down Syndrome facies. Baseline for him as Alert and oriented; coherent speech, normal   rate and volume, able to articulate logical thoughts, able   to abstract reason, no tangential thoughts, no hallucinations   or delusions.  Normal behavior.  His affect is flat.      ASSESSMENT/PLAN:         ICD-10-CM    1. Down's syndrome Q90.9    2. Furuncle of skin or subcutaneous tissue L02.92 amoxicillin-clavulanate (AUGMENTIN) 875-125 MG per tablet   3. Behavior problem, adult F69    4. Need for prophylactic vaccination and inoculation against influenza Z23 FLU VACCINE, SPLIT VIRUS, IM (QUADRIVALENT) [63626]- >3 YRS     Vaccine Administration, Initial [95939]      Will treat the infected areas. Recommend close monitoring and seek ED care this weekend if that gets worse. This prescription is given with a discussion of side effects, risks and proper use.  Instructions are given to follow up if not improving or symptoms change or worsen as discussed.     His behavior " issues are a challenge. We've been working on options for mom and Hany and she does have an appointment she said in a few weeks with someone that specializes in Down's mental health challenges - can also prescribe. Follow up as needed with me.    YULISA SALGADO PA-C  [facility]    Injectable Influenza Immunization Documentation    1.  Is the person to be vaccinated sick today?   No    2. Does the person to be vaccinated have an allergy to a component   of the vaccine?   No  Egg Allergy Algorithm Link    3. Has the person to be vaccinated ever had a serious reaction   to influenza vaccine in the past?   No    4. Has the person to be vaccinated ever had Guillain-Barré syndrome?   No    Form completed by Akanksha Gatica CMA ([facility])

## 2018-11-02 NOTE — NURSING NOTE
Prior to injection verified patient identity using patient's name and date of birth.  Due to injection administration, patient instructed to remain in clinic for 15 minutes  afterwards, and to report any adverse reaction to me immediately.    Akanksha Gatica CMA ([facility])

## 2018-11-02 NOTE — MR AVS SNAPSHOT
After Visit Summary   11/2/2018    Hany Patel    MRN: [id number]           Patient Information     Date Of Birth          1995        Visit Information        Provider Department      11/2/2018 12:40 PM Liu Song PA-C [facility]        Today's Diagnoses     Down's syndrome    -  1    Furuncle of skin or subcutaneous tissue        Behavior problem, adult        Need for prophylactic vaccination and inoculation against influenza          Care Instructions    1. Hot pads to face twice a day for 10 minutes (as tolerated)  2. Antibiotics twice daily   3. If gets worse (facial redness, swelling, etc) probably needs to go in to the ER this weekend   4. Good luck with the behavioral / mental health people           Follow-ups after your visit        Who to contact     If you have questions or need follow up information about today's clinic visit or your schedule please contact [facility] directly at [phone].  Normal or non-critical lab and imaging results will be communicated to you by Field Squaredhart, letter or phone within 4 business days after the clinic has received the results. If you do not hear from us within 7 days, please contact the clinic through Field Squaredhart or phone. If you have a critical or abnormal lab result, we will notify you by phone as soon as possible.  Submit refill requests through [email or web address] or call your pharmacy and they will forward the refill request to us. Please allow 3 business days for your refill to be completed.          Additional Information About Your Visit        MyChart Information     [email or web address] gives you secure access to your electronic health record. If you see a primary care provider, you can also send messages to your care team and make appointments. If you have questions, please call your primary care clinic.  If you do not have a primary care provider, please call [phone] and they will assist you.        Care EveryWhere  "ID     This is your Care EveryWhere ID. This could be used by other organizations to access your [facility] medical records  [id number]        Your Vitals Were     Pulse Temperature Height BMI (Body Mass Index)          84 98.2  F (36.8  C) (Oral) 5' 2.25\" (1.581 m) 45.54 kg/m2         Blood Pressure from Last 3 Encounters:   11/02/18 116/66   07/26/18 117/71   07/13/18 124/70    Weight from Last 3 Encounters:   11/02/18 251 lb (113.9 kg)   07/26/18 252 lb (114.3 kg)   07/13/18 247 lb (112 kg)              We Performed the Following     FLU VACCINE, SPLIT VIRUS, IM (QUADRIVALENT) [51044]- >3 YRS     Vaccine Administration, Initial [65083]          Today's Medication Changes          These changes are accurate as of 11/2/18  1:10 PM.  If you have any questions, ask your nurse or doctor.               Start taking these medicines.        Dose/Directions    amoxicillin-clavulanate 875-125 MG per tablet   Commonly known as:  AUGMENTIN   Used for:  Furuncle of skin or subcutaneous tissue   Started by:  Liu Song PA-C        Dose:  1 tablet   Take 1 tablet by mouth 2 times daily   Quantity:  20 tablet   Refills:  0            Where to get your medicines      These medications were sent to [id number] - [place], [place] - [address] AT [facility] [address]  [address], [place] [address]     Phone:  [phone]     amoxicillin-clavulanate 875-125 MG per tablet                Primary Care Provider Office Phone # Fax #    Liu Song PA-C [phone]       [address] [facility] [address]        Equal Access to Services     JUWAN HOLT AH: Denise Ospina, wazunildada luqadaha, qaybta kaalmada adekarolynyasamuel, penelope peacock. So [facility] [phone].    ATENCIÓN: Si habla español, tiene a kuo disposición servicios gratuitos de asistencia lingüística. Llrachel al [phone].    We comply with applicable " federal civil rights laws and [place] laws. We do not discriminate on the basis of race, color, national origin, age, disability, sex, sexual orientation, or gender identity.            Thank you!     Thank you for choosing [facility]  for your care. Our goal is always to provide you with excellent care. Hearing back from our patients is one way we can continue to improve our services. Please take a few minutes to complete the written survey that you may receive in the mail after your visit with us. Thank you!             Your Updated Medication List - Protect others around you: Learn how to safely use, store and throw away your medicines at www.disposemymeds.org.          This list is accurate as of 11/2/18  1:10 PM.  Always use your most recent med list.                   Brand Name Dispense Instructions for use Diagnosis    albuterol 108 (90 Base) MCG/ACT inhaler    PROAIR HFA/PROVENTIL HFA/VENTOLIN HFA    1 Inhaler    Inhale 2 puffs into the lungs every 4 hours as needed for shortness of breath / dyspnea or wheezing    Pneumonia of left lower lobe due to infectious organism (H)       amoxicillin-clavulanate 875-125 MG per tablet    AUGMENTIN    20 tablet    Take 1 tablet by mouth 2 times daily    Furuncle of skin or subcutaneous tissue       CALCIUM + D PO      Daily chewable        ketoconazole 2 % shampoo    NIZORAL    120 mL    Apply to the affected area and wash off after 5 minutes.    Seborrheic dermatitis       ketotifen 0.025 % Soln ophthalmic solution    ZADITOR    1 Bottle    Place 1 drop into both eyes every 12 hours    Chronic allergic conjunctivitis       lactobacillus acidophilus Tabs      Take 1 tablet by mouth 2 times daily        levothyroxine 112 MCG tablet    SYNTHROID/LEVOTHROID     Take 112 mcg by mouth daily.        LEXAPRO 20 MG tablet   Generic drug:  escitalopram      Take 20 mg by mouth daily        loperamide 2 MG capsule    IMODIUM     Take 2 mg by mouth daily  (with breakfast) Take two tablets with breakfast        metFORMIN 1000 MG tablet    GLUCOPHAGE     Take 1,000 mg by mouth 2 times daily (with meals).        metroNIDAZOLE 0.75 % topical gel    METROGEL    45 g    Apply topically 2 times daily    Rosacea       Multi-vitamin Tabs tablet   Generic drug:  multivitamin, therapeutic with minerals      Take 1 tablet by mouth daily. With D3        omega-3 acid ethyl esters 1 g capsule    Lovaza     TAKE 2 CAPSULES BY MOUTH TWICE A DAY        simvastatin 20 MG tablet    ZOCOR     Take 1 tablet (20 mg) by mouth At Bedtime        vitamin B complex with vitamin C Tabs tablet      Take 1 tablet by mouth daily        vitamin C 500 MG Chew      Take by mouth daily

## 2018-11-02 NOTE — PATIENT INSTRUCTIONS
1. Hot pads to face twice a day for 10 minutes (as tolerated)  2. Antibiotics twice daily   3. If gets worse (facial redness, swelling, etc) probably needs to go in to the ER this weekend   4. Good luck with the behavioral / mental health people

## 2018-11-04 ENCOUNTER — MYC MEDICAL ADVICE (OUTPATIENT)
Dept: FAMILY MEDICINE | Facility: CLINIC | Age: 23
End: 2018-11-04

## 2018-11-05 NOTE — TELEPHONE ENCOUNTER
Route to covering providers. Should patient switch abx due yo diarrhea?  Note from 11/2 not finished but patient is taking abx for a boil.    Jeramie Hinton RN

## 2019-02-20 ENCOUNTER — MEDICAL CORRESPONDENCE (OUTPATIENT)
Dept: HEALTH INFORMATION MANAGEMENT | Facility: CLINIC | Age: 24
End: 2019-02-20

## 2019-03-05 ENCOUNTER — TELEPHONE (OUTPATIENT)
Dept: EDUCATION SERVICES | Facility: CLINIC | Age: 24
End: 2019-03-05

## 2019-03-05 DIAGNOSIS — Q90.9 DOWN'S SYNDROME: Primary | ICD-10-CM

## 2019-03-05 DIAGNOSIS — R73.03 PRE-DIABETES: ICD-10-CM

## 2019-03-05 DIAGNOSIS — E66.01 MORBID OBESITY (H): ICD-10-CM

## 2019-03-05 NOTE — TELEPHONE ENCOUNTER
Patient on schedule for Friday to look at weight loss.  Per father, Nicholas, feels portion control is pretty routinely enforced but Hany not seeing weight loss.  He feels artificially sweetened beverages may be limiting weight loss success and wondering if can discuss and help find more natural drinks/flavorings such as water/tea.    Discussed that can look at trying Horace (no artificial sweeteners or colors) if Hany needs something bubbly to drink or can also discussed different water infusion (let fruit/veggie soak in water overnight for flavoring).      Has follow up on Friday but will need a referral with Medicare.  Will route message to [facility] to see if can order Nutrition Referral for Hany.    Arabella Moreno RD, LD, CDE

## 2019-03-08 ENCOUNTER — OFFICE VISIT (OUTPATIENT)
Dept: NUTRITION | Facility: CLINIC | Age: 24
End: 2019-03-08
Payer: MEDICARE

## 2019-03-08 VITALS — WEIGHT: 249.2 LBS | BODY MASS INDEX: 45.21 KG/M2

## 2019-03-08 DIAGNOSIS — Q90.9 DOWN'S SYNDROME: ICD-10-CM

## 2019-03-08 DIAGNOSIS — E66.01 MORBID OBESITY (H): Primary | ICD-10-CM

## 2019-03-08 DIAGNOSIS — R73.03 PRE-DIABETES: ICD-10-CM

## 2019-03-08 PROCEDURE — 97802 MEDICAL NUTRITION INDIV IN: CPT

## 2019-03-08 NOTE — PATIENT INSTRUCTIONS
Goals:    1. Try more unsweetened ice-tea or soda with Stevia (Zevia flavored teas).  Cut out the artificial sweeteners (Energy Drink and other Diet sodas)    2. Include weights/resistance 2-3 times a week and ideally working up to 60 minutes a day (dancing or biking, walking the dog, playing outside or helping with snow).    FOLLOW UP: Friday, April 19th at 1pm at [facility]    Arabella Moreno RD, ED, CDE   [phone]

## 2019-03-08 NOTE — PROGRESS NOTES
"Medical Nutrition Therapy  Visit Type:Initial assessment and intervention    Hany Patel presents today for MNT and education related to prediabetes and weight management.   He is accompanied by mother and father.     ASSESSMENT:   Patient comments/concerns relating to nutrition: Here for ideas on weight loss.     NUTRITION HISTORY:  Wakes: 6:30am  Breakfast: 7:15-7:30am: none - he worked out.  Fruit cup (no added sugar), Dannon Fit (80 goran) and Fiber One Granola bar (90 goran) and 4oz orange juice - 4 days; Holiday Taquito- 1, 1/2 hashbrown maxine and 4-6oz OJ  AM: none  Lunch: noon: sandwich OR 6\" sub with cheese, meat, tomato, easy carson, carrots (4-5 mini)/salad, small amount of dip (non-fat ranch), low-sodium V-8 (6oz) and cheese stick, handful potato chips and small Thermos skim milk (8oz), 1.5 3\" cookie OR M/TH: thin slice roast beef deli, 1 slice pepper jeni cheese slice and easy carson coating - 5 days/week OR Chipotle topping with steak, corn, sour cream, cheese, avocado and beans, tomatoes and chips with vinaigrette dip with water and Diet Mt. Dew  PM: Reeces and crackers OR 4 carrots, small dallop dip and 5 almonds (hot/spicy), 5 combos or 5 Fritos  OR 1/2 slice pie 1x/week  Dinner: chicken sandwich and fries 1x/week OR sandwich (egg or tuna salad/chicken maxine/salmon maxine on hamburger/Brioche/Bocanegra bun), frozen veggie, potato (6 [place], hashbrown maxine) OR quinoa/rice blend, brats, salad with nuts, feta cheese and easy poppy seed dressing, breadstick (thin) OR Sheree chin (noodles and orange chicken, garlic green beans, cheese puff, fortune cookie OR 1/4-1/3 medium pizza   Snacks: 1/2 - 1 cup Orange or raspberry sherbet in small bowl with 1/2 glass skim milk   Beverages: Pop (Diet) 1x OR Energy Drink 1x/day, Tea 1 bottle unsweetened iced-tea/day, Water all day and Sparkling water (Bubbly)    Misses meals? none  Eats out:  1-2 meals/per week     Previous diet education:  Yes     Food allergies/intolerances: "     Diet is high in: calories  Diet is low in: fiber, fruits and vegetables    EXERCISE: Treadmill and weights on Fridays for 1 hour.  Work program Monday- Thursday (hand bike) and 1-2 times a week will walk on treadmill for 15 minutes on incline.  More walking with warmer weather and biking 2-3 times a week for a short time.     SOCIO/ECONOMIC:   Lives with: mother, father and siblings    MEDICATIONS:  Current Outpatient Medications   Medication     albuterol (PROAIR HFA/PROVENTIL HFA/VENTOLIN HFA) 108 (90 Base) MCG/ACT Inhaler     amoxicillin-clavulanate (AUGMENTIN) 875-125 MG per tablet     Ascorbic Acid (VITAMIN C) 500 MG CHEW     Calcium Carbonate-Vitamin D (CALCIUM + D PO)     escitalopram (LEXAPRO) 20 MG tablet     ketoconazole (NIZORAL) 2 % shampoo     ketotifen (ZADITOR) 0.025 % SOLN ophthalmic solution     Lactobacillus Acid-Pectin (LACTOBACILLUS ACIDOPHILUS) TABS     levothyroxine (SYNTHROID, LEVOTHROID) 112 MCG tablet     loperamide (IMODIUM) 2 MG capsule     metFORMIN (GLUCOPHAGE) 1000 MG tablet     metroNIDAZOLE (METROGEL) 0.75 % topical gel     Multiple Vitamin (MULTI-VITAMIN) per tablet     omega-3 acid ethyl esters (LOVAZA) 1 G capsule     simvastatin (ZOCOR) 20 MG tablet     vitamin  B complex with vitamin C (VITAMIN  B COMPLEX) TABS     No current facility-administered medications for this visit.        LABS:  Last Basic Metabolic Panel:  Lab Results   Component Value Date     07/10/2017      Lab Results   Component Value Date    POTASSIUM 4.3 07/10/2017     Lab Results   Component Value Date    CHLORIDE 107 07/10/2017     Lab Results   Component Value Date    JUAN 8.6 07/10/2017     Lab Results   Component Value Date    CO2 26 07/10/2017     Lab Results   Component Value Date    BUN 12 07/10/2017     Lab Results   Component Value Date    CR 0.69 07/10/2017     Lab Results   Component Value Date    GLC 84 07/10/2017       ANTHROPOMETRICS:  Vitals: Wt 113 kg (249 lb 3.2 oz)   BMI 45.21 kg/m     Body mass index is 45.21 kg/m .      Wt Readings from Last 5 Encounters:   11/02/18 113.9 kg (251 lb)   07/26/18 114.3 kg (252 lb)   07/13/18 112 kg (247 lb)   07/09/18 110.7 kg (244 lb)   07/06/18 111.6 kg (246 lb)       Weight Change: Weight is up 6 lbs since last seen on 11/3/17.    ESTIMATED KCAL REQUIREMENTS:  2406 kcal per day    NUTRITION DIAGNOSIS: Overweight/Obesity related to excessive energy intake as evidenced by low exercise and BMI >40    NUTRITION INTERVENTION:  Nutrition Prescription: Energy Intake: 2000 kcal/day for weight loss  Education given to support: general nutrition guidelines, weight reduction, artificial sweeteners, exercise and behavior modification  Motivational Interviewing    Discussion: Hany's parents continue to help him with portion control at meals and snacks and feel they have done very well on this.  They are not why Hany has not lost more weight.  They are worried artificial sweeteners may be a factor.  Discussed more natural artificial sweeteners such as Stevia (Zevia soda is made with it) or trying to drink naturally flavored water or unsweetened iced tea.  Suggested Hany to try it this next month to see if making a difference.     Noticed Hany's activity is irregular and he is also interested in seeing his stomach fat decline.  Recommended he work on more vigorous activity to help with this.  Suggested working on timing first and gradually build up or do smaller bouts of activity throughout the day to try to reach 60 minutes daily.  Also discussed light resistance or weights 2-3 times a week to help with muscle mass.  Informed Hany being active is a more natural way to help with energy levels. Pt verbalized understanding of concepts discussed and recommendations provided.       If struggling at follow up, may be helpful to add more fruit with dinner (instead of another starch grain), try higher protein Greek yogurt for breakfast and/or continue to work on minimizing added  sugar and refined carbs (smaller sherbet portion or choose between cookies and chip, whole grain bread, etc) as other ideas to try.  Did not want Hany to feel overwhelmed so focusing on 1-2 smaller changes at a time.    PATIENT'S BEHAVIOR CHANGE GOALS:   See Patient Instructions for patient stated behavior change goals. AVS was printed and given to patient at today's appointment.    MONITOR / EVALUATE:  RD will monitor/evaluate:  Progress toward meeting stated nutrition-related goals  Weight change    FOLLOW-UP:  Call RD with questions/concerns.   Follow-up appointment scheduled on 4/19/19.     Arabella Moreno RD, LD, CDE   Time spent in minutes: 60 minutes  Encounter: Individual

## 2019-04-08 ENCOUNTER — TELEPHONE (OUTPATIENT)
Dept: FAMILY MEDICINE | Facility: CLINIC | Age: 24
End: 2019-04-08
Payer: MEDICARE

## 2019-04-08 NOTE — TELEPHONE ENCOUNTER
Reason for Call:  Other / Labs (outside order)    Detailed comments: Patient's mom called and scheduled a lab appointment with outside order which she will bring to the appointment.      Phone Number Patient can be reached at: Home number on file [phone] (home)    Best Time: Anytime    Can we leave a detailed message on this number? YES    Call taken on 4/8/2019 at 9:40 AM by Monica Rodríguez

## 2019-04-11 DIAGNOSIS — R73.02 GLUCOSE INTOLERANCE (IMPAIRED GLUCOSE TOLERANCE): ICD-10-CM

## 2019-04-11 DIAGNOSIS — Q90.9 DOWN SYNDROME: ICD-10-CM

## 2019-04-11 DIAGNOSIS — E88.819 INSULIN RESISTANCE: Primary | ICD-10-CM

## 2019-04-11 DIAGNOSIS — E03.9 HYPOTHYROIDISM: ICD-10-CM

## 2019-04-11 DIAGNOSIS — E66.01 MORBID OBESITY (H): ICD-10-CM

## 2019-04-11 LAB
FSH SERPL-ACNC: 9.1 IU/L (ref 0.7–10.8)
HBA1C MFR BLD: 5.3 % (ref 0–5.6)
LH SERPL-ACNC: 11.4 IU/L (ref 1.5–9.3)
PROLACTIN SERPL-MCNC: 11 UG/L (ref 2–18)

## 2019-04-11 PROCEDURE — 82306 VITAMIN D 25 HYDROXY: CPT

## 2019-04-11 PROCEDURE — 83036 HEMOGLOBIN GLYCOSYLATED A1C: CPT

## 2019-04-11 PROCEDURE — 84403 ASSAY OF TOTAL TESTOSTERONE: CPT

## 2019-04-11 PROCEDURE — 83001 ASSAY OF GONADOTROPIN (FSH): CPT

## 2019-04-11 PROCEDURE — 82947 ASSAY GLUCOSE BLOOD QUANT: CPT

## 2019-04-11 PROCEDURE — 83002 ASSAY OF GONADOTROPIN (LH): CPT

## 2019-04-11 PROCEDURE — 36415 COLL VENOUS BLD VENIPUNCTURE: CPT

## 2019-04-11 PROCEDURE — 84439 ASSAY OF FREE THYROXINE: CPT

## 2019-04-11 PROCEDURE — 84443 ASSAY THYROID STIM HORMONE: CPT

## 2019-04-11 PROCEDURE — 84146 ASSAY OF PROLACTIN: CPT

## 2019-04-12 LAB
DEPRECATED CALCIDIOL+CALCIFEROL SERPL-MC: 51 UG/L (ref 20–75)
GLUCOSE SERPL-MCNC: 82 MG/DL (ref 70–99)
T4 FREE SERPL-MCNC: 0.88 NG/DL (ref 0.76–1.46)
TSH SERPL DL<=0.005 MIU/L-ACNC: 1.47 MU/L (ref 0.4–4)

## 2019-04-13 LAB — TESTOST SERPL-MCNC: 238 NG/DL (ref 240–950)

## 2019-04-25 ENCOUNTER — TRANSFERRED RECORDS (OUTPATIENT)
Dept: HEALTH INFORMATION MANAGEMENT | Facility: CLINIC | Age: 24
End: 2019-04-25

## 2019-05-17 ENCOUNTER — OFFICE VISIT (OUTPATIENT)
Dept: NUTRITION | Facility: CLINIC | Age: 24
End: 2019-05-17
Payer: MEDICARE

## 2019-05-17 VITALS — WEIGHT: 246.6 LBS | BODY MASS INDEX: 44.74 KG/M2

## 2019-05-17 DIAGNOSIS — E66.01 MORBID OBESITY (H): Primary | ICD-10-CM

## 2019-05-17 DIAGNOSIS — Q90.9 DOWN'S SYNDROME: ICD-10-CM

## 2019-05-17 DIAGNOSIS — R73.03 PRE-DIABETES: ICD-10-CM

## 2019-05-17 PROCEDURE — 97803 MED NUTRITION INDIV SUBSEQ: CPT

## 2019-05-17 NOTE — PROGRESS NOTES
Medical Nutrition Therapy  Visit Type:Reassessment and intervention    Hany Patel presents today for MNT and education related to prediabetes and weight management.   He is accompanied by mother and father.     ASSESSMENT:   Patient comments/concerns relating to nutrition: Parents struggling with the sweet drinks.  He likes Mt. Dew and likes to have it daily.  Says tries to monitor and keep it less often.  Difficult- likes sparkling Ice water and Zevia.  Wants to have him drink unsweetened iced-tea.  Says has drinks lined up and feels due to Autism spectrum.      Says new medication prescribed but after reading through it, may cause weight gain, hair loss and diarrhea so plans to hold off on it.     Can do more slight tweaks to food.  Dad makes lunches 4 days a week.  Recently he tweaked the sandwich bun - was getting Bocanegra roll and changed to smaller hamburger sized bun. Trying to downsize dip to 1 Tbsp. And changed to baked chips.  Says reduced cookies to 1 instead of 2.  Fills thermos less- 2/3 instead of full.     NUTRITION HISTORY:    Breakfast: fruit cup, yogurt (90 calories), Fiber 1 Granola bar (90 calories) and small glass OJ (6 oz)  AM: none  Lunch: sandwich and mini carrots, smaller dip portion, small bag baked chips, small cheese stick and 6 oz V-8 and 1 mini cookie instead of 2 and 2/3 thermos of skim milk OR small crab salad OR [place] 1x/week  PM: 5-6 combo, 5-6 hot spicy almonds, 3-4 mini carrots and <1 Tbsp poppy seed dressing  Dinner: salad with poppy seed dressing, grilled cheese sandwich, 4-5 small fries and 1 cup skim milk   Snacks: raspberry or orange sherbet  Beverages: Juice 6oz fruit and 6 oz V-8/day, Milk 2 cups/day, Pop (Diet) 0-1x/day, Unsweetened Iced Tea 0-1x/day and Water 1-2x/day    Misses meals? none  Eats out:  1-2 meals/per week     Previous diet education:  Yes     Food allergies/intolerances: none noted    Diet is high in: carbohydrates at some sittings and sodium  Diet  is low in: fiber, fruits and vegetables    EXERCISE: Friday workout.  He started to use the leg bike at work- 2-3 days a week but unsure of time.     SOCIO/ECONOMIC:   Lives with: father, mother and siblings    MEDICATIONS:  Current Outpatient Medications   Medication     albuterol (PROAIR HFA/PROVENTIL HFA/VENTOLIN HFA) 108 (90 Base) MCG/ACT Inhaler     amoxicillin-clavulanate (AUGMENTIN) 875-125 MG per tablet     Ascorbic Acid (VITAMIN C) 500 MG CHEW     Calcium Carbonate-Vitamin D (CALCIUM + D PO)     escitalopram (LEXAPRO) 20 MG tablet     ketoconazole (NIZORAL) 2 % shampoo     ketotifen (ZADITOR) 0.025 % SOLN ophthalmic solution     Lactobacillus Acid-Pectin (LACTOBACILLUS ACIDOPHILUS) TABS     levothyroxine (SYNTHROID, LEVOTHROID) 112 MCG tablet     loperamide (IMODIUM) 2 MG capsule     metFORMIN (GLUCOPHAGE) 1000 MG tablet     metroNIDAZOLE (METROGEL) 0.75 % topical gel     Multiple Vitamin (MULTI-VITAMIN) per tablet     omega-3 acid ethyl esters (LOVAZA) 1 G capsule     simvastatin (ZOCOR) 20 MG tablet     vitamin  B complex with vitamin C (VITAMIN  B COMPLEX) TABS     No current facility-administered medications for this visit.        LABS:  Last Basic Metabolic Panel:  Lab Results   Component Value Date     07/10/2017      Lab Results   Component Value Date    POTASSIUM 4.3 07/10/2017     Lab Results   Component Value Date    CHLORIDE 107 07/10/2017     Lab Results   Component Value Date    JUAN 8.6 07/10/2017     Lab Results   Component Value Date    CO2 26 07/10/2017     Lab Results   Component Value Date    BUN 12 07/10/2017     Lab Results   Component Value Date    CR 0.69 07/10/2017     Lab Results   Component Value Date    GLC 82 04/11/2019       ANTHROPOMETRICS:  Vitals: Wt 111.9 kg (246 lb 9.6 oz)   BMI 44.74 kg/m    Body mass index is 44.74 kg/m .      Wt Readings from Last 5 Encounters:   03/08/19 113 kg (249 lb 3.2 oz)   11/02/18 113.9 kg (251 lb)   07/26/18 114.3 kg (252 lb)   07/13/18  112 kg (247 lb)   07/09/18 110.7 kg (244 lb)       Weight Change: Lost 2.6 lbs in the past 2 months    ESTIMATED KCAL REQUIREMENTS:  2382 kcal per day    NUTRITION DIAGNOSIS: Overweight/Obesity related to excessive energy intake and low physical activity as evidenced by diet discussion and BMI >40    NUTRITION INTERVENTION:  Nutrition Prescription: Energy Intake: 2000 kcal/day for weight loss  Education given to support: general nutrition guidelines, weight reduction, artificial sweeteners, fat modification, exercise, dining out/special occasions, fiber, behavior modification, portion control and heart healthy diet  Motivational Interviewing    Discussion: Per parents, still struggling with getting rid of artificially sweetened beverages such as ICE water and Diet Mt. Dew. Says Hany can become loud/agreessive and bully mom into getting him the ICE drink or Mt. Dew.  Encouraged them to see any cut back in diet soda as an accomplishment.  Discussed other ideas to try to change behavior or expectations such as offering 2 healthy drink options (unsweetened iced-tea or Stevia or water) for Hany to choose from to see if this would satisfy him instead of just trying to refuse buying anything. Also discussed experimenting with keeping an old Diet Mt. Dew container and filling with sparkling water or iced-tea and see if Hany is going for taste of the product or look of it.  Problem is that the Diet Mt. Dew is bought while out shopping, not as much at home.  Hany meets other buddies at [facility] and does not wish to give up the experience since he gets a lot out of it.  Shopping is also something to do to help the time pass.     Commended changes to reduce portions at lunch by using smaller bread and to reduce portion chips, milk, dip and cookies at lunch.  Suspect this has helped most with weight loss.  With the warmer weather, and since Hany likes to go for a walk and ride his bike, sounds like it may be more beneficial to  focus on increasing Hany's activity instead of cutting back further on portions at meals since Dad is already working on watching the portions of meals and snacks.  Encouraged for Hany to work up to 60 minutes a day.  They struggled to increase activity from last visit but notice they are already doing more with the warmer weather.  Patient and parents  verbalized understanding of concepts discussed and recommendations provided.        PATIENT'S BEHAVIOR CHANGE GOALS:   See Patient Instructions for patient stated behavior change goals. AVS was printed and given to patient at today's appointment.    MONITOR / EVALUATE:  RD will monitor/evaluate:  Progress toward meeting stated nutrition-related goals  Weight change    FOLLOW-UP:  Call RD with questions/concerns.   Follow-up appointment scheduled on 7/12/19.     Arabella Moreno RD, LD, CDE   Time spent in minutes: 45 minutes  Encounter: Individual

## 2019-05-17 NOTE — PATIENT INSTRUCTIONS
1. Try filling old Mt. Dew bottle with Sparkling water?    2. Offer 2 healthier beverage options when out?  Maybe limit the Diet Mt. Dew and ICE to 1 time a week.     3. Walk the dog at the park, activity on Friday, biking- ideal is for 60 minutes a day.     4. Continue with watch portions at lunch and dinner!    FOLLOW UP: Friday, July 12th at 1pm at [facility]    Arabella Moreno RD, ED, CDE   [phone]

## 2019-05-18 DIAGNOSIS — F84.9 PERVASIVE DEVELOPMENTAL DISORDER: ICD-10-CM

## 2019-05-20 DIAGNOSIS — F84.9 PERVASIVE DEVELOPMENTAL DISORDER: ICD-10-CM

## 2019-05-20 RX ORDER — ESCITALOPRAM OXALATE 20 MG/1
20 TABLET ORAL DAILY
Qty: 90 TABLET | Refills: 0 | Status: SHIPPED | OUTPATIENT
Start: 2019-05-20 | End: 2019-08-18

## 2019-05-20 NOTE — TELEPHONE ENCOUNTER
Reason for Call:  Other prescription    Detailed comments: Patient need depression approval for anti depression.    Phone Number Patient can be reached at: Home number on file [phone] (home)    Best Time: Any time     Can we leave a detailed message on this number? YES    Call taken on 5/20/2019 at 1:37 PM by Kera Dow

## 2019-05-20 NOTE — TELEPHONE ENCOUNTER
"Prescription approved per [facility].  Mom aware.     Devon Rodgers RN          Requested Prescriptions   Pending Prescriptions Disp Refills     escitalopram (LEXAPRO) 20 MG tablet 90 tablet 0     Sig: Take 1 tablet (20 mg) by mouth daily       SSRIs Protocol Passed - 5/20/2019  5:10 PM        Passed - Recent (12 mo) or future (30 days) visit within the authorizing provider's specialty     Patient had office visit in the last 12 months or has a visit in the next 30 days with authorizing provider or within the authorizing provider's specialty.  See \"Patient Info\" tab in inbasket, or \"Choose Columns\" in Meds & Orders section of the refill encounter.              Passed - Medication is active on med list        Passed - Patient is age 18 or older        "

## 2019-05-20 NOTE — TELEPHONE ENCOUNTER
"Requested Prescriptions   Pending Prescriptions Disp Refills     escitalopram (LEXAPRO) 20 MG tablet [Pharmacy Med Name: ESCITALOPRAM 20MG TABLETS]  Last Written Prescription Date:  2/15/2019  Last Fill Quantity: 90 tablet,  # refills: 0   Last office visit: 11/2/2018 with prescribing provider:  THAI Song   Future Office Visit:     90 tablet 0     Sig: TAKE 1 TABLET BY MOUTH DAILY       SSRIs Protocol Passed - 5/18/2019 12:00 PM  No flowsheet data found.  No flowsheet data found.            Passed - Recent (12 mo) or future (30 days) visit within the authorizing provider's specialty     Patient had office visit in the last 12 months or has a visit in the next 30 days with authorizing provider or within the authorizing provider's specialty.  See \"Patient Info\" tab in inbasket, or \"Choose Columns\" in Meds & Orders section of the refill encounter.              Passed - Medication is active on med list        Passed - Patient is age 18 or older          "

## 2019-05-21 RX ORDER — ESCITALOPRAM OXALATE 20 MG/1
TABLET ORAL
Qty: 90 TABLET | Refills: 0 | OUTPATIENT
Start: 2019-05-21

## 2019-06-04 ENCOUNTER — TELEPHONE (OUTPATIENT)
Dept: FAMILY MEDICINE | Facility: CLINIC | Age: 24
End: 2019-06-04

## 2019-06-04 NOTE — TELEPHONE ENCOUNTER
Routing medication replacement to PCP to please advise.    Patient's insurance is requesting to replace omega-3 acid ethyl esters (LOVAZA) 1 G capsule with VASCEPACAPGM    Shira Rodriguez RN

## 2019-06-04 NOTE — TELEPHONE ENCOUNTER
[facility] faxed a Drug Change Request re: omega-3 acid ethyl esters (LOVAZA) 1 G capsule    Pharmacy Message:  Drug not covered by patient plan.  The preferred alternative is VASCEPACAPGM .  Please call/fax the pharmacy to change medication along with strength, directions, quantity and refills.

## 2019-06-05 NOTE — TELEPHONE ENCOUNTER
Patient/family was instructed to return call to [facility] RN directly on the RN Call back line at [phone].     Chandan Cruz RN

## 2019-06-05 NOTE — TELEPHONE ENCOUNTER
I do not believe we prescribe this. Request should go to the requesting prescriber.    Liu Song, MPAS, PA-C

## 2019-06-05 NOTE — TELEPHONE ENCOUNTER
Phone call from patient's caregiver/mother April and reviewed information per PCP below.    Chandan Cruz RN

## 2019-07-12 ENCOUNTER — OFFICE VISIT (OUTPATIENT)
Dept: NUTRITION | Facility: CLINIC | Age: 24
End: 2019-07-12
Payer: MEDICARE

## 2019-07-12 VITALS — WEIGHT: 248.6 LBS | BODY MASS INDEX: 45.11 KG/M2

## 2019-07-12 DIAGNOSIS — Q90.9 DOWN'S SYNDROME: ICD-10-CM

## 2019-07-12 DIAGNOSIS — E66.01 MORBID OBESITY (H): Primary | ICD-10-CM

## 2019-07-12 DIAGNOSIS — E03.9 HYPOTHYROIDISM, UNSPECIFIED TYPE: ICD-10-CM

## 2019-07-12 DIAGNOSIS — R73.03 PRE-DIABETES: ICD-10-CM

## 2019-07-12 PROCEDURE — 97803 MED NUTRITION INDIV SUBSEQ: CPT

## 2019-07-12 NOTE — PROGRESS NOTES
Medical Nutrition Therapy  Visit Type:Reassessment and intervention    Hany Patel presents today for MNT and education related to prediabetes and weight management.   He is accompanied by mother and father.     ASSESSMENT:   Patient comments/concerns relating to nutrition: Communication mostly with parents. They says it has been harder to control intake of artificially sweetened drinks behaviorally.  It has been more of a struggle and Hany is getting agressive about it- says hard when in a store when he is acting out and lately have been giving in to him since less of a struggle.     NUTRITION HISTORY:    Breakfast: fruit cup, 80 goran yogurt and granola bar 80 goran and 1/2 glass lower-sugar OJ (6oz)  AM: None  Lunch: cheese stick, small V8 can, 1 sandwich on hamburger bun with roast beef, small cheese slice, light carson, carrots and dip (fat-free ranch)/green salad, 1.5 small chocolate chip cookie and 3/4 oz skim milk with 1/3 cup small crab salad or small chips  PM: 6 combos and 6 hot and spicy almonds, 4 mini carrots and dollop fat-free dip  Dinner: shrimp linguine (1/4 pkg), 1/2 breadstick and green salad OR grilled cheese with small portion fries and 1-2 servings frozen veggies   Snacks: small portion sherbet and 1/2 glass skim milk   Beverages: Juice 6oz/day, Milk 2 cups/day, Pop (Diet) 1x/day, Tea  1x/day, Energy drinks 4x/week and Water 1-2x/day    Misses meals? none  Eats out:  1-2 meals/per week     Previous diet education:  Yes     Food allergies/intolerances: none    Diet is high in: calories  Diet is low in: fiber and fruits    EXERCISE: Finished up track and field but not much increased.  Going to work out on Friday mornings.      SOCIO/ECONOMIC:   Lives with: mother, father and siblings    MEDICATIONS:  Current Outpatient Medications   Medication     albuterol (PROAIR HFA/PROVENTIL HFA/VENTOLIN HFA) 108 (90 Base) MCG/ACT Inhaler     amoxicillin-clavulanate (AUGMENTIN) 875-125 MG per tablet     Ascorbic  Acid (VITAMIN C) 500 MG CHEW     Calcium Carbonate-Vitamin D (CALCIUM + D PO)     escitalopram (LEXAPRO) 20 MG tablet     ketoconazole (NIZORAL) 2 % shampoo     ketotifen (ZADITOR) 0.025 % SOLN ophthalmic solution     Lactobacillus Acid-Pectin (LACTOBACILLUS ACIDOPHILUS) TABS     levothyroxine (SYNTHROID, LEVOTHROID) 112 MCG tablet     loperamide (IMODIUM) 2 MG capsule     metFORMIN (GLUCOPHAGE) 1000 MG tablet     metroNIDAZOLE (METROGEL) 0.75 % topical gel     Multiple Vitamin (MULTI-VITAMIN) per tablet     omega-3 acid ethyl esters (LOVAZA) 1 G capsule     simvastatin (ZOCOR) 20 MG tablet     vitamin  B complex with vitamin C (VITAMIN  B COMPLEX) TABS     No current facility-administered medications for this visit.        LABS:  Last Basic Metabolic Panel:  Lab Results   Component Value Date     07/10/2017      Lab Results   Component Value Date    POTASSIUM 4.3 07/10/2017     Lab Results   Component Value Date    CHLORIDE 107 07/10/2017     Lab Results   Component Value Date    JUAN 8.6 07/10/2017     Lab Results   Component Value Date    CO2 26 07/10/2017     Lab Results   Component Value Date    BUN 12 07/10/2017     Lab Results   Component Value Date    CR 0.69 07/10/2017     Lab Results   Component Value Date    GLC 82 04/11/2019       ANTHROPOMETRICS:  Vitals: Wt 112.8 kg (248 lb 9.6 oz)   BMI 45.11 kg/m     Body mass index is 45.11 kg/m .      Wt Readings from Last 5 Encounters:   05/17/19 111.9 kg (246 lb 9.6 oz)   03/08/19 113 kg (249 lb 3.2 oz)   11/02/18 113.9 kg (251 lb)   07/26/18 114.3 kg (252 lb)   07/13/18 112 kg (247 lb)       Weight Change: Gained 2 lbs in the past month    ESTIMATED KCAL REQUIREMENTS:  2382 kcal per day    NUTRITION DIAGNOSIS: Excessive energy intake related to less activity as evidenced by discussion and weight gain.    NUTRITION INTERVENTION:  Nutrition Prescription: Energy Intake: 2000 kcal/day for weight loss  Education given to support: general nutrition guidelines,  weight reduction, consistent meals, exercise, fiber, behavior modification, portion control and heart healthy diet  Motivational Interviewing    Discussion: Hany had a 2 lbs weight gain since May and per diet discussion, continue to watch portion sizes at lunch but admit track and field activity is done and Hany resistant to doing kickball that he signed up for so sounds like Hany is not as active as he was.  He is also demanding more sugar-free energy drinks (ICE or Diet Mt. Dew) and per parents, having more behavior issues.      Since Hany's intake at meals is controlled and appears to eat the most at lunch, discussed small changes to try to reduce carbs.  He already has cheese on sandwich at lunch and some milk so could cut back on extra cheese stick (or choose between the two).  Also getting enough grainsthrough bun, 1.5 cookies and either chips/noodle salad, that would be able to continue to reduce portions of at least one of these options.  Will try limiting cookie to 1 small (which is also what was reported last time, 1 cookie instead of 1.5).  Parent are agreeable to these suggestions.     Lastly, discussed that if Hany is having trouble accepting he cannot get beverages from the store when he wants them, discussed using increased activity as a means to earn them and see if this works any better since it sounds like it is hard to get Hany to do more exercise.  He does enjoy riding his bike and has the option to walk or use treadmill.  Says already checked with Hany's job and no other positions available that are naturally more active.      Hany at first seemed to be a little upset about changes and sounds like having a hard day prior to appointment.  Tried to review with Hany why discussing changes - due to weight gain and trying to prevent diabetes and was less agitated at the end of the appointment.  Patient's parents verbalized understanding of concepts discussed and recommendations provided.        PATIENT'S BEHAVIOR CHANGE GOALS:   See Patient Instructions for patient stated behavior change goals. AVS was printed and given to patient at today's appointment.    MONITOR / EVALUATE:  RD will monitor/evaluate:  Progress toward meeting stated nutrition-related goals  Weight change    FOLLOW-UP:  Call RD with questions/concerns.   Follow-up appointment scheduled on 10/4/19.     Arabella Moreno RD, LD, CDE   Time spent in minutes: 45 minutes  Encounter: Individual

## 2019-07-12 NOTE — PATIENT INSTRUCTIONS
1. Reduce cheese to lunch to 1 slice at lunch (cheese on sandwich or string cheese)    2. Reduce cookie to 1 at a meal instead of 1.5 cookies.    3. Add activity before he can have the energy drink    FOLLOW UP: Friday, October 4th at 1pm at [facility]    Arabella Moreno RD, ED, CDE   [phone]

## 2019-07-29 ENCOUNTER — PATIENT OUTREACH (OUTPATIENT)
Dept: CARE COORDINATION | Facility: CLINIC | Age: 24
End: 2019-07-29

## 2019-07-29 NOTE — PROGRESS NOTES
Clinic Care Coordination Contact-Social Work Initial Call/Assessment     PCP referral to assist with resources related to Patient's diagnosis of Down's Syndrome.  Per referral, Patient is having a lot of behavioral challenges.  He is a low functioning man, who has had outbursts, aggression, behavioral challenges,.  Per telephone call, clinic care team is calling the Down Syndrome Clinic at [facility] to find where they transition patients after age 18.  Also, [facility] or [facility] are options.

## 2019-07-31 ENCOUNTER — PATIENT OUTREACH (OUTPATIENT)
Dept: CARE COORDINATION | Facility: CLINIC | Age: 24
End: 2019-07-31

## 2019-07-31 NOTE — PROGRESS NOTES
The clinic Community Health Worker called patient's Mother Marcella per PCP referral for Care Coordination. CHW assisted in scheduling 08/01/19 10am phone appointment with mother.    [facility]   [facility]    Next outreach: 08/06/19    Plan:  -F/u        10am Call 08/01/19

## 2019-08-01 ENCOUNTER — PATIENT OUTREACH (OUTPATIENT)
Dept: CARE COORDINATION | Facility: CLINIC | Age: 24
End: 2019-08-01

## 2019-08-01 ASSESSMENT — ACTIVITIES OF DAILY LIVING (ADL)
DEPENDENT_IADLS:: CLEANING;COOKING;LAUNDRY;SHOPPING;MEAL PREPARATION;TRANSPORTATION;MONEY MANAGEMENT;MEDICATION MANAGEMENT

## 2019-08-01 NOTE — LETTER
[facility]ARE COORDINATION  [address] [address]  August 6, 2019    Hany LAM Jorge  [address]  [place] [address]      Dear Hany and parents of Hany,     Thank you for your time on the telephone this week.  I am sending a Complex Care plan which will review the goals we discussed.  Preeti, your Community Health Worker will be calling in 2-3 weeks to check in with you.     The clinic care coordinator is a registered nurse and/or  who understand the health care system. The goal of clinic care coordination is to help you manage your health and improve access to the [facility] system in the most efficient manner. The registered nurse can assist you in meeting your health care goals by providing education, coordinating services, and strengthening the communication among your providers. The  can assist you with financial, behavioral, psychosocial, chemical dependency, counseling, and/or psychiatric resources.    Please feel free to contact me at [phone] with any questions or concerns. We at [facility] are focused on providing you with the highest-quality healthcare experience possible and that all starts with you.     Sincerely,     Belinda Garcia, JUANJOW, MSW    Clinical Care Coordination  [facility]  [phone]    Enclosed: I have enclosed a copy of the Complex Care Plan. This has helpful information and goals that we have talked about. Please keep this in an easy to access place to use as needed.

## 2019-08-01 NOTE — LETTER
[facility]  Complex Care Plan  About Me:    Patient Name:  Hany Patel    YOB: 1995  Age:         24 year old   [place] MRN:    [id number] Telephone Information:  Home Phone [phone]   Mobile [phone]       Address:  [address]  [place] [address] Email address:  No e-mail address on record      Emergency Contact(s)    Name Relationship Lgl Grd Work Phone Home Phone Mobile Phone   1. SHAVON NATION* Mother   [phone]   2. LINDEN PATEL Father   [phone]           Primary language:  English     needed? No   [facility] [place] [facility]:  [phone] op. 1  Other communication barriers: Other, developmental disability   Preferred Method of Communication:  Telephone   Current living arrangement: I live in a private home with family  Mobility Status/ Medical Equipment:  None    Health Maintenance  Health Maintenance Reviewed:   Health Maintenance Due   Topic Date Due     HIV SCREENING  02/06/2008     DTAP/TDAP/TD IMMUNIZATION (8 - Td) 09/17/2018     PHQ-2  01/01/2019     MEDICARE ANNUAL WELLNESS VISIT  07/26/2019       My Access Plan  Medical Emergency 911   Primary Clinic Line [facility] [phone]   24 Hour Appointment Line [phone] or  [phone]) (toll-free)   24 Hour Nurse Line [phone] (toll-free)   Preferred Urgent Care [place] [phone]   Preferred Hospital [facility]  [phone]   [id number] [place] [place] - [address] AT [facility] [address]     Behavioral Health Crisis Line The National Suicide Prevention Lifeline at 1-223.200.5472 or 911             My Care Team Members  Patient Care Team       Relationship Specialty Notifications Start End    Liu Song PA-C PCP - General Family Practice  4/9/13     Phone:  [phone] Fax: [phone] [address] [address]    Oziel Perry MD MD Pediatrics  9/18/15     Phone: [phone] Fax: [phone]         [facility] [address] [facility] [address]    Liu Song PA-C Assigned PCP   2/19/17     Phone: [phone] Fax: [phone] [address] [address]    Belinda Garcia BSW Lead Care Coordinator Primary Care - CC  8/1/19      Care Coordination [facility]            My Care Plans  Self Management and Treatment Plan  Goals and (Comments)  Goals        General    1. Mental Health Management (pt-stated)     Notes - Note edited  8/6/2019 10:27 AM by Belinda Garcia BSW    Goal Statement: Patients family will contact resources discussed today for Patient--psychiatry and alternate living arrangements.  To be done within 3 months   Measure of Success: Patient's family will research/obtain psychiatrist for Patient, have researched alternate living arrangements   Supportive Steps to Achieve: SW discussed psychiatry resources, family also has their own resources, SW discussed alternate living resources for Patient  Community Health Worker (CHW) will call Patient's family in 2-3 weeks for status on above resources and to discuss next steps   Barriers: Family would respite.  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  This was not an appropriate fit for Patient   Strengths: Patient's parents are very supportive and involved, are a good advocate for Patient   Date to Achieve By: 11/6/2019  Patient expressed understanding of goal: Yes, Patient's mother                Action Plans on File: none                      Advance Care Plans/Directives Type: none       My Medical and Care Information  Problem List   Patient Active Problem List   Diagnosis     Down's syndrome     Hypothyroidism     Pre-diabetes      Chronic diarrhea     CARDIOVASCULAR SCREENING; LDL GOAL LESS THAN 130     Pervasive developmental disorder     Cystic acne vulgaris     Hypoxia     Morbid obesity (H)          Care Coordination Start Date: 8/1/2019   Frequency of Care Coordination: 2 weeks   Form Last Updated: 08/06/2019

## 2019-08-05 ENCOUNTER — TELEPHONE (OUTPATIENT)
Dept: PSYCHIATRY | Facility: CLINIC | Age: 24
End: 2019-08-05

## 2019-08-05 NOTE — TELEPHONE ENCOUNTER
PSYCHIATRY CLINIC PHONE INTAKE     SERVICES REQUESTED / INTERESTED IN          Med Management    Presenting Problem and Brief History                              What would you like to be seen for? (brief description):  Patients mother called on this date stating she is looking into psychiatric services for her son. Patient lost his PCP roughly 5 years prior. Patient has been on a constant medication management regime through a provider at [place], although Mom is stating that patient has been on the same medication (Lexapro), and MG for years. Patient has lost his care at his care center due to impulse control issues. Patient is having problems getting his behavior under control per mom. Patient is verbal, although has trouble reading writing to a large degree. Patient can understand right and wrong, but does seek out attention in inappropriate manners. Patient has been acting depressed as of late, but mom believes that it is due from having to be home for the better part of the day since losing his day treatment facility.     Have you received a mental health diagnosis? No   Which one (s): N/A  Is there any history of developmental delay?  Yes ASD   Are you currently seeing a mental health provider?  No            Who / month last seen:  N/A  Do you have mental health records elsewhere?  Yes  Will you sign a release so we can obtain them?  Yes    Have you ever been hospitalized for psychiatric reasons?  No  Describe:  N/A    Do you have current thoughts of self-harm?  No    Do you currently have thoughts of harming others?  No       Substance Use History     Do you have any history of alcohol / illicit drug use?  No  Describe:  N/A  Have you ever received treatment for this?  No    Describe:  N/A     Social History     Does the patient have a guardian?  Yes    Name / number: N/A  Have you had an ACT team in last 12 months?  No  Describe: N/A   Do you have any current or past legal issues?  No  Describe: N/A   OK  to leave a detailed voicemail?  Yes    Medical/ Surgical History                                   Patient Active Problem List   Diagnosis     Down's syndrome     Hypothyroidism     Pre-diabetes     Chronic diarrhea     CARDIOVASCULAR SCREENING; LDL GOAL LESS THAN 130     Pervasive developmental disorder     Cystic acne vulgaris     Hypoxia     Morbid obesity (H)          Medications             Current Outpatient Medications   Medication Sig Dispense Refill     albuterol (PROAIR HFA/PROVENTIL HFA/VENTOLIN HFA) 108 (90 Base) MCG/ACT Inhaler Inhale 2 puffs into the lungs every 4 hours as needed for shortness of breath / dyspnea or wheezing (Patient not taking: Reported on 7/26/2018) 1 Inhaler 0     amoxicillin-clavulanate (AUGMENTIN) 875-125 MG per tablet Take 1 tablet by mouth 2 times daily 20 tablet 0     Ascorbic Acid (VITAMIN C) 500 MG CHEW Take by mouth daily       Calcium Carbonate-Vitamin D (CALCIUM + D PO) Daily chewable       escitalopram (LEXAPRO) 20 MG tablet Take 1 tablet (20 mg) by mouth daily 90 tablet 0     ketoconazole (NIZORAL) 2 % shampoo Apply to the affected area and wash off after 5 minutes. 120 mL 1     ketotifen (ZADITOR) 0.025 % SOLN ophthalmic solution Place 1 drop into both eyes every 12 hours 1 Bottle 3     Lactobacillus Acid-Pectin (LACTOBACILLUS ACIDOPHILUS) TABS Take 1 tablet by mouth 2 times daily        levothyroxine (SYNTHROID, LEVOTHROID) 112 MCG tablet Take 112 mcg by mouth daily.       loperamide (IMODIUM) 2 MG capsule Take 2 mg by mouth daily (with breakfast) Take two tablets with breakfast       metFORMIN (GLUCOPHAGE) 1000 MG tablet Take 1,000 mg by mouth 2 times daily (with meals).       metroNIDAZOLE (METROGEL) 0.75 % topical gel Apply topically 2 times daily 45 g 11     Multiple Vitamin (MULTI-VITAMIN) per tablet Take 1 tablet by mouth daily. With D3       omega-3 acid ethyl esters (LOVAZA) 1 G capsule TAKE 2 CAPSULES BY MOUTH TWICE A DAY  6     simvastatin (ZOCOR) 20 MG  tablet Take 1 tablet (20 mg) by mouth At Bedtime       vitamin  B complex with vitamin C (VITAMIN  B COMPLEX) TABS Take 1 tablet by mouth daily           DISPOSITION      Patients phone screen submitted to Alma Combs.    Arnaldo Sarmiento

## 2019-08-06 NOTE — PROGRESS NOTES
Clinic Care Coordination Contact-Social Work     Clinic Care Coordination Contact  OUTREACH    Referral Information: Patient's family researching alternate living arrangement for Patient, need psychiatry resources for Patient with Down's Syndrome     Call to Patient's mother Marcella, Patient's parents are legal guardians.  Please see media     Referral Source: PCP    Primary Diagnosis: Psychosocial    Chief Complaint   Patient presents with     Clinic Care Coordination - Initial             Universal Utilization:  Patient has PCP, follow up appt scheduled for 8/9/2019   Clinic Utilization  Difficulty keeping appointments:: No  Compliance Concerns: No  No-Show Concerns: No  No PCP office visit in Past Year: No  Utilization    Last refreshed: 8/6/2019 11:11 AM:  Hospital Admissions 0           Last refreshed: 8/6/2019 11:11 AM:  ED Visits 0           Last refreshed: 8/6/2019 11:11 AM:  No Show Count (past year) 0              Current as of: 8/6/2019 11:11 AM            Clinical Concerns: Per telephone call 7/29/2019, Patient's mother wants referral for some place that can see the patient for his behavior issues/disability.  Please see telephone call 2/12/2019 in which this was addressed   Current Medical Concerns:    Patient Active Problem List   Diagnosis     Down's syndrome     Hypothyroidism     Pre-diabetes     Chronic diarrhea     CARDIOVASCULAR SCREENING; LDL GOAL LESS THAN 130     Pervasive developmental disorder     Cystic acne vulgaris     Hypoxia     Morbid obesity (H)     Current Behavioral Concerns: Patient's mother said Patient will tend to eat often and large portions.  Patient becoming aggressive with healthy change (specifically artificially sweetened drinks in diet per nutrition note 7/12/2019.  Patient also bored per mother as he is no longer attending day training group ([facility]) as not a good fit as other attendees much older than him.  Mother reports many are non verbal and are  in wheelchairs.  Patient's mother reports that Patient has exhibited behaviors as he is likely bored and does not have a structured program.  Patient exhibited behaviors at day training program.  He is no longer attending.  [id number] reports that Patient's is discourgaged with no longer being at day trining progtram as liked the staff.  Patient's mother researching alternate programs, such as [facility] in [place], with assistance with Patient's  with [facility].  Patient's mother reports that Patient is never left alone in the home    Education Provided to patient: Discussed psychiatry resources and alternate living arrangements for Patient.  [facility], a group home for those Patient's age, this # given   Pain  Pain (GOAL):: No  Health Maintenance Reviewed:    Health Maintenance Due   Topic Date Due     HIV SCREENING  02/06/2008     DTAP/TDAP/TD IMMUNIZATION (8 - Td) 09/17/2018     PHQ-2  01/01/2019     MEDICARE ANNUAL WELLNESS VISIT  07/26/2019     Clinical Pathway: None    Medication Management: Patient's mother desires a psychiatrist for Patient.  She is not interested in [facility] and states there are limited resources for specialty providers as Patient is over 18.  Patient's mother states having been given 2 resources of which she will inquire.  Patient's mother also interested in an MD not a NP for medication review       Functional Status:  Dependent ADLs:: Toileting, Bathing  Dependent IADLs:: Cleaning, Cooking, Laundry, Shopping, Meal Preparation, Transportation, Money Management, Medication Management  Bed or wheelchair confined:: No    Living Situation:  Current living arrangement:: I live in a private home with family  Type of residence:: Private home - stairs    Diet/Exercise/Sleep:  Patient has very good appetite and has gained weight, working with nutrition n healthy eating and portion control.  No concerns with sleep per Patient's mother   Diet:: Regular  Inadequate nutrition (GOAL)::  No  Food Insecurity: No  Tube Feeding: No  Exercise:: Yes(takes the dog for a walk )  Days per week of moderate to strenuous exercise (like a brisk walk): 3  On average, minutes per day of exercise at this level: 20  How intense was your typical exercise? : Moderate (like brisk walking)  Exercise Minutes per Week: 60  Inadequate activity/exercise (GOAL):: No  Significant changes in sleep pattern (GOAL): No    Transportation:  Transportation concerns (GOAL):: No  Transportation means:: Family     Psychosocial: Patient's mother is retired, she is very involved in Patient's care.  She and Patient's father and/or PCA often take Patient on outings and do social things, Patient will walk the dog.  Family also plan to research respite options with [facility]  to allow them to find resources for Patient   Patient has PCA for 3-4 hours, every Tuesday and 1 Saturday a month, has Developmental Disability waiver. Patient's mother desires psychiatry, alternate living for Patient and day training program.  She reports they cannot utilize [facility] for Patient needs as too old for program.  Patient has aged out of many systems ([facility], [facility], [facility]) as over 18 or many are not taking new referrals.  [facility] also does not accept referral, they work with disabilities.  Patient has been taking Celexa for a while, Patient's mother uncertain if still effective.  Patient and family have researched [facility] as possible new placement    Hindu or spiritual beliefs that impact treatment:: No  Mental health DX:: No  Mental health DX how managed:: Medication, Other(searching for psychiatrist that works with Down's Syndrome patients )  Mental health management concern (GOAL):: Yes(likely bored )  Informal Support system:: Family, Parent, Other([facility]  )     Financial/Insurance: [place], [place] waivered program for Developmental Disability   Financial/Insurance concerns (GOAL):: No       Resources and  Interventions: Patient's family researching alternate living arrangement, psychiatry and day training program  Current Resources: [facility] , PCA.  DD waivered program      Community Resources: Other (see comment), County Worker(Was attending a day training rehabilitation  program, have applied for another [facility] in [place].  [facility]  )  Supplies used at home:: None  Equipment Currently Used at Home: none    Advance Care Plan/Directive  Advanced Care Plans/Directives on file:: No    Referrals Placed: Other (parents looking for long term housing, respite at present )     Goals:   Goals        General    1. Mental Health Management (pt-stated)     Notes - Note edited  8/6/2019 10:27 AM by Belinda Garcia BSW    Goal Statement: Patient's family will contact resources discussed today for Patient--psychiatry and alternate living arrangements.  To be done within 3 months   Measure of Success: Patient's family will research/obtain psychiatrist for Patient, have researched alternate living arrangements   Supportive Steps to Achieve: SW discussed psychiatry resources, family also has their own resources, SW discussed alternate living resources for Patient  Community Health Worker (CHW) will call Patient's family in 2-3 weeks for status on above resources and to discuss next steps   Barriers: Family would respite.  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  This was not an appropriate fit for Patient   Strengths: Patient's parents are very supportive and involved, are a good advocate for Patient   Date to Achieve By: 11/6/2019  Patient expressed understanding of goal: Yes, Patient's mother                 Patient/Caregiver understanding:   Patient's family will contact resources discussed today for Patient--psychiatry and alternate living arrangements.  To be done within 3 months       Outreach Frequency: 2 weeks  Future  Appointments              In 3 days Liu Song PA-C [place]    In 1 month [place] [facility]          Plan:   1) Patient's family will contact resources discussed today for Patient--psychiatry and alternate living arrangements.  To be done within 3 months   2) SW will send introduction letter and Complex Care Plan today  3) SW will update PCP and Community Health Worker (CHW), CHW will call Patient's family in 2-3 weeks for update and needs assessment     PALMA Cash, MSW   [facility]   [phone]  8/6/2019 1:09 PM    CHW is following at this time, SW will not intervene and will route to new CHW to review.    PALMA Cash, NEERAJ   [facility]   [phone]  9/26/2019 2:11 PM

## 2019-08-09 ENCOUNTER — OFFICE VISIT (OUTPATIENT)
Dept: FAMILY MEDICINE | Facility: CLINIC | Age: 24
End: 2019-08-09
Payer: MEDICARE

## 2019-08-09 VITALS
SYSTOLIC BLOOD PRESSURE: 120 MMHG | DIASTOLIC BLOOD PRESSURE: 72 MMHG | BODY MASS INDEX: 44.9 KG/M2 | HEIGHT: 63 IN | WEIGHT: 253.4 LBS | TEMPERATURE: 98.4 F | HEART RATE: 92 BPM

## 2019-08-09 DIAGNOSIS — Q90.9 DOWN'S SYNDROME: ICD-10-CM

## 2019-08-09 DIAGNOSIS — F84.9 PERVASIVE DEVELOPMENTAL DISORDER: ICD-10-CM

## 2019-08-09 DIAGNOSIS — Z00.00 ROUTINE GENERAL MEDICAL EXAMINATION AT A HEALTH CARE FACILITY: Primary | ICD-10-CM

## 2019-08-09 PROCEDURE — 99213 OFFICE O/P EST LOW 20 MIN: CPT | Mod: 25 | Performed by: PHYSICIAN ASSISTANT

## 2019-08-09 PROCEDURE — 99395 PREV VISIT EST AGE 18-39: CPT | Performed by: PHYSICIAN ASSISTANT

## 2019-08-09 ASSESSMENT — MIFFLIN-ST. JEOR: SCORE: 2016.92
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
## 2019-08-09 NOTE — PATIENT INSTRUCTIONS
1. Can try CBD if you want - try 5-25 mg daily if you want depending on response.   2. Can try hydroxyzine later for use when irritable/angry/frustrated, etc   3. To Consider Weight Management Visit:  Dr. Beatrice Licea  [address] [place] [address]   Phone: [phone]     Preventive Health Recommendations  Male Ages 26 - 39    Yearly exam:             See your health care provider every year in order to  o   Review health changes.   o   Discuss preventive care.    o   Review your medicines if your doctor has prescribed any.    You should be tested each year for STDs (sexually transmitted diseases), if you re at risk.     After age 35, talk to your provider about cholesterol testing. If you are at risk for heart disease, have your cholesterol tested at least every 5 years.     If you are at risk for diabetes, you should have a diabetes test (fasting glucose).  Shots: Get a flu shot each year. Get a tetanus shot every 10 years.     Nutrition:    Eat at least 5 servings of fruits and vegetables daily.     Eat whole-grain bread, whole-wheat pasta and brown rice instead of white grains and rice.     Get adequate Calcium and Vitamin D.     Lifestyle    Exercise for at least 150 minutes a week (30 minutes a day, 5 days a week). This will help you control your weight and prevent disease.     Limit alcohol to one drink per day.     No smoking.     Wear sunscreen to prevent skin cancer.     See your dentist every six months for an exam and cleaning.

## 2019-08-18 DIAGNOSIS — F84.9 PERVASIVE DEVELOPMENTAL DISORDER: ICD-10-CM

## 2019-08-19 NOTE — TELEPHONE ENCOUNTER
"Requested Prescriptions   Pending Prescriptions Disp Refills     escitalopram (LEXAPRO) 20 MG tablet [Pharmacy Med Name: ESCITALOPRAM 20MG TABLETS]  Last Written Prescription Date:  5/20/2019  Last Fill Quantity: 90 tablet,  # refills: 0   Last office visit: 8/9/2019 with prescribing provider:  THAI Song   Future Office Visit:     90 tablet 0     Sig: TAKE 1 TABLET(20 MG) BY MOUTH DAILY       SSRIs Protocol Passed - 8/18/2019 12:41 PM  No flowsheet data found.  No flowsheet data found.            Passed - Recent (12 mo) or future (30 days) visit within the authorizing provider's specialty     Patient had office visit in the last 12 months or has a visit in the next 30 days with authorizing provider or within the authorizing provider's specialty.  See \"Patient Info\" tab in inbasket, or \"Choose Columns\" in Meds & Orders section of the refill encounter.              Passed - Medication is active on med list        Passed - Patient is age 18 or older          "

## 2019-08-20 RX ORDER — ESCITALOPRAM OXALATE 20 MG/1
20 TABLET ORAL DAILY
Qty: 90 TABLET | Refills: 1 | Status: SHIPPED | OUTPATIENT
Start: 2019-08-20 | End: 2020-02-11

## 2019-08-27 ENCOUNTER — TELEPHONE (OUTPATIENT)
Dept: FAMILY MEDICINE | Facility: CLINIC | Age: 24
End: 2019-08-27

## 2019-08-27 NOTE — TELEPHONE ENCOUNTER
Huddled with PCP who states that patient should be seen in urgent care or with another provider.     Reached out to suggest urgent care today or offered an appointment at the [facility] with another provider. Preferred to schedule with the other provider. Reports some drainage in the last couple days, patient doesn't seem to be bother by it. No fever noted or any other symptoms.    Shira Rodriguez, RN

## 2019-08-27 NOTE — TELEPHONE ENCOUNTER
Reason for call:  Patient reporting a symptom    Symptom or request: Ear Pain and drainage    Duration (how long have symptoms been present): a couple of days    Have you been treated for this before? No    Additional comments: Mom is wanting to see if she can get Hany in for a quick ear check with Nelson Song    Phone Number patient can be reached at:  Home number on file [phone] (home)    Best Time:  Any    Can we leave a detailed message on this number:  YES    Call taken on 8/27/2019 at 1:43 PM by Alida Hernandez

## 2019-08-28 ENCOUNTER — OFFICE VISIT (OUTPATIENT)
Dept: FAMILY MEDICINE | Facility: CLINIC | Age: 24
End: 2019-08-28
Payer: MEDICARE

## 2019-08-28 ENCOUNTER — PATIENT OUTREACH (OUTPATIENT)
Dept: CARE COORDINATION | Facility: CLINIC | Age: 24
End: 2019-08-28

## 2019-08-28 VITALS
HEART RATE: 80 BPM | OXYGEN SATURATION: 98 % | BODY MASS INDEX: 45.33 KG/M2 | DIASTOLIC BLOOD PRESSURE: 58 MMHG | WEIGHT: 252 LBS | TEMPERATURE: 97.1 F | SYSTOLIC BLOOD PRESSURE: 108 MMHG

## 2019-08-28 DIAGNOSIS — H60.312 ACUTE DIFFUSE OTITIS EXTERNA OF LEFT EAR: Primary | ICD-10-CM

## 2019-08-28 PROCEDURE — 99213 OFFICE O/P EST LOW 20 MIN: CPT | Performed by: FAMILY MEDICINE

## 2019-08-28 RX ORDER — CIPROFLOXACIN AND DEXAMETHASONE 3; 1 MG/ML; MG/ML
4 SUSPENSION/ DROPS AURICULAR (OTIC) 2 TIMES DAILY
Qty: 1 BOTTLE | Refills: 0 | Status: SHIPPED | OUTPATIENT
Start: 2019-08-28 | End: 2019-10-04

## 2019-08-28 ASSESSMENT — PAIN SCALES - GENERAL: PAINLEVEL: EXTREME PAIN (8)

## 2019-08-28 NOTE — PROGRESS NOTES
Subjective     Hany Patel is a 24 year old male who presents to clinic today for the following health issues:    HPI   (S) 24 year old male, comes with parents, complains of pain in left ear, started after he went swimming, for 3-5 days. No fever or URI symptoms. Has been swimming.    (O) He appears well, afebrile. Left ear reveals tenderness of the tragus; debris and inflammation in external canal. TM is well seen , and normal    (A) Otitis Externa    ICD-10-CM    1. Acute diffuse otitis externa of left ear H60.312 ciprofloxacin-dexamethasone (CIPRODEX) 0.3-0.1 % otic suspension     (P) Instructed to keep ear dry until better; eardrops per orders, call if persistent pain, swelling or fever, FUV prn.      Sergio Florence MD.
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
## 2019-08-28 NOTE — PATIENT INSTRUCTIONS
Patient Education     External Ear Infection (Adult)    External otitis (also called  swimmer s ear ) is an infection in the ear canal. It is often caused by bacteria or fungus. It can occur a few days after water gets trapped in the ear canal (from swimming or bathing). It can also occur after cleaning too deeply in the ear canal with a cotton swab or other object. Sometimes, hair care products get into the ear canal and cause this problem.  Symptoms can include pain, fever, itching, redness, drainage, or swelling of the ear canal. Temporary hearing loss may also occur.  Home care    Do not try to clean the ear canal. This can push pus and bacteria deeper into the canal.    Use prescribed ear drops as directed. These help reduce swelling and fight the infection. If an ear wick was placed in the ear canal, apply drops right onto the end of the wick. The wick will draw the medicine into the ear canal even if it is swollen closed.    A cotton ball may be loosely placed in the outer ear to absorb any drainage.    You may use acetaminophen or ibuprofen to control pain, unless another medicine was prescribed. Note: If you have chronic liver or kidney disease or ever had a stomach ulcer or GI bleeding, talk to your healthcare provider before taking any of these medicines.    Do not allow water to get into your ear when bathing. Also, don't swim until the infection has cleared.  Prevention    Keep your ears dry. This helps lower the risk of infection. Dry your ears with a towel or hair dryer after getting wet. Also, use ear plugs when swimming.    Do not stick any objects in the ear to remove wax.    If you feel water trapped in your ear, use ear drops right away. You can get these drops over the counter at most drugstores. They work by removing water from the ear canal.  Follow-up care  Follow up with your healthcare provider in 1 week, or as advised.  When to seek medical advice  Call your healthcare provider right  away if any of these occur:    Ear pain becomes worse or doesn t improve after 3 days of treatment    Redness or swelling of the outer ear occurs or gets worse    Headache    Painful or stiff neck    Drowsiness or confusion    Fever of 100.4 F (38 C) or higher, or as directed by your healthcare provider    Seizure  Date Last Reviewed: 10/1/2017    5087-0563 [employer]. [address] [place] [address]. All rights reserved. This information is not intended as a substitute for professional medical care. Always follow your healthcare professional's instructions.

## 2019-09-27 ENCOUNTER — PATIENT OUTREACH (OUTPATIENT)
Dept: CARE COORDINATION | Facility: CLINIC | Age: 24
End: 2019-09-27

## 2019-09-27 NOTE — PROGRESS NOTES
Mom requested that I resend the resources that were provided as she is unable to locate them. She requested this to be by [employer]. Sent [employer] message to Marcella with [facility].  Consulted with [facility]ARI to confirm this resource.       Outreach 10/27/19  Outreach Interval: 1 month

## 2019-10-04 ENCOUNTER — PATIENT OUTREACH (OUTPATIENT)
Dept: CARE COORDINATION | Facility: CLINIC | Age: 24
End: 2019-10-04

## 2019-10-04 ENCOUNTER — APPOINTMENT (OUTPATIENT)
Dept: GENERAL RADIOLOGY | Facility: CLINIC | Age: 24
DRG: 193 | End: 2019-10-04
Attending: EMERGENCY MEDICINE
Payer: MEDICARE

## 2019-10-04 ENCOUNTER — HOSPITAL ENCOUNTER (INPATIENT)
Facility: CLINIC | Age: 24
LOS: 3 days | Discharge: HOME | DRG: 193 | End: 2019-10-07
Attending: EMERGENCY MEDICINE | Admitting: INTERNAL MEDICINE
Payer: MEDICARE

## 2019-10-04 DIAGNOSIS — J18.9 PNEUMONIA OF LEFT LOWER LOBE DUE TO INFECTIOUS ORGANISM: ICD-10-CM

## 2019-10-04 DIAGNOSIS — J18.9 PNEUMONIA OF LEFT LUNG DUE TO INFECTIOUS ORGANISM, UNSPECIFIED PART OF LUNG: ICD-10-CM

## 2019-10-04 DIAGNOSIS — J18.9 PNEUMONIA OF BOTH LUNGS DUE TO INFECTIOUS ORGANISM, UNSPECIFIED PART OF LUNG: Primary | ICD-10-CM

## 2019-10-04 DIAGNOSIS — J18.8 OTHER PNEUMONIA, UNSPECIFIED ORGANISM: ICD-10-CM

## 2019-10-04 LAB
ALBUMIN SERPL-MCNC: 3.2 G/DL (ref 3.4–5)
ALP SERPL-CCNC: 57 U/L (ref 40–150)
ALT SERPL W P-5'-P-CCNC: 28 U/L (ref 0–70)
ANION GAP SERPL CALCULATED.3IONS-SCNC: 7 MMOL/L (ref 3–14)
AST SERPL W P-5'-P-CCNC: 13 U/L (ref 0–45)
BASOPHILS # BLD AUTO: 0.1 10E9/L (ref 0–0.2)
BASOPHILS NFR BLD AUTO: 0.9 %
BILIRUB SERPL-MCNC: 0.5 MG/DL (ref 0.2–1.3)
BUN SERPL-MCNC: 9 MG/DL (ref 7–30)
CALCIUM SERPL-MCNC: 8.7 MG/DL (ref 8.5–10.1)
CHLORIDE SERPL-SCNC: 105 MMOL/L (ref 94–109)
CO2 SERPL-SCNC: 27 MMOL/L (ref 20–32)
CREAT SERPL-MCNC: 0.66 MG/DL (ref 0.66–1.25)
DIFFERENTIAL METHOD BLD: ABNORMAL
EOSINOPHIL # BLD AUTO: 0.1 10E9/L (ref 0–0.7)
EOSINOPHIL NFR BLD AUTO: 0.4 %
ERYTHROCYTE [DISTWIDTH] IN BLOOD BY AUTOMATED COUNT: 15.2 % (ref 10–15)
GFR SERPL CREATININE-BSD FRML MDRD: >90 ML/MIN/{1.73_M2}
GLUCOSE SERPL-MCNC: 116 MG/DL (ref 70–99)
HCT VFR BLD AUTO: 42.7 % (ref 40–53)
HGB BLD-MCNC: 13.9 G/DL (ref 13.3–17.7)
IMM GRANULOCYTES # BLD: 0.1 10E9/L (ref 0–0.4)
IMM GRANULOCYTES NFR BLD: 0.5 %
LYMPHOCYTES # BLD AUTO: 1.6 10E9/L (ref 0.8–5.3)
LYMPHOCYTES NFR BLD AUTO: 12.6 %
MCH RBC QN AUTO: 29.3 PG (ref 26.5–33)
MCHC RBC AUTO-ENTMCNC: 32.6 G/DL (ref 31.5–36.5)
MCV RBC AUTO: 90 FL (ref 78–100)
MONOCYTES # BLD AUTO: 0.7 10E9/L (ref 0–1.3)
MONOCYTES NFR BLD AUTO: 5.8 %
NEUTROPHILS # BLD AUTO: 10.3 10E9/L (ref 1.6–8.3)
NEUTROPHILS NFR BLD AUTO: 79.8 %
NRBC # BLD AUTO: 0 10*3/UL
NRBC BLD AUTO-RTO: 0 /100
NT-PROBNP SERPL-MCNC: 26 PG/ML (ref 0–450)
PLATELET # BLD AUTO: 302 10E9/L (ref 150–450)
POTASSIUM SERPL-SCNC: 3.8 MMOL/L (ref 3.4–5.3)
PROCALCITONIN SERPL-MCNC: 0.05 NG/ML
PROT SERPL-MCNC: 7.3 G/DL (ref 6.8–8.8)
RBC # BLD AUTO: 4.75 10E12/L (ref 4.4–5.9)
SODIUM SERPL-SCNC: 140 MMOL/L (ref 133–144)
WBC # BLD AUTO: 12.9 10E9/L (ref 4–11)

## 2019-10-04 PROCEDURE — 99285 EMERGENCY DEPT VISIT HI MDM: CPT | Mod: 25 | Performed by: EMERGENCY MEDICINE

## 2019-10-04 PROCEDURE — 25000132 ZZH RX MED GY IP 250 OP 250 PS 637: Mod: GY | Performed by: EMERGENCY MEDICINE

## 2019-10-04 PROCEDURE — 25800030 ZZH RX IP 258 OP 636: Performed by: STUDENT IN AN ORGANIZED HEALTH CARE EDUCATION/TRAINING PROGRAM

## 2019-10-04 PROCEDURE — 83880 ASSAY OF NATRIURETIC PEPTIDE: CPT | Performed by: EMERGENCY MEDICINE

## 2019-10-04 PROCEDURE — 40000556 ZZH STATISTIC PERIPHERAL IV START W US GUIDANCE

## 2019-10-04 PROCEDURE — 25800030 ZZH RX IP 258 OP 636: Performed by: EMERGENCY MEDICINE

## 2019-10-04 PROCEDURE — 96361 HYDRATE IV INFUSION ADD-ON: CPT | Performed by: EMERGENCY MEDICINE

## 2019-10-04 PROCEDURE — 25000125 ZZHC RX 250: Performed by: EMERGENCY MEDICINE

## 2019-10-04 PROCEDURE — 71046 X-RAY EXAM CHEST 2 VIEWS: CPT

## 2019-10-04 PROCEDURE — 85025 COMPLETE CBC W/AUTO DIFF WBC: CPT | Performed by: EMERGENCY MEDICINE

## 2019-10-04 PROCEDURE — 12000004 ZZH R&B IMCU UMMC

## 2019-10-04 PROCEDURE — 99223 1ST HOSP IP/OBS HIGH 75: CPT | Mod: AI | Performed by: INTERNAL MEDICINE

## 2019-10-04 PROCEDURE — 94640 AIRWAY INHALATION TREATMENT: CPT | Performed by: EMERGENCY MEDICINE

## 2019-10-04 PROCEDURE — 96365 THER/PROPH/DIAG IV INF INIT: CPT | Performed by: EMERGENCY MEDICINE

## 2019-10-04 PROCEDURE — 84145 PROCALCITONIN (PCT): CPT | Performed by: EMERGENCY MEDICINE

## 2019-10-04 PROCEDURE — 99284 EMERGENCY DEPT VISIT MOD MDM: CPT | Mod: Z6 | Performed by: EMERGENCY MEDICINE

## 2019-10-04 PROCEDURE — 25000132 ZZH RX MED GY IP 250 OP 250 PS 637: Mod: GY | Performed by: STUDENT IN AN ORGANIZED HEALTH CARE EDUCATION/TRAINING PROGRAM

## 2019-10-04 PROCEDURE — 96367 TX/PROPH/DG ADDL SEQ IV INF: CPT | Performed by: EMERGENCY MEDICINE

## 2019-10-04 PROCEDURE — 25000128 H RX IP 250 OP 636: Performed by: EMERGENCY MEDICINE

## 2019-10-04 PROCEDURE — 80053 COMPREHEN METABOLIC PANEL: CPT | Performed by: EMERGENCY MEDICINE

## 2019-10-04 PROCEDURE — 25000125 ZZHC RX 250: Performed by: STUDENT IN AN ORGANIZED HEALTH CARE EDUCATION/TRAINING PROGRAM

## 2019-10-04 RX ORDER — SODIUM CHLORIDE 9 MG/ML
INJECTION, SOLUTION INTRAVENOUS CONTINUOUS
Status: ACTIVE | OUTPATIENT
Start: 2019-10-04 | End: 2019-10-05

## 2019-10-04 RX ORDER — ALBUTEROL SULFATE 0.83 MG/ML
2.5 SOLUTION RESPIRATORY (INHALATION)
Status: DISCONTINUED | OUTPATIENT
Start: 2019-10-04 | End: 2019-10-05

## 2019-10-04 RX ORDER — SIMVASTATIN 20 MG
20 TABLET ORAL AT BEDTIME
Status: DISCONTINUED | OUTPATIENT
Start: 2019-10-04 | End: 2019-10-07 | Stop reason: HOSPADM

## 2019-10-04 RX ORDER — ONDANSETRON 4 MG/1
4 TABLET, ORALLY DISINTEGRATING ORAL EVERY 6 HOURS PRN
Status: DISCONTINUED | OUTPATIENT
Start: 2019-10-04 | End: 2019-10-07 | Stop reason: HOSPADM

## 2019-10-04 RX ORDER — CEFTRIAXONE 2 G/1
2 INJECTION, POWDER, FOR SOLUTION INTRAMUSCULAR; INTRAVENOUS ONCE
Status: DISCONTINUED | OUTPATIENT
Start: 2019-10-05 | End: 2019-10-04

## 2019-10-04 RX ORDER — LOPERAMIDE HCL 2 MG
2 CAPSULE ORAL
Status: DISCONTINUED | OUTPATIENT
Start: 2019-10-05 | End: 2019-10-07 | Stop reason: HOSPADM

## 2019-10-04 RX ORDER — OLANZAPINE 5 MG/1
10 TABLET, ORALLY DISINTEGRATING ORAL ONCE
Status: COMPLETED | OUTPATIENT
Start: 2019-10-04 | End: 2019-10-04

## 2019-10-04 RX ORDER — ALBUTEROL SULFATE 0.83 MG/ML
2.5 SOLUTION RESPIRATORY (INHALATION)
Status: DISCONTINUED | OUTPATIENT
Start: 2019-10-04 | End: 2019-10-07 | Stop reason: HOSPADM

## 2019-10-04 RX ORDER — LIDOCAINE 40 MG/G
CREAM TOPICAL
Status: DISCONTINUED | OUTPATIENT
Start: 2019-10-04 | End: 2019-10-07 | Stop reason: HOSPADM

## 2019-10-04 RX ORDER — ACETAMINOPHEN 325 MG/1
650 TABLET ORAL EVERY 4 HOURS PRN
Status: DISCONTINUED | OUTPATIENT
Start: 2019-10-04 | End: 2019-10-07 | Stop reason: HOSPADM

## 2019-10-04 RX ORDER — LORAZEPAM 1 MG/1
1 TABLET ORAL ONCE
Status: DISCONTINUED | OUTPATIENT
Start: 2019-10-04 | End: 2019-10-07 | Stop reason: HOSPADM

## 2019-10-04 RX ORDER — LEVOTHYROXINE SODIUM 112 UG/1
112 TABLET ORAL DAILY
Status: DISCONTINUED | OUTPATIENT
Start: 2019-10-05 | End: 2019-10-07 | Stop reason: HOSPADM

## 2019-10-04 RX ORDER — CEFTRIAXONE 2 G/1
2 INJECTION, POWDER, FOR SOLUTION INTRAMUSCULAR; INTRAVENOUS EVERY 24 HOURS
Status: DISPENSED | OUTPATIENT
Start: 2019-10-04 | End: 2019-10-05

## 2019-10-04 RX ORDER — CEFTRIAXONE 2 G/1
2 INJECTION, POWDER, FOR SOLUTION INTRAMUSCULAR; INTRAVENOUS ONCE
Status: COMPLETED | OUTPATIENT
Start: 2019-10-04 | End: 2019-10-04

## 2019-10-04 RX ORDER — ACETAMINOPHEN 650 MG/1
650 SUPPOSITORY RECTAL EVERY 4 HOURS PRN
Status: DISCONTINUED | OUTPATIENT
Start: 2019-10-04 | End: 2019-10-07 | Stop reason: HOSPADM

## 2019-10-04 RX ORDER — ONDANSETRON 2 MG/ML
4 INJECTION INTRAMUSCULAR; INTRAVENOUS EVERY 6 HOURS PRN
Status: DISCONTINUED | OUTPATIENT
Start: 2019-10-04 | End: 2019-10-07 | Stop reason: HOSPADM

## 2019-10-04 RX ORDER — LACTOBACILLUS RHAMNOSUS GG 10B CELL
1 CAPSULE ORAL 2 TIMES DAILY
Status: DISCONTINUED | OUTPATIENT
Start: 2019-10-05 | End: 2019-10-07 | Stop reason: HOSPADM

## 2019-10-04 RX ORDER — ESCITALOPRAM OXALATE 20 MG/1
20 TABLET ORAL DAILY
Status: DISCONTINUED | OUTPATIENT
Start: 2019-10-05 | End: 2019-10-07 | Stop reason: HOSPADM

## 2019-10-04 RX ORDER — IPRATROPIUM BROMIDE AND ALBUTEROL SULFATE 2.5; .5 MG/3ML; MG/3ML
3 SOLUTION RESPIRATORY (INHALATION) ONCE
Status: COMPLETED | OUTPATIENT
Start: 2019-10-04 | End: 2019-10-04

## 2019-10-04 RX ADMIN — SIMVASTATIN 20 MG: 20 TABLET, FILM COATED ORAL at 22:28

## 2019-10-04 RX ADMIN — ALBUTEROL SULFATE 2.5 MG: 2.5 SOLUTION RESPIRATORY (INHALATION) at 22:28

## 2019-10-04 RX ADMIN — SODIUM CHLORIDE: 9 INJECTION, SOLUTION INTRAVENOUS at 20:56

## 2019-10-04 RX ADMIN — OLANZAPINE 10 MG: 5 TABLET, ORALLY DISINTEGRATING ORAL at 12:53

## 2019-10-04 RX ADMIN — CEFTRIAXONE SODIUM 2 G: 2 INJECTION, POWDER, FOR SOLUTION INTRAMUSCULAR; INTRAVENOUS at 14:05

## 2019-10-04 RX ADMIN — IPRATROPIUM BROMIDE AND ALBUTEROL SULFATE 3 ML: .5; 3 SOLUTION RESPIRATORY (INHALATION) at 17:14

## 2019-10-04 RX ADMIN — OLANZAPINE 10 MG: 5 TABLET, ORALLY DISINTEGRATING ORAL at 12:13

## 2019-10-04 RX ADMIN — AZITHROMYCIN MONOHYDRATE 500 MG: 500 INJECTION, POWDER, LYOPHILIZED, FOR SOLUTION INTRAVENOUS at 14:30

## 2019-10-04 ASSESSMENT — ENCOUNTER SYMPTOMS: COUGH: 1

## 2019-10-04 NOTE — ED PROVIDER NOTES
History     Chief Complaint   Patient presents with     Cough     HPI   Hany Patel is a 24 year old male with a history of Down's syndrome, hypothyroidism, imparited glucose tolerance, chronic diarrhea, chronic adhesive otitis media status post bilateral myringotomy and T-tube placement (2015 at [place] with Dr. Young Floyd); who presents to the Emergency Department accompanied by his parents for evaluation of a cough. His family has noted a cough for the last several days.  Today, they noticed Hany was not acting normally and appeared confused, but this resolved within a short period of time.  He has had no vomiting or diarrhea.  He has had no difficulty breathing.        Per chart review, patient was admitted here on 2/7/2017 to 2/20/2017 for acute hypoxic respiratory failure secondary to community-acquired pneumonia and left-sided parapneumonic effusion. He required chest tube placement and intubation as well as several days of IV antibiotics in the MICU. His follow-up x-ray was improved and he was discharged with a five day course of oral antibiotics.       I have reviewed the Medications, Allergies, Past Medical and Surgical History, and Social History in the [employer] system.  Past Medical History:   Diagnosis Date     Diarrhea     Diarrhea Episodes        Past Surgical History:   Procedure Laterality Date     INSERT CHEST TUBE Left 2/7/2017    Procedure: INSERT CHEST TUBE;  Surgeon: Coco Zambrano MD;  Location: [facility] OR     SURGICAL HISTORY OF -       Ear tubes      SURGICAL HISTORY OF -       Adenoid removal        Family History   Problem Relation Age of Onset     Breast Cancer Mother      Hypertension Father        Social History     Tobacco Use     Smoking status: Never Smoker     Smokeless tobacco: Never Used   Substance Use Topics     Alcohol use: No     No current facility-administered medications for this encounter.      Current Outpatient Medications   Medication     albuterol (PROAIR  HFA/PROVENTIL HFA/VENTOLIN HFA) 108 (90 Base) MCG/ACT Inhaler     amoxicillin-clavulanate (AUGMENTIN) 875-125 MG per tablet     Ascorbic Acid (VITAMIN C) 500 MG CHEW     Calcium Carbonate-Vitamin D (CALCIUM + D PO)     ciprofloxacin-dexamethasone (CIPRODEX) 0.3-0.1 % otic suspension     escitalopram (LEXAPRO) 20 MG tablet     ketoconazole (NIZORAL) 2 % shampoo     ketotifen (ZADITOR) 0.025 % SOLN ophthalmic solution     Lactobacillus Acid-Pectin (LACTOBACILLUS ACIDOPHILUS) TABS     levothyroxine (SYNTHROID, LEVOTHROID) 112 MCG tablet     loperamide (IMODIUM) 2 MG capsule     metFORMIN (GLUCOPHAGE) 1000 MG tablet     metroNIDAZOLE (METROGEL) 0.75 % topical gel     Multiple Vitamin (MULTI-VITAMIN) per tablet     omega-3 acid ethyl esters (LOVAZA) 1 G capsule     simvastatin (ZOCOR) 20 MG tablet     vitamin  B complex with vitamin C (VITAMIN  B COMPLEX) TABS        Allergies   Allergen Reactions     Seasonal Allergies        Review of Systems   Respiratory: Positive for cough.        Physical Exam   BP: (!) 140/60  Pulse: 100  Temp: 99  F (37.2  C)  Resp: 20  SpO2: 96 %      Physical Exam  Vitals signs and nursing note reviewed.   Constitutional:       General: He is not in acute distress.     Appearance: He is not diaphoretic.   HENT:      Head: Normocephalic and atraumatic.   Neck:      Musculoskeletal: Normal range of motion and neck supple.   Cardiovascular:      Rate and Rhythm: Normal rate.   Pulmonary:      Effort: Pulmonary effort is normal. No respiratory distress.      Breath sounds: Rhonchi present. No rales.   Abdominal:      Palpations: Abdomen is soft.   Musculoskeletal: Normal range of motion.   Skin:     General: Skin is warm and dry.   Neurological:      Mental Status: He is alert and oriented to person, place, and time.      Sensory: No sensory deficit.         ED Course        Procedures             Critical Care time:  none             Labs Ordered and Resulted from Time of ED Arrival Up to the Time  of Departure from the ED - No data to display         Assessments & Plan (with Medical Decision Making)   This is a 23 yo M with Down's syndrome and prior history of pneumonia with parapneumonic effusion and chest tube with acute hypoxic respiratory failure who presents with cough and an episode of confusion.  A chest xray shows bilateral patchy infiltrates, worse on the left.  He required sedation to perform the necessary testing, and received Zyprexa 10mg PO but spit most out and received another Zyprexa 10mg PO.  While sedated he had episodes of hypoxia worsened by sleep apnea.  When he awakes he refuses oxygen.  Will give blow by oxygen.  He received IV Rocephin and Azithromycin for pneumonia.  He will be admitted for further monitoring.       While in the ER, the patient had significant desaturations to the upper 70s,  likely related to his pneumonia, sleep apnea and sedation. Will admit to the I[facility].      I have reviewed the nursing notes.    I have reviewed the findings, diagnosis, plan and need for follow up with the patient.    New Prescriptions    No medications on file       Final diagnoses:   None       10/4/2019   [facility], [place], EMERGENCY DEPARTMENT     Marquise Mccracken MD  10/04/19 2375

## 2019-10-04 NOTE — H&P
[facility], [place]    History and Physical - Marjanette 3 Service        Date of Admission:  10/4/2019    Assessment & Plan   Hany Patel is a 24 year-old male with history of Down's syndrome, hypothyroidism, chronic diarrhea, adhesive otitis media status post bilateral myringotomy and T-tube placement (2015 at [place] with Dr. Young Floyd); who presented with cough that has been going on for a week. Admitted on 10/4/2019.     # Community Acquired pneumonia   # Cough   #Acute hypoxic respiratory failure  One week of cough, now productive over the past 3 days. Afebrile on admission with O2 saturation > 93 % on room air.  Wbc slightly elevated at 13.9 with absolute neutrophil of 10.3.  Procal 0.05.  Physical exam bening except for labored breathing. Chest x-ray demonstrates bilateral perihilar and bibasilar opacities concerning for bacterial/viral pneumonia versus aspiration pneumonitis. Recently ( in 2017) was hospitalized for CAP complicated by left para pneumonic effusion s/p intubation for low oxygen and agitation, chest tube placement and drainage. Ddx considered include CAP, aspiration pneumonia and effusion.   - Procal AM   - Legionella pneumonia antigen  - Strep pneumo antigen urine  - CBC with platelet  - CMP    - Sputum culture  - Gram stain  - Ceftriaxone and azithromycin   - Albuterol nebs   - Flu vaccine prior to discharge     # Diarrhea, chronic    - Continue PTA loperamide 2 mg daily   - Monitor stool output; consider c. diff testing if persistent leukocytosis or diarrhea     #hx of hypothyroidism  4/11/19 TSH of 1.47.   - Continue PTA levothyroxine     # Mood-disorder on Escitalopram, unspecified   Pediatric physician prescribed few years back. [place] don't know specific diagnosis. Continues to be on it.   - Continue PTA Escitalopram.    # Pre-diabetic   Last A1c of 5.3.   - Continue PTA  Simvastatin 20 mg  - Hold Metformin      Diet: Advance as tolerated   Fluids:  mIVF  DVT Prophylaxis: Pneumatic Compression Devices  [place] Catheter: not present  Code Status: Full     Disposition Plan   Expected discharge: 2 - 3 days, recommended to prior living arrangement once antibiotic plan established and mental status at baseline.  Entered: Emeka Kent MD 10/04/2019, 5:40 PM       The patient's care was discussed with the Attending Physician, Dr. Luis Manuel Wade .    Jb Kent MD  Internal Medicine, PGY1  [id number]    ______________________________________________________________________    Chief Complaint   Cough     History is obtained from the patient's parent(s) and ED notes. Pt sllightly drowsy .      History of Present Illness     Hany Patel is a 24 year-old male with history of Down's syndrome, hypothyroidism, chronic diarrhea, adhesive otitis media status post bilateral myringotomy and T-tube placement (2015 at [place] with Dr. Young Floyd); who presented with cough that has been going on for a week.  Molly brought him for evaluation of cough. Cough has been productive lately. They're concerned because he had similar presentation in 2017 that lead to hospitalization. Family noticed that he's confused at times. He's having normal BM (usually once per day) and urine output. No difficulties with breathing. Patient denies chest pain or abdominal pain though difficult to interview as he's sleepy.  Pt is s/p Zyprexa x2  for agitation and refusing ED team to start an IV. He's also s/p azithromycin and ceftriaxone.     2/7/2017-2/20/2017 pt was admitted for CAP complicated by left para pneumonic effusion. He was intubated for agitation increased oxygen requirement. Chest tube drained about 1-2 L of fluid, cultures grew strep intermedius, pan sensitive. He was treated with zosyn, levofloxacin.     Review of Systems    The 10 point Review of Systems is negative other than noted in the HPI.    Past Medical History    I have reviewed this patient's medical history and updated it  with pertinent information if needed.   Past Medical History:   Diagnosis Date     Diarrhea     Diarrhea Episodes    Hypothymism     Past Surgical History   I have reviewed this patient's surgical history and updated it with pertinent information if needed.  Past Surgical History:   Procedure Laterality Date     INSERT CHEST TUBE Left 2/7/2017    Procedure: INSERT CHEST TUBE;  Surgeon: Coco Zambrano MD;  [facility]     SURGICAL HISTORY OF -       Ear tubes      SURGICAL HISTORY OF -       Adenoid removal         Social History   I have reviewed this patient's social history and updated it with pertinent information if needed. Hany Patel  reports that he has never smoked. He has never used smokeless tobacco. He reports that he does not drink alcohol or use drugs.  Lives with his parents at home     Family History   I have reviewed this patient's family history and updated it with pertinent information if needed.   Family History   Problem Relation Age of Onset     Breast Cancer Mother      Hypertension Father        Prior to Admission Medications   Prior to Admission Medications   Prescriptions Last Dose Informant Patient Reported? Taking?   Ascorbic Acid (VITAMIN C) 500 MG CHEW   Yes No   Sig: Take by mouth daily   Calcium Carbonate-Vitamin D (CALCIUM + D PO)   Yes No   Sig: Daily chewable   Lactobacillus Acid-Pectin (LACTOBACILLUS ACIDOPHILUS) TABS   Yes No   Sig: Take 1 tablet by mouth 2 times daily    Multiple Vitamin (MULTI-VITAMIN) per tablet   Yes No   Sig: Take 1 tablet by mouth daily. With D3   albuterol (PROAIR HFA/PROVENTIL HFA/VENTOLIN HFA) 108 (90 Base) MCG/ACT Inhaler   No No   Sig: Inhale 2 puffs into the lungs every 4 hours as needed for shortness of breath / dyspnea or wheezing   amoxicillin-clavulanate (AUGMENTIN) 875-125 MG per tablet   No No   Sig: Take 1 tablet by mouth 2 times daily   Patient not taking: Reported on 8/28/2019   ciprofloxacin-dexamethasone (CIPRODEX) 0.3-0.1 %  otic suspension   No No   Sig: Place 4 drops Into the left ear 2 times daily   escitalopram (LEXAPRO) 20 MG tablet   No No   Sig: Take 1 tablet (20 mg) by mouth daily   ketoconazole (NIZORAL) 2 % shampoo   No No   Sig: Apply to the affected area and wash off after 5 minutes.   ketotifen (ZADITOR) 0.025 % SOLN ophthalmic solution   No No   Sig: Place 1 drop into both eyes every 12 hours   levothyroxine (SYNTHROID, LEVOTHROID) 112 MCG tablet   Yes No   Sig: Take 112 mcg by mouth daily.   loperamide (IMODIUM) 2 MG capsule   Yes No   Sig: Take 2 mg by mouth daily (with breakfast) Take two tablets with breakfast   metFORMIN (GLUCOPHAGE) 1000 MG tablet   Yes No   Sig: Take 1,000 mg by mouth 2 times daily (with meals).   metroNIDAZOLE (METROGEL) 0.75 % topical gel   No No   Sig: Apply topically 2 times daily   omega-3 acid ethyl esters (LOVAZA) 1 G capsule   Yes No   Sig: TAKE 2 CAPSULES BY MOUTH TWICE A DAY   simvastatin (ZOCOR) 20 MG tablet   Yes No   Sig: Take 1 tablet (20 mg) by mouth At Bedtime   vitamin  B complex with vitamin C (VITAMIN  B COMPLEX) TABS   Yes No   Sig: Take 1 tablet by mouth daily      Facility-Administered Medications: None     Allergies   Allergies   Allergen Reactions     Seasonal Allergies        Physical Exam   Vital Signs: Temp: 99  F (37.2  C) Temp src: Oral BP: (!) 140/60 Pulse: 100   Resp: 20 SpO2: 95 % O2 Device: None (Room air)    Weight: 0 lbs 0 oz    General Appearance: Sleepy, labored breathing, wakes up to sternal rub.   Eyes: Pupils equil in size and reactive to light. No scleral icterus.  HEENT: Atraumatic.   Respiratory:  Clear to ascultation b/l. No wheezing.   Cardiovascular:  RRR, normal S1 and S2, no murmur.   GI:  Abdomen obese, non tympanic.   Lymph/Hematologic:  No anterior anterior cervical lymphadenopathy.   Genitourinary:  No altman catheter.   Skin: No lesions observed, warm and dry   Musculoskeletal:  Unable to exam, pt sleepy.  Neurologic:  Drowsy, wakes-up to sternal  rub. Able to answer question however difficult to keep attention; falling back to sleep.      Data   Data reviewed today: I reviewed all medications, new labs and imaging results over the last 24 hours.     Recent Labs   Lab 10/04/19  1356   WBC 12.9*   HGB 13.9   MCV 90         POTASSIUM 3.8   CHLORIDE 105   CO2 27   BUN 9   CR 0.66   ANIONGAP 7   JUAN 8.7   *   ALBUMIN 3.2*   PROTTOTAL 7.3   BILITOTAL 0.5   ALKPHOS 57   ALT 28   AST 13     Recent Results (from the past 24 hour(s))   Chest XR,  PA & LAT    Narrative    EXAM: XR CHEST 2 VW  10/4/2019 11:36 AM     HISTORY:  cough       COMPARISON:  10/4/2019    FINDINGS:    The trachea is midline. The cardiomediastinal silhouette is partially  obscured bilaterally. The pulmonary vasculature are indistinct with  prominent vascular pedicle.     The included osseous thorax, soft tissues and upper abdomen and are  within normal limits.     Lungs demonstrate bilateral perihilar and bibasilar opacities. No  pneumothorax or pleural effusion.      Impression    IMPRESSION: Bacterial/viral pneumonia versus aspiration pneumonitis.     I have personally reviewed the examination and initial interpretation  and I agree with the findings.    ISSAC BECK MD     Internal Medicine Staff Addendum  Date of Service: 10/5/2019  I have seen and examined Mr Patel, reviewed the data and discussed the plan of care with the care team on rounds.  I agree with the above documentation with the additions/changes to the ROS, HPI, Exam or data (including my edits in italics):    I discussed pt's care with bedside RN, case management/social work today.  I personally reviewed, labs, medications and past 24 hr notes.  Assessment/Plan/Diagnoses: plan/dx as above, which contains my edits and reflects our joint medical decision-making.     Luis Manuel Wade MD PhD  Internal Medicine Hospitalist & Staff Physician   of Internal Medicine   [facility]  Pager:  [phone]

## 2019-10-04 NOTE — ED NOTES
[facility], [place]   ED Nurse to Floor Handoff     Hany Patel is a 24 year old male who speaks English and lives with family members,  in a home  They arrived in the ED by car from home    ED Chief Complaint: Cough    ED Dx;   Final diagnoses:   None         Needed?: No    Allergies:   Allergies   Allergen Reactions     Seasonal Allergies    .  Past Medical Hx:   Past Medical History:   Diagnosis Date     Diarrhea     Diarrhea Episodes       Baseline Mental status: cognitively impaired  Current Mental Status changes: at basesline    Infection present or suspected this encounter: yes respiratory  Sepsis suspected: No  Isolation type: No active isolations     Activity level - Baseline/Home:  Independent  Activity Level - Current:   Independent    Bariatric equipment needed?: No    In the ED these meds were given:   Medications   LORazepam (ATIVAN) tablet 1 mg (1 mg Oral Not Given 10/4/19 1340)   OLANZapine zydis (zyPREXA) ODT tab 10 mg (10 mg Oral Given 10/4/19 1213)   OLANZapine zydis (zyPREXA) ODT tab 10 mg (10 mg Oral Given 10/4/19 1253)   cefTRIAXone (ROCEPHIN) 2 g vial to attach to  ml bag for ADULTS or NS 50 ml bag for PEDS (0 g Intravenous Stopped 10/4/19 1436)   azithromycin (ZITHROMAX) 500 mg in sodium chloride 0.9 % 250 mL intermittent infusion (500 mg Intravenous New Bag 10/4/19 1430)       Drips running?  Yes IV abx    Home pump  No    Current LDAs  Peripheral IV 10/04/19 Right Lower forearm (Active)   Number of days: 0       Wound 02/11/17 Medial Neck Other (comment) Blister (Active)   Number of days: 965       Rash Bilateral groin (Active)   Number of days:        Incision/Surgical Site 02/07/17 Medial;Left Flank (Active)   Number of days: 969       Labs results:   Labs Ordered and Resulted from Time of ED Arrival Up to the Time of Departure from the ED   CBC WITH PLATELETS DIFFERENTIAL - Abnormal; Notable for the following components:       Result Value     WBC 12.9 (*)     RDW 15.2 (*)     Absolute Neutrophil 10.3 (*)     All other components within normal limits   COMPREHENSIVE METABOLIC PANEL - Abnormal; Notable for the following components:    Glucose 116 (*)     Albumin 3.2 (*)     All other components within normal limits   NT PROBNP INPATIENT   PROCALCITONIN   RESPIRATORY VIRUS PANEL BY PCR       Imaging Studies:   Recent Results (from the past 24 hour(s))   Chest XR,  PA & LAT    Narrative    EXAM: XR CHEST 2 VW  10/4/2019 11:36 AM     HISTORY:  cough       COMPARISON:  10/4/2019    FINDINGS:    The trachea is midline. The cardiomediastinal silhouette is partially  obscured bilaterally. The pulmonary vasculature are indistinct with  prominent vascular pedicle.     The included osseous thorax, soft tissues and upper abdomen and are  within normal limits.     Lungs demonstrate bilateral perihilar and bibasilar opacities. No  pneumothorax or pleural effusion.      Impression    IMPRESSION: Bacterial/viral pneumonia versus aspiration pneumonitis.     I have personally reviewed the examination and initial interpretation  and I agree with the findings.    ISSAC BECK MD       Recent vital signs:   BP (!) 140/60   Pulse 100   Temp 99  F (37.2  C) (Oral)   Resp 20   SpO2 92%     Elena Coma Scale Score: 15 (10/04/19 1028)       Cardiac Rhythm: Normal Sinus  Pt needs tele? No  Skin/wound Issues: None    Code Status: Full Code    Pain control: good    Nausea control: pt had none    Abnormal labs/tests/findings requiring intervention: CXR showing PNA    Family present during ED course? Yes   Family Comments/Social Situation comments: Parents at bedside    Tasks needing completion: RSV swab, pt currently refusing    Akanksha Maurer RN  ascom -- [phone] [place] [facility]  [phone] [facility]

## 2019-10-04 NOTE — ED NOTES
Patient refusing to be hooked up to vitals machine, refusing to wear oxygen mask. Attempts made with parents at beside, Md aware.

## 2019-10-04 NOTE — PHARMACY-ADMISSION MEDICATION HISTORY
Admission medication history interview status for the 10/4/2019 admission is complete. See Epic admission navigator for allergy information, pharmacy, prior to admission medications and immunization status.     Medication history interview sources:  Patient's Parents, [facility] in [place], [place] ([place] dispense report)    Changes made to PTA medication list (reason)  Added: N/A  Deleted:  1.) Augmentin 875-125 mg tablet: take 1 tablet by mouth 2 times daily. Deleted because medication therapy was completed.   2.) Ciprodex 0.3-0.1% otic suspension: place 4 drops into the left ear 2 times daily. Deleted because medication therapy was completed.   3.) Lovaza 1 g capsule: take 2 capsules by mouth twice a day. Deleted because therapy discontinued and replaced with statin therapy.   Changed: N/A    Additional medication history information (including reliability of information, actions taken by pharmacist):   1.) Patient's parents are legal guardians  2.) patient was unable to participate in medication reconciliation process  3.) Patient's parents were both very knowledgeable about patient's medications and health history.       Prior to Admission medications    Medication Sig Last Dose Taking? Auth Provider   albuterol (PROAIR HFA/PROVENTIL HFA/VENTOLIN HFA) 108 (90 Base) MCG/ACT Inhaler Inhale 2 puffs into the lungs every 4 hours as needed for shortness of breath / dyspnea or wheezing 10/4/2019 at PM Yes Floyd Goyal NP   Ascorbic Acid (VITAMIN C) 500 MG CHEW Take by mouth daily 10/3/2019 at PM Yes Reported, Patient   Calcium Carbonate-Vitamin D (CALCIUM + D PO) Daily chewable 10/3/2019 at PM Yes Reported, Patient   escitalopram (LEXAPRO) 20 MG tablet Take 1 tablet (20 mg) by mouth daily 10/4/2019 at AM Yes Liu Song PA-C   ketoconazole (NIZORAL) 2 % shampoo Apply to the affected area and wash off after 5 minutes. 10/4/2019 at AM Yes Liu Song PA-C   ketotifen (ZADITOR) 0.025 %  SOLN ophthalmic solution Place 1 drop into both eyes every 12 hours Past Week at PM Yes Liu Song PA-C   Lactobacillus Acid-Pectin (LACTOBACILLUS ACIDOPHILUS) TABS Take 1 tablet by mouth 2 times daily  10/4/2019 at AM Yes Reported, Patient   levothyroxine (SYNTHROID, LEVOTHROID) 112 MCG tablet Take 112 mcg by mouth daily. 10/4/2019 at AM Yes Reported, Patient   loperamide (IMODIUM) 2 MG capsule Take 2 mg by mouth daily (with breakfast) Take two tablets with breakfast Past Month Yes Reported, Patient   metFORMIN (GLUCOPHAGE) 1000 MG tablet Take 1,000 mg by mouth 2 times daily (with meals). 10/4/2019 at AM Yes Reported, Patient   Multiple Vitamin (MULTI-VITAMIN) per tablet Take 1 tablet by mouth daily. With D3 10/4/2019 at AM Yes Reported, Patient   simvastatin (ZOCOR) 20 MG tablet Take 1 tablet (20 mg) by mouth At Bedtime 10/3/2019 at PM Yes Liu Song PA-C   vitamin  B complex with vitamin C (VITAMIN  B COMPLEX) TABS Take 1 tablet by mouth daily 10/4/2019 at AM Yes Reported, Patient   metroNIDAZOLE (METROGEL) 0.75 % topical gel Apply topically 2 times daily More than a month  Liu Song PA-C         Medication history completed by: Yomi Mayberry, PD2 Pharmacy Intern

## 2019-10-05 LAB
ANION GAP SERPL CALCULATED.3IONS-SCNC: 7 MMOL/L (ref 3–14)
BUN SERPL-MCNC: 9 MG/DL (ref 7–30)
CALCIUM SERPL-MCNC: 8.4 MG/DL (ref 8.5–10.1)
CHLORIDE SERPL-SCNC: 108 MMOL/L (ref 94–109)
CO2 SERPL-SCNC: 24 MMOL/L (ref 20–32)
CREAT SERPL-MCNC: 0.63 MG/DL (ref 0.66–1.25)
ERYTHROCYTE [DISTWIDTH] IN BLOOD BY AUTOMATED COUNT: 15.7 % (ref 10–15)
GFR SERPL CREATININE-BSD FRML MDRD: >90 ML/MIN/{1.73_M2}
GLUCOSE SERPL-MCNC: 96 MG/DL (ref 70–99)
GRAM STN SPEC: NORMAL
HCT VFR BLD AUTO: 41.3 % (ref 40–53)
HGB BLD-MCNC: 13.2 G/DL (ref 13.3–17.7)
LACTATE BLD-SCNC: 0.9 MMOL/L (ref 0.7–2)
Lab: NORMAL
MCH RBC QN AUTO: 29.4 PG (ref 26.5–33)
MCHC RBC AUTO-ENTMCNC: 32 G/DL (ref 31.5–36.5)
MCV RBC AUTO: 92 FL (ref 78–100)
PLATELET # BLD AUTO: 265 10E9/L (ref 150–450)
POTASSIUM SERPL-SCNC: 3.8 MMOL/L (ref 3.4–5.3)
PROCALCITONIN SERPL-MCNC: 0.15 NG/ML
RBC # BLD AUTO: 4.49 10E12/L (ref 4.4–5.9)
SODIUM SERPL-SCNC: 139 MMOL/L (ref 133–144)
SPECIMEN SOURCE: NORMAL
WBC # BLD AUTO: 10.3 10E9/L (ref 4–11)

## 2019-10-05 PROCEDURE — 25000125 ZZHC RX 250: Performed by: STUDENT IN AN ORGANIZED HEALTH CARE EDUCATION/TRAINING PROGRAM

## 2019-10-05 PROCEDURE — 87077 CULTURE AEROBIC IDENTIFY: CPT | Performed by: STUDENT IN AN ORGANIZED HEALTH CARE EDUCATION/TRAINING PROGRAM

## 2019-10-05 PROCEDURE — 94640 AIRWAY INHALATION TREATMENT: CPT | Mod: 76

## 2019-10-05 PROCEDURE — 87186 SC STD MICRODIL/AGAR DIL: CPT | Performed by: STUDENT IN AN ORGANIZED HEALTH CARE EDUCATION/TRAINING PROGRAM

## 2019-10-05 PROCEDURE — 36415 COLL VENOUS BLD VENIPUNCTURE: CPT

## 2019-10-05 PROCEDURE — 99232 SBSQ HOSP IP/OBS MODERATE 35: CPT | Mod: GC | Performed by: INTERNAL MEDICINE

## 2019-10-05 PROCEDURE — 87205 SMEAR GRAM STAIN: CPT | Performed by: STUDENT IN AN ORGANIZED HEALTH CARE EDUCATION/TRAINING PROGRAM

## 2019-10-05 PROCEDURE — 87633 RESP VIRUS 12-25 TARGETS: CPT | Performed by: STUDENT IN AN ORGANIZED HEALTH CARE EDUCATION/TRAINING PROGRAM

## 2019-10-05 PROCEDURE — 12000004 ZZH R&B IMCU UMMC

## 2019-10-05 PROCEDURE — 25000132 ZZH RX MED GY IP 250 OP 250 PS 637: Mod: GY | Performed by: STUDENT IN AN ORGANIZED HEALTH CARE EDUCATION/TRAINING PROGRAM

## 2019-10-05 PROCEDURE — 25800030 ZZH RX IP 258 OP 636: Performed by: STUDENT IN AN ORGANIZED HEALTH CARE EDUCATION/TRAINING PROGRAM

## 2019-10-05 PROCEDURE — 25000128 H RX IP 250 OP 636: Performed by: STUDENT IN AN ORGANIZED HEALTH CARE EDUCATION/TRAINING PROGRAM

## 2019-10-05 PROCEDURE — 40000275 ZZH STATISTIC RCP TIME EA 10 MIN

## 2019-10-05 PROCEDURE — 94640 AIRWAY INHALATION TREATMENT: CPT

## 2019-10-05 PROCEDURE — 83605 ASSAY OF LACTIC ACID: CPT

## 2019-10-05 PROCEDURE — 80048 BASIC METABOLIC PNL TOTAL CA: CPT | Performed by: STUDENT IN AN ORGANIZED HEALTH CARE EDUCATION/TRAINING PROGRAM

## 2019-10-05 PROCEDURE — 84145 PROCALCITONIN (PCT): CPT | Performed by: STUDENT IN AN ORGANIZED HEALTH CARE EDUCATION/TRAINING PROGRAM

## 2019-10-05 PROCEDURE — 85027 COMPLETE CBC AUTOMATED: CPT | Performed by: STUDENT IN AN ORGANIZED HEALTH CARE EDUCATION/TRAINING PROGRAM

## 2019-10-05 PROCEDURE — 87070 CULTURE OTHR SPECIMN AEROBIC: CPT | Performed by: STUDENT IN AN ORGANIZED HEALTH CARE EDUCATION/TRAINING PROGRAM

## 2019-10-05 RX ORDER — CEFDINIR 300 MG/1
300 CAPSULE ORAL EVERY 12 HOURS
Qty: 6 CAPSULE | Refills: 0 | Status: CANCELLED | OUTPATIENT
Start: 2019-10-06 | End: 2019-10-09

## 2019-10-05 RX ORDER — CEFDINIR 300 MG/1
300 CAPSULE ORAL EVERY 12 HOURS SCHEDULED
Status: DISCONTINUED | OUTPATIENT
Start: 2019-10-06 | End: 2019-10-07 | Stop reason: HOSPADM

## 2019-10-05 RX ORDER — AZITHROMYCIN 250 MG/1
250 TABLET, FILM COATED ORAL DAILY
Status: DISCONTINUED | OUTPATIENT
Start: 2019-10-06 | End: 2019-10-07 | Stop reason: HOSPADM

## 2019-10-05 RX ORDER — AZITHROMYCIN 250 MG/1
250 TABLET, FILM COATED ORAL DAILY
Qty: 3 TABLET | Refills: 0 | Status: CANCELLED | OUTPATIENT
Start: 2019-10-06 | End: 2019-10-09

## 2019-10-05 RX ADMIN — CEFTRIAXONE SODIUM 2 G: 2 INJECTION, POWDER, FOR SOLUTION INTRAMUSCULAR; INTRAVENOUS at 15:13

## 2019-10-05 RX ADMIN — KETOTIFEN FUMARATE 1 DROP: 0.35 SOLUTION/ DROPS OPHTHALMIC at 20:38

## 2019-10-05 RX ADMIN — KETOTIFEN FUMARATE 1 DROP: 0.35 SOLUTION/ DROPS OPHTHALMIC at 09:01

## 2019-10-05 RX ADMIN — SODIUM CHLORIDE: 9 INJECTION, SOLUTION INTRAVENOUS at 04:06

## 2019-10-05 RX ADMIN — ESCITALOPRAM OXALATE 20 MG: 20 TABLET ORAL at 09:00

## 2019-10-05 RX ADMIN — AZITHROMYCIN MONOHYDRATE 500 MG: 500 INJECTION, POWDER, LYOPHILIZED, FOR SOLUTION INTRAVENOUS at 16:36

## 2019-10-05 RX ADMIN — ALBUTEROL SULFATE 2.5 MG: 2.5 SOLUTION RESPIRATORY (INHALATION) at 07:38

## 2019-10-05 RX ADMIN — SIMVASTATIN 20 MG: 20 TABLET, FILM COATED ORAL at 21:46

## 2019-10-05 RX ADMIN — ALBUTEROL SULFATE 2.5 MG: 2.5 SOLUTION RESPIRATORY (INHALATION) at 04:54

## 2019-10-05 RX ADMIN — LOPERAMIDE HYDROCHLORIDE 2 MG: 2 CAPSULE ORAL at 09:00

## 2019-10-05 RX ADMIN — Medication 1 CAPSULE: at 20:38

## 2019-10-05 RX ADMIN — Medication 1 CAPSULE: at 09:00

## 2019-10-05 RX ADMIN — LEVOTHYROXINE SODIUM 112 MCG: 112 TABLET ORAL at 09:01

## 2019-10-05 ASSESSMENT — ACTIVITIES OF DAILY LIVING (ADL)
ADLS_ACUITY_SCORE: 15
ADLS_ACUITY_SCORE: 15
ADLS_ACUITY_SCORE: 13
ADLS_ACUITY_SCORE: 15

## 2019-10-05 NOTE — PLAN OF CARE
/72 (BP Location: Left arm)   Pulse 96   Temp 98.8  F (37.1  C) (Oral)   Resp 18   Wt 117.9 kg (259 lb 14.8 oz)   SpO2 91%   BMI 46.75 kg/m      Neuro: A&Ox4. Intermittently obeys commands and refusing specific cares. Father at bedside and is very helpful in advocating cares.   Cardiac: SR/ST . -110. Afebrile.   Respiratory: Sating >95% on RA. Sleep apnea present overnight, dipping into the 60's but not sustaining. Pt refusing all O2 devices, able to place O2 tent on while asleep. Refused all specimen collections.  GI/: Pt anuric and due to void. Bladder scanned for >300ml but Pt refuses straight cath. No BMs.  Diet/appetite: Regular  Activity:  Assist of 1  Pain: At acceptable level on current regimen.   Skin: Pt refused full body skin check.  LDA's: R PIV infusing NS @ 125ml/hr     Plan: Continue with POC. Notify primary team with changes.

## 2019-10-05 NOTE — PROGRESS NOTES
Admission          10/4/2019 2300  -----------------------------------------------------------  Reason for admission:  Cough  Primary team notified of pt arrival.  Admitted from:  ED  Via: stretcher  Accompanied by: family  Belongings: Placed in closet  Admission Profile: complete  Teaching: orientation to unit and call light- call light within reach, call don't fall, use of console, meal times, when to call for the RN, and enforced importance of safety   Access: PIV  Telemetry:Placed on pt  Ht./Wt.: complete  2 RN Skin Assessment Completed By:   Pt declined    Pt status:  Neuro: A&Ox4. Inconsistently follows commands, uncooperative with some cares.  Cardiac: SR/ST . SBP 120s. Afebrile.   Respiratory: Sating >95% on RA.  GI/: due to void  Diet/appetite: Regular  Activity:  Assist of 2  Pain: At acceptable level on current regimen.   Skin: No new deficits noted.  LDA's: R PIV. NS infusing at 125ml/hr      Temp:  [99  F (37.2  C)] 99  F (37.2  C)  Pulse:  [] 96  Heart Rate:  [] 96  Resp:  [16-20] 16  BP: (111-140)/(60-72) 122/72  SpO2:  [76 %-97 %] 90 %

## 2019-10-05 NOTE — PLAN OF CARE
Neuro: A&O but forgetful ( base line, per family).   Cardiac: SR. VSS.   Respiratory: Sating 96% on RA, maintain his O2 with out de sating.   GI/: Adequate urine output. BM X2 . Unable to get urine sample , as he continue to need to have stool and urine mixed at the same time.   Diet/appetite: Tolerating  regular diet. Eating well.  Activity:  Assist of one up to chair and using the bathroom .   Pain: denied pain or discomfort.   Skin: No new deficits noted.  LDA's: PIV patent and infusing.   Plan: Continue with POC. Notify primary team with changes.

## 2019-10-05 NOTE — PROGRESS NOTES
[facility], [place]    Progress Note - Aries 3 Service        Date of Admission:  10/4/2019    Assessment & Plan     Hany Patel is a 24 year-old male with history of Down's syndrome, hypothyroidism, chronic diarrhea, adhesive otitis media status post bilateral myringotomy and T-tube placement (2015 at [place] with Dr. Young Floyd); who presented with cough and was found to have pneumonia now improving with antibiotics.      # Community Acquired pneumonia   #Hypoxic respiratory failure - resolved.   One week of cough, now productive over the past 3 days. Afebrile on admission with O2 saturation > 93 % on room air.  Wbc slightly elevated at 13.9 with absolute neutrophil of 10.3.  Procal 0.05.  Physical exam bening except for labored breathing. Chest x-ray demonstrates bilateral perihilar and bibasilar opacities concerning for bacterial/viral pneumonia versus aspiration pneumonitis. Recently ( in 2017) was hospitalized for CAP complicated by left para pneumonic effusion s/p intubation for low oxygen and agitation, chest tube placement and drainage. Ddx considered include CAP, aspiration pneumonia and effusion. 7/05 lactic acid of 0.9, wbc of 10.3. Improving with abx and albuterol.    - Legionella pneumonia peding  - Strep pneumo antigen pending  - CBC with platelet  - Sputum culture pending   - Gram stain pending   - Abx   - Ceftriaxone IV (10/04- present)   - Azithromycin  IV (10/04 - present )   - Transition to PO 10/06  - Albuterol nebs discontinue 10/05  - Flu vaccine prior to discharge      # Diarrhea, chronic    - Continue PTA loperamide 2 mg daily   - Monitor stool output; consider c. diff testing if persistent leukocytosis or diarrhea      #hx of hypothyroidism  4/11/19 TSH of 1.47.   - Continue PTA levothyroxine      # Mood-disorder on Escitalopram, unspecified   Pediatric physician prescribed few years back. [place] don't know specific diagnosis. Continues to be on it.   -  Continue PTA Escitalopram.     # Pre-diabetic   Last A1c of 5.3.   - Continue PTA  Simvastatin 20 mg  - Hold Metformin        Diet: Advance Diet as Tolerated: Regular Diet Adult    Fluids: mIVF   Lines: pIV  DVT Prophylaxis: Pneumatic Compression Devices  Altman Catheter: not present  Code Status: Full Code      Disposition Plan   Expected discharge: Tomorrow, recommended to prior living arrangement once able to tolerate PO intake, and is stable enough.  Entered: Emeka Kent MD 10/05/2019, 11:16 AM       The patient's care was discussed with the Attending Physician, Dr. Luis Manuel Wade.      ______________________________________________________________________    Interval History   No labored breathing today. Eating and watching TV.  Denies SOB, chest pain, abdominal pain.  Parents interested in talking with a physician that specializes in peds/adult behavior disorders.      Data reviewed today: I reviewed all medications, new labs and imaging results over the last 24 hours. I personally reviewed no images or EKG's today.    Physical Exam   Vital Signs: Temp: 99.2  F (37.3  C) Temp src: Oral BP: 116/70 Pulse: 96 Heart Rate: 114 Resp: 18 SpO2: (!) 88 % O2 Device: None (Room air) Oxygen Delivery: 6 LPM  Weight: 259 lbs 14.76 oz      General Appearance: Awake, watching tv this Am.  .   Eyes: No scleral icterus.  HEENT: Atraumatic.   Respiratory:  Clear to ascultation b/l. No wheezing.   Cardiovascular:  RRR, normal S1 and S2, no murmur.   GI:  Abdomen obese, non-tympanic.   Lymph/Hematologic:  No anterior anterior cervical lymphadenopathy.   Genitourinary:  No altman catheter.   Skin: No lesions observed, warm and dry   Musculoskeletal:  Unable to exam, pt sleepy.  Neurologic:  Alert today. Able to answer questions. Able to answer question however difficult to keep attention.       Data   Recent Labs   Lab 10/05/19  0853 10/04/19  1356   WBC 10.3 12.9*   HGB 13.2* 13.9   MCV 92 90    302    140    POTASSIUM 3.8 3.8   CHLORIDE 108 105   CO2 24 27   BUN 9 9   CR 0.63* 0.66   ANIONGAP 7 7   JUAN 8.4* 8.7   GLC 96 116*   ALBUMIN  --  3.2*   PROTTOTAL  --  7.3   BILITOTAL  --  0.5   ALKPHOS  --  57   ALT  --  28   AST  --  13     No results found for this or any previous visit (from the past 24 hour(s)).  Medications     sodium chloride Stopped (10/05/19 0900)       azithromycin  500 mg Intravenous Q24H     cefTRIAXone  2 g Intravenous Q24H     escitalopram  20 mg Oral Daily     influenza quadrivalent (PF) vacc  0.5 mL Intramuscular Prior to discharge     ketotifen  1 drop Both Eyes Q12H     lactobacillus rhamnosus (GG)  1 capsule Oral BID     levothyroxine  112 mcg Oral Daily     loperamide  2 mg Oral Daily with breakfast     LORazepam  1 mg Oral Once     simvastatin  20 mg Oral At Bedtime     sodium chloride (PF)  3 mL Intracatheter Q8H     Internal Medicine Staff Addendum  Date of Service: 10/5/2019  I have seen and examined Mr Patel, reviewed the data and discussed the plan of care with the care team on rounds.  I agree with the above documentation with the additions/changes to the ROS, HPI, Exam or data (including my edits in italics):    I discussed pt's care with bedside RN, case management/social work today.  I personally reviewed, labs, medications and past 24 hr notes.  Assessment/Plan/Diagnoses: plan/dx as above, which contains my edits and reflects our joint medical decision-making.     Luis Manuel Wade MD PhD  Internal Medicine Hospitalist & Staff Physician   of Internal Medicine   [facility]  Pager: [phone]

## 2019-10-06 LAB
FLUAV H1 2009 PAND RNA SPEC QL NAA+PROBE: NEGATIVE
FLUAV H1 RNA SPEC QL NAA+PROBE: NEGATIVE
FLUAV H3 RNA SPEC QL NAA+PROBE: NEGATIVE
FLUAV RNA SPEC QL NAA+PROBE: NEGATIVE
FLUBV RNA SPEC QL NAA+PROBE: NEGATIVE
HADV DNA SPEC QL NAA+PROBE: NEGATIVE
HADV DNA SPEC QL NAA+PROBE: NEGATIVE
HMPV RNA SPEC QL NAA+PROBE: NEGATIVE
HPIV1 RNA SPEC QL NAA+PROBE: NEGATIVE
HPIV2 RNA SPEC QL NAA+PROBE: NEGATIVE
HPIV3 RNA SPEC QL NAA+PROBE: NEGATIVE
MICROBIOLOGIST REVIEW: ABNORMAL
RHINOVIRUS RNA SPEC QL NAA+PROBE: POSITIVE
RSV RNA SPEC QL NAA+PROBE: NEGATIVE
RSV RNA SPEC QL NAA+PROBE: NEGATIVE
SPECIMEN SOURCE: ABNORMAL

## 2019-10-06 PROCEDURE — 94640 AIRWAY INHALATION TREATMENT: CPT

## 2019-10-06 PROCEDURE — 25000132 ZZH RX MED GY IP 250 OP 250 PS 637: Mod: GY | Performed by: STUDENT IN AN ORGANIZED HEALTH CARE EDUCATION/TRAINING PROGRAM

## 2019-10-06 PROCEDURE — 25000125 ZZHC RX 250: Performed by: STUDENT IN AN ORGANIZED HEALTH CARE EDUCATION/TRAINING PROGRAM

## 2019-10-06 PROCEDURE — 99232 SBSQ HOSP IP/OBS MODERATE 35: CPT | Mod: GC | Performed by: INTERNAL MEDICINE

## 2019-10-06 PROCEDURE — 40000275 ZZH STATISTIC RCP TIME EA 10 MIN

## 2019-10-06 PROCEDURE — 94640 AIRWAY INHALATION TREATMENT: CPT | Mod: 76

## 2019-10-06 PROCEDURE — 12000001 ZZH R&B MED SURG/OB UMMC

## 2019-10-06 RX ORDER — CEFDINIR 300 MG/1
300 CAPSULE ORAL EVERY 12 HOURS
Qty: 6 CAPSULE | Refills: 0 | Status: SHIPPED | OUTPATIENT
Start: 2019-10-06 | End: 2019-10-10

## 2019-10-06 RX ORDER — AZITHROMYCIN 250 MG/1
250 TABLET, FILM COATED ORAL DAILY
Qty: 3 TABLET | Refills: 0 | Status: SHIPPED | OUTPATIENT
Start: 2019-10-07 | End: 2019-10-10

## 2019-10-06 RX ADMIN — LEVOTHYROXINE SODIUM 112 MCG: 112 TABLET ORAL at 08:26

## 2019-10-06 RX ADMIN — AZITHROMYCIN 250 MG: 250 TABLET, FILM COATED ORAL at 08:26

## 2019-10-06 RX ADMIN — ALBUTEROL SULFATE 2.5 MG: 2.5 SOLUTION RESPIRATORY (INHALATION) at 03:49

## 2019-10-06 RX ADMIN — KETOTIFEN FUMARATE 1 DROP: 0.35 SOLUTION/ DROPS OPHTHALMIC at 19:58

## 2019-10-06 RX ADMIN — CEFDINIR 300 MG: 300 CAPSULE ORAL at 08:26

## 2019-10-06 RX ADMIN — Medication 1 CAPSULE: at 08:26

## 2019-10-06 RX ADMIN — Medication 1 CAPSULE: at 18:08

## 2019-10-06 RX ADMIN — CEFDINIR 300 MG: 300 CAPSULE ORAL at 19:57

## 2019-10-06 RX ADMIN — LOPERAMIDE HYDROCHLORIDE 2 MG: 2 CAPSULE ORAL at 08:26

## 2019-10-06 RX ADMIN — ESCITALOPRAM OXALATE 20 MG: 20 TABLET ORAL at 08:26

## 2019-10-06 RX ADMIN — ALBUTEROL SULFATE 2.5 MG: 2.5 SOLUTION RESPIRATORY (INHALATION) at 11:23

## 2019-10-06 RX ADMIN — KETOTIFEN FUMARATE 1 DROP: 0.35 SOLUTION/ DROPS OPHTHALMIC at 08:27

## 2019-10-06 ASSESSMENT — ACTIVITIES OF DAILY LIVING (ADL)
ADLS_ACUITY_SCORE: 12
ADLS_ACUITY_SCORE: 14
ADLS_ACUITY_SCORE: 15
ADLS_ACUITY_SCORE: 15
ADLS_ACUITY_SCORE: 14
ADLS_ACUITY_SCORE: 15

## 2019-10-06 NOTE — PROGRESS NOTES
Pt arrived to unit 5B from 6B around 1430.    Settled pt to room and VS obtained.    BP (!) 166/45 (BP Location: Right arm)   Pulse 97   Temp 95.4  F (35.2  C) (Oral)   Resp 9   Wt 116.3 kg (256 lb 4.8 oz)   SpO2 99%   BMI 46.10 kg/m      Continue to monitor. Plan to discharge tomorrow??

## 2019-10-06 NOTE — PROGRESS NOTES
Transfer  Transferred to: U5B   Via:bed  Reason for transfer:Improved pt condition. Pt no longer appropriate for U6B  Family: Parents aware of transfer, accompanied pt with transfer.   Belongings: Sent with pt  Chart: Sent with pt  Medications: Sent with pt   Report given to: EVER RN      Pt status: VSS. RA.  +Rhinovirus. Plan for CXR tomorrow morning and then patient to discharge home

## 2019-10-06 NOTE — PLAN OF CARE
Cares provided from 2300 10/5 - 1130 10/6    /59 (BP Location: Left arm)   Pulse 97   Temp 98.7  F (37.1  C) (Oral)   Resp 18   Wt 114.5 kg (252 lb 6.8 oz)   SpO2 92%   BMI 45.40 kg/m      Neuro: A&Ox4. Intermittently obeys commands and refusing specific cares. Family at bedside are very helpful in advocating cares.   Cardiac: No tele, HR . -130. Afebrile.        Respiratory: Sating >95% on RA. Sleep apnea present overnight, dipping into the 50's and sustaining at times. MD called to assess Pt, but deemed there is not much we can do. Continue to monitor and arouse Pt if O2 levels dip too low. Pt refusing all O2 devices, able to place blow by O2 on Pt while asleep.  GI/: Adequate output. BM x2. Pt unable to void into collection containers making I&O measuring difficult.  Diet/appetite: Regular  Activity:  Assist of 1  Pain: At acceptable level on current regimen.   Skin: Pt refused full body skin check.  LDA's: R PIV.     Plan: Continue with POC. Notify primary team with changes.

## 2019-10-06 NOTE — PROVIDER NOTIFICATION
Notified Dr. Kent (#[id number]) with M3 that patient is positive for rhinovirus. Will continue to monitor

## 2019-10-06 NOTE — PROGRESS NOTES
Respiratory care note- called to see patient while sleeping on his left side.  Blow by nebulized neb treatment given. Lung sounds expiratory wheezes, with mild obstruction of upper glottis. Recommend ENT consult for his SHERLY.

## 2019-10-07 ENCOUNTER — APPOINTMENT (OUTPATIENT)
Dept: GENERAL RADIOLOGY | Facility: CLINIC | Age: 24
DRG: 193 | End: 2019-10-07
Payer: MEDICARE

## 2019-10-07 VITALS
TEMPERATURE: 97.1 F | SYSTOLIC BLOOD PRESSURE: 110 MMHG | WEIGHT: 256.3 LBS | OXYGEN SATURATION: 97 % | RESPIRATION RATE: 20 BRPM | BODY MASS INDEX: 46.1 KG/M2 | DIASTOLIC BLOOD PRESSURE: 62 MMHG | HEART RATE: 91 BPM

## 2019-10-07 LAB
L PNEUMO1 AG UR QL IA: NORMAL
SPECIMEN SOURCE: NORMAL

## 2019-10-07 PROCEDURE — 87899 AGENT NOS ASSAY W/OPTIC: CPT | Performed by: STUDENT IN AN ORGANIZED HEALTH CARE EDUCATION/TRAINING PROGRAM

## 2019-10-07 PROCEDURE — 25000128 H RX IP 250 OP 636: Performed by: STUDENT IN AN ORGANIZED HEALTH CARE EDUCATION/TRAINING PROGRAM

## 2019-10-07 PROCEDURE — 25000132 ZZH RX MED GY IP 250 OP 250 PS 637: Mod: GY | Performed by: STUDENT IN AN ORGANIZED HEALTH CARE EDUCATION/TRAINING PROGRAM

## 2019-10-07 PROCEDURE — 80048 BASIC METABOLIC PNL TOTAL CA: CPT | Performed by: STUDENT IN AN ORGANIZED HEALTH CARE EDUCATION/TRAINING PROGRAM

## 2019-10-07 PROCEDURE — 90686 IIV4 VACC NO PRSV 0.5 ML IM: CPT | Performed by: STUDENT IN AN ORGANIZED HEALTH CARE EDUCATION/TRAINING PROGRAM

## 2019-10-07 PROCEDURE — 99239 HOSP IP/OBS DSCHRG MGMT >30: CPT | Mod: GC | Performed by: INTERNAL MEDICINE

## 2019-10-07 PROCEDURE — 71046 X-RAY EXAM CHEST 2 VIEWS: CPT

## 2019-10-07 RX ORDER — ALBUTEROL SULFATE 90 UG/1
2 AEROSOL, METERED RESPIRATORY (INHALATION) EVERY 4 HOURS PRN
Qty: 1 INHALER | Refills: 0 | Status: SHIPPED | OUTPATIENT
Start: 2019-10-07 | End: 2019-12-30

## 2019-10-07 RX ADMIN — AZITHROMYCIN 250 MG: 250 TABLET, FILM COATED ORAL at 09:06

## 2019-10-07 RX ADMIN — LOPERAMIDE HYDROCHLORIDE 2 MG: 2 CAPSULE ORAL at 09:06

## 2019-10-07 RX ADMIN — Medication 1 CAPSULE: at 09:06

## 2019-10-07 RX ADMIN — CEFDINIR 300 MG: 300 CAPSULE ORAL at 09:06

## 2019-10-07 RX ADMIN — INFLUENZA A VIRUS A/BRISBANE/02/2018 IVR-190 (H1N1) ANTIGEN (FORMALDEHYDE INACTIVATED), INFLUENZA A VIRUS A/KANSAS/14/2017 X-327 (H3N2) ANTIGEN (FORMALDEHYDE INACTIVATED), INFLUENZA B VIRUS B/PHUKET/3073/2013 ANTIGEN (FORMALDEHYDE INACTIVATED), AND INFLUENZA B VIRUS B/MARYLAND/15/2016 BX-69A ANTIGEN (FORMALDEHYDE INACTIVATED) 0.5 ML: 15; 15; 15; 15 INJECTION, SUSPENSION INTRAMUSCULAR at 11:05

## 2019-10-07 RX ADMIN — ESCITALOPRAM OXALATE 20 MG: 20 TABLET ORAL at 09:06

## 2019-10-07 RX ADMIN — LEVOTHYROXINE SODIUM 112 MCG: 112 TABLET ORAL at 09:06

## 2019-10-07 ASSESSMENT — ACTIVITIES OF DAILY LIVING (ADL)
ADLS_ACUITY_SCORE: 12

## 2019-10-07 NOTE — PLAN OF CARE
/62 (BP Location: Left arm)   Pulse 91   Temp 97.1  F (36.2  C) (Oral)   Resp 20   Wt 116.3 kg (256 lb 4.8 oz)   SpO2 97%   BMI 46.10 kg/m      Time: 6570-5906.  Reason for admission: Pneumonia.    VS: VSS on RA with O2 sats in high 90s. Afebrile.   Activity: Up with SBA, steady on feet. Calls appropriately.   Neuros: A&Ox4. Hx of downs syndrome. Neuros intact. Denies pain.   Cardiac: WDL.   Respiratory: Infreuqent cough. LS clear but diminished. Denies SOB or IRAHETA.   GI/: Voiding without difficulty, urine sample sent to lab. No BM on this shift. +BS, +gas. Denies nausea or vomiting.   Diet: Regular, tolerating well.   Skin: WDL.  Lines: Right PIV removed.   Labs: WDL.    New changes this shift/Plan: CXR completed. Refused AM labs. Received flu vaccine, handout given to pt's parents. PIV removed. Medically clear for discharge home. AVS reviewed with pt and pt's parents, questions answered. Pt and pt's parents instructed to stop at discharge pharmacy prior to leaving hospital. Pt ambulated off unit with parents around 1203.   Will continue to monitor & follow POC.

## 2019-10-07 NOTE — PLAN OF CARE
No respiratory issues over night. Denies pain. Father declined lab draw. He wanted lab to be drawn from PIV. Spent several minutes with patient and dad this morning explaining why we can not draw from PIV. Dad insistent. Listened and said I would talk to my Manager, Yeimy, and that she may stop by. Did explain to Yeimy. The patient has the right to refuse lab draw. Discharge today after morning chest xray is anticipated by patient. Continue with current plan of nursing care, and update MD with concerns as needed.

## 2019-10-07 NOTE — PROVIDER NOTIFICATION
"  Intern Jb paged [phone]) at 0320 regarding pt's AM lab draw. Page stated, \"[id number] Asim Patel pt refusing AM labs, attempted x2. Thanks Kate HUGO [phone]\"  "

## 2019-10-07 NOTE — PLAN OF CARE
Pt alert and oriented. Denied pain. Denied shortness of breath. Tolerating po diet. Voiding spontaneously. Urine sample needed. Hat in toilet. BM this evening. Up to BR with SBA. Plan for CXR in AM and then discharge home. Pt requested flu vaccine prior to discharge. Continue to monitor respiratory status. Pt declined to keep continuous pulse ox on finger.

## 2019-10-07 NOTE — PROGRESS NOTES
[facility], [place]    Progress Note - Marjanette 3 Service        Date of Admission:  10/4/2019    Assessment & Plan     Hany Patel is a 24 year-old male with history of Down's syndrome, hypothyroidism, chronic diarrhea, adhesive otitis media status post bilateral myringotomy and T-tube placement (2015 at [place] with Dr. Young Floyd); who presented with cough and was found to have pneumonia now improving with antibiotics.      # Community Acquired pneumonia   #Hypoxic respiratory failure - resolved.   One week of cough, now productive over the past 3 days. Afebrile on admission with O2 saturation > 93 % on room air.  Wbc slightly elevated at 13.9 with absolute neutrophil of 10.3.  Procal 0.05.  Physical exam bening except for labored breathing. Chest x-ray demonstrates bilateral perihilar and bibasilar opacities concerning for bacterial/viral pneumonia versus aspiration pneumonitis. Recently ( in 2017) was hospitalized for CAP complicated by left para pneumonic effusion s/p intubation for low oxygen and agitation, chest tube placement and drainage. Ddx considered include CAP, aspiration pneumonia and effusion. 7/05 lactic acid of 0.9, wbc of 10.3. Improving with abx and albuterol.    - Legionella pneumonia peding  - Strep pneumo antigen pending  - CBC with platelet  - Sputum culture pending   - Gram stain pending   - Abx   - Ceftriaxone IV (10/04- present)   - Azithromycin  IV (10/04 - present )   - Transition to PO 10/06  - Albuterol nebs discontinue 10/05  - Flu vaccine prior to discharge   - 10/6 rhinovirus postive. Morning cbc, bmp and chest x-ray. Discharge tomorrow AM.     # Diarrhea, chronic    - Continue PTA loperamide 2 mg daily   - Monitor stool output; consider c. diff testing if persistent leukocytosis or diarrhea      #hx of hypothyroidism  4/11/19 TSH of 1.47.   - Continue PTA levothyroxine      # Mood-disorder on Escitalopram, unspecified   Pediatric physician  prescribed few years back. [place] don't know specific diagnosis. Continues to be on it.   - Continue PTA Escitalopram.     # Pre-diabetic   Last A1c of 5.3.   - Continue PTA  Simvastatin 20 mg  - Hold Metformin        Diet: Advance Diet as Tolerated: Regular Diet Adult  Diet    Fluids: mIVF   Lines: pIV  DVT Prophylaxis: Pneumatic Compression Devices  Altman Catheter: not present  Code Status: Full Code      Disposition Plan   Expected discharge: Tomorrow, recommended to prior living arrangement once able to tolerate PO intake, and is stable enough.  Entered: Emeka Kent MD 10/06/2019, 11:32 PM       The patient's care was discussed with the Attending Physician, Dr. Luis Manuel Wade.    Jb Kent MD  Internal Medicine, PGY1  [id number]    ______________________________________________________________________    Interval History   No labored breathing today. Eating and watching TV.  Denies SOB, chest pain, abdominal pain.      Data reviewed today: I reviewed all medications, new labs and imaging results over the last 24 hours. I personally reviewed no images or EKG's today.    Physical Exam   Vital Signs: Temp: 97.7  F (36.5  C) Temp src: Axillary BP: (!) 106/39 Pulse: 68 Heart Rate: 92 Resp: 12 SpO2: 92 % O2 Device: None (Room air)    Weight: 256 lbs 4.8 oz      General Appearance: Awake, watching tv this Am.  .   Eyes: No scleral icterus.  HEENT: Atraumatic.   Respiratory:  Clear to ascultation b/l. No wheezing.   Cardiovascular:  RRR, normal S1 and S2, no murmur.   GI:  Abdomen obese, non-tympanic.   Lymph/Hematologic:  No anterior anterior cervical lymphadenopathy.   Genitourinary:  No altman catheter.   Skin: No lesions observed, warm and dry   Musculoskeletal:  Unable to exam, pt sleepy.  Neurologic:  Alert today. Able to answer questions. Able to answer question however difficult to keep attention.       Data   Recent Labs   Lab 10/05/19  0853 10/04/19  1356   WBC 10.3 12.9*   HGB 13.2* 13.9   MCV 92 90   PLT  265 302    140   POTASSIUM 3.8 3.8   CHLORIDE 108 105   CO2 24 27   BUN 9 9   CR 0.63* 0.66   ANIONGAP 7 7   JUAN 8.4* 8.7   GLC 96 116*   ALBUMIN  --  3.2*   PROTTOTAL  --  7.3   BILITOTAL  --  0.5   ALKPHOS  --  57   ALT  --  28   AST  --  13     No results found for this or any previous visit (from the past 24 hour(s)).  Medications       azithromycin  250 mg Oral Daily     cefdinir  300 mg Oral Q12H NATHAN     escitalopram  20 mg Oral Daily     influenza quadrivalent (PF) vacc  0.5 mL Intramuscular Prior to discharge     ketotifen  1 drop Both Eyes Q12H     lactobacillus rhamnosus (GG)  1 capsule Oral BID     levothyroxine  112 mcg Oral Daily     loperamide  2 mg Oral Daily with breakfast     LORazepam  1 mg Oral Once     simvastatin  20 mg Oral At Bedtime     sodium chloride (PF)  3 mL Intracatheter Q8H     Internal Medicine Staff Addendum  Date of Service: 10/6/2019  I have seen and examined Mr Patel, reviewed the data and discussed the plan of care with the care team on rounds.  I agree with the above documentation with the additions/changes to the ROS, HPI, Exam or data (including my edits in italics):    I discussed pt's care with bedside RN, case management/social work today.  I personally reviewed, labs, medications and past 24 hr notes.  Assessment/Plan/Diagnoses: plan/dx as above, which contains my edits and reflects our joint medical decision-making.     Luis Manuel Wade MD PhD  Internal Medicine Hospitalist & Staff Physician   of Internal Medicine   [facility]  Pager: [phone]

## 2019-10-08 ENCOUNTER — TELEPHONE (OUTPATIENT)
Dept: FAMILY MEDICINE | Facility: CLINIC | Age: 24
End: 2019-10-08

## 2019-10-08 ENCOUNTER — PATIENT OUTREACH (OUTPATIENT)
Dept: CARE COORDINATION | Facility: CLINIC | Age: 24
End: 2019-10-08

## 2019-10-08 LAB
BACTERIA SPEC CULT: ABNORMAL
SPECIMEN SOURCE: ABNORMAL

## 2019-10-08 NOTE — TELEPHONE ENCOUNTER
This patient was discharged from [facility] on MON 07-OCT-2019.    Discharge Diagnosis: Pneumonia Of Left Lung Due To Infectious Organism, Unspecified Part Of Lung, Pneumonia Of Both Lungs Due To Infec    Follow-up instructions: 7 day    A follow-up visit has not been scheduled.      Number of ED/ER visits in the last 12 months:  1     Please follow-up with patient.    Thank you,  Diane QUINTANILLA    NE Team Lynette

## 2019-10-08 NOTE — PROGRESS NOTES
Clinic Care Coordination Contact    Follow Up Progress Note      Assessment: Patient in [facility] ED 10/4/2017 to 10/7/2017 for acute hypoxic respiratory failure secondary to rhinovirus and CAP.  Patient is to follow up with PCP within 7 days.  At this point, he is scheduled for lab visit on 10/10/2019.  Patient required ICU stay in 2017 for CAP.    Patient's mother wishes for referral for ENT Young Floyd, if possible, as is familiar with Patient. She reports need to schedule follow up PCP visit, she will try to schedule on same day as lab.      Patient's mother Marcella reports this has been a long afternoon, Patient is currently not  in a program.  She reports Patient is perseverating  often since return home.  She reports having received more information on a behaviorist,  they are still searching.  She reports she still has number for [facility] for potential alternate group homes. Patient's mother reports that every time she is ready to proceed, something happens in their family.  SW informed that she will update PCP on need for ENT per Patient's mother's request    Goal Statement: Patients family will contact resources discussed today for Patient--psychiatry and alternate living arrangements.  To be done within 3 months   Action Steps:  1. I will have my mom look into [facility].     Updated: 9/27/19  Goal set: 8/6/2019    Measure of Success: Patient's family will research/obtain psychiatrist for Patient, have researched alternate living arrangements   Supportive Steps to Achieve: SW discussed psychiatry resources, family also has their own resources, SW discussed alternate living resources for Patient  Community Health Worker (CHW) will call Patient's family in 2-3 weeks for status on above resources and to discuss next steps   Barriers: Family would respite.  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  This was not an  appropriate fit for Patient   Strengths: Patient's parents are very supportive and involved, are a good advocate for Patient   Date to Achieve By: 11/6/2019  Patient expressed understanding of goal: Yes, Patient's mother     Update 08/28/19 Goal not discussed. Preet[facility] CHW       Intervention/Education provided during outreach: SW discussed Patient's current needs, we agreed on return call in 2 weeks as Patient's mother slightly overwhelmed as Patient recently returned from ED/hospital      Outreach Frequency: monthly    Plan: Patient's mother will call to schedule follow up PCP visit as recommended in ED     Care Coordinator will follow up in 2 weeks for update and needs assessment     PALMA Cash, MSW   [facility]   [phone]  10/8/2019 4:13 PM

## 2019-10-09 DIAGNOSIS — E88.819 INSULIN RESISTANCE: Primary | ICD-10-CM

## 2019-10-09 DIAGNOSIS — Z13.21 ENCOUNTER FOR VITAMIN DEFICIENCY SCREENING: ICD-10-CM

## 2019-10-09 DIAGNOSIS — R73.02 IMPAIRED GLUCOSE TOLERANCE: ICD-10-CM

## 2019-10-09 DIAGNOSIS — E78.5 DYSLIPIDEMIA: ICD-10-CM

## 2019-10-09 DIAGNOSIS — E03.9 HYPOTHYROIDISM, ADULT: ICD-10-CM

## 2019-10-09 DIAGNOSIS — E66.01 MORBID OBESITY (H): ICD-10-CM

## 2019-10-09 DIAGNOSIS — R73.03 PRE-DIABETES: ICD-10-CM

## 2019-10-09 DIAGNOSIS — Q90.9 DOWN'S SYNDROME: ICD-10-CM

## 2019-10-09 NOTE — TELEPHONE ENCOUNTER
"Hospital/TCU/ED for chronic condition Discharge Protocol  Spoke with patient's mother, Marcella    \"Hi, my name is Chandan Cruz RN, a registered nurse, and I am calling from [facility].  I am calling to follow up and see how things are going for you after your recent emergency visit/hospital/TCU stay.\"    Tell me how you are doing now that you are home?\" Hany is doing fine. He still has that noisy breathing they mentioned in the hospital and said it might be sleep apnea. They said to talk to Nelson ([facility]) about an ENT referral.      Discharge Instructions    \"Let's review your discharge instructions.  What is/are the follow-up recommendations?  Pt. Response: See PCP within 1 week    \"Has an appointment with your primary care provider been scheduled?\"   Yes but not scheduled until 10/17 and with other provider. Rescheduled with PCP this week per mother's request.    \"When you see the provider, I would recommend that you bring your medications with you.\"    Medications    \"Tell me what changed about your medicines when you discharged?\"    Changes to chronic meds?    0-1    \"What questions do you have about your medications?\"    None     New diagnoses of heart failure, COPD, diabetes, or MI?    No              Post Discharge Medication Reconciliation Status: discharge medications reconciled, continue medications without change.    Was MTM referral placed (*Make sure to put transitions as reason for referral)?   No    Call Summary    \"What questions or concerns do you have about your recent visit and your follow-up care?\"     none    \"If you have questions or things don't continue to improve, we encourage you contact us through the main clinic number (give number).  Even if the clinic is not open, triage nurses are available 24/7 to help you.     We would like you to know that our clinic has extended hours (provide information).  We also have urgent care (provide details on closest location and hours/contact " "info)\"      \"Thank you for your time and take care!\"      Chandan Cruz RN      "

## 2019-10-10 ENCOUNTER — NURSE TRIAGE (OUTPATIENT)
Dept: FAMILY MEDICINE | Facility: CLINIC | Age: 24
End: 2019-10-10

## 2019-10-10 ENCOUNTER — APPOINTMENT (OUTPATIENT)
Dept: GENERAL RADIOLOGY | Facility: CLINIC | Age: 24
DRG: 871 | End: 2019-10-10
Attending: EMERGENCY MEDICINE
Payer: MEDICARE

## 2019-10-10 ENCOUNTER — HOSPITAL ENCOUNTER (INPATIENT)
Facility: CLINIC | Age: 24
LOS: 4 days | Discharge: HOME | DRG: 871 | End: 2019-10-14
Attending: EMERGENCY MEDICINE | Admitting: PEDIATRICS
Payer: MEDICARE

## 2019-10-10 ENCOUNTER — APPOINTMENT (OUTPATIENT)
Dept: CT IMAGING | Facility: CLINIC | Age: 24
DRG: 871 | End: 2019-10-10
Attending: STUDENT IN AN ORGANIZED HEALTH CARE EDUCATION/TRAINING PROGRAM
Payer: MEDICARE

## 2019-10-10 DIAGNOSIS — J18.9 HCAP (HEALTHCARE-ASSOCIATED PNEUMONIA): Primary | ICD-10-CM

## 2019-10-10 DIAGNOSIS — J18.8 OTHER PNEUMONIA, UNSPECIFIED ORGANISM: ICD-10-CM

## 2019-10-10 DIAGNOSIS — J18.9 PNEUMONIA DUE TO INFECTIOUS ORGANISM, UNSPECIFIED LATERALITY, UNSPECIFIED PART OF LUNG: ICD-10-CM

## 2019-10-10 PROBLEM — J96.00 ACUTE RESPIRATORY FAILURE (H): Status: ACTIVE | Noted: 2019-10-10

## 2019-10-10 LAB
ALBUMIN SERPL-MCNC: 2.9 G/DL (ref 3.4–5)
ALBUMIN UR-MCNC: NEGATIVE MG/DL
ALP SERPL-CCNC: 60 U/L (ref 40–150)
ALT SERPL W P-5'-P-CCNC: 33 U/L (ref 0–70)
ANION GAP SERPL CALCULATED.3IONS-SCNC: 6 MMOL/L (ref 3–14)
APPEARANCE UR: CLEAR
AST SERPL W P-5'-P-CCNC: 26 U/L (ref 0–45)
BASOPHILS # BLD AUTO: 0.1 10E9/L (ref 0–0.2)
BASOPHILS NFR BLD AUTO: 0.7 %
BILIRUB SERPL-MCNC: 0.2 MG/DL (ref 0.2–1.3)
BILIRUB UR QL STRIP: NEGATIVE
BUN SERPL-MCNC: 15 MG/DL (ref 7–30)
CALCIUM SERPL-MCNC: 8.8 MG/DL (ref 8.5–10.1)
CHLORIDE SERPL-SCNC: 106 MMOL/L (ref 94–109)
CO2 SERPL-SCNC: 24 MMOL/L (ref 20–32)
COLOR UR AUTO: NORMAL
CREAT SERPL-MCNC: 0.8 MG/DL (ref 0.66–1.25)
DIFFERENTIAL METHOD BLD: ABNORMAL
EOSINOPHIL # BLD AUTO: 0 10E9/L (ref 0–0.7)
EOSINOPHIL NFR BLD AUTO: 0.1 %
ERYTHROCYTE [DISTWIDTH] IN BLOOD BY AUTOMATED COUNT: 15.4 % (ref 10–15)
GFR SERPL CREATININE-BSD FRML MDRD: >90 ML/MIN/{1.73_M2}
GLUCOSE SERPL-MCNC: 97 MG/DL (ref 70–99)
GLUCOSE UR STRIP-MCNC: NEGATIVE MG/DL
HCT VFR BLD AUTO: 41.6 % (ref 40–53)
HGB BLD-MCNC: 13.3 G/DL (ref 13.3–17.7)
HGB UR QL STRIP: NEGATIVE
IMM GRANULOCYTES # BLD: 0.1 10E9/L (ref 0–0.4)
IMM GRANULOCYTES NFR BLD: 0.5 %
KETONES UR STRIP-MCNC: NEGATIVE MG/DL
LACTATE BLD-SCNC: 2 MMOL/L (ref 0.7–2)
LEUKOCYTE ESTERASE UR QL STRIP: NEGATIVE
LYMPHOCYTES # BLD AUTO: 1.3 10E9/L (ref 0.8–5.3)
LYMPHOCYTES NFR BLD AUTO: 7.8 %
MCH RBC QN AUTO: 29 PG (ref 26.5–33)
MCHC RBC AUTO-ENTMCNC: 32 G/DL (ref 31.5–36.5)
MCV RBC AUTO: 91 FL (ref 78–100)
MONOCYTES # BLD AUTO: 0.8 10E9/L (ref 0–1.3)
MONOCYTES NFR BLD AUTO: 4.9 %
NEUTROPHILS # BLD AUTO: 14.4 10E9/L (ref 1.6–8.3)
NEUTROPHILS NFR BLD AUTO: 86 %
NITRATE UR QL: NEGATIVE
NRBC # BLD AUTO: 0 10*3/UL
NRBC BLD AUTO-RTO: 0 /100
PH UR STRIP: 7 PH (ref 5–7)
PLATELET # BLD AUTO: 324 10E9/L (ref 150–450)
POTASSIUM SERPL-SCNC: 4.4 MMOL/L (ref 3.4–5.3)
PROT SERPL-MCNC: 7.1 G/DL (ref 6.8–8.8)
RBC # BLD AUTO: 4.58 10E12/L (ref 4.4–5.9)
RBC #/AREA URNS AUTO: 0 /HPF (ref 0–2)
SODIUM SERPL-SCNC: 137 MMOL/L (ref 133–144)
SOURCE: NORMAL
SP GR UR STRIP: 1 (ref 1–1.03)
UROBILINOGEN UR STRIP-MCNC: NORMAL MG/DL (ref 0–2)
WBC # BLD AUTO: 16.7 10E9/L (ref 4–11)
WBC #/AREA URNS AUTO: <1 /HPF (ref 0–5)

## 2019-10-10 PROCEDURE — 87088 URINE BACTERIA CULTURE: CPT | Performed by: EMERGENCY MEDICINE

## 2019-10-10 PROCEDURE — 99284 EMERGENCY DEPT VISIT MOD MDM: CPT | Mod: Z6 | Performed by: EMERGENCY MEDICINE

## 2019-10-10 PROCEDURE — 83605 ASSAY OF LACTIC ACID: CPT | Performed by: EMERGENCY MEDICINE

## 2019-10-10 PROCEDURE — 96366 THER/PROPH/DIAG IV INF ADDON: CPT | Performed by: EMERGENCY MEDICINE

## 2019-10-10 PROCEDURE — 80053 COMPREHEN METABOLIC PANEL: CPT | Performed by: EMERGENCY MEDICINE

## 2019-10-10 PROCEDURE — 96365 THER/PROPH/DIAG IV INF INIT: CPT | Performed by: EMERGENCY MEDICINE

## 2019-10-10 PROCEDURE — 85025 COMPLETE CBC W/AUTO DIFF WBC: CPT | Performed by: EMERGENCY MEDICINE

## 2019-10-10 PROCEDURE — 99285 EMERGENCY DEPT VISIT HI MDM: CPT | Mod: 25 | Performed by: EMERGENCY MEDICINE

## 2019-10-10 PROCEDURE — 12000001 ZZH R&B MED SURG/OB UMMC

## 2019-10-10 PROCEDURE — 87086 URINE CULTURE/COLONY COUNT: CPT | Performed by: EMERGENCY MEDICINE

## 2019-10-10 PROCEDURE — 87186 SC STD MICRODIL/AGAR DIL: CPT | Performed by: EMERGENCY MEDICINE

## 2019-10-10 PROCEDURE — 71250 CT THORAX DX C-: CPT

## 2019-10-10 PROCEDURE — 81001 URINALYSIS AUTO W/SCOPE: CPT | Performed by: EMERGENCY MEDICINE

## 2019-10-10 PROCEDURE — 40000556 ZZH STATISTIC PERIPHERAL IV START W US GUIDANCE

## 2019-10-10 PROCEDURE — 96372 THER/PROPH/DIAG INJ SC/IM: CPT | Performed by: EMERGENCY MEDICINE

## 2019-10-10 PROCEDURE — 25000128 H RX IP 250 OP 636: Performed by: STUDENT IN AN ORGANIZED HEALTH CARE EDUCATION/TRAINING PROGRAM

## 2019-10-10 PROCEDURE — 99223 1ST HOSP IP/OBS HIGH 75: CPT | Mod: AI | Performed by: PEDIATRICS

## 2019-10-10 PROCEDURE — 25000128 H RX IP 250 OP 636: Performed by: EMERGENCY MEDICINE

## 2019-10-10 PROCEDURE — 71045 X-RAY EXAM CHEST 1 VIEW: CPT

## 2019-10-10 PROCEDURE — 96367 TX/PROPH/DG ADDL SEQ IV INF: CPT | Performed by: EMERGENCY MEDICINE

## 2019-10-10 PROCEDURE — 96361 HYDRATE IV INFUSION ADD-ON: CPT | Performed by: EMERGENCY MEDICINE

## 2019-10-10 PROCEDURE — 87040 BLOOD CULTURE FOR BACTERIA: CPT | Performed by: EMERGENCY MEDICINE

## 2019-10-10 PROCEDURE — 25800030 ZZH RX IP 258 OP 636: Performed by: EMERGENCY MEDICINE

## 2019-10-10 RX ORDER — LEVOTHYROXINE SODIUM 112 UG/1
112 TABLET ORAL DAILY
Status: DISCONTINUED | OUTPATIENT
Start: 2019-10-11 | End: 2019-10-14 | Stop reason: HOSPADM

## 2019-10-10 RX ORDER — NALOXONE HYDROCHLORIDE 0.4 MG/ML
.1-.4 INJECTION, SOLUTION INTRAMUSCULAR; INTRAVENOUS; SUBCUTANEOUS
Status: DISCONTINUED | OUTPATIENT
Start: 2019-10-10 | End: 2019-10-14 | Stop reason: HOSPADM

## 2019-10-10 RX ORDER — ACETAMINOPHEN 325 MG/1
650 TABLET ORAL EVERY 4 HOURS PRN
Status: DISCONTINUED | OUTPATIENT
Start: 2019-10-10 | End: 2019-10-14 | Stop reason: HOSPADM

## 2019-10-10 RX ORDER — SIMVASTATIN 20 MG
20 TABLET ORAL AT BEDTIME
Status: DISCONTINUED | OUTPATIENT
Start: 2019-10-11 | End: 2019-10-14 | Stop reason: HOSPADM

## 2019-10-10 RX ORDER — OLANZAPINE 10 MG/2ML
5 INJECTION, POWDER, FOR SOLUTION INTRAMUSCULAR ONCE
Status: COMPLETED | OUTPATIENT
Start: 2019-10-10 | End: 2019-10-10

## 2019-10-10 RX ORDER — IVERMECTIN 10 MG/G
CREAM TOPICAL DAILY PRN
Status: ON HOLD | COMMUNITY
End: 2021-01-29

## 2019-10-10 RX ORDER — LIDOCAINE 40 MG/G
CREAM TOPICAL
Status: DISCONTINUED | OUTPATIENT
Start: 2019-10-10 | End: 2019-10-14 | Stop reason: HOSPADM

## 2019-10-10 RX ORDER — ONDANSETRON 4 MG/1
4 TABLET, ORALLY DISINTEGRATING ORAL EVERY 6 HOURS PRN
Status: DISCONTINUED | OUTPATIENT
Start: 2019-10-10 | End: 2019-10-14 | Stop reason: HOSPADM

## 2019-10-10 RX ORDER — ALBUTEROL SULFATE 90 UG/1
2 AEROSOL, METERED RESPIRATORY (INHALATION) EVERY 4 HOURS PRN
Status: DISCONTINUED | OUTPATIENT
Start: 2019-10-10 | End: 2019-10-14 | Stop reason: HOSPADM

## 2019-10-10 RX ORDER — PIPERACILLIN SODIUM, TAZOBACTAM SODIUM 3; .375 G/15ML; G/15ML
3.38 INJECTION, POWDER, LYOPHILIZED, FOR SOLUTION INTRAVENOUS ONCE
Status: COMPLETED | OUTPATIENT
Start: 2019-10-10 | End: 2019-10-10

## 2019-10-10 RX ORDER — SODIUM CHLORIDE 9 MG/ML
1000 INJECTION, SOLUTION INTRAVENOUS CONTINUOUS
Status: DISCONTINUED | OUTPATIENT
Start: 2019-10-10 | End: 2019-10-11 | Stop reason: CLARIF

## 2019-10-10 RX ORDER — PIPERACILLIN SODIUM, TAZOBACTAM SODIUM 4; .5 G/20ML; G/20ML
4.5 INJECTION, POWDER, LYOPHILIZED, FOR SOLUTION INTRAVENOUS EVERY 6 HOURS
Status: DISCONTINUED | OUTPATIENT
Start: 2019-10-10 | End: 2019-10-13

## 2019-10-10 RX ORDER — CHLORAL HYDRATE 500 MG
1 CAPSULE ORAL 2 TIMES DAILY
COMMUNITY
End: 2023-05-30

## 2019-10-10 RX ORDER — METRONIDAZOLE 7.5 MG/G
GEL TOPICAL 2 TIMES DAILY PRN
Status: DISCONTINUED | OUTPATIENT
Start: 2019-10-10 | End: 2019-10-14 | Stop reason: HOSPADM

## 2019-10-10 RX ORDER — ESCITALOPRAM OXALATE 20 MG/1
20 TABLET ORAL DAILY
Status: DISCONTINUED | OUTPATIENT
Start: 2019-10-11 | End: 2019-10-14 | Stop reason: HOSPADM

## 2019-10-10 RX ORDER — LACTOBACILLUS RHAMNOSUS GG 10B CELL
1 CAPSULE ORAL DAILY
Status: DISCONTINUED | OUTPATIENT
Start: 2019-10-11 | End: 2019-10-14 | Stop reason: HOSPADM

## 2019-10-10 RX ORDER — ONDANSETRON 2 MG/ML
4 INJECTION INTRAMUSCULAR; INTRAVENOUS EVERY 6 HOURS PRN
Status: DISCONTINUED | OUTPATIENT
Start: 2019-10-10 | End: 2019-10-14 | Stop reason: HOSPADM

## 2019-10-10 RX ADMIN — OLANZAPINE 5 MG: 10 INJECTION, POWDER, FOR SOLUTION INTRAMUSCULAR at 11:48

## 2019-10-10 RX ADMIN — SODIUM CHLORIDE 1000 ML: 9 INJECTION, SOLUTION INTRAVENOUS at 18:34

## 2019-10-10 RX ADMIN — SODIUM CHLORIDE 1000 ML: 900 INJECTION, SOLUTION INTRAVENOUS at 14:10

## 2019-10-10 RX ADMIN — VANCOMYCIN HYDROCHLORIDE 2000 MG: 10 INJECTION, POWDER, LYOPHILIZED, FOR SOLUTION INTRAVENOUS at 16:16

## 2019-10-10 RX ADMIN — PIPERACILLIN SODIUM AND TAZOBACTAM SODIUM 3.38 G: 3; .375 INJECTION, POWDER, LYOPHILIZED, FOR SOLUTION INTRAVENOUS at 14:41

## 2019-10-10 RX ADMIN — PIPERACILLIN SODIUM AND TAZOBACTAM SODIUM 4.5 G: 4; .5 INJECTION, POWDER, LYOPHILIZED, FOR SOLUTION INTRAVENOUS at 22:11

## 2019-10-10 RX ADMIN — SODIUM CHLORIDE 1000 ML: 900 INJECTION, SOLUTION INTRAVENOUS at 15:32

## 2019-10-10 ASSESSMENT — ENCOUNTER SYMPTOMS
COLOR CHANGE: 0
FEVER: 1
DIARRHEA: 1
HEADACHES: 0
DIFFICULTY URINATING: 0
CONFUSION: 0
COUGH: 1
ABDOMINAL PAIN: 0
SHORTNESS OF BREATH: 0
NECK STIFFNESS: 0
ARTHRALGIAS: 0
EYE REDNESS: 0

## 2019-10-10 NOTE — H&P
[facility], [place]    History and Physical - Marjanette 3 Service        Date of Admission:  10/10/2019    Assessment & Plan   Hany Patel is a 24 year old male w/hx of Down's syndrome, hypothyroidism, and chronic diarrhea, and is admitted on 10/10/2019 for fever, cough, and possible hemoptysis in the setting of recent treatment for community acquired pneumonia.    # Sepsis  # Acute hypoxic respiratory failure  # Mild hemoptysis  Patient presents after recent admission for URI (10/4 - 10/7) s/p 6-day course of azithromycin and ceftriaxone/cefdinir. Did well after discharge, until day before admission when he had some behavioral changes and pink-tinged sputum concerning for hemoptysis. Patient was 102.7F and tachycardic to 115 on arrival to the ED, along with an elevated white count of 16.7 up from 10.3 on discharge from last visit 10/7.  CXR shows mildly worsened basilar opacities from imaging on recent admission, concerning for infection, edema, or diffuse alveolar damage. Desatting on RA to mid 80s, requiring blow by oxygen. Given 5 mg olanzapine in the ED for procedure. Likely pulmonary etiology w/CXR and recent URI and recent treatment for PNA, concern for empyema, though considering other sources of fever or hemoptysis, including new viral URI, HCAP (recent hospitalization), strep throat, PE, pulmonary hemorrhage. Less likely to be other infectious source such as UTI. Consider c.diff given recent antibiotic use.  - IV vancomycin and Zosyn for empiric coverage of HCAP  - CT chest w/o contrast to evaluate for infiltrates, pulmonary hemorrhage, parapneumonic effusion  - If findings concerning and would need intervention, will make NPO at midnight. Hold any DVT ppx for now due to possibility of a procedure.  - Supplemental O2, blow by as patient will not tolerate face mask or nasal cannula  - Blood cultures pending  - UA negative, UCx pending  - Tylenol 650 mg Q4H PRN for fever    #  Diarrhea  Pt w/hx of chronic diarrhea, with reported increase in number of stools per day, along w/an episode of bowel incontinence last night.  Recently completed course of abx (as above), and recent hospital stay w/concern for C. Diff.  May also be medication side effect of recent abx use.  - stool PCR for C. Diff  - Continue home probiotic, hold home loperamide     # Change in behavior  Parents reported change in behavior, more talkative/exciteable than usual, possibly related to anxiety.   - Monitor    # Prediabetes  - Hold home metformin    # HLD  - Continue home simvastatin    # Hypothyroidism  - Continue home levothyroxine         Diet: regular  Fluids: PO  DVT Prophylaxis: Holding anticoagulation for now given possible intervention  [place] Catheter: not present  Code Status: FULL    Disposition Plan   Expected discharge: 2 - 3 days, recommended to prior living arrangement once respiratory status improved.  Entered: Yeimy Wade 10/10/2019, 5:25 PM       The patient's care was discussed with the Attending Physician, Dr. Herr and Dr. Bonner.    Yeimy Wade  Medical Student  [place] [phone] Service  [facility]  Pager: [phone]  Please see sticky note for cross cover information    Resident/Fellow Attestation   I, Tori Bonner, was present with the medical student who participated in the service and in the documentation of the note. I edited the note where appropriate. I have verified the history and personally performed the physical exam and medical decision making.  I agree with the assessment and plan of care as documented in the note.        Tori Bonner MD  PGY - 4  Internal Medicine/Pediatrics  Pager [phone]    _____________________________________________________________________    Chief Complaint   Hemoptysis    History is obtained from the patient's parents and chart review    History of Present Illness   Hany Patel is a 24 year old male who has a history of Down's  syndrome and chronic diarrhea and is admitted for hemoptysis and fevers.  His parents reported that his behavior changed this morning, stating he was more talkative and exciteable than usual, but attributing this to anxiety over how he was feeling with the fevers and the cough.  They also reported that the sputum coming up with his cough was pink-colored, and they called a nurse line who told them to come in.  In addition, he had an episode of bowel incontinence last night, and even though he has a history of chronic diarrhea, incontinence is not common for him.  He was not happy about returning to the hospital, but his parents were concerned that he was spiking fevers and coughing up pink sputum.    Review of Systems    The 10 point Review of Systems is negative other than noted in the HPI or here.    Past Medical History    Per chart: Down's syndrome, hypothyroidism, chronic diarrhea, pre-diabetes, and morbid obesity    Past Surgical History   Per chart: adenoid removal and ear tubes    Social History   I have personally reviewed the social history with the parents showing he is active with friends and goes out with his PCA. No tobacco or alcohol or drug use. Lives with parents.     Family History   Mother w/breast CA and Father w/htn    Prior to Admission Medications   Prior to Admission Medications   Prescriptions Last Dose Informant Patient Reported? Taking?   Ascorbic Acid (VITAMIN C) 500 MG CHEW 10/10/2019  Yes Yes   Sig: Take 500 mg by mouth daily    Calcium Carbonate-Vitamin D (CALCIUM + D PO) 10/10/2019  Yes Yes   Sig: Take 1 tablet by mouth daily    Lactobacillus Acid-Pectin (LACTOBACILLUS ACIDOPHILUS) TABS 10/10/2019  Yes Yes   Sig: Take 1 tablet by mouth daily    Multiple Vitamin (MULTI-VITAMIN) per tablet 10/10/2019  Yes Yes   Sig: Take 1 tablet by mouth daily. With D3   albuterol (PROAIR HFA/PROVENTIL HFA/VENTOLIN HFA) 108 (90 Base) MCG/ACT inhaler   No Yes   Sig: Inhale 2 puffs into the lungs every 4  hours as needed for shortness of breath / dyspnea or wheezing   escitalopram (LEXAPRO) 20 MG tablet 10/10/2019  No Yes   Sig: Take 1 tablet (20 mg) by mouth daily   fish oil-omega-3 fatty acids 1000 MG capsule 10/10/2019 at Unknown time  Yes Yes   Sig: Take 1 g by mouth daily   ivermectin (SOOLANTRA) 1 % cream Past Month at Unknown time  Yes Yes   Sig: Apply topically daily as needed (for flares)   ketoconazole (NIZORAL) 2 % shampoo 10/10/2019  No Yes   Sig: Apply to the affected area and wash off after 5 minutes.   ketotifen (ZADITOR) 0.025 % SOLN ophthalmic solution Past Month at Unknown time  No Yes   Sig: Place 1 drop into both eyes every 12 hours   Patient taking differently: Place 1 drop into both eyes every 12 hours as needed    levothyroxine (SYNTHROID, LEVOTHROID) 112 MCG tablet 10/10/2019  Yes Yes   Sig: Take 112 mcg by mouth daily.   loperamide (IMODIUM) 2 MG capsule 10/10/2019  Yes Yes   Sig: Take 2 mg by mouth daily (with breakfast) Take two tablets with breakfast   metFORMIN (GLUCOPHAGE) 1000 MG tablet 10/10/2019  Yes Yes   Sig: Take 1,000 mg by mouth 2 times daily (with meals).   metroNIDAZOLE (METROGEL) 0.75 % topical gel More than a month at Unknown time  No No   Sig: Apply topically 2 times daily   simvastatin (ZOCOR) 20 MG tablet 10/10/2019  Yes Yes   Sig: Take 1 tablet (20 mg) by mouth At Bedtime   vitamin  B complex with vitamin C (VITAMIN  B COMPLEX) TABS 10/10/2019  Yes Yes   Sig: Take 1 tablet by mouth daily      Facility-Administered Medications: None     Allergies   Allergies   Allergen Reactions     Seasonal Allergies        Physical Exam   Vital Signs: Temp: 102.7  F (39.3  C) Temp src: Oral BP: (!) 113/38 Pulse: 115   Resp: 20 SpO2: 94 % O2 Device: None (Room air)    Weight: 0 lbs 0 oz    Constitutional: fatigued, somnolent, appears stated age and morbidly obese  HEENT: NCAT, sclera injected, no nasal discharge, MMM, unable to visualize posterior oropharynx, bilateral TMs obscured by  cerumen  Respiratory: Mild respiratory distress, Mild retractions and transmitted upper airways sounds in all fields on inspiration and expiration, no wheezing, good air entry bilaterally  Cardiovascular: distant heart sounds, normal apical pulses , normal S1 and S2 and no edema  Skin: no bruising or bleeding, normal skin color, texture, and no redness, warmth, or swelling  Neuropsychiatric: Level of consciousness: lethargic    Data   Data reviewed today: I reviewed all medications, new labs and imaging results over the last 24 hours. I personally reviewed the chest x-ray image(s) showing mildly worsening basilar opacities w/concern for inefction, edema, or diffuse alveolar damage.    Recent Labs   Lab 10/10/19  1350 10/05/19  0853 10/04/19  1356   WBC 16.7* 10.3 12.9*   HGB 13.3 13.2* 13.9   MCV 91 92 90    265 302    139 140   POTASSIUM 4.4 3.8 3.8   CHLORIDE 106 108 105   CO2 24 24 27   BUN 15 9 9   CR 0.80 0.63* 0.66   ANIONGAP 6 7 7   JUAN 8.8 8.4* 8.7   GLC 97 96 116*   ALBUMIN 2.9*  --  3.2*   PROTTOTAL 7.1  --  7.3   BILITOTAL 0.2  --  0.5   ALKPHOS 60  --  57   ALT 33  --  28   AST 26  --  13

## 2019-10-10 NOTE — PHARMACY-VANCOMYCIN DOSING SERVICE
Pharmacy Vancomycin Initial Note  Date of Service October 10, 2019  Patient's  1995  24 year old, male    Indication: Sepsis    Current estimated CrCl = Estimated Creatinine Clearance: 201.1 mL/min (A) (based on SCr of 0.63 mg/dL (L)).    Creatinine for last 3 days  No results found for requested labs within last 72 hours.    Recent Vancomycin Level(s) for last 3 days  No results found for requested labs within last 72 hours.      Vancomycin IV Administrations (past 72 hours)      No vancomycin orders with administrations in past 72 hours.                Nephrotoxins and other renal medications (From now, onward)    Start     Dose/Rate Route Frequency Ordered Stop    10/10/19 1209  vancomycin (VANCOCIN) 2,000 mg in sodium chloride 0.9 % 500 mL intermittent infusion      2,000 mg  over 2 Hours Intravenous ONCE 10/10/19 1208      10/10/19 1209  vancomycin (VANCOCIN) 1,750 mg in sodium chloride 0.9 % 500 mL intermittent infusion      1,750 mg  over 2 Hours Intravenous EVERY 12 HOURS 10/10/19 1208      10/10/19 1153  piperacillin-tazobactam (ZOSYN) 3.375 g vial to attach to  mL bag      3.375 g  over 30 Minutes Intravenous ONCE 10/10/19 1152            Contrast Orders - past 72 hours (72h ago, onward)    None                Plan:  1.  Start vancomycin  2000 mg IV x1 then 1750 mg q12h.   2.  Goal Trough Level: 15-20 mg/L   3.  Pharmacy will check trough levels as appropriate in 1-3 Days.    4. Serum creatinine levels will be ordered daily for the first week of therapy and at least twice weekly for subsequent weeks.    5. [place] method utilized to dose vancomycin therapy: Method 2    Mohinder Crawley [facility]

## 2019-10-10 NOTE — ED TRIAGE NOTES
Pt presents ambulatory from home with parents. Pt has hx downs and high anxiety when in medical facilities. Pt declined being triaged in [place], was triage in lobby and very reluctant to answer questions and engage in assessment. Pt has recent hospitalization for pneumonia, tx with abx that were finished today. Pt has one episode of hemoptysis today. Pt Alert, does not appear patricia cute distress.

## 2019-10-10 NOTE — ED NOTES
[facility], [place]   ED Nurse to Floor Handoff     Hany Patel is a 24 year old male who speaks English and lives with family members,  in a home  They arrived in the ED by car from home    ED Chief Complaint: Hemoptysis    ED Dx;   Final diagnoses:   Pneumonia due to infectious organism, unspecified laterality, unspecified part of lung         Needed?: No    Allergies:   Allergies   Allergen Reactions     Seasonal Allergies    .  Past Medical Hx:   Past Medical History:   Diagnosis Date     Diarrhea     Diarrhea Episodes       Baseline Mental status: cognitively impaired, pt has down syndrome  Current Mental Status changes: at basesline    Infection present or suspected this encounter: no  Sepsis suspected: No  Isolation type: No active isolations     Activity level - Baseline/Home:  Independent  Activity Level - Current:   Stand with Assist    Bariatric equipment needed?: No    In the ED these meds were given:   Medications   sodium chloride (PF) 0.9% PF flush 3 mL (has no administration in time range)   sodium chloride (PF) 0.9% PF flush 3 mL (3 mLs Intravenous Given 10/10/19 1410)   0.9% sodium chloride BOLUS (has no administration in time range)     Followed by   sodium chloride 0.9% infusion (has no administration in time range)   piperacillin-tazobactam (ZOSYN) 3.375 g vial to attach to  mL bag (3.375 g Intravenous New Bag 10/10/19 1441)   vancomycin (VANCOCIN) 2,000 mg in sodium chloride 0.9 % 500 mL intermittent infusion (has no administration in time range)   vancomycin (VANCOCIN) 1,750 mg in sodium chloride 0.9 % 500 mL intermittent infusion (has no administration in time range)   0.9% sodium chloride BOLUS (1,000 mLs Intravenous New Bag 10/10/19 1410)   OLANZapine (zyPREXA) injection 5 mg (5 mg Intramuscular Given 10/10/19 1148)       Drips running?  Yes, IV fluids and IV abx infusing    Home pump  No    Current LDAs  Peripheral IV 10/10/19 Left;Lateral  Lower forearm (Active)   Number of days: 0       Wound 02/11/17 Medial Neck Other (comment) Blister (Active)   Number of days: 971       Rash Bilateral groin (Active)   Number of days:        Incision/Surgical Site 02/07/17 Medial;Left Flank (Active)   Number of days: 975       Labs results:   Labs Ordered and Resulted from Time of ED Arrival Up to the Time of Departure from the ED   COMPREHENSIVE METABOLIC PANEL - Abnormal; Notable for the following components:       Result Value    Albumin 2.9 (*)     All other components within normal limits   CBC WITH PLATELETS DIFFERENTIAL - Abnormal; Notable for the following components:    WBC 16.7 (*)     RDW 15.4 (*)     Absolute Neutrophil 14.4 (*)     All other components within normal limits   LACTIC ACID WHOLE BLOOD   ROUTINE UA WITH MICROSCOPIC   PERIPHERAL IV CATHETER   PULSE OXIMETRY NURSING   CARDIAC CONTINUOUS MONITORING   NURSING DRAW AND HOLD   ISTAT CG4 GASES LACTATE TRACY NURSING POCT   BLOOD CULTURE   URINE CULTURE AEROBIC BACTERIAL   BLOOD CULTURE       Imaging Studies: No results found for this or any previous visit (from the past 24 hour(s)).    Recent vital signs:   BP (!) 113/38   Pulse 115   Temp 102.7  F (39.3  C) (Oral)   Resp 20   SpO2 94%     [place] Coma Scale Score: 15 (10/10/19 1140)       Cardiac Rhythm: Tachycardia  Pt needs tele? No  Skin/wound Issues: have not completed full skin assessment    Code Status: Full Code    Pain control: pt had none    Nausea control: pt had none    Abnormal labs/tests/findings requiring intervention:     Family present during ED course? Yes   Family Comments/Social Situation comments: mother and father at bedside    Tasks needing completion: None    Calista England, RN    [phone] [facility]

## 2019-10-10 NOTE — TELEPHONE ENCOUNTER
"Patient was recently discharged from [facility] for pneumonia. Advised mother to take her son to that ER.    Reason for Disposition    Chest pain present when not coughing    Additional Information    Negative: Bluish (or gray) lips or face    Negative: Severe difficulty breathing (e.g., struggling for each breath, speaks in single words)    Negative: Rapid onset of cough and has hives    Negative: Coughing started suddenly after medicine, an allergic food or bee sting    Negative: Difficulty breathing after exposure to flames, smoke, or fumes    Negative: Sounds like a life-threatening emergency to the triager    Negative: Previous asthma attacks and this feels like asthma attack    Answer Assessment - Initial Assessment Questions  1. ONSET: \"When did the cough begin?\"       Recently hospitalized for pneumonia. Has a continuous cough.  2. SEVERITY: \"How bad is the cough today?\"       Severe. Coughing up blood  3. RESPIRATORY DISTRESS: \"Describe your breathing.\"       Mother states that he also has a cold so breathing is hard.  4. FEVER: \"Do you have a fever?\" If so, ask: \"What is your temperature, how was it measured, and when did it start?\"      Mother did not take his temp.  5. HEMOPTYSIS: \"Are you coughing up any blood?\" If so ask: \"How much?\" (flecks, streaks, tablespoons, etc.)      Yes, streaks  6. TREATMENT: \"What have you done so far to treat the cough?\" (e.g., meds, fluids, humidifier)      On antibiotics.  7. CARDIAC HISTORY: \"Do you have any history of heart disease?\" (e.g., heart attack, congestive heart failure)       no  8. LUNG HISTORY: \"Do you have any history of lung disease?\"  (e.g., pulmonary embolus, asthma, emphysema)      no  9. PE RISK FACTORS: \"Do you have a history of blood clots?\" (or: recent major surgery, recent prolonged travel, bedridden )      no  10. OTHER SYMPTOMS: \"Do you have any other symptoms? (e.g., runny nose, wheezing, chest pain)        Runny nose  11. PREGNANCY: \"Is " "there any chance you are pregnant?\" \"When was your last menstrual period?\"        na  12. TRAVEL: \"Have you traveled out of the country in the last month?\" (e.g., travel history, exposures)        no    Protocols used: COUGH-A-OH      "

## 2019-10-11 LAB
GRAM STN SPEC: ABNORMAL
LACTATE BLD-SCNC: 1.5 MMOL/L (ref 0.7–2)
LACTATE BLD-SCNC: NORMAL MMOL/L (ref 0.7–2)
Lab: ABNORMAL
MRSA DNA SPEC QL NAA+PROBE: NEGATIVE
SPECIMEN SOURCE: ABNORMAL
SPECIMEN SOURCE: NORMAL

## 2019-10-11 PROCEDURE — 87070 CULTURE OTHR SPECIMN AEROBIC: CPT | Performed by: STUDENT IN AN ORGANIZED HEALTH CARE EDUCATION/TRAINING PROGRAM

## 2019-10-11 PROCEDURE — 25000128 H RX IP 250 OP 636: Performed by: STUDENT IN AN ORGANIZED HEALTH CARE EDUCATION/TRAINING PROGRAM

## 2019-10-11 PROCEDURE — 25000128 H RX IP 250 OP 636: Performed by: EMERGENCY MEDICINE

## 2019-10-11 PROCEDURE — 83605 ASSAY OF LACTIC ACID: CPT

## 2019-10-11 PROCEDURE — 12000001 ZZH R&B MED SURG/OB UMMC

## 2019-10-11 PROCEDURE — 99233 SBSQ HOSP IP/OBS HIGH 50: CPT | Performed by: PEDIATRICS

## 2019-10-11 PROCEDURE — 25000132 ZZH RX MED GY IP 250 OP 250 PS 637: Mod: GY | Performed by: STUDENT IN AN ORGANIZED HEALTH CARE EDUCATION/TRAINING PROGRAM

## 2019-10-11 PROCEDURE — 87205 SMEAR GRAM STAIN: CPT | Performed by: STUDENT IN AN ORGANIZED HEALTH CARE EDUCATION/TRAINING PROGRAM

## 2019-10-11 PROCEDURE — 87106 FUNGI IDENTIFICATION YEAST: CPT | Performed by: STUDENT IN AN ORGANIZED HEALTH CARE EDUCATION/TRAINING PROGRAM

## 2019-10-11 PROCEDURE — 25800030 ZZH RX IP 258 OP 636: Performed by: EMERGENCY MEDICINE

## 2019-10-11 PROCEDURE — 87641 MR-STAPH DNA AMP PROBE: CPT | Performed by: STUDENT IN AN ORGANIZED HEALTH CARE EDUCATION/TRAINING PROGRAM

## 2019-10-11 PROCEDURE — 87640 STAPH A DNA AMP PROBE: CPT | Performed by: STUDENT IN AN ORGANIZED HEALTH CARE EDUCATION/TRAINING PROGRAM

## 2019-10-11 PROCEDURE — 83605 ASSAY OF LACTIC ACID: CPT | Performed by: PEDIATRICS

## 2019-10-11 RX ADMIN — VANCOMYCIN HYDROCHLORIDE 1750 MG: 10 INJECTION, POWDER, LYOPHILIZED, FOR SOLUTION INTRAVENOUS at 05:46

## 2019-10-11 RX ADMIN — PIPERACILLIN SODIUM AND TAZOBACTAM SODIUM 4.5 G: 4; .5 INJECTION, POWDER, LYOPHILIZED, FOR SOLUTION INTRAVENOUS at 03:58

## 2019-10-11 RX ADMIN — SIMVASTATIN 20 MG: 20 TABLET, FILM COATED ORAL at 22:38

## 2019-10-11 RX ADMIN — ACETAMINOPHEN 650 MG: 325 TABLET, FILM COATED ORAL at 20:45

## 2019-10-11 RX ADMIN — PIPERACILLIN SODIUM AND TAZOBACTAM SODIUM 4.5 G: 4; .5 INJECTION, POWDER, LYOPHILIZED, FOR SOLUTION INTRAVENOUS at 22:38

## 2019-10-11 RX ADMIN — PIPERACILLIN SODIUM AND TAZOBACTAM SODIUM 4.5 G: 4; .5 INJECTION, POWDER, LYOPHILIZED, FOR SOLUTION INTRAVENOUS at 09:13

## 2019-10-11 RX ADMIN — LEVOTHYROXINE SODIUM 112 MCG: 112 TABLET ORAL at 10:21

## 2019-10-11 RX ADMIN — PIPERACILLIN SODIUM AND TAZOBACTAM SODIUM 4.5 G: 4; .5 INJECTION, POWDER, LYOPHILIZED, FOR SOLUTION INTRAVENOUS at 16:10

## 2019-10-11 RX ADMIN — Medication 1 CAPSULE: at 10:22

## 2019-10-11 RX ADMIN — ESCITALOPRAM OXALATE 20 MG: 20 TABLET ORAL at 10:21

## 2019-10-11 ASSESSMENT — ACTIVITIES OF DAILY LIVING (ADL)
DRESS: 0-->INDEPENDENT
ADLS_ACUITY_SCORE: 13
SWALLOWING: 0-->SWALLOWS FOODS/LIQUIDS WITHOUT DIFFICULTY
ADLS_ACUITY_SCORE: 13
FALL_HISTORY_WITHIN_LAST_SIX_MONTHS: NO
BATHING: 0-->INDEPENDENT
ADLS_ACUITY_SCORE: 13
TOILETING: 0-->INDEPENDENT
RETIRED_EATING: 0-->INDEPENDENT
ADLS_ACUITY_SCORE: 13
RETIRED_COMMUNICATION: 0-->UNDERSTANDS/COMMUNICATES WITHOUT DIFFICULTY
TRANSFERRING: 0-->INDEPENDENT
COGNITION: 0 - NO COGNITION ISSUES REPORTED
AMBULATION: 0-->INDEPENDENT

## 2019-10-11 NOTE — PLAN OF CARE
D/I: Pt is a 24 year old with Down syndrome. Able to verbalize some needs. IVF NS at 125 ml/hr via PIV. Father at bedside attentive to cares. Pt desats to mid 80s when he sleeps and unable to maintain mask of nasal canula due to claustophobia. However oxygen blow by mask close to his face keeps O2 at 90%   P: Continue to monitor pt and follow plan of care.

## 2019-10-11 NOTE — PLAN OF CARE
Care assumed [id number]. A&O, developmentally delayed. VSS on room air, sats in the lower 90s while sleeping and O2 used last night, tachypnic and dyspnea on exertion. PIV intact, SL, MIVF discontinued today. Pt voiding frequently, drinking well. Up with standby assist, parent in room assisting to bathroom. Pt occasionally agitated, redirectable. One walk in hallway this afternoon, encourage walks out of room as tolerated. Infrequent cough productive of small amount of red-streaked sputum, sample sent. Awaiting BM for c diff sample, pt family aware sample is needed, no BM this shift. Per family, 10/12 AM labs ordered are for clinic appointment on 10/24, will need ultrasound for draw. Denies pain, nausea, tolerating regular diet well. Continue with POC.

## 2019-10-11 NOTE — SUMMARY OF CARE
Pt arrived to [facility] with the following items: cell phone, wallet, two backpacks, iPad, shoes, clothing

## 2019-10-11 NOTE — PROGRESS NOTES
Sepsis Evaluation Progress Note    I was called to see Hany Patel due to abnormal vital signs triggering the Sepsis SIRS screening alert. He is known to have an infection. Patient is on Vanc and Zosyn.    Physical Exam   Vital Signs:  Temp: 99.9  F (37.7  C) Temp src: Axillary BP: 136/46 Pulse: 100   Resp: 18 SpO2: 95 % O2 Device: None (Room air)      Lab:  Lactic Acid   Date Value Ref Range Status   10/10/2019 2.0 0.7 - 2.0 mmol/L Final       The patient is at baseline mental status.   He appears well sleeping in bed, snoring loudly. Course breath sounds all throughout.    Assessment & Plan   Hany Patel meets SIRS criteria but does NOT have a lactate >2 or other evidence of acute organ damage.  These vital sign, lab and physical exam findings are consistent with SEPSIS.    Sepsis Time-Zero (time Sepsis diagnosis confirmed): 11:11 PM  10/10/19    Anti-infectives (From now, onward)    Start     Dose/Rate Route Frequency Ordered Stop    10/11/19 0415  vancomycin (VANCOCIN) 1,750 mg in sodium chloride 0.9 % 500 mL intermittent infusion      1,750 mg  over 2 Hours Intravenous EVERY 12 HOURS 10/10/19 1208      10/10/19 2030  piperacillin-tazobactam (ZOSYN) 4.5 g vial to attach to  mL bag      4.5 g  over 30 Minutes Intravenous EVERY 6 HOURS 10/10/19 1958          Current antibiotic coverage is appropriate for source of infection.     Disposition: The patient will remain on the current unit. We will continue to monitor this patient closely.  Kee Smith Jr., MD    Sepsis Criteria   Sepsis: 2+ SIRS criteria due to infection  Severe Sepsis: Sepsis AND 1+ new sign of acute organ dysfunction (Note: lactate >2 is organ dysfunction)  Septic Shock: Sepsis AND hypotension despite volume resuscitation with 30 ml/kg crystalloid

## 2019-10-11 NOTE — PLAN OF CARE
Admitted this 24 yr old male from ED at 2200 via litter and escorted by transport personnel. Came in with CC of fever, cough, hemoptysis and recently treated with CAP. With PMhx of Down's Syndrome, hypothyroidism and chronic diarrhea. Made comfortable in bed. With ongoing IVF NS at 125 ml/hr via PIV.He is drowsy/ sleepy but able to verbalize some needs.orientation to room call light and staff done. Father at bedside attentive to cares, claimed he is claustophobic with regards to O2 delivery systems but able to tolerate Blow by O2 using mask. Father also claimed that patient is ambulatory at home. He triggered sepsis Risk alert (tachycardia and Elevated WBC), protocol initiated. He however refused blood draw. MD notified. Sleeping as of this time. Vanco and Zosyn for Abx.

## 2019-10-12 LAB
ANION GAP SERPL CALCULATED.3IONS-SCNC: 7 MMOL/L (ref 3–14)
BASOPHILS # BLD AUTO: 0.1 10E9/L (ref 0–0.2)
BASOPHILS NFR BLD AUTO: 1 %
BUN SERPL-MCNC: 7 MG/DL (ref 7–30)
C DIFF TOX B STL QL: NEGATIVE
CALCIUM SERPL-MCNC: 8.4 MG/DL (ref 8.5–10.1)
CHLORIDE SERPL-SCNC: 111 MMOL/L (ref 94–109)
CHOLEST SERPL-MCNC: 148 MG/DL
CO2 SERPL-SCNC: 26 MMOL/L (ref 20–32)
CREAT SERPL-MCNC: 0.63 MG/DL (ref 0.66–1.25)
DIFFERENTIAL METHOD BLD: ABNORMAL
EOSINOPHIL # BLD AUTO: 0.1 10E9/L (ref 0–0.7)
EOSINOPHIL NFR BLD AUTO: 1.2 %
ERYTHROCYTE [DISTWIDTH] IN BLOOD BY AUTOMATED COUNT: 15.9 % (ref 10–15)
FLUAV+FLUBV RNA SPEC QL NAA+PROBE: NEGATIVE
FLUAV+FLUBV RNA SPEC QL NAA+PROBE: NEGATIVE
FSH SERPL-ACNC: 6.3 IU/L (ref 0.7–10.8)
GFR SERPL CREATININE-BSD FRML MDRD: >90 ML/MIN/{1.73_M2}
GLUCOSE SERPL-MCNC: 96 MG/DL (ref 70–99)
HBA1C MFR BLD: 5.4 % (ref 0–5.6)
HCT VFR BLD AUTO: 36 % (ref 40–53)
HDLC SERPL-MCNC: 34 MG/DL
HGB BLD-MCNC: 11.6 G/DL (ref 13.3–17.7)
IMM GRANULOCYTES # BLD: 0.1 10E9/L (ref 0–0.4)
IMM GRANULOCYTES NFR BLD: 0.7 %
LDLC SERPL CALC-MCNC: 99 MG/DL
LH SERPL-ACNC: 6.6 IU/L (ref 1.5–9.3)
LYMPHOCYTES # BLD AUTO: 1.8 10E9/L (ref 0.8–5.3)
LYMPHOCYTES NFR BLD AUTO: 14.5 %
MCH RBC QN AUTO: 29.7 PG (ref 26.5–33)
MCHC RBC AUTO-ENTMCNC: 32.2 G/DL (ref 31.5–36.5)
MCV RBC AUTO: 92 FL (ref 78–100)
MONOCYTES # BLD AUTO: 0.9 10E9/L (ref 0–1.3)
MONOCYTES NFR BLD AUTO: 7.7 %
NEUTROPHILS # BLD AUTO: 9.1 10E9/L (ref 1.6–8.3)
NEUTROPHILS NFR BLD AUTO: 74.9 %
NONHDLC SERPL-MCNC: 114 MG/DL
NRBC # BLD AUTO: 0 10*3/UL
NRBC BLD AUTO-RTO: 0 /100
PLATELET # BLD AUTO: 271 10E9/L (ref 150–450)
POTASSIUM SERPL-SCNC: 4 MMOL/L (ref 3.4–5.3)
PROLACTIN SERPL-MCNC: 8 UG/L (ref 2–18)
RBC # BLD AUTO: 3.9 10E12/L (ref 4.4–5.9)
RSV RNA SPEC NAA+PROBE: NEGATIVE
SODIUM SERPL-SCNC: 143 MMOL/L (ref 133–144)
SPECIMEN SOURCE: NORMAL
SPECIMEN SOURCE: NORMAL
T4 FREE SERPL-MCNC: 0.85 NG/DL (ref 0.76–1.46)
TRIGL SERPL-MCNC: 74 MG/DL
TSH SERPL DL<=0.005 MIU/L-ACNC: 1.65 MU/L (ref 0.4–4)
WBC # BLD AUTO: 12.2 10E9/L (ref 4–11)

## 2019-10-12 PROCEDURE — 82306 VITAMIN D 25 HYDROXY: CPT | Performed by: PEDIATRICS

## 2019-10-12 PROCEDURE — 80048 BASIC METABOLIC PNL TOTAL CA: CPT | Performed by: PEDIATRICS

## 2019-10-12 PROCEDURE — 25000132 ZZH RX MED GY IP 250 OP 250 PS 637: Mod: GY | Performed by: STUDENT IN AN ORGANIZED HEALTH CARE EDUCATION/TRAINING PROGRAM

## 2019-10-12 PROCEDURE — 87493 C DIFF AMPLIFIED PROBE: CPT | Performed by: STUDENT IN AN ORGANIZED HEALTH CARE EDUCATION/TRAINING PROGRAM

## 2019-10-12 PROCEDURE — 36415 COLL VENOUS BLD VENIPUNCTURE: CPT | Performed by: PEDIATRICS

## 2019-10-12 PROCEDURE — 83001 ASSAY OF GONADOTROPIN (FSH): CPT | Performed by: PEDIATRICS

## 2019-10-12 PROCEDURE — 82565 ASSAY OF CREATININE: CPT | Performed by: PEDIATRICS

## 2019-10-12 PROCEDURE — 84439 ASSAY OF FREE THYROXINE: CPT | Performed by: PEDIATRICS

## 2019-10-12 PROCEDURE — 99233 SBSQ HOSP IP/OBS HIGH 50: CPT | Performed by: PEDIATRICS

## 2019-10-12 PROCEDURE — 84270 ASSAY OF SEX HORMONE GLOBUL: CPT | Performed by: PEDIATRICS

## 2019-10-12 PROCEDURE — 12000001 ZZH R&B MED SURG/OB UMMC

## 2019-10-12 PROCEDURE — 80061 LIPID PANEL: CPT | Performed by: PEDIATRICS

## 2019-10-12 PROCEDURE — 84443 ASSAY THYROID STIM HORMONE: CPT | Performed by: PEDIATRICS

## 2019-10-12 PROCEDURE — 84146 ASSAY OF PROLACTIN: CPT | Performed by: PEDIATRICS

## 2019-10-12 PROCEDURE — 84403 ASSAY OF TOTAL TESTOSTERONE: CPT | Performed by: PEDIATRICS

## 2019-10-12 PROCEDURE — 87631 RESP VIRUS 3-5 TARGETS: CPT | Performed by: PEDIATRICS

## 2019-10-12 PROCEDURE — 83002 ASSAY OF GONADOTROPIN (LH): CPT | Performed by: PEDIATRICS

## 2019-10-12 PROCEDURE — 25000128 H RX IP 250 OP 636: Performed by: STUDENT IN AN ORGANIZED HEALTH CARE EDUCATION/TRAINING PROGRAM

## 2019-10-12 PROCEDURE — 36415 COLL VENOUS BLD VENIPUNCTURE: CPT

## 2019-10-12 PROCEDURE — 83036 HEMOGLOBIN GLYCOSYLATED A1C: CPT | Performed by: PEDIATRICS

## 2019-10-12 PROCEDURE — 85025 COMPLETE CBC W/AUTO DIFF WBC: CPT | Performed by: PEDIATRICS

## 2019-10-12 RX ORDER — CALCIUM CARBONATE 500 MG/1
500 TABLET, CHEWABLE ORAL ONCE
Status: COMPLETED | OUTPATIENT
Start: 2019-10-12 | End: 2019-10-12

## 2019-10-12 RX ADMIN — CALCIUM CARBONATE (ANTACID) CHEW TAB 500 MG 500 MG: 500 CHEW TAB at 21:48

## 2019-10-12 RX ADMIN — Medication 1 CAPSULE: at 10:09

## 2019-10-12 RX ADMIN — PIPERACILLIN SODIUM AND TAZOBACTAM SODIUM 4.5 G: 4; .5 INJECTION, POWDER, LYOPHILIZED, FOR SOLUTION INTRAVENOUS at 10:09

## 2019-10-12 RX ADMIN — PIPERACILLIN SODIUM AND TAZOBACTAM SODIUM 4.5 G: 4; .5 INJECTION, POWDER, LYOPHILIZED, FOR SOLUTION INTRAVENOUS at 21:38

## 2019-10-12 RX ADMIN — ESCITALOPRAM OXALATE 20 MG: 20 TABLET ORAL at 10:09

## 2019-10-12 RX ADMIN — PIPERACILLIN SODIUM AND TAZOBACTAM SODIUM 4.5 G: 4; .5 INJECTION, POWDER, LYOPHILIZED, FOR SOLUTION INTRAVENOUS at 04:07

## 2019-10-12 RX ADMIN — SIMVASTATIN 20 MG: 20 TABLET, FILM COATED ORAL at 21:39

## 2019-10-12 RX ADMIN — LEVOTHYROXINE SODIUM 112 MCG: 112 TABLET ORAL at 10:09

## 2019-10-12 RX ADMIN — PIPERACILLIN SODIUM AND TAZOBACTAM SODIUM 4.5 G: 4; .5 INJECTION, POWDER, LYOPHILIZED, FOR SOLUTION INTRAVENOUS at 15:34

## 2019-10-12 ASSESSMENT — ACTIVITIES OF DAILY LIVING (ADL)
ADLS_ACUITY_SCORE: 13

## 2019-10-12 NOTE — PLAN OF CARE
A&O, developmentally delayed. VSS on room air this shift except tachypnic, sats mid 90's. PIV intact, SL with IV antibiotics. Up with standby assist, PCA at bedside this shift and pt cooperative with cares and tests. Father and mother also at bedside, supportive of cares. Good appetite on regular diet. Stool sample and influenza swab sent, awaiting results. Continue with POC.

## 2019-10-12 NOTE — PLAN OF CARE
Care assumed 1900-0730. A&O, developmentally delayed. O2 sats dropped overnight to low 80s when pt awake and refusing O2- 90's while asleep and calm, on blow by. Tachypnic and dyspnea on exertion. Other VSS. Febrile in evening, 102.8- Tylenol given x1, decreased to 97.4. Sepsis triggered- LA 1.5. PIV left arm saline locked. Zosyn administered as ordered. Family requests ultrasound with lab draws. Patient intermittently agitated, redirectable. Denies pain. Up with standby assist. Good appetite. Urinates spontaneously. No BM this shift- need stool sample. Continue to monitor respiratory status and notify MD with changes.

## 2019-10-12 NOTE — PROVIDER NOTIFICATION
Paged [phone]    Pt refuses to wear O2 mask- sats fall to low 80's while asleep without O2. How low can O2 get before interventions needed?

## 2019-10-12 NOTE — PROVIDER NOTIFICATION
Paged [phone]    FYI pt requesting vascular access to do lab draw for LA- talked to vascular access and they will be sending someone up within an hour.

## 2019-10-12 NOTE — PROVIDER NOTIFICATION
Paged [phone]    Pt's O2 95% with blow by oxymask- wheezes with inspiration and gurgles on expiration. Messaged pharmacy to send up PRN albuterol- would neb treatment also be an option?

## 2019-10-12 NOTE — PROGRESS NOTES
[facility], [place]    Progress Note - Aries 3 Service        Date of Admission:  10/10/2019    Assessment & Plan   This is a 23 yo male with h/o Down Syndrome and chronic diarrhea recently admitted 10/4-7 with CAP treated with azithro/cefdinir at discharge who initially was doing well, but then decompensated over the last 24 hours with fevers found to have diffuse infiltrates on chest imaging and elevated WBC. Concern for pneumonia as the source of his sepsis, no parapneumonic effusion/empyema.  Clinically improved this morning.  No increased work of breathing and on room air.    # Sepsis 2/2 presumed pneumonia  # Acute hypoxic respiratory failure, resolved  # Mild hemoptysis in setting of mild epistaxis this am (patient actively picking nose)  Patient presents after recent admission for URI (10/4 - 10/7) s/p 6-day course of azithromycin and ceftriaxone/cefdinir. Did well after discharge, until day before admission when he had some behavioral changes and pink-tinged sputum concerning for hemoptysis.   Febrile on presentation with leukocytosis and chest CT showing tree-in-bud infiltrates.  No evidence of parapneumonic effusion.  At this point the differential is either inadequate coverage with previous antibiotics that we did improve to seems less likely.  Versus the development of a secondary pneumonia.  If this were the case would be concerned about H CAP given his recent hospitalization.  Must also consider fungal causes if the patient does not improve.  This point he is on room air.  Hemoptysis initially concern for but this seems to be more related to epistaxis.  MRSA nasal swab negative. Alternative sources unlikely with unremarkable UA and lack of ongoing watery diarrhea.   - IV vancomycin stopped  - Continue zosyn, would anticipate deescalating if remains clinically stable tomorrow   - spot check oximetry, supplemental oximetry with blow by as needed  - sputum culture  -  follow blood cultures  - Tylenol 650 mg Q4H PRN for fever     # Diarrhea  Pt w/hx of chronic diarrhea, with reported increase in number of stools per day, along w/an episode of bowel incontinence prior to admission. Just one soft stool since admission, seems less likely c.diff by history. May also be medication side effect of recent abx use.  - stool PCR for C. Diff  - Continue home probiotic, hold home loperamide      # Change in behavior, resolved back to baseline  Parents reported change in behavior, more talkative/exciteable than usual, possibly related to anxiety. Likely related to fever  - Monitor     # Prediabetes  - Hold home metformin     # HLD  - Continue home simvastatin     # Hypothyroidism  - Continue home levothyroxine        Diet: Combination Diet Regular Diet Adult    Fluids: stop mIVF  Lines: PIV  DVT Prophylaxis: Ambulate every shift  [place] Catheter: not present  Code Status: Full Code      Disposition Plan   Expected discharge: 2 - 3 days, recommended to prior living arrangement once antibiotic plan established and SIRS/Sepsis treated.  Entered: Marylin Herr MD 10/11/2019, 7:03 PM       The patient's care was discussed with the Bedside Nurse.    Marylin Herr MD  [address]  Pager: [phone]  Please see sticky note for cross cover information  ______________________________________________________________________    Interval History   No acute events overnight. Back to baseline mental status. No fevers. One soft stool. Epistaxis with picking his nose this morning, no further hemoptysis. No pain or shortness of breath.     Data reviewed today: I reviewed all medications, new labs and imaging results over the last 24 hours. I personally reviewed no images or EKG's today.    Physical Exam   Vital Signs: Temp: 94  F (34.4  C) Temp src: Oral BP: (!) 117/35 Pulse: 103   Resp: 28 SpO2: 98 % O2 Device: None (Room air)    Weight: 261 lbs 0  oz    Exam:  General: the patient is pleasant, resting comfortably, no acute distress. Answers simple yes / no questions mostly accurate based on parent support  HEENT: down facies, atraumatic. Epistaxis, patient actively picking nose  Lungs: No increased work of breathing. Scattered rhonchi throughout.   CV: RRR, nl s1 and s2, no m/r/g.   Abdomen: Obese but soft, nondistended, nontender. No rebound or guarding. Bowel sounds active.  Extremities: No lower extremity edema. Peripheral pulses strong and symmetric.   Skin: no appreciable skin lesions/rashes on exposed skin.   Neuro: developmental delay, but interactive and answers simple questions. Shakes student's hand upon entering the room.       Data   Recent Labs   Lab 10/10/19  1350 10/05/19  0853   WBC 16.7* 10.3   HGB 13.3 13.2*   MCV 91 92    265    139   POTASSIUM 4.4 3.8   CHLORIDE 106 108   CO2 24 24   BUN 15 9   CR 0.80 0.63*   ANIONGAP 6 7   JUAN 8.8 8.4*   GLC 97 96   ALBUMIN 2.9*  --    PROTTOTAL 7.1  --    BILITOTAL 0.2  --    ALKPHOS 60  --    ALT 33  --    AST 26  --      Recent Results (from the past 24 hour(s))   CT Chest w/o Contrast    Narrative    Exam Type: CT CHEST W/O CONTRAST  Date and Time: 10/10/2019 8:46 PM  History: Pneumonia, unresolved or complicated  Comparison: 10/10/2019    Procedure:   Helical CT images were obtained of the chest without IV contrast.    Radiation Dose (DLP): 488 mGy*cm    FINDINGS:    CHEST:  AIRWAYS: Central bronchial wall thickening. Patent central airways.  LUNG: Diffuse patchy groundglass and consolidative attenuation  particularly affecting the right middle lobe, inferior lingula, left  base. Intermixed tree-in-bud attenuation.  PLEURA: Within normal limits.  VESSELS: Within normal limits.  HEART: Within normal limits.  MEDIASTINUM AND YOSI: Mild pericardial effusion. Mediastinal, hilar  adenopathy.  CHEST WALL AND LOWER NECK: Within normal limits.    UPPER ABDOMEN:  Limited evaluation of the upper  abdomen demonstrates no acute  parenchymal abnormalities, nonobstructive bowel gas pattern, and no  free fluid or free air.       Impression    IMPRESSION:  Diffuse groundglass and consolidative attenuation with associated  tree-in-bud attenuation, most consistent with infection.    I have personally reviewed the examination and initial interpretation  and I agree with the findings.    TYSON TAYLOR MD     Medications       escitalopram  20 mg Oral Daily     lactobacillus rhamnosus (GG)  1 capsule Oral Daily     levothyroxine  112 mcg Oral Daily     piperacillin-tazobactam  4.5 g Intravenous Q6H     simvastatin  20 mg Oral At Bedtime     sodium chloride (PF)  3 mL Intracatheter Q8H

## 2019-10-12 NOTE — PROGRESS NOTES
[facility], [place]    Progress Note - Aries 3 Service        Date of Admission:  10/10/2019    Assessment & Plan   This is a 25 yo male with h/o Down Syndrome and chronic diarrhea recently admitted 10/4-7 with CAP treated with azithro/cefdinir at discharge who initially was doing well, but then decompensated over the last 24 hours with fevers found to have diffuse infiltrates on chest imaging and elevated WBC. Concern for pneumonia as the source of his sepsis, no parapneumonic effusion/empyema.  Clinically improved this morning.  No increased work of breathing and on room air.    Changes today:    - continue zosyn   - continue attempt getting c.diff sample   - influenza swab   - if afebrile, de-escalate antibiotics further    # Sepsis 2/2 presumed pneumonia  # Acute hypoxic respiratory failure, resolved  # Mild hemoptysis in setting of mild epistaxis this am (patient actively picking nose)  Patient presents after recent admission for URI (10/4 - 10/7) s/p 6-day course of azithromycin and ceftriaxone/cefdinir. Did well after discharge, until day before admission when he had some behavioral changes and pink-tinged sputum concerning for hemoptysis.   Febrile on presentation with leukocytosis and chest CT showing tree-in-bud infiltrates.  No evidence of parapneumonic effusion.  At this point the differential is either inadequate coverage with previous antibiotics that we did improve to seems less likely.  Versus the development of a secondary pneumonia.  If this were the case would be concerned about H CAP given his recent hospitalization.  Must also consider fungal causes if the patient does not improve.  This point he is on room air.  Hemoptysis initially concern for but this seems to be more related to epistaxis.  MRSA nasal swab negative. Alternative sources unlikely with unremarkable UA and lack of ongoing watery diarrhea.   - Continue zosyn, would anticipate deescalating if  remains clinically stable tomorrow   - Influenza swab  - spot check oximetry, supplemental oximetry with blow by as needed  - sputum culture  - follow blood cultures  - Repeat blood cultures if spikes  - Tylenol 650 mg Q4H PRN for fever     # Diarrhea  Pt w/hx of chronic diarrhea, with reported increase in number of stools per day, along w/an episode of bowel incontinence prior to admission. Just one soft stool since admission, seems less likely c.diff by history. May also be medication side effect of recent abx use.  - stool PCR for C. Diff  - Continue home probiotic, hold home loperamide      # Change in behavior, resolved back to baseline  Parents reported change in behavior, more talkative/exciteable than usual, possibly related to anxiety. Likely related to fever  - Monitor     # Prediabetes  - Hold home metformin     # HLD  - Continue home simvastatin     # Hypothyroidism  - Continue home levothyroxine        Diet: Combination Diet Regular Diet Adult    Fluids: stop mIVF  Lines: PIV  DVT Prophylaxis: Ambulate every shift  [place] Catheter: not present  Code Status: Full Code      Disposition Plan   Expected discharge: 2 - 3 days, recommended to prior living arrangement once antibiotic plan established and SIRS/Sepsis treated.  Entered: Marylin Herr MD 10/12/2019, 9:15 AM       The patient's care was discussed with the Bedside Nurse.    Marylin Herr MD  [address], [place]  Pager: [phone]  Please see sticky note for cross cover information  ______________________________________________________________________    Interval History   No acute events overnight. Febrile overnight. Persistent cough, no more bloody sputum. No chest pain or shortness of breath.  Required some blow by overnight for desats.     Data reviewed today: I reviewed all medications, new labs and imaging results over the last 24 hours. I personally reviewed no images or EKG's today.    Physical Exam    Vital Signs: Temp: 97.6  F (36.4  C) Temp src: Axillary BP: 135/57 Pulse: 72   Resp: 24 SpO2: 90 % O2 Device: Oxymask    Weight: 262 lbs 3.2 oz    Exam:  General: the patient is pleasant and laughing, happy and interactive. Resting comfortably, no acute distress.   HEENT: down facies, atraumatic.   Lungs: No increased work of breathing. Symmetric air movement, lungs largely clear with some transmitted upper airway movement.   CV: RRR, nl s1 and s2, no m/r/g.   Abdomen: Obese but soft, nondistended, nontender. No rebound or guarding. Bowel sounds active.  Extremities: No lower extremity edema. Peripheral pulses strong and symmetric.   Skin: no appreciable skin lesions/rashes on exposed skin.   Neuro: developmental delay, but interactive and answers simple questions.      Data   Recent Labs   Lab 10/12/19  0701 10/10/19  1350   WBC 12.2* 16.7*   HGB 11.6* 13.3   MCV 92 91    324    137   POTASSIUM 4.0 4.4   CHLORIDE 111* 106   CO2 26 24   BUN 7 15   CR 0.63* 0.80   ANIONGAP 7 6   JUAN 8.4* 8.8   GLC 96 97   ALBUMIN  --  2.9*   PROTTOTAL  --  7.1   BILITOTAL  --  0.2   ALKPHOS  --  60   ALT  --  33   AST  --  26     No results found for this or any previous visit (from the past 24 hour(s)).  Medications       escitalopram  20 mg Oral Daily     lactobacillus rhamnosus (GG)  1 capsule Oral Daily     levothyroxine  112 mcg Oral Daily     piperacillin-tazobactam  4.5 g Intravenous Q6H     simvastatin  20 mg Oral At Bedtime     sodium chloride (PF)  3 mL Intracatheter Q8H

## 2019-10-13 LAB
BACTERIA SPEC CULT: ABNORMAL
CREAT SERPL-MCNC: 0.65 MG/DL (ref 0.66–1.25)
GFR SERPL CREATININE-BSD FRML MDRD: >90 ML/MIN/{1.73_M2}
Lab: ABNORMAL
SPECIMEN SOURCE: ABNORMAL
SPECIMEN SOURCE: ABNORMAL

## 2019-10-13 PROCEDURE — 82565 ASSAY OF CREATININE: CPT | Performed by: PEDIATRICS

## 2019-10-13 PROCEDURE — 12000001 ZZH R&B MED SURG/OB UMMC

## 2019-10-13 PROCEDURE — 36415 COLL VENOUS BLD VENIPUNCTURE: CPT

## 2019-10-13 PROCEDURE — 99232 SBSQ HOSP IP/OBS MODERATE 35: CPT | Mod: GC | Performed by: PEDIATRICS

## 2019-10-13 PROCEDURE — 36415 COLL VENOUS BLD VENIPUNCTURE: CPT | Performed by: PEDIATRICS

## 2019-10-13 PROCEDURE — 25000132 ZZH RX MED GY IP 250 OP 250 PS 637: Mod: GY | Performed by: STUDENT IN AN ORGANIZED HEALTH CARE EDUCATION/TRAINING PROGRAM

## 2019-10-13 PROCEDURE — 25000128 H RX IP 250 OP 636: Performed by: STUDENT IN AN ORGANIZED HEALTH CARE EDUCATION/TRAINING PROGRAM

## 2019-10-13 RX ORDER — LEVOFLOXACIN 750 MG/1
750 TABLET, FILM COATED ORAL DAILY
Status: DISCONTINUED | OUTPATIENT
Start: 2019-10-13 | End: 2019-10-14 | Stop reason: HOSPADM

## 2019-10-13 RX ADMIN — Medication 1 CAPSULE: at 10:11

## 2019-10-13 RX ADMIN — LEVOFLOXACIN 750 MG: 750 TABLET, FILM COATED ORAL at 14:20

## 2019-10-13 RX ADMIN — LEVOTHYROXINE SODIUM 112 MCG: 112 TABLET ORAL at 10:11

## 2019-10-13 RX ADMIN — PIPERACILLIN SODIUM AND TAZOBACTAM SODIUM 4.5 G: 4; .5 INJECTION, POWDER, LYOPHILIZED, FOR SOLUTION INTRAVENOUS at 04:12

## 2019-10-13 RX ADMIN — SIMVASTATIN 20 MG: 20 TABLET, FILM COATED ORAL at 21:09

## 2019-10-13 RX ADMIN — ESCITALOPRAM OXALATE 20 MG: 20 TABLET ORAL at 10:11

## 2019-10-13 RX ADMIN — PIPERACILLIN SODIUM AND TAZOBACTAM SODIUM 4.5 G: 4; .5 INJECTION, POWDER, LYOPHILIZED, FOR SOLUTION INTRAVENOUS at 10:08

## 2019-10-13 ASSESSMENT — ACTIVITIES OF DAILY LIVING (ADL)
ADLS_ACUITY_SCORE: 13

## 2019-10-13 NOTE — PLAN OF CARE
Care assumed [id number]. A&O, developmentally delayed. VSS, on O2 blow-by with oxymask- sats in 90s. Patient reported a stomach ache in the evening, relieved with tums- denies other pain. PIV left arm saline locked- administered scheduled abx. Up with sba. Urinates spontaneously. No BM this shift. Continue to monitor respiratory status and notify MD with changes.

## 2019-10-13 NOTE — PLAN OF CARE
Patient is alert and oriented, developmentally delayed with Down's syndrome. Patient denies pain and nausea. The parents are at the bedside and help with most of his cares as well as walk with him since he is standby assist. The patient was transitioned to oral antibiotics today so the patient is pretty happy with that. The Patient has been pretty quiet this shift going for walks and watching cartoons and movies in his room. Continue to monitor and notify MD of any changes.

## 2019-10-13 NOTE — PROVIDER NOTIFICATION
Paged [phone]    Pt states his stomach hurts, but is unable to describe. Is there anything that can be ordered to relieve his discomfort?

## 2019-10-13 NOTE — PROGRESS NOTES
[facility], [place]    Progress Note - Aries 3 Service        Date of Admission:  10/10/2019    Assessment & Plan     This is a 25 yo male with h/o Down Syndrome and chronic diarrhea recently admitted 10/4-7 with CAP treated with azithro/cefdinir at discharge who initially was doing well, but then decompensated over the last 24 hours with fevers found to have diffuse infiltrates on chest imaging and elevated WBC. Concern for pneumonia as the source of his sepsis, no parapneumonic effusion/empyema.  Clinically improved this morning.  No increased work of breathing and on room air.     Changes today:    - discontinue zosyn   - Start levofloxacin   - C.diff: neg   - influenza swab: neg    # Sepsis 2/2 presumed pneumonia  # Acute hypoxic respiratory failure, resolved  # Mild hemoptysis in setting of mild epistaxis this am (patient actively picking nose)  Patient presents after recent admission for URI (10/4 - 10/7) s/p 6-day course of azithromycin and ceftriaxone/cefdinir. Did well after discharge, until day before admission when he had some behavioral changes and pink-tinged sputum concerning for hemoptysis.   Febrile on presentation with leukocytosis and chest CT showing tree-in-bud infiltrates.  No evidence of parapneumonic effusion.  At this point the differential is either inadequate coverage with previous antibiotics that we did improve to seems less likely.  Versus the development of a secondary pneumonia.  If this were the case would be concerned about H CAP given his recent hospitalization.  Must also consider fungal causes if the patient does not improve.  This point he is on room air.  Hemoptysis initially concern for but this seems to be more related to epistaxis.  MRSA nasal swab negative. Alternative sources unlikely with unremarkable UA and lack of ongoing watery diarrhea.   - discontinue zosyn. Start levofloxacin (10/13 - 10/17).    - Influenza swab: neg  - spot check  oximetry, supplemental oximetry with blow by as needed  - sputum culture: pending  - follow blood cultures: remain NGTD  - Repeat blood cultures if spikes  - Tylenol 650 mg Q4H PRN for fever     # Diarrhea  Pt w/hx of chronic diarrhea, with reported increase in number of stools per day, along w/an episode of bowel incontinence prior to admission. Just one soft stool since admission, seems less likely c.diff by history. May also be medication side effect of recent abx use.  - stool PCR for C. Diff: neg  - Continue home probiotic, hold home loperamide   - Consider imodium, if needed    # Asymptomatic bacteriuria  Urinalysis neg, urine culture +enterococcuc faecium (10,000 - 50,000 colonies/mL)  - No need to treat now as pt is asymptomatic      # Change in behavior, resolved back to baseline  Parents reported change in behavior, more talkative/exciteable than usual, possibly related to anxiety. Likely related to fever  - Monitor     # Prediabetes  - Hold home metformin     # HLD  - Continue home simvastatin     # Hypothyroidism  - Continue home levothyroxine        Diet: Combination Diet Regular Diet Adult    Fluids: stop mIVF  Lines: PIV  DVT Prophylaxis: Ambulate every shift  [place] Catheter: not present  Code Status: Full Code          Disposition Plan     Expected discharge: 1 days, recommended to prior living arrangement once antibiotic plan established and SIRS/Sepsis treated.  Entered: Gloria Herrmann MD 10/13/2019, 7:44 AM       The patient's care was discussed with the Attending Physician, Dr. Herr.    Gloria Herrmann MD  [address], [place]  Pager: [phone]  Please see sticky note for cross cover information  ______________________________________________________________________    Interval History   No acute events overnight. Required oxymask blow-by last night but has been on room air since that time. Discussed patient with nursing staff.  Hany states he has no pain or  dysuria. He appears to be breathing comfortably on room air. He is playing with a new stuffed animal and is answering simple questions. Parents are in room and have no questions or concerns at this time.     Data reviewed today: I reviewed all medications, new labs and imaging results over the last 24 hours.     Physical Exam   Vital Signs: Temp: 98.3  F (36.8  C) Temp src: Oral BP: 127/64 Pulse: 72   Resp: 20 SpO2: 91 % O2 Device: None (Room air)    Weight: 259 lbs 6.4 oz    Exam:  General: the patient is pleasant, watching television, resting comfortably, no acute distress.   HEENT: down facies, atraumatic.   Lungs: No increased work of breathing. Symmetric air movement, lungs largely clear with some transmitted upper airway movement.   CV: RRR, nl s1 and s2, no m/r/g.   Abdomen: Obese but soft, nondistended, nontender. No rebound or guarding. Bowel sounds active.  Extremities: No lower extremity edema.   Skin: no appreciable skin lesions/rashes on exposed skin.   Neuro: developmental delay, but interactive and answers simple questions.    Data   Recent Labs   Lab 10/12/19  0701 10/10/19  1350   WBC 12.2* 16.7*   HGB 11.6* 13.3   MCV 92 91    324    137   POTASSIUM 4.0 4.4   CHLORIDE 111* 106   CO2 26 24   BUN 7 15   CR 0.63* 0.80   ANIONGAP 7 6   JUAN 8.4* 8.8   GLC 96 97   ALBUMIN  --  2.9*   PROTTOTAL  --  7.1   BILITOTAL  --  0.2   ALKPHOS  --  60   ALT  --  33   AST  --  26

## 2019-10-14 VITALS
SYSTOLIC BLOOD PRESSURE: 121 MMHG | WEIGHT: 256.1 LBS | HEART RATE: 72 BPM | RESPIRATION RATE: 18 BRPM | OXYGEN SATURATION: 93 % | DIASTOLIC BLOOD PRESSURE: 59 MMHG | BODY MASS INDEX: 46.07 KG/M2 | TEMPERATURE: 97.7 F

## 2019-10-14 LAB — DEPRECATED CALCIDIOL+CALCIFEROL SERPL-MC: 39 UG/L (ref 20–75)

## 2019-10-14 PROCEDURE — 99238 HOSP IP/OBS DSCHRG MGMT 30/<: CPT | Mod: GC | Performed by: PEDIATRICS

## 2019-10-14 PROCEDURE — 25000132 ZZH RX MED GY IP 250 OP 250 PS 637: Mod: GY | Performed by: STUDENT IN AN ORGANIZED HEALTH CARE EDUCATION/TRAINING PROGRAM

## 2019-10-14 RX ORDER — LEVOFLOXACIN 750 MG/1
750 TABLET, FILM COATED ORAL DAILY
Qty: 4 TABLET | Refills: 0 | Status: SHIPPED | OUTPATIENT
Start: 2019-10-14 | End: 2020-01-02

## 2019-10-14 RX ADMIN — Medication 1 CAPSULE: at 09:20

## 2019-10-14 RX ADMIN — LEVOTHYROXINE SODIUM 112 MCG: 112 TABLET ORAL at 09:21

## 2019-10-14 RX ADMIN — ESCITALOPRAM OXALATE 20 MG: 20 TABLET ORAL at 09:20

## 2019-10-14 ASSESSMENT — ACTIVITIES OF DAILY LIVING (ADL)
ADLS_ACUITY_SCORE: 13

## 2019-10-14 NOTE — PLAN OF CARE
Patient is alert and oriented, developmentally delayed with Down's syndrome. RP Father and Mother present at bedside. Patient Denies pain and VSS no respiratory distress noted. Patient has discharge order to go home and Pt/ Family aware and agreeable with plan. Writer reviewed discharge instructions with Patient/family, and instruct to follow up with primary care provider . Family verbalized understanding. Patient left the unit via wheelchair accompanied by family at ~ 10:55am. Patient family took all belonging with.

## 2019-10-14 NOTE — PLAN OF CARE
Care assumed [id number]. A&O x4, developmentally delayed with Down Syndrome. Pt calm and cooperative, father at bedside overnight. VSS, on room air. O2 in 90s overnight. Pt denies pain. PIV left arm saline locked. Up with sba. Regular diet, good appetite. Urinates spontaneously. No BM this shift. Continue to monitor and notify MD with changes.

## 2019-10-14 NOTE — DISCHARGE SUMMARY
[facility], [place]  Discharge Summary - Medicine & Pediatrics       Date of Admission:  10/10/2019  Date of Discharge:  10/14/2019 10:59 AM  Discharging Provider: Dr. Marylin Herr  Discharge Service: [facility] 3    Discharge Diagnoses   # Acute hypoxic respiratory failure and sepsis 2/2 pneumonia, resolved  # Mild hemoptysis in setting of epistaxis, resolved  # Acute on chronic diarrhea, due to antibiotics, resolved  # Asymptomatic bacteruria  # Change in behavior, resolved back to baseline    Chronic:  # Prediabetes  # HLD  # Hypothyroidism    Follow-ups Needed After Discharge   Follow-up Appointments     Adult [facility] Follow-up and recommended labs and tests      Follow up with primary care provider, YULISA SALGADO, within 5 days for   hospital follow- up.  No follow up labs or test are needed.    Discuss sleep study and ENT follow up with your doctor for possible sleep   apnea.    Appointments on [place] and/or [facility] (with [facility] or [facility]   provider or service). Call [phone] if you haven't heard regarding   these appointments within 7 days of discharge.             Unresulted Labs Ordered in the Past 30 Days of this Admission     Date and Time Order Name Status Description    10/12/2019 0001 Testosterone Free and Total In process     10/10/2019 1152 Blood culture Preliminary       These results will be followed up by PCP.    Discharge Disposition   Discharged to home.  Condition at discharge: Stable.    Hospital Course   aHny Patel was admitted on 10/10/2019. He has a PMH of Down's Syndrome and chronic diarrhea, recently admitted from 10/4-10/7/19 with CAP treated with azithro/cefdinir at discharge. He presented on 10/10 with new fever and concern for one episode of hemoptysis. The following problems were addressed during his hospitalization:    # Acute hypoxic respiratory failure and sepsis 2/2 pneumonia, resolved  # Mild hemoptysis in setting of epistaxis,  resolved  Patient presented after recent admission for rhinovirus with superimposed pneumonia (10/4 - 10/7), s/p 6-day course of azithromycin and ceftriaxone/cefdinir. Did well after discharge, until day before admission when he had new fever and pink-tinged sputum concerning for hemoptysis. Febrile on presentation with leukocytosis with CXR showing stable to mildly worsened basilar predominant opacities that may represent infection vs pulmonary edema vs diffuse alveolar damage. CT chest showed diffuse groundglass and consolidative attenuation with associated tree-in-bud attentuation, consistent with infection. No evidence of parapneumonic effusion. Influenza negative. MRSA nares negative.  He was started on vancomycin and Zosyn empirically. It is felt most likely he was either inadequately treated in terms of duration or pathogen coverage, or he developed a new pneumonia. Given recent admission, we opted to treat with pseudomonal coverage. Hemoptysis did not occur during his admission. He was noted to be picking his nose and developed epistaxis, which is likely the cause of hemoptysis prior to admission. He was transitioned to levofloxacin, with plan for total 7 days of treatment.      # Acute on chronic diarrhea  He has history of chronic diarrhea, with reported increase in number of stools per day due to antibiotic use. Diarrhea improved throughout admission. C.diff negative. Recommend continuing home probiotic and may use Imodium if needed.      # Asymptomatic bacteriuria  Urinalysis negatve, urine culture +enterococcus faecium (10,000 - 50,000 colonies/mL). No dysuria, frequency, or urgency. Fevers improved without targeted treatment for E.faecium.     Of note, fasting labs were drawn at the request of parents due to difficult draws outpatient. These labs will be faxed to [facility] to Dr. Oziel Perry of endocrinology when they have resulted.        Consultations This Hospital Stay   VASCULAR ACCESS CARE ADULT  IP CONSULT  PHARMACY TO DOSE VANCO  VASCULAR ACCESS CARE ADULT IP CONSULT  MEDICATION HISTORY IP PHARMACY CONSULT    Code Status   Full Code       The patient was discussed with Dr. Marylin Herr.    Tori Bonner MD  PGY - 4  Internal Medicine/Pediatrics  Pager [phone]    ______________________________________________________________________    Physical Exam   Vital Signs: Temp: 97.7  F (36.5  C) Temp src: Oral BP: 121/59 Pulse: 72   Resp: 18 SpO2: 93 % O2 Device: None (Room air)    Weight: 256 lbs 1.6 oz    Physical Exam  General: awake, alert, in no acute distress  HEENT: NCAT, EOMI, sclera anicteric, no nasal discharge, MMM  CV: RRR, no murmurs noted  Resp: CTAB, no increased WOB  Abd: Soft, nontender, nondistended, +BS, no rebound or guarding  MSK: No peripheral edema, extremities warm and well perfused  Skin: warm, dry, no jaundice  Neuro: Answering questions appropriately.        Primary Care Physician   YULISA SALGADO    Discharge Orders      Reason for your hospital stay    You were admitted to the hospital for fever and concern for blood in your sputum, being treated for hospital-acquired pneumonia.     Adult [facility] Follow-up and recommended labs and tests    Follow up with primary care provider, YULISA SALGADO, within 5 days for hospital follow- up.  No follow up labs or test are needed.    Discuss sleep study and ENT follow up with your doctor for possible sleep apnea.    Appointments on [place] and/or [facility] (with [facility] or [facility] provider or service). Call [phone] if you haven't heard regarding these appointments within 7 days of discharge.     Activity    Your activity upon discharge: activity as tolerated     When to contact your care team    Call your primary doctor if you have any of the following: fever, chills, worsening cough or breathing, blood in your sputum     Discharge Instructions    1. Levofloxacin for 4 more days. First dose this afternoon at 2PM.  2. See your  primary care doctor within 5 days for follow up. Talk about sleep study and ENT for sleep apnea.  3. Labs for Dr. Perry done. Vitamin D and testosterone levels are still pending.     Full Code     Diet    Follow this diet upon discharge: Orders Placed This Encounter      Combination Diet Regular Diet Adult       Significant Results and Procedures   Most Recent 3 CBC's:  Recent Labs   Lab Test 10/12/19  0701 10/10/19  1350 10/05/19  0853   WBC 12.2* 16.7* 10.3   HGB 11.6* 13.3 13.2*   MCV 92 91 92    324 265     Most Recent 3 BMP's:  Recent Labs   Lab Test 10/13/19  0712 10/12/19  0701 10/10/19  1350 10/05/19  0853   NA  --  143 137 139   POTASSIUM  --  4.0 4.4 3.8   CHLORIDE  --  111* 106 108   CO2  --  26 24 24   BUN  --  7 15 9   CR 0.65* 0.63* 0.80 0.63*   ANIONGAP  --  7 6 7   JUAN  --  8.4* 8.8 8.4*   GLC  --  96 97 96     Most Recent 2 LFT's:  Recent Labs   Lab Test 10/10/19  1350 10/04/19  1356   AST 26 13   ALT 33 28   ALKPHOS 60 57   BILITOTAL 0.2 0.5     Most Recent 6 Bacteria Isolates From Any Culture (See EPIC Reports for Culture Details):  Recent Labs   Lab Test 10/11/19  1615 10/10/19  1352 10/10/19  1350 10/05/19  1036 02/19/17  1355 02/15/17  0400   CULT Moderate growth  Candida albicans / dubliniensis  Candida albicans and Candida dubliniensis are not routinely speciated  Susceptibility testing not routinely done  * 10,000 to 50,000 colonies/mL  Enterococcus faecium  *  <10,000 colonies/mL  mixed urogenital radha  Susceptibility testing not routinely done   No growth after 4 days Moderate growth  Staphylococcus aureus  * No growth No growth     Most Recent TSH and T4:  Recent Labs   Lab Test 10/12/19  0701   TSH 1.65   T4 0.85     Most Recent Hemoglobin A1c:  Recent Labs   Lab Test 10/12/19  0701   A1C 5.4     Most Recent 6 glucoses:  Recent Labs   Lab Test 10/12/19  0701 10/10/19  1350 10/05/19  0853 10/04/19  1356 04/11/19  0930 07/10/17  0847   GLC 96 97 96 116* 82 84     Most Recent  Urinalysis:  Recent Labs   Lab Test 10/10/19  1352  06/05/15  1500   COLOR Light Yellow   < > Yellow   APPEARANCE Clear   < > Clear   URINEGLC Negative   < > Negative   URINEBILI Negative   < > Negative   URINEKETONE Negative   < > Negative   SG 1.004   < > <=1.005   UBLD Negative   < > Negative   URINEPH 7.0   < > 6.5   PROTEIN Negative   < > Negative   UROBILINOGEN  --   --  0.2   NITRITE Negative   < > Negative   LEUKEST Negative   < > Small*   RBCU 0   < > O - 2   WBCU <1   < > O - 2    < > = values in this interval not displayed.   ,   Results for orders placed or performed during the hospital encounter of 10/10/19   XR Chest Port 1 View    Narrative    EXAM: XR CHEST PORT 1 VW  10/10/2019 2:53 PM     HISTORY: Hemoptysis, recent admission for pneumonia.    COMPARISON: 10/7/2019    FINDINGS:    The trachea is midline. The cardiomediastinal silhouette and pulmonary  vasculature are indistinct. Ill-defined bilateral perihilar and  peripheral airspace opacity complete silhouetting of the left heart  border. Low lung volumes.         Impression    IMPRESSION:   Stable to mildly worsened basilar predominant opacities may represent  infection, pulmonary edema and/or diffuse alveolar damage.    I have personally reviewed the examination and initial interpretation  and I agree with the findings.    LINDEN DAY MD   CT Chest w/o Contrast    Narrative    Exam Type: CT CHEST W/O CONTRAST  Date and Time: 10/10/2019 8:46 PM  History: Pneumonia, unresolved or complicated  Comparison: 10/10/2019    Procedure:   Helical CT images were obtained of the chest without IV contrast.    Radiation Dose (DLP): 488 mGy*cm    FINDINGS:    CHEST:  AIRWAYS: Central bronchial wall thickening. Patent central airways.  LUNG: Diffuse patchy groundglass and consolidative attenuation  particularly affecting the right middle lobe, inferior lingula, left  base. Intermixed tree-in-bud attenuation.  PLEURA: Within normal limits.  VESSELS: Within  normal limits.  HEART: Within normal limits.  MEDIASTINUM AND YOSI: Mild pericardial effusion. Mediastinal, hilar  adenopathy.  CHEST WALL AND LOWER NECK: Within normal limits.    UPPER ABDOMEN:  Limited evaluation of the upper abdomen demonstrates no acute  parenchymal abnormalities, nonobstructive bowel gas pattern, and no  free fluid or free air.       Impression    IMPRESSION:  Diffuse groundglass and consolidative attenuation with associated  tree-in-bud attenuation, most consistent with infection.    I have personally reviewed the examination and initial interpretation  and I agree with the findings.    TYSON TAYLOR MD       Discharge Medications   Discharge Medication List as of 10/14/2019 10:39 AM      START taking these medications    Details   levofloxacin (LEVAQUIN) 750 MG tablet Take 1 tablet (750 mg) by mouth daily for 4 doses, Disp-4 tablet, R-0, E-PrescribeNext due 1400 on 10/14         CONTINUE these medications which have NOT CHANGED    Details   albuterol (PROAIR HFA/PROVENTIL HFA/VENTOLIN HFA) 108 (90 Base) MCG/ACT inhaler Inhale 2 puffs into the lungs every 4 hours as needed for shortness of breath / dyspnea or wheezing, Disp-1 Inhaler, R-0, E-PrescribePharmacy may dispense brand covered by insurance (Proair, or proventil or ventolin or generic albuterol inhaler)      Ascorbic Acid (VITAMIN C) 500 MG CHEW Take 500 mg by mouth daily , Historical      Calcium Carbonate-Vitamin D (CALCIUM + D PO) Take 1 tablet by mouth daily , Historical      escitalopram (LEXAPRO) 20 MG tablet Take 1 tablet (20 mg) by mouth daily, Disp-90 tablet, R-1, E-Prescribe      fish oil-omega-3 fatty acids 1000 MG capsule Take 1 g by mouth daily, Historical      ivermectin (SOOLANTRA) 1 % cream Apply topically daily as needed (for flares)Historical      ketoconazole (NIZORAL) 2 % shampoo Apply to the affected area and wash off after 5 minutes.Disp-120 mL, [id number]      ketotifen (ZADITOR) 0.025 % SOLN ophthalmic  solution Place 1 drop into both eyes every 12 hours, Disp-1 Bottle, R-3, E-Prescribe      Lactobacillus Acid-Pectin (LACTOBACILLUS ACIDOPHILUS) TABS Take 1 tablet by mouth daily , Historical      levothyroxine (SYNTHROID, LEVOTHROID) 112 MCG tablet Take 112 mcg by mouth daily., 112 mcg, Oral, DAILY, Until Discontinued, Historical      loperamide (IMODIUM) 2 MG capsule Take 2 mg by mouth daily (with breakfast) Take two tablets with breakfast, Historical      metFORMIN (GLUCOPHAGE) 1000 MG tablet Take 1,000 mg by mouth 2 times daily (with meals)., 1,000 mg, Oral, 2 TIMES DAILY WITH MEALS, Until Discontinued, Historical      Multiple Vitamin (MULTI-VITAMIN) per tablet Take 1 tablet by mouth daily. With D3, 1 tablet, Oral, DAILY, Until Discontinued, Historical      simvastatin (ZOCOR) 20 MG tablet Take 1 tablet (20 mg) by mouth At Bedtime, Historical      vitamin  B complex with vitamin C (VITAMIN  B COMPLEX) TABS Take 1 tablet by mouth daily, Historical      metroNIDAZOLE (METROGEL) 0.75 % topical gel Apply topically 2 times dailyDisp-45 g, [id number]           Allergies   Allergies   Allergen Reactions     Seasonal Allergies

## 2019-10-15 ENCOUNTER — TELEPHONE (OUTPATIENT)
Dept: FAMILY MEDICINE | Facility: CLINIC | Age: 24
End: 2019-10-15

## 2019-10-15 ENCOUNTER — PATIENT OUTREACH (OUTPATIENT)
Dept: CARE COORDINATION | Facility: CLINIC | Age: 24
End: 2019-10-15

## 2019-10-15 NOTE — TELEPHONE ENCOUNTER
ED / Discharge Outreach Protocol    Patient Contact    Attempt # 1    Was call answered?  No.  Left message on voicemail with information to call back on the RN Line.    Rubens Disla RN

## 2019-10-15 NOTE — TELEPHONE ENCOUNTER
"ED/Discharge Protocol    \"Hi, my name is Rubens Amaya RN, a registered nurse, and I am calling on behalf of Nelson Song 's office at [facility].  I am calling to follow up and see how things are going for you after your recent visit.\"    \"I see that you were in the (ER/UC/IP) on October 14th  How are you doing now that you are home?\" good    Is patient experiencing symptoms that may require a hospital visit?  no    Discharge Instructions    \"Let's review your discharge instructions.  What is/are the follow-up recommendations?  Pt. Response: Hany is going to a specialist at [facility] and needs to see Nelson for a follow up.    \"Were you instructed to make a follow-up appointment?\"  Pt. Response: Yes.  Has appointment been made?   No.  \"Can I help you schedule that appointment?\" yes. Appointment scheduled.      \"When you see the provider, I would recommend that you bring your discharge instructions with you.    Medications    \"How many new medications are you on since your hospitalization/ED visit?\"    0-1  \"How many of your current medicines changed (dose, timing, name, etc.) while you were in the hospital/ED visit?\"   0-1  \"Do you have questions about your medications?\"   No  \"Were you newly diagnosed with heart failure, COPD, diabetes or did you have a heart attack?\"   No  For patients on insulin: \"Did you start on insulin in the hospital or did you have your insulin dose changed?\"   No  Post Discharge Medication Reconciliation Status: discharge medications reconciled, continue medications without change.    Was MTM referral placed (*Make sure to put transitions as reason for referral)?   No    Call Summary    \"Do you have any questions or concerns about your condition or care plan at the moment?\"    No  Triage nurse advice given: Follow up with     Patient was in ER 2 in the past year (assess appropriateness of ER visits.)      \"If you have questions or things don't continue to improve, we encourage you contact us " "through the main clinic number,  [phone]  .  Even if the clinic is not open, triage nurses are available 24/7 to help you.     We would like you to know that our clinic has extended hours (provide information).  We also have urgent care (provide details on closest location and hours/contact info)\"      \"Thank you for your time and take care!\"        "

## 2019-10-15 NOTE — PROGRESS NOTES
Clinic Care Coordination Contact  [facility]/Voicemail       Clinical Data: Care Coordinator Outreach.  Patient discharged home 10/14/2019, diagnosis of pneumonia.    Outreach attempted x 1.  Left message on mother/guardian  voicemail with call back information and requested return call.  Plan:  Care Coordinator will try to reach patient again in 1-2 business days.    PALMA Cash, MSW   [facility]   [phone]  10/15/2019 12:14 PM

## 2019-10-15 NOTE — TELEPHONE ENCOUNTER
This patient was discharged from [facility] on MON 14-OCT-2019.    Discharge Diagnosis: Pneumonia Due To Infectious Organism, Unspecified Laterality, Unspecified Part Of Lung, Hcap (Healthcare-Associated    Follow-up instructions: 5 days    A follow-up visit has not been scheduled.      Number of ED/ER visits in the last 12 months:  2     Please follow-up with patient.    Thank you,  Diane QUINTANILLA  [facility]  Team Lynette Coordinator

## 2019-10-15 NOTE — TELEPHONE ENCOUNTER
Daughter, Marcella, called back on RN VM.  Please call her at [phone].     Devon Rodgers RN

## 2019-10-16 LAB
BACTERIA SPEC CULT: NO GROWTH
Lab: NORMAL
SPECIMEN SOURCE: NORMAL

## 2019-10-16 NOTE — PROGRESS NOTES
Clinic Care Coordination Contact  [facility]/Voicemail       Clinical Data: Care Coordinator Outreach.  Patient's mother Marcella left return message on 10/15/2019.  Per this message, Patient is scheduled to see another provider as Patient's provider is not in the office.  She reports they have a follow up ENT visit for Patient.      Outreach attempted x 2.  Received return voice mail after initial call.  Left message on patient's voicemail with call back information and requested return call.    Plan:  Care Coordinator will try to reach patient again in 3-5 business days.    PALMA Cash, NEERAJ   [facility]   [phone]  10/16/2019 3:53 PM    Patient's mother left return message on 10/17/2019 late afternoon.  She reports it is better to reach her by e-mail or through [facility] for Patient.  She reports that she and Patient's father's (both guardians of Patient) priority is to find Patient a behavioral health provider who has knowledge of Down's Syndrome ad autism spectrum to manage Patient's medications, even his mood.  Resources haven given but they are limited as Patient has agreed out of many of the systems that provide these resources.     will send [facility]o message to this effect.     PALMA Cash, NEERAJ   [facility]   [phone]  10/18/2019 2:16 PM

## 2019-10-18 ENCOUNTER — PATIENT OUTREACH (OUTPATIENT)
Dept: CARE COORDINATION | Facility: CLINIC | Age: 24
End: 2019-10-18

## 2019-10-18 NOTE — PROGRESS NOTES
Clinic Care Coordination Contact  Care Team Conversations    SW sent [employer] message to Patient's mother/guardian per her preference for this or e-mail, e-mail is not secure.      My Chart message sent to Patient's mother:    Yaquelin Naranjo,     I am sorry that I did not know your preference for this communication, I do now and will use this going forward.       I have reviewed your son's chart and recalled discussing this need with Nelson Song in the past.  It seems that many of the resources available would not meet his needs or did not have the specialty you had requested.  I understand that Hany is not a candidate for many resources as he is over 18.       Please discuss this again with the provider you are seeing in the near future . It looks like you have an appointment at the clinic on 10/22/2019.  I am uncertain if this has been done, but sometimes referral to Behavioral Health Providers (BHP) can assist with finding unique resources.       I wish you luck at your upcoming appointment.  I will route this note to your care team to ensure this is discussed at upcoming visit.     Plan: 1) YARI will follow up in 3-5 business days     Belinda Garcia, PALMA, MSW   [facility]   [phone]  10/18/2019 2:27 PM

## 2019-10-22 ENCOUNTER — OFFICE VISIT (OUTPATIENT)
Dept: FAMILY MEDICINE | Facility: CLINIC | Age: 24
End: 2019-10-22
Payer: MEDICARE

## 2019-10-22 VITALS
DIASTOLIC BLOOD PRESSURE: 56 MMHG | WEIGHT: 247 LBS | BODY MASS INDEX: 45.45 KG/M2 | HEART RATE: 100 BPM | HEIGHT: 62 IN | SYSTOLIC BLOOD PRESSURE: 88 MMHG | TEMPERATURE: 98.5 F | OXYGEN SATURATION: 96 %

## 2019-10-22 DIAGNOSIS — E66.01 MORBID OBESITY (H): ICD-10-CM

## 2019-10-22 DIAGNOSIS — R19.7 DIARRHEA, UNSPECIFIED TYPE: ICD-10-CM

## 2019-10-22 DIAGNOSIS — J96.01 ACUTE RESPIRATORY FAILURE WITH HYPOXIA (H): Primary | ICD-10-CM

## 2019-10-22 DIAGNOSIS — Q90.9 DOWN'S SYNDROME: ICD-10-CM

## 2019-10-22 DIAGNOSIS — J18.9 PNEUMONIA DUE TO INFECTIOUS ORGANISM, UNSPECIFIED LATERALITY, UNSPECIFIED PART OF LUNG: ICD-10-CM

## 2019-10-22 PROCEDURE — 99495 TRANSJ CARE MGMT MOD F2F 14D: CPT | Performed by: PHYSICIAN ASSISTANT

## 2019-10-22 ASSESSMENT — MIFFLIN-ST. JEOR: SCORE: 1983.6

## 2019-10-22 NOTE — PROGRESS NOTES
Subjective     Hany Patel is a 24 year old male who presents to clinic today for the following health issues:    HPI     Hospital Follow-up Visit:    Hospital/Nursing Home/IP Rehab Facility: [facility]  Date of Admission: 10/4/2019, 10/10/2019  Date of Discharge: 10/7/2019, 10/14/2019  Reason(s) for Admission: pneumonia             Problems taking medications regularly:  None       Medication changes since discharge: None       Problems adhering to non-medication therapy:  None    Hospital Course     Hany Patel was admitted on 10/10/2019. He has a PMH of Down's Syndrome and chronic diarrhea, recently admitted from 10/4-10/7/19 with CAP treated with azithro/cefdinir at discharge. He presented on 10/10 with new fever and concern for one episode of hemoptysis. The following problems were addressed during his hospitalization:     # Acute hypoxic respiratory failure and sepsis 2/2 pneumonia, resolved  # Mild hemoptysis in setting of epistaxis, resolved  Patient presented after recent admission for rhinovirus with superimposed pneumonia (10/4 - 10/7), s/p 6-day course of azithromycin and ceftriaxone/cefdinir. Did well after discharge, until day before admission when he had new fever and pink-tinged sputum concerning for hemoptysis. Febrile on presentation with leukocytosis with CXR showing stable to mildly worsened basilar predominant opacities that may represent infection vs pulmonary edema vs diffuse alveolar damage. CT chest showed diffuse groundglass and consolidative attenuation with associated tree-in-bud attentuation, consistent with infection. No evidence of parapneumonic effusion. Influenza negative. MRSA nares negative.  He was started on vancomycin and Zosyn empirically. It is felt most likely he was either inadequately treated in terms of duration or pathogen coverage, or he developed a new pneumonia. Given recent admission, we opted to treat with pseudomonal coverage. Hemoptysis did  not occur during his admission. He was noted to be picking his nose and developed epistaxis, which is likely the cause of hemoptysis prior to admission. He was transitioned to levofloxacin, with plan for total 7 days of treatment.      # Acute on chronic diarrhea  He has history of chronic diarrhea, with reported increase in number of stools per day due to antibiotic use. Diarrhea improved throughout admission. C.diff negative. Recommend continuing home probiotic and may use Imodium if needed.      # Asymptomatic bacteriuria  Urinalysis negatve, urine culture +enterococcus faecium (10,000 - 50,000 colonies/mL). No dysuria, frequency, or urgency. Fevers improved without targeted treatment for E.faecium.     Summary of hospitalization:  [facility] discharge summary reviewed  Diagnostic Tests/Treatments reviewed.  Follow up needed: none  Other Healthcare Providers Involved in Patient s Care:         None  Update since discharge: improved.     Post Discharge Medication Reconciliation: discharge medications reconciled, continue medications without change.  Plan of care communicated with patient     Coding guidelines for this visit:  Type of Medical   Decision Making Face-to-Face Visit       within 7 Days of discharge Face-to-Face Visit        within 14 days of discharge   Moderate Complexity 56045 [id number]   High Complexity 58456 17907              Patient Active Problem List   Diagnosis     Down's syndrome     Hypothyroidism     Pre-diabetes     Chronic diarrhea     CARDIOVASCULAR SCREENING; LDL GOAL LESS THAN 130     Pervasive developmental disorder     Cystic acne vulgaris     Hypoxia     Morbid obesity (H)     Pneumonia     Acute respiratory failure (H)     Past Surgical History:   Procedure Laterality Date     INSERT CHEST TUBE Left 2/7/2017    Procedure: INSERT CHEST TUBE;  Surgeon: Coco Zambrano MD;  Location: [facility]     SURGICAL HISTORY OF -       Ear tubes      SURGICAL HISTORY OF -       Adenoid  "removal        Social History     Tobacco Use     Smoking status: Never Smoker     Smokeless tobacco: Never Used   Substance Use Topics     Alcohol use: No     Family History   Problem Relation Age of Onset     Breast Cancer Mother      Hypertension Father            Reviewed and updated as needed this visit by Provider         Review of Systems   ROS COMP: Constitutional, HEENT, cardiovascular, pulmonary, gi and gu systems are negative, except as otherwise noted.      Objective    BP (!) 88/56   Pulse 100   Temp 98.5  F (36.9  C) (Oral)   Ht 1.581 m (5' 2.25\")   Wt 112 kg (247 lb)   SpO2 96%   BMI 44.81 kg/m    Body mass index is 44.81 kg/m .  Physical Exam   GENERAL: healthy, alert and no distress  HENT: ear canals and TM's normal, nose and mouth without ulcers or lesions  NECK: no adenopathy, no asymmetry, masses, or scars and thyroid normal to palpation  RESP: lungs clear to auscultation - no rales, rhonchi or wheezes  CV: regular rate and rhythm, normal S1 S2, no S3 or S4, no murmur, click or rub, no peripheral edema and peripheral pulses strong  MS: no gross musculoskeletal defects noted, no edema    Diagnostic Test Results:  Labs reviewed in Epic        Assessment & Plan     (J96.01) Acute respiratory failure with hypoxia (H)  (primary encounter diagnosis)  Comment:   Plan: Lungs clear today. Exam reassuring. Monitoring reviewed.     (J18.9) Pneumonia due to infectious organism, unspecified laterality, unspecified part of lung  Comment:   Plan: Asymptomatic   They will monitor. No concerns on exam. No fevers. Trial adding NAC oral 600 mg twice daily for mucus thinning.     (Q90.9) Down's syndrome  Comment:   Plan: Ongoing  Challenges for mom and dad due to his obstinate behavior and some of his tension / anxiety issues. They do see Endocrine at [facility] - he lives at home, goes to his day program, still gets irritable and pushes back, sometimes refuses care. He is always pleasant " here, but he apparently will bother other members of the day program. Mom did contact the Down Syndrome society - they will be seeing a physician in [place] that has some training in managing down. Follow up as needed.    (E66.01) Morbid obesity (H)  Comment:   Plan: Counseling     (R19.7) Diarrhea, unspecified type  Comment:   Plan: Resolved      Liu Song, DICK, PA-C

## 2019-10-22 NOTE — PATIENT INSTRUCTIONS
1. Could try NAC (N-Acetyl Cysteine) 600 mg twice a day - [employer], [employer] / [employer] - can try for mood / anxiety

## 2019-10-24 ENCOUNTER — TRANSFERRED RECORDS (OUTPATIENT)
Dept: HEALTH INFORMATION MANAGEMENT | Facility: CLINIC | Age: 24
End: 2019-10-24

## 2019-10-25 ENCOUNTER — MYC MEDICAL ADVICE (OUTPATIENT)
Dept: CARE COORDINATION | Facility: CLINIC | Age: 24
End: 2019-10-25

## 2019-10-25 NOTE — PROGRESS NOTES
Clinic Care Coordination Contact  Care Team Conversations    SW sent second [employer] message to Patient's mother as she noted this is her preference for communication.  See [employer] message below:     Yaquelin Naranjo,     I am just checking in to see you things are going for Hany.      Please respond as able.      Plan: 1) SW will check in with Patient's mother in 3-5 business days if no return response     Belinda Garcia, PALMA, MSW   [facility]   [phone]  10/25/2019 3:43 PM

## 2019-10-28 ENCOUNTER — PATIENT OUTREACH (OUTPATIENT)
Dept: CARE COORDINATION | Facility: CLINIC | Age: 24
End: 2019-10-28

## 2019-10-28 NOTE — PROGRESS NOTES
Scheduled Follow Up Call: Attempt 1 Community Health Worker called and left a message for the patient. If the patient is returning my call, please transfer the patient to [facility] at [phone]  No goals were touched at this time

## 2019-10-29 ENCOUNTER — PATIENT OUTREACH (OUTPATIENT)
Dept: CARE COORDINATION | Facility: CLINIC | Age: 24
End: 2019-10-29

## 2019-10-29 NOTE — TELEPHONE ENCOUNTER
Clinic Care Coordination Contact  Care Team Conversations    Patient's mother responded to YARI's MyChart message.  She left message yesterday requesting return call but also mentioned SW could send a MyChart message.  She recently reported that MyChart was preferred method.  SW sent another message to discuss with Patient's mother/guardian.  Please see media for this paperwork.      Plan: 1) YARI will respond to Patient's mother in 1 week if no return call/message     Belinda Garcia, PALMA, MSW   [facility]  Care Coordination  [facility]   [phone]  10/29/2019 3:18 PM

## 2019-10-29 NOTE — PROGRESS NOTES
Clinic Care Coordination Contact  Care Team Conversations     Patient's mother responded to YARI's MyChart message.  She left message yesterday requesting return call but also mentioned SW could send a MyChart message.  She recently reported that MyChart was preferred method.  SW sent another message to discuss with Patient's mother/guardian.  Please see media for this paperwork.       Plan: 1) YARI will respond to Patient's mother in 1 week if no return call/message      Belinda Garcia, JUANJOW, MSW   [facility]  Care Coordination  [facility]   [phone]  10/29/2019 3:18 PM    YARI has been in contact with Patient's guardian/mother Marcella to discuss Patient needs for other residential placement, supportive work program and medication management needs,, their request for this professional to have experience with autism spectrum and/or Down's Syndrome.  YARI will delegate these tasks to CHW.    Goal Statement: Patient/family will work with Community Health Worker to find medication  with specialized focus/experience with Down's Syndrome or autism spectrum   Date goal set:   Measure of Success: Patient/family will work with Community Health Worker to find medication  with specialized focus/experience with Down's Syndrome or autism spectrum   Barriers: Patient's family have been given numerous resources and have researched many, they are having difficulty finding this professional   Strengths: Patient's family have resource information and are researching this specific resource, however resources may not be available     Date to Achieve By: 1/1/2020  Patient expressed understanding of goal: Yes    Action steps to achieve this goal  1. Patient/family will work with CHW to find specific resource for medication management   2. CHW will assist with researching specific resource for medication management     Goal Statement: Patient/family  will work with Community Health Worker to find medication  with specialized focus/experience with Down's Syndrome or autism spectrum   Date goal set:   Measure of Success: Patient/family will work with Community Health Worker to find medication  with specialized focus/experience with Down's Syndrome or autism spectrum   Barriers: Patient's family have been given numerous resources and have researched many, they are having difficulty finding this professional   Strengths: Patient's family have resource information and are researching this specific resource, however resources may not be available   Date to Achieve By: 1/1/2020  Patient expressed understanding of goal: Yes    Action steps to achieve this goal  1. Patient/family will work with CHW to find specific resource for medication management   2. CHW will assist with researching specific resource for medication management     CHW will:  CHW Delegation:   1)  Due Date: 12/1/2019        Delegation: Please assist Patient and family with finding specific resources for residential treatment   2) Due Date: 11/12/2019    Delegation: Please assists Patient and family with finding specific resource for medication management   3) Due Date: 11/18/2019   Delegation: Please assist Patient and family with finding supportive work program for Patient       CHW will:  CHW Delegation:   1)  Due Date: 12/1/2019        Delegation: Please assist Patient and family with finding specific resources for residential treatment   2) Due Date: 11/12/2019    Delegation: Please assists Patient and family with finding specific resource for medication management   3) Due Date: 11/18/2019   Delegation: Please assist Patient and family with finding supportive work program for Patient       Belinda Garcia, PALMA, MSW   [facility]  Care Coordination  [facility]   [phone]  11/7/2019 10:02 AM

## 2019-11-07 ENCOUNTER — PATIENT OUTREACH (OUTPATIENT)
Dept: CARE COORDINATION | Facility: CLINIC | Age: 24
End: 2019-11-07

## 2019-11-07 DIAGNOSIS — E78.5 DYSLIPIDEMIA: ICD-10-CM

## 2019-11-07 DIAGNOSIS — Q90.9 DOWN'S SYNDROME: Primary | ICD-10-CM

## 2019-11-07 DIAGNOSIS — E03.9 HYPOTHYROIDISM: ICD-10-CM

## 2019-11-07 DIAGNOSIS — E88.819 INSULIN RESISTANCE: ICD-10-CM

## 2019-11-07 DIAGNOSIS — R73.02 IGT (IMPAIRED GLUCOSE TOLERANCE): ICD-10-CM

## 2019-11-07 LAB
SHBG SERPL-SCNC: 14 NMOL/L (ref 11–80)
TESTOST FREE SERPL-MCNC: 1.14 NG/DL (ref 4.7–24.4)
TESTOST SERPL-MCNC: 42 NG/DL (ref 240–950)

## 2019-11-07 NOTE — PROGRESS NOTES
Talked briefly with patients mother, she could not talk long due to an apportionment, so not much goal update at this time  . Mother expressed her son has an appointment tomorrow at [facility] in hopes itll be a good fit for behaviroal needs. She was referred there from a friend who is going through the same struggles.     Didier mom signed communication release so there can be communication through the doctors to find the best care for her son with Down syndrome and autism..     CHW will call back next week to see how the apt went. If it went poor CHW will then try to help find a behaviorist who specializes in down syndrome.     Better assessment of goals will be done at this time.    Outreach: 11/11/19  Outreach Interval: 1 month

## 2019-11-12 DIAGNOSIS — E88.819 INSULIN RESISTANCE: ICD-10-CM

## 2019-11-12 DIAGNOSIS — Q90.9 DOWN'S SYNDROME: ICD-10-CM

## 2019-11-12 DIAGNOSIS — R73.02 IMPAIRED GLUCOSE TOLERANCE: ICD-10-CM

## 2019-11-12 DIAGNOSIS — R73.02 IGT (IMPAIRED GLUCOSE TOLERANCE): ICD-10-CM

## 2019-11-12 DIAGNOSIS — R73.03 PRE-DIABETES: ICD-10-CM

## 2019-11-12 DIAGNOSIS — E03.9 HYPOTHYROIDISM: ICD-10-CM

## 2019-11-12 DIAGNOSIS — E66.01 MORBID OBESITY (H): ICD-10-CM

## 2019-11-12 DIAGNOSIS — E78.5 DYSLIPIDEMIA: ICD-10-CM

## 2019-11-12 DIAGNOSIS — E03.9 HYPOTHYROIDISM, ADULT: ICD-10-CM

## 2019-11-12 LAB
FSH SERPL-ACNC: 8.7 IU/L (ref 0.7–10.8)
HBA1C MFR BLD: 5 % (ref 0–5.6)
LH SERPL-ACNC: 7.6 IU/L (ref 1.5–9.3)
PROLACTIN SERPL-MCNC: 14 UG/L (ref 2–18)

## 2019-11-12 PROCEDURE — 84146 ASSAY OF PROLACTIN: CPT | Performed by: INTERNAL MEDICINE

## 2019-11-12 PROCEDURE — 80061 LIPID PANEL: CPT | Performed by: INTERNAL MEDICINE

## 2019-11-12 PROCEDURE — 80048 BASIC METABOLIC PNL TOTAL CA: CPT | Performed by: INTERNAL MEDICINE

## 2019-11-12 PROCEDURE — 83036 HEMOGLOBIN GLYCOSYLATED A1C: CPT | Performed by: INTERNAL MEDICINE

## 2019-11-12 PROCEDURE — 84403 ASSAY OF TOTAL TESTOSTERONE: CPT | Performed by: INTERNAL MEDICINE

## 2019-11-12 PROCEDURE — 84443 ASSAY THYROID STIM HORMONE: CPT | Performed by: INTERNAL MEDICINE

## 2019-11-12 PROCEDURE — 83001 ASSAY OF GONADOTROPIN (FSH): CPT | Performed by: INTERNAL MEDICINE

## 2019-11-12 PROCEDURE — 82306 VITAMIN D 25 HYDROXY: CPT | Performed by: INTERNAL MEDICINE

## 2019-11-12 PROCEDURE — 36415 COLL VENOUS BLD VENIPUNCTURE: CPT | Performed by: INTERNAL MEDICINE

## 2019-11-12 PROCEDURE — 84270 ASSAY OF SEX HORMONE GLOBUL: CPT | Performed by: INTERNAL MEDICINE

## 2019-11-12 PROCEDURE — 83002 ASSAY OF GONADOTROPIN (LH): CPT | Performed by: INTERNAL MEDICINE

## 2019-11-12 PROCEDURE — 84460 ALANINE AMINO (ALT) (SGPT): CPT | Performed by: INTERNAL MEDICINE

## 2019-11-13 ENCOUNTER — PATIENT OUTREACH (OUTPATIENT)
Dept: CARE COORDINATION | Facility: CLINIC | Age: 24
End: 2019-11-13

## 2019-11-13 LAB
ALT SERPL W P-5'-P-CCNC: 33 U/L (ref 0–70)
ANION GAP SERPL CALCULATED.3IONS-SCNC: 10 MMOL/L (ref 3–14)
BUN SERPL-MCNC: 13 MG/DL (ref 7–30)
CALCIUM SERPL-MCNC: 9.2 MG/DL (ref 8.5–10.1)
CHLORIDE SERPL-SCNC: 105 MMOL/L (ref 94–109)
CHOLEST SERPL-MCNC: 175 MG/DL
CO2 SERPL-SCNC: 23 MMOL/L (ref 20–32)
CREAT SERPL-MCNC: 0.67 MG/DL (ref 0.66–1.25)
DEPRECATED CALCIDIOL+CALCIFEROL SERPL-MC: 48 UG/L (ref 20–75)
GFR SERPL CREATININE-BSD FRML MDRD: >90 ML/MIN/{1.73_M2}
GLUCOSE SERPL-MCNC: 82 MG/DL (ref 70–99)
HDLC SERPL-MCNC: 43 MG/DL
LDLC SERPL CALC-MCNC: 98 MG/DL
NONHDLC SERPL-MCNC: 132 MG/DL
POTASSIUM SERPL-SCNC: 4.4 MMOL/L (ref 3.4–5.3)
SODIUM SERPL-SCNC: 138 MMOL/L (ref 133–144)
TRIGL SERPL-MCNC: 168 MG/DL
TSH SERPL DL<=0.005 MIU/L-ACNC: 2.64 MU/L (ref 0.4–4)

## 2019-11-13 NOTE — PROGRESS NOTES
"I spoke with patients mom, she stated they Haven't heard back from behavioral apt that her son attended  last week through [facility] ( Dr tsai ?)    They did not like Christomathy, taking that off the Goal  list.     They have a PCA coming in once a week for the son, which is doing good so far    Still waiting for testostone results     Mom was thinking about Looking into [facility]  group   CHW is to call back patients mom sometime next week, as we are still waiting to hear back from many doctors.    Patients mom expressed its nice to hear from us because she does not have a lot of support that actually \"understand and get what she is saying, and going through\"    CHW will look into some resources for mom to feel more supportied, if that comes up. ( [facility] or [facility] [place])   "

## 2019-11-18 LAB
SHBG SERPL-SCNC: 19 NMOL/L (ref 11–80)
TESTOST FREE SERPL-MCNC: 8.24 NG/DL (ref 4.7–24.4)
TESTOST SERPL-MCNC: 315 NG/DL (ref 240–950)

## 2019-11-21 ENCOUNTER — PATIENT OUTREACH (OUTPATIENT)
Dept: CARE COORDINATION | Facility: CLINIC | Age: 24
End: 2019-11-21

## 2019-11-21 NOTE — PROGRESS NOTES
Talked to patients mom Jad, Blood work came back normal, So that is not the cause of patients behavior.   Patient  had apt with DR. Nye in [place], still waiting to hear back form her.     Hany is liking his PCA, and getting out with him once a week. Mom really wants to get him into activities because he's expressed he is bored at home, but patient currently needs to be put on medication for his     Mother wants more time to look into [facility]   psych group     Hany needs medication for the behaviors so they can get into a day center type thing  They Looked into [facility] filled out everything and they liked it but hany need a 1 on 2 staff to patient ratio that they don't offer there    CHW called [facility] and explained our needs, Manager said she will talk to some colleges and call CHW back.   CHW called [place] [facility] and left a      Outreach: 12/18/19  Outreach Interval: Monthy

## 2019-11-26 ENCOUNTER — PATIENT OUTREACH (OUTPATIENT)
Dept: CARE COORDINATION | Facility: CLINIC | Age: 24
End: 2019-11-26

## 2019-11-26 NOTE — PROGRESS NOTES
Scheduled Follow Up Call: Attempt 1 Community Health Worker called and left a message for the patient. If the patient is returning my call, please transfer the patient to [facility] at [phone]      CHW was calling to give resources I found. Will try again next week    No goals were reached at this time

## 2019-12-04 ENCOUNTER — PATIENT OUTREACH (OUTPATIENT)
Dept: CARE COORDINATION | Facility: CLINIC | Age: 24
End: 2019-12-04

## 2019-12-04 NOTE — LETTER
Dear Hany/ Marcella,                                                                        You enrolled with the [facility] Care Coordination Services.  In order for us provide guidance we need to connect on a monthly bases.  We have tried calling you 2 times in the last month and have been unsuccessful in reaching you. Please call me at [phone] at your earliest convenience.  If you reach my voicemail, please leave a message with your daytime telephone number and a date and time that I can return your call.                                                                        I also sent a mychart with some information I found, I hope it helps.  (:      Sincerely,        VALENCIA Liao                                                                     Clinic Care Coordination                                          [facility]

## 2019-12-04 NOTE — PROGRESS NOTES
Scheduled Follow Up Call: Attempt 2 Community Health Worker called and left a message for the patient. If the patient is returning my call, please transfer the patient to [facility] at [phone]  I have called the patient 2 times over the past two weeks and have been unsuccessful in reaching him/her.  The patient has not returned any of my messages. I have mailed a letter to the patient requesting a return call and will continue attempting to reach out to the patient in one month. I will also check the patient's chart for upcoming appointments, ER reports that may contain a new phone number, or any other recent activity.    CHW sent patients mother a [employer] message regarding some of the resources found. Psychology today, and Dr. Eulalia Feldman goes to [place]. the # is  due to the request of the patients mother if she is unable to answer my calls.

## 2019-12-06 ENCOUNTER — PATIENT OUTREACH (OUTPATIENT)
Dept: CARE COORDINATION | Facility: CLINIC | Age: 24
End: 2019-12-06

## 2019-12-06 NOTE — PROGRESS NOTES
Patients mother called CHW after hours, CW returned her call regarding [facility], CHW was not able to reach patients mother at this time  Scheduled Follow Up Call: Attempt 1 Community Health Worker called and left a message for the patient. If the patient is returning my call, please transfer the patient to [facility] at [phone]

## 2019-12-09 ENCOUNTER — PATIENT OUTREACH (OUTPATIENT)
Dept: CARE COORDINATION | Facility: CLINIC | Age: 24
End: 2019-12-09

## 2019-12-09 NOTE — PROGRESS NOTES
Patients mother called over there weekened, asked if CHW could mychart her if I am getting her messages to make sure we are still communicating, Patients mother expressed they are working with another doctor at the moment to get Patient into a day program.

## 2019-12-09 NOTE — PROGRESS NOTES
Patient called CHW this morning after CHW sent over the [employer]. Patient mother stated Hany is doing well. They have an apt today and if that does not go well they will continue to look at CHW resources that were sent over by [employer] 2 weeks ago.   Patients mother stated this is a very busy time of the year for the family and will try to keep in touch.     Outreach: 12/30/19  Outreach Interval: 3 week s

## 2019-12-11 ENCOUNTER — TELEPHONE (OUTPATIENT)
Dept: FAMILY MEDICINE | Facility: CLINIC | Age: 24
End: 2019-12-11

## 2019-12-11 NOTE — TELEPHONE ENCOUNTER
Prior Authorization Retail Medication Request    Medication/Dose: fish oil-omega-3 fatty acids 1000 MG capsule  ICD code (if different than what is on RX):  na  Previously Tried and Failed:  mitul  Rationale:  mitul    Insurance Name:  mitul  Insurance ID:  [id number]      Pharmacy Information (if different than what is on RX)  Name:  [facility]  Phone:  mitul

## 2019-12-12 NOTE — TELEPHONE ENCOUNTER
Central Prior Authorization Team   Phone: [phone]      PA NON-FV PROVIDER    Medication: Omega-3 fish oil-NON-FV Provider  Insurance Company:    Pharmacy Filling the Rx:    Filling Pharmacy Phone:    Filling Pharmacy Fax:    Start Date: 12/12/2019    Called [facility] to see which provider prescribed fish oil since there is not an active prescription in Epic.  The medication is prescribed by Dr. Oziel Perry and he works at [facility].  Pharmacy will reach out to that clinic for PA.

## 2019-12-26 ENCOUNTER — OFFICE VISIT (OUTPATIENT)
Dept: URGENT CARE | Facility: URGENT CARE | Age: 24
End: 2019-12-26
Payer: MEDICARE

## 2019-12-26 VITALS
HEART RATE: 122 BPM | SYSTOLIC BLOOD PRESSURE: 88 MMHG | HEIGHT: 62 IN | TEMPERATURE: 99.6 F | BODY MASS INDEX: 45.45 KG/M2 | WEIGHT: 247 LBS | RESPIRATION RATE: 16 BRPM | DIASTOLIC BLOOD PRESSURE: 54 MMHG | OXYGEN SATURATION: 99 %

## 2019-12-26 DIAGNOSIS — J06.9 VIRAL URI: Primary | ICD-10-CM

## 2019-12-26 PROCEDURE — 99213 OFFICE O/P EST LOW 20 MIN: CPT | Performed by: FAMILY MEDICINE

## 2019-12-26 ASSESSMENT — MIFFLIN-ST. JEOR: SCORE: 1983.6

## 2019-12-26 NOTE — PROGRESS NOTES
Subjective: Patient with a history of bad pneumonia several times, last time in October, started with colds than that led to pneumonia and he has been around some people with colds lately and for the last 3 days has had a dry cough and some nasal discharge that is clear, unsure if he has had fevers but he does not have one now.  Parents bring him in.  He has Down syndrome, fairly dense, and they are of course very worried about developing pneumonia again.    Objective: NAD.  Vitals are stable.  Not much of a historian.  ENT is normal.  Neck is normal.  Lungs are clear.  Heart is regular without murmurs.    Assessment and plan: I think this is a viral cold at this point but we had a long discussion about what secondary infections look like, second sickening, bring him in immediately if that happens.

## 2019-12-28 ENCOUNTER — HOSPITAL ENCOUNTER (EMERGENCY)
Facility: CLINIC | Age: 24
Discharge: HOME | End: 2019-12-28
Attending: EMERGENCY MEDICINE | Admitting: EMERGENCY MEDICINE
Payer: MEDICARE

## 2019-12-28 ENCOUNTER — APPOINTMENT (OUTPATIENT)
Dept: GENERAL RADIOLOGY | Facility: CLINIC | Age: 24
End: 2019-12-28
Attending: EMERGENCY MEDICINE
Payer: MEDICARE

## 2019-12-28 VITALS
HEART RATE: 109 BPM | RESPIRATION RATE: 30 BRPM | BODY MASS INDEX: 43.19 KG/M2 | SYSTOLIC BLOOD PRESSURE: 95 MMHG | WEIGHT: 253 LBS | HEIGHT: 64 IN | TEMPERATURE: 100 F | DIASTOLIC BLOOD PRESSURE: 76 MMHG | OXYGEN SATURATION: 96 %

## 2019-12-28 DIAGNOSIS — R05.9 COUGH: ICD-10-CM

## 2019-12-28 DIAGNOSIS — J18.1 UNRESOLVED LOBAR PNEUMONIA (H): ICD-10-CM

## 2019-12-28 DIAGNOSIS — J18.9 PNEUMONIA OF LEFT LOWER LOBE DUE TO INFECTIOUS ORGANISM: ICD-10-CM

## 2019-12-28 DIAGNOSIS — J10.1 INFLUENZA B: ICD-10-CM

## 2019-12-28 LAB
ANION GAP SERPL CALCULATED.3IONS-SCNC: 4 MMOL/L (ref 3–14)
BASOPHILS # BLD AUTO: 0.1 10E9/L (ref 0–0.2)
BASOPHILS NFR BLD AUTO: 0.5 %
BUN SERPL-MCNC: 17 MG/DL (ref 7–30)
CALCIUM SERPL-MCNC: 8.8 MG/DL (ref 8.5–10.1)
CHLORIDE SERPL-SCNC: 102 MMOL/L (ref 94–109)
CO2 BLDCOV-SCNC: 28 MMOL/L (ref 21–28)
CO2 BLDCOV-SCNC: 29 MMOL/L (ref 21–28)
CO2 SERPL-SCNC: 30 MMOL/L (ref 20–32)
CREAT SERPL-MCNC: 0.92 MG/DL (ref 0.66–1.25)
DIFFERENTIAL METHOD BLD: ABNORMAL
EOSINOPHIL # BLD AUTO: 0.1 10E9/L (ref 0–0.7)
EOSINOPHIL NFR BLD AUTO: 0.5 %
ERYTHROCYTE [DISTWIDTH] IN BLOOD BY AUTOMATED COUNT: 16.1 % (ref 10–15)
FLUAV+FLUBV AG SPEC QL: NEGATIVE
FLUAV+FLUBV AG SPEC QL: POSITIVE
GFR SERPL CREATININE-BSD FRML MDRD: >90 ML/MIN/{1.73_M2}
GLUCOSE SERPL-MCNC: 103 MG/DL (ref 70–99)
HCT VFR BLD AUTO: 40.8 % (ref 40–53)
HGB BLD-MCNC: 13.3 G/DL (ref 13.3–17.7)
IMM GRANULOCYTES # BLD: 0 10E9/L (ref 0–0.4)
IMM GRANULOCYTES NFR BLD: 0.4 %
LACTATE BLD-SCNC: 1.2 MMOL/L (ref 0.7–2.1)
LACTATE BLD-SCNC: 2.2 MMOL/L (ref 0.7–2.1)
LYMPHOCYTES # BLD AUTO: 1 10E9/L (ref 0.8–5.3)
LYMPHOCYTES NFR BLD AUTO: 9.6 %
MCH RBC QN AUTO: 29.3 PG (ref 26.5–33)
MCHC RBC AUTO-ENTMCNC: 32.6 G/DL (ref 31.5–36.5)
MCV RBC AUTO: 90 FL (ref 78–100)
MONOCYTES # BLD AUTO: 0.8 10E9/L (ref 0–1.3)
MONOCYTES NFR BLD AUTO: 7.6 %
NEUTROPHILS # BLD AUTO: 8.2 10E9/L (ref 1.6–8.3)
NEUTROPHILS NFR BLD AUTO: 81.4 %
NRBC # BLD AUTO: 0 10*3/UL
NRBC BLD AUTO-RTO: 0 /100
PCO2 BLDV: 50 MM HG (ref 40–50)
PCO2 BLDV: 51 MM HG (ref 40–50)
PH BLDV: 7.36 PH (ref 7.32–7.43)
PH BLDV: 7.36 PH (ref 7.32–7.43)
PLATELET # BLD AUTO: 196 10E9/L (ref 150–450)
PO2 BLDV: 24 MM HG (ref 25–47)
PO2 BLDV: 24 MM HG (ref 25–47)
POTASSIUM SERPL-SCNC: 3.9 MMOL/L (ref 3.4–5.3)
PROCALCITONIN SERPL-MCNC: 0.61 NG/ML
RBC # BLD AUTO: 4.54 10E12/L (ref 4.4–5.9)
SAO2 % BLDV FROM PO2: 39 %
SAO2 % BLDV FROM PO2: 39 %
SODIUM SERPL-SCNC: 137 MMOL/L (ref 133–144)
SPECIMEN SOURCE: ABNORMAL
WBC # BLD AUTO: 10.1 10E9/L (ref 4–11)

## 2019-12-28 PROCEDURE — 96365 THER/PROPH/DIAG IV INF INIT: CPT

## 2019-12-28 PROCEDURE — 96361 HYDRATE IV INFUSION ADD-ON: CPT

## 2019-12-28 PROCEDURE — 87040 BLOOD CULTURE FOR BACTERIA: CPT | Performed by: EMERGENCY MEDICINE

## 2019-12-28 PROCEDURE — 83605 ASSAY OF LACTIC ACID: CPT

## 2019-12-28 PROCEDURE — 99284 EMERGENCY DEPT VISIT MOD MDM: CPT | Mod: Z6 | Performed by: EMERGENCY MEDICINE

## 2019-12-28 PROCEDURE — 25000132 ZZH RX MED GY IP 250 OP 250 PS 637: Mod: GY | Performed by: INTERNAL MEDICINE

## 2019-12-28 PROCEDURE — 87804 INFLUENZA ASSAY W/OPTIC: CPT | Mod: 59 | Performed by: EMERGENCY MEDICINE

## 2019-12-28 PROCEDURE — 71045 X-RAY EXAM CHEST 1 VIEW: CPT

## 2019-12-28 PROCEDURE — 84145 PROCALCITONIN (PCT): CPT | Performed by: INTERNAL MEDICINE

## 2019-12-28 PROCEDURE — 99284 EMERGENCY DEPT VISIT MOD MDM: CPT | Mod: 25

## 2019-12-28 PROCEDURE — 25800030 ZZH RX IP 258 OP 636: Performed by: EMERGENCY MEDICINE

## 2019-12-28 PROCEDURE — 25000125 ZZHC RX 250: Performed by: EMERGENCY MEDICINE

## 2019-12-28 PROCEDURE — 25000128 H RX IP 250 OP 636: Performed by: INTERNAL MEDICINE

## 2019-12-28 PROCEDURE — 94640 AIRWAY INHALATION TREATMENT: CPT

## 2019-12-28 PROCEDURE — 85025 COMPLETE CBC W/AUTO DIFF WBC: CPT | Performed by: EMERGENCY MEDICINE

## 2019-12-28 PROCEDURE — 80048 BASIC METABOLIC PNL TOTAL CA: CPT | Performed by: EMERGENCY MEDICINE

## 2019-12-28 PROCEDURE — 82803 BLOOD GASES ANY COMBINATION: CPT | Mod: 91

## 2019-12-28 RX ORDER — CEFTRIAXONE 1 G/1
1 INJECTION, POWDER, FOR SOLUTION INTRAMUSCULAR; INTRAVENOUS ONCE
Status: COMPLETED | OUTPATIENT
Start: 2019-12-28 | End: 2019-12-28

## 2019-12-28 RX ORDER — AZITHROMYCIN 250 MG/1
500 TABLET, FILM COATED ORAL ONCE
Status: COMPLETED | OUTPATIENT
Start: 2019-12-28 | End: 2019-12-28

## 2019-12-28 RX ORDER — OSELTAMIVIR PHOSPHATE 75 MG/1
75 CAPSULE ORAL ONCE
Status: COMPLETED | OUTPATIENT
Start: 2019-12-28 | End: 2019-12-28

## 2019-12-28 RX ORDER — AZITHROMYCIN 250 MG/1
TABLET, FILM COATED ORAL
Qty: 6 TABLET | Refills: 0 | Status: SHIPPED | OUTPATIENT
Start: 2019-12-28 | End: 2020-03-03

## 2019-12-28 RX ORDER — OSELTAMIVIR PHOSPHATE 75 MG/1
75 CAPSULE ORAL 2 TIMES DAILY
Qty: 10 CAPSULE | Refills: 0 | Status: SHIPPED | OUTPATIENT
Start: 2019-12-28 | End: 2020-01-02

## 2019-12-28 RX ORDER — ALBUTEROL SULFATE 0.83 MG/ML
2.5 SOLUTION RESPIRATORY (INHALATION) ONCE
Status: COMPLETED | OUTPATIENT
Start: 2019-12-28 | End: 2019-12-28

## 2019-12-28 RX ADMIN — ALBUTEROL SULFATE 2.5 MG: 2.5 SOLUTION RESPIRATORY (INHALATION) at 14:25

## 2019-12-28 RX ADMIN — CEFTRIAXONE 1 G: 1 INJECTION, POWDER, FOR SOLUTION INTRAMUSCULAR; INTRAVENOUS at 17:31

## 2019-12-28 RX ADMIN — AZITHROMYCIN 500 MG: 250 TABLET, FILM COATED ORAL at 18:28

## 2019-12-28 RX ADMIN — SODIUM CHLORIDE 1000 ML: 9 INJECTION, SOLUTION INTRAVENOUS at 15:09

## 2019-12-28 RX ADMIN — OSELTAMIVIR PHOSPHATE 75 MG: 75 CAPSULE ORAL at 16:50

## 2019-12-28 ASSESSMENT — ENCOUNTER SYMPTOMS
COUGH: 1
SHORTNESS OF BREATH: 1
VOMITING: 1
FEVER: 1

## 2019-12-28 ASSESSMENT — MIFFLIN-ST. JEOR: SCORE: 2038.6

## 2019-12-28 NOTE — DISCHARGE INSTRUCTIONS
Tylenol as needed for fevers.  Tamiflu 75 mg twice/ day for 5 days.  Azithromycin 2 pills tomorrow, then 1 pill daily for 4 more days.  Return for high fevers in the 102-103 range, increased shortness of breath, worsening cough, dehydration, lethargy, worse.  Follow up in about 1 week with Dr Song.

## 2019-12-28 NOTE — ED TRIAGE NOTES
Pt has shortness of breath, productive cough and low grade temp (99.6). Started  12/26-- was seen in urgent care then. Had pneumonia twice in October.

## 2019-12-28 NOTE — ED AVS SNAPSHOT
[facility], [place], Emergency Department  [address] [address]  Phone:  [phone]                                    Hany Patel   MRN: [id number]    Department:  [facility], Emergency Department   Date of Visit:  12/28/2019           After Visit Summary Signature Page    I have received my discharge instructions, and my questions have been answered. I have discussed any challenges I see with this plan with the nurse or doctor.    ..........................................................................................................................................  Patient/Patient Representative Signature      ..........................................................................................................................................  Patient Representative Print Name and Relationship to Patient    ..................................................               ................................................  Date                                   Time    ..........................................................................................................................................  Reviewed by Signature/Title    ...................................................              ..............................................  Date                                               Time          22EPIC Rev 08/18

## 2019-12-28 NOTE — ED PROVIDER NOTES
[facility]ERGENCY DEPARTMENT ([facility])  12/28/19    History     Chief Complaint   Patient presents with     Shortness of Breath     The history is provided by the patient and medical records.     Hany Patel is a 24 year old male, Down syndrome patient who presents the Emergency Department with his parents for evaluation of a cough and fever over the past 3 days.  The patient was taken to an urgent care 2 days ago but at that time was not febrile and was told to just watch his symptoms.  The patient has had a persistent fever since that time around 99.6 at home with a productive cough and increasing shortness of breath.  Patient has had a previous history of pneumonia requiring hospitalization.  Patient has had some vomiting and presents to the ER for evaluation.    This part of the medical record was transcribed by Antony Sanchez Medical Scribe, from a dictation done by Shaun Bustillos MD.     I have reviewed the Medications, Allergies, Past Medical and Surgical History, and Social History in the [employer] system.    PAST MEDICAL HISTORY:   Past Medical History:   Diagnosis Date     Diarrhea     Diarrhea Episodes        PAST SURGICAL HISTORY:   Past Surgical History:   Procedure Laterality Date     INSERT CHEST TUBE Left 2/7/2017    Procedure: INSERT CHEST TUBE;  Surgeon: Coco Zambrano MD;  Location:  OR     SURGICAL HISTORY OF -       Ear tubes      SURGICAL HISTORY OF -       Adenoid removal        FAMILY HISTORY:   Family History   Problem Relation Age of Onset     Breast Cancer Mother      Hypertension Father        SOCIAL HISTORY:   Social History     Tobacco Use     Smoking status: Never Smoker     Smokeless tobacco: Never Used   Substance Use Topics     Alcohol use: No        Allergies   Allergen Reactions     Seasonal Allergies          Dose / Directions   albuterol 108 (90 Base) MCG/ACT inhaler  Commonly known as:  PROAIR HFA/PROVENTIL HFA/VENTOLIN HFA  Used for:  Pneumonia of left  lower lobe due to infectious organism (H)      Dose:  2 puff  Inhale 2 puffs into the lungs every 4 hours as needed for shortness of breath / dyspnea or wheezing  Quantity:  1 Inhaler  Refills:  0     CALCIUM + D PO      Dose:  1 tablet  Take 1 tablet by mouth daily  Refills:  0     escitalopram 20 MG tablet  Commonly known as:  LEXAPRO  Used for:  Pervasive developmental disorder      Dose:  20 mg  Take 1 tablet (20 mg) by mouth daily  Quantity:  90 tablet  Refills:  1     fish oil-omega-3 fatty acids 1000 MG capsule      Dose:  1 g  Take 1 g by mouth daily  Refills:  0     ketoconazole 2 % external shampoo  Commonly known as:  NIZORAL  Used for:  Seborrheic dermatitis      Apply to the affected area and wash off after 5 minutes.  Quantity:  120 mL  Refills:  1     ketotifen 0.025 % ophthalmic solution  Commonly known as:  Zaditor  Used for:  Chronic allergic conjunctivitis      Dose:  1 drop  Place 1 drop into both eyes every 12 hours  Quantity:  1 Bottle  Refills:  3     lactobacillus acidophilus Tabs      Dose:  1 tablet  Take 1 tablet by mouth daily  Refills:  0     levofloxacin 750 MG tablet  Commonly known as:  LEVAQUIN  Indication:  Healthcare-Associated Pneumonia  Used for:  HCAP (healthcare-associated pneumonia)  Ask about: Should I take this medication?      Dose:  750 mg  Take 1 tablet (750 mg) by mouth daily for 4 doses  Quantity:  4 tablet  Refills:  0     levothyroxine 112 MCG tablet  Commonly known as:  SYNTHROID/LEVOTHROID      Dose:  112 mcg  Take 112 mcg by mouth daily.  Refills:  0     loperamide 2 MG capsule  Commonly known as:  IMODIUM      Dose:  2 mg  Take 2 mg by mouth daily (with breakfast) Take two tablets with breakfast  Refills:  0     metFORMIN 1000 MG tablet  Commonly known as:  GLUCOPHAGE      Dose:  1,000 mg  Take 1,000 mg by mouth 2 times daily (with meals).  Refills:  0     metroNIDAZOLE 0.75 % external gel  Commonly known as:  METROGEL  Used for:  Rosacea      Apply topically 2  "times daily  Quantity:  45 g  Refills:  11     Multi-vitamin tablet  Generic drug:  multivitamin w/minerals      Dose:  1 tablet  Take 1 tablet by mouth daily. With D3  Refills:  0     QUEtiapine 6.25 mg Tabs quarter-tab  Commonly known as:  SEROquel      Take by mouth daily Unsure dose  Refills:  0     simvastatin 20 MG tablet  Commonly known as:  ZOCOR      Dose:  20 mg  Take 1 tablet (20 mg) by mouth At Bedtime  Refills:  0     Soolantra 1 % cream  Generic drug:  ivermectin      Apply topically daily as needed (for flares)  Refills:  0     vitamin B complex with vitamin C tablet      Dose:  1 tablet  Take 1 tablet by mouth daily  Refills:  0     vitamin C 500 MG Chew      Dose:  500 mg  Take 500 mg by mouth daily  Refills:  0            Review of Systems   Constitutional: Positive for fever.   Respiratory: Positive for cough and shortness of breath.    Gastrointestinal: Positive for vomiting.   All other systems reviewed and are negative.      Physical Exam   BP: 124/57  Heart Rate: 116  Temp: 100.8  F (38.2  C)  Resp: 30  Height: 162.6 cm (5' 4\")  Weight: 114.8 kg (253 lb)  SpO2: 93 %      Physical Exam  Vitals signs reviewed.   Constitutional:       Comments: Awake alert cooperative   HENT:      Head: Atraumatic.   Neck:      Musculoskeletal: Neck supple.   Pulmonary:      Comments: Good aeration bilaterally but with does have some rhonchi in the bases  Musculoskeletal:      Right lower leg: No edema.      Left lower leg: No edema.   Neurological:      General: No focal deficit present.   Psychiatric:         Mood and Affect: Mood normal.         ED Course     Orders Placed This Encounter   Procedures     XR Chest 2 Views     CBC with platelets differential     Basic metabolic panel     Pulse oximetry nursing     Cardiac Continuous Monitoring     Peripheral IV: Standard     ISTAT CG4 gases lactate thom nursing POCT     Vascular Access Care Adult IP Consult     Oxygen: Nasal cannula, Oxygen mask     Medications "   0.9% sodium chloride BOLUS (has no administration in time range)   albuterol (PROVENTIL) neb solution 2.5 mg (2.5 mg Nebulization Given 12/28/19 0468)     Sputum Gram stain and culture were ordered as well.    Patient was a difficult stick and vascular access had to be called to initiate an IV and get blood.    Assessments & Plan (with Medical Decision Making)     I have reviewed the nursing notes.    At this time the case will be signed out one my partners will follow up with the patient's labs and x-ray and most likely initiate antibiotics for pneumonia, Tamiflu for influenza, and possibly admit the patient based on lab results above.      Working diagnoses:   Cough       Shaun Bustillos MD, MD    Antony BINGHAM, am serving as a trained medical scribe to document services personally performed by Shaun Bustillos MD, based on the provider's statements to me.   Shaun BINGHAM MD, was physically present and have reviewed and verified the accuracy of this note documented by Antony Sanchez.    12/28/2019   [facility], [place], EMERGENCY DEPARTMENT     Shaun Bustillos MD  12/28/19 0483

## 2019-12-28 NOTE — ED NOTES
Patient with Down's syndrome and recurrent pneumonias presents with fever, increased cough and shortness of breath. Signed out to me by Dr Bustillos. Please see his note for details of initial presentation. He has mild tachycardia. Temperature 100.9. He has had URI symptoms for a few days. Signed out with labs pending.    Lactate 2.2.  WBC 10.1  CXR LLL infiltrate consistent with pneumonia, same region with infiltrate on past XR.    I discussed the diagnosis and findings with the patient's parents.   I suggested observation admission, given mildly elevated lactate and LLL infiltrate on CXR. I discussed that influenza can cause pneumonia, but that there is possibility of bacterial superinfection.    His parents are extremely reluctant for him to be admitted to the hospital since the experience was very traumatic for the patient and family in the past. They would like to try outpatient therapy if at all possible.     I discussed that it would be at least reasonable to try outpatient management given his normal WBC and oxygenation and that admission would be for caution given his recent past history. The family would like to try outpatient management if at all possible. They agree to have a low threshold for return should he appear to be worsening. We discussed that he should stay well hydrated and should return for high fevers in the 102-103 range, increased shortness of breath, lethargy, or worsening cough.    He will be treated in the ED with ceftriaxone, azithromycin and tamiflu and will be discharged with prescriptions for tamiflu and azithromycin.     Troy Harp MD  12/28/19 4050

## 2019-12-30 ENCOUNTER — TELEPHONE (OUTPATIENT)
Dept: FAMILY MEDICINE | Facility: CLINIC | Age: 24
End: 2019-12-30

## 2019-12-30 DIAGNOSIS — J18.9 PNEUMONIA OF LEFT LOWER LOBE DUE TO INFECTIOUS ORGANISM: ICD-10-CM

## 2019-12-30 RX ORDER — ALBUTEROL SULFATE 90 UG/1
2 AEROSOL, METERED RESPIRATORY (INHALATION) EVERY 4 HOURS PRN
Qty: 1 INHALER | Refills: 0 | Status: SHIPPED | OUTPATIENT
Start: 2019-12-30 | End: 2019-12-31

## 2019-12-30 NOTE — TELEPHONE ENCOUNTER
Reason for Call:  Other appointment    Detailed comments: PT seen in ED 12/28 for pneumonia and appt scheduled for 1/9 but would like to be seen before    Phone Number Patient can be reached at: Home number on file [phone] (home)    Best Time: Anytime    Can we leave a detailed message on this number? YES    Call taken on 12/30/2019 at 9:29 AM by Mary Gomez

## 2019-12-30 NOTE — TELEPHONE ENCOUNTER
Reason for Call:  Other     Detailed comments: [facility] pharmacy is calling and the patients father is waiting at [facility] until this prescription is sent over.  [facility] is wondering if this can be sent soon.    Phone Number Patient can be reached at: Other phone number:  [place]/ [address] [place]/ [phone]    Best Time: aSAP    Can we leave a detailed message on this number? YES    Call taken on 12/30/2019 at 12:03 PM by Katerine Andres

## 2019-12-30 NOTE — TELEPHONE ENCOUNTER
"Requested Prescriptions   Pending Prescriptions Disp Refills     albuterol (PROAIR HFA/PROVENTIL HFA/VENTOLIN HFA) 108 (90 Base) MCG/ACT inhaler [Pharmacy Med Name: ALBUTEROL HFA INH (200 PUFFS) 18GM]  Patient requests 90 days supply    Last Written Prescription Date:  12/30/2019  Last Fill Quantity: 1 inhaler,  # refills: 0   Last office visit: 10/22/2019 with prescribing provider:  THAI Song   Future Office Visit:   Next 5 appointments (look out 90 days)    Jan 02, 2020  8:40 AM CST  SHORT with Liu Song PA-C  [facility] ([facility]) [address] [place] [id number] [phone]        90 g      Sig: INHALE 2 PUFFS INTO THE LUNGS EVERY 4 HOURS AS NEEDED FOR SHORTNESS OF BREATH OR DIFFICULT BREATHING OR WHEEZING       Asthma Maintenance Inhalers - Anticholinergics Passed - 12/30/2019 12:17 PM        Passed - Patient is age 12 years or older        Passed - Recent (12 mo) or future (30 days) visit within the authorizing provider's specialty     Patient has had an office visit with the authorizing provider or a provider within the authorizing providers department within the previous 12 mos or has a future within next 30 days. See \"Patient Info\" tab in inbasket, or \"Choose Columns\" in Meds & Orders section of the refill encounter.              Passed - Medication is active on med list        "

## 2019-12-30 NOTE — TELEPHONE ENCOUNTER
"Prescription approved per [facility] Refill Protocol.    Requested Prescriptions   Pending Prescriptions Disp Refills     albuterol (PROAIR HFA/PROVENTIL HFA/VENTOLIN HFA) 108 (90 Base) MCG/ACT inhaler 1 Inhaler 0     Sig: Inhale 2 puffs into the lungs every 4 hours as needed for shortness of breath / dyspnea or wheezing       Asthma Maintenance Inhalers - Anticholinergics Passed - 12/30/2019 12:10 PM        Passed - Patient is age 12 years or older        Passed - Recent (12 mo) or future (30 days) visit within the authorizing provider's specialty     Patient has had an office visit with the authorizing provider or a provider within the authorizing providers department within the previous 12 mos or has a future within next 30 days. See \"Patient Info\" tab in inbasket, or \"Choose Columns\" in Meds & Orders section of the refill encounter.              Passed - Medication is active on med list        Kallie Dupree RN  "

## 2019-12-31 ENCOUNTER — TELEPHONE (OUTPATIENT)
Dept: FAMILY MEDICINE | Facility: CLINIC | Age: 24
End: 2019-12-31

## 2019-12-31 RX ORDER — ALBUTEROL SULFATE 90 UG/1
AEROSOL, METERED RESPIRATORY (INHALATION)
Qty: 90 G | Refills: 3 | Status: SHIPPED | OUTPATIENT
Start: 2019-12-31 | End: 2020-03-03

## 2019-12-31 NOTE — TELEPHONE ENCOUNTER
Prescription approved per [facility] Refill Protocol.    Cesilia Maurice, RN, BSN, PHN  [facility]: [place]

## 2019-12-31 NOTE — TELEPHONE ENCOUNTER
Reason for call:  Patient reporting a symptom    Symptom or request:   Patients mother calling to speak to a nurse. Patient was in the Emergency Room on 12/28/19 and mom(guardian) is concerned because patient does not seem himself.  Patient has a lot of phlegm and cough.  Patient does not want to get out of bed and this is not like him.  When nurse calls back she can speak to Nicholas, father or Marcella, mother.    Duration (how long have symptoms been present):     Have you been treated for this before? No    Additional comments:     Phone Number patient can be reached at:  Home number on file [phone] (home)    Best Time:  any    Can we leave a detailed message on this number:  YES    Call taken on 12/31/2019 at 11:31 AM by Katerine Andres

## 2019-12-31 NOTE — TELEPHONE ENCOUNTER
Patient with history significant for Down's Syndrome, acute respiratory failure and pneumonia requiring hospitalization.  He was seen at [facility] ED on 12/27/19 for SOB, influenza B and pneumonia, was prescribed Tamiflu, Zithromax, albuterol inhaler. He is scheduled for f/u with PCP on 1/2/20.     Returned call to parents at number below.  Patient/family was instructed to return call to [facility], directly on the RN Call back line at [phone].    Norma Johnston RN

## 2020-01-02 ENCOUNTER — OFFICE VISIT (OUTPATIENT)
Dept: FAMILY MEDICINE | Facility: CLINIC | Age: 25
End: 2020-01-02
Payer: MEDICARE

## 2020-01-02 ENCOUNTER — ANCILLARY PROCEDURE (OUTPATIENT)
Dept: GENERAL RADIOLOGY | Facility: CLINIC | Age: 25
End: 2020-01-02
Attending: PHYSICIAN ASSISTANT
Payer: MEDICARE

## 2020-01-02 VITALS
DIASTOLIC BLOOD PRESSURE: 70 MMHG | HEART RATE: 65 BPM | WEIGHT: 249.8 LBS | TEMPERATURE: 97.5 F | BODY MASS INDEX: 42.65 KG/M2 | OXYGEN SATURATION: 93 % | SYSTOLIC BLOOD PRESSURE: 118 MMHG | RESPIRATION RATE: 20 BRPM | HEIGHT: 64 IN

## 2020-01-02 DIAGNOSIS — J10.1 INFLUENZA B: ICD-10-CM

## 2020-01-02 DIAGNOSIS — J18.9 PNEUMONIA DUE TO INFECTIOUS ORGANISM, UNSPECIFIED LATERALITY, UNSPECIFIED PART OF LUNG: Primary | ICD-10-CM

## 2020-01-02 DIAGNOSIS — R53.83 OTHER FATIGUE: ICD-10-CM

## 2020-01-02 DIAGNOSIS — J18.9 PNEUMONIA DUE TO INFECTIOUS ORGANISM, UNSPECIFIED LATERALITY, UNSPECIFIED PART OF LUNG: ICD-10-CM

## 2020-01-02 LAB
ERYTHROCYTE [DISTWIDTH] IN BLOOD BY AUTOMATED COUNT: 16.3 % (ref 10–15)
HCT VFR BLD AUTO: 41.6 % (ref 40–53)
HGB BLD-MCNC: 13.6 G/DL (ref 13.3–17.7)
MCH RBC QN AUTO: 28.9 PG (ref 26.5–33)
MCHC RBC AUTO-ENTMCNC: 32.7 G/DL (ref 31.5–36.5)
MCV RBC AUTO: 88 FL (ref 78–100)
PLATELET # BLD AUTO: 289 10E9/L (ref 150–450)
RBC # BLD AUTO: 4.71 10E12/L (ref 4.4–5.9)
WBC # BLD AUTO: 7.4 10E9/L (ref 4–11)

## 2020-01-02 PROCEDURE — 85027 COMPLETE CBC AUTOMATED: CPT | Performed by: PHYSICIAN ASSISTANT

## 2020-01-02 PROCEDURE — 36415 COLL VENOUS BLD VENIPUNCTURE: CPT | Performed by: PHYSICIAN ASSISTANT

## 2020-01-02 PROCEDURE — 99214 OFFICE O/P EST MOD 30 MIN: CPT | Performed by: PHYSICIAN ASSISTANT

## 2020-01-02 PROCEDURE — 71046 X-RAY EXAM CHEST 2 VIEWS: CPT | Mod: FY

## 2020-01-02 PROCEDURE — 80048 BASIC METABOLIC PNL TOTAL CA: CPT | Performed by: PHYSICIAN ASSISTANT

## 2020-01-02 ASSESSMENT — MIFFLIN-ST. JEOR: SCORE: 2024.09

## 2020-01-02 NOTE — PROGRESS NOTES
Subjective     Hany Patel is a 24 year old male who presents to clinic today for the following health issues:    HPI   ED/UC Followup:    Facility:  [facility] ED  Date of visit: 12/28/2019  Reason for visit: Cough, Influenza B, Pneumonia of Left Lower Lobe  Current Status: patient still seems lethargic and not getting good sleep.      Parents concerned regarding his lack of energy - he had influenza B and left lobe pneumonia. He's had no fevers, no cough, no shortness of breath or respiratory symptoms. He's just very tired. Is on last day of Tamiflu. Finished Zithomrax a few days ago. No GI symptoms, etc.    Patient Active Problem List   Diagnosis     Down's syndrome     Hypothyroidism     Pre-diabetes     Chronic diarrhea     CARDIOVASCULAR SCREENING; LDL GOAL LESS THAN 130     Pervasive developmental disorder     Cystic acne vulgaris     Hypoxia     Morbid obesity (H)     Pneumonia     Acute respiratory failure (H)      Current Outpatient Medications   Medication     albuterol (PROAIR HFA/PROVENTIL HFA/VENTOLIN HFA) 108 (90 Base) MCG/ACT inhaler     Ascorbic Acid (VITAMIN C) 500 MG CHEW     azithromycin (ZITHROMAX Z-MAR) 250 MG tablet     Calcium Carbonate-Vitamin D (CALCIUM + D PO)     escitalopram (LEXAPRO) 20 MG tablet     fish oil-omega-3 fatty acids 1000 MG capsule     ivermectin (SOOLANTRA) 1 % cream     ketoconazole (NIZORAL) 2 % shampoo     ketotifen (ZADITOR) 0.025 % SOLN ophthalmic solution     levothyroxine (SYNTHROID, LEVOTHROID) 112 MCG tablet     loperamide (IMODIUM) 2 MG capsule     metFORMIN (GLUCOPHAGE) 1000 MG tablet     metroNIDAZOLE (METROGEL) 0.75 % topical gel     Multiple Vitamin (MULTI-VITAMIN) per tablet     oseltamivir (TAMIFLU) 75 MG capsule     QUEtiapine (SEROQUEL) 6.25 mg TABS quarter-tab     simvastatin (ZOCOR) 20 MG tablet     vitamin  B complex with vitamin C (VITAMIN  B COMPLEX) TABS     Lactobacillus Acid-Pectin (LACTOBACILLUS ACIDOPHILUS) TABS     No current facility-administered  "medications for this visit.         Patient Active Problem List   Diagnosis     Down's syndrome     Hypothyroidism     Pre-diabetes     Chronic diarrhea     CARDIOVASCULAR SCREENING; LDL GOAL LESS THAN 130     Pervasive developmental disorder     Cystic acne vulgaris     Hypoxia     Morbid obesity (H)     Pneumonia     Acute respiratory failure (H)     Past Surgical History:   Procedure Laterality Date     INSERT CHEST TUBE Left 2/7/2017    Procedure: INSERT CHEST TUBE;  Surgeon: Coco Zambrano MD;  Location:  OR     SURGICAL HISTORY OF -       Ear tubes      SURGICAL HISTORY OF -       Adenoid removal        Social History     Tobacco Use     Smoking status: Never Smoker     Smokeless tobacco: Never Used   Substance Use Topics     Alcohol use: No     Family History   Problem Relation Age of Onset     Breast Cancer Mother      Graves' disease Mother      Hypertension Father          Reviewed and updated as needed this visit by Provider         Review of Systems   ROS COMP: Constitutional, HEENT, cardiovascular, pulmonary, gi and gu systems are negative, except as otherwise noted.      Objective    /70 (BP Location: Right arm, Patient Position: Chair, Cuff Size: Adult Large)   Pulse 65   Temp 97.5  F (36.4  C) (Oral)   Resp 20   Ht 1.626 m (5' 4\")   Wt 113.3 kg (249 lb 12.8 oz)   SpO2 93%   BMI 42.88 kg/m    Body mass index is 42.88 kg/m .  Physical Exam   GENERAL: healthy, alert and no distress  HENT: ear canals and TM's normal, nose and mouth without ulcers or lesions  NECK: no adenopathy, no asymmetry, masses, or scars and thyroid normal to palpation  RESP: lungs clear to auscultation - no rales, rhonchi or wheezes  CV: regular rate and rhythm, normal S1 S2, no S3 or S4, no murmur, click or rub, no peripheral edema and peripheral pulses strong  MS: no gross musculoskeletal defects noted, no edema    Diagnostic Test Results:  Labs reviewed in Epic        Assessment & Plan     (J18.9) " Pneumonia due to infectious organism, unspecified laterality, unspecified part of lung  (primary encounter diagnosis)  Comment:   Plan: CBC with platelets, Basic metabolic panel  (Ca,        Cl, CO2, Creat, Gluc, K, Na, BUN), XR Chest 2         Views        Recheck x ray appears okay today though the view is limited due to his habitus and refusal to stand for x ray techs. Will wait final read - CBC is reassuring. I think the fatigue is simply post infectious     (J10.1) Influenza B  Comment:   Plan: Resolving. Afebrile x 2 days     (R53.83) Other fatigue  Comment:   Plan: May be mild dehydration, post infectious, etc. Reassurance given. Symptomatic measures and suggestions for self care given.  Follow up if not improving or symptoms change as discussed.  All questions were answered.      YULISA SALGADO PA-C  [facility]

## 2020-01-03 ENCOUNTER — TELEPHONE (OUTPATIENT)
Dept: FAMILY MEDICINE | Facility: CLINIC | Age: 25
End: 2020-01-03

## 2020-01-03 LAB
ANION GAP SERPL CALCULATED.3IONS-SCNC: 6 MMOL/L (ref 3–14)
BACTERIA SPEC CULT: NO GROWTH
BUN SERPL-MCNC: 10 MG/DL (ref 7–30)
CALCIUM SERPL-MCNC: 9.1 MG/DL (ref 8.5–10.1)
CHLORIDE SERPL-SCNC: 106 MMOL/L (ref 94–109)
CO2 SERPL-SCNC: 27 MMOL/L (ref 20–32)
CREAT SERPL-MCNC: 0.67 MG/DL (ref 0.66–1.25)
GFR SERPL CREATININE-BSD FRML MDRD: >90 ML/MIN/{1.73_M2}
GLUCOSE SERPL-MCNC: 90 MG/DL (ref 70–99)
Lab: NORMAL
POTASSIUM SERPL-SCNC: 4.3 MMOL/L (ref 3.4–5.3)
SODIUM SERPL-SCNC: 139 MMOL/L (ref 133–144)
SPECIMEN SOURCE: NORMAL

## 2020-01-03 NOTE — TELEPHONE ENCOUNTER
Team RN  Please call Hany's parents and see how he's feeling today.  His x ray was okay - but radiology felt he might have some fluid build up in his lungs - if he's worse - please let me know.  Thanks.  Liu Song, NANOS, PA-C

## 2020-01-03 NOTE — TELEPHONE ENCOUNTER
Routing to PCP as FELICIA re: status.    Mother Marcella reports that patient seems to be improved since finishing medications.  Today he has been more animated, energetic, laughing and went out and got his haircut.  Still has productive cough. Denies fever or any signs of respiratory distress such as SOB, tachypnea, skin discoloration.  RN instructed to monitor closely, and they should return to ED over the weekend should symptoms return or if he develops previously denied symptoms above. Understanding voiced.    Norma Johnston RN

## 2020-01-07 ENCOUNTER — PATIENT OUTREACH (OUTPATIENT)
Dept: CARE COORDINATION | Facility: CLINIC | Age: 25
End: 2020-01-07

## 2020-01-07 NOTE — PROGRESS NOTES
Scheduled Follow Up Call: Attempt 1 Community Health Worker called and left a message for the patient. If the patient is returning my call, please transfer the patient to Yanni at [phone]     Went right to         Notes: Patient expresses behaviors, is on the autism/ down syndrom Spectrum. Patients mother looking for someone to help with his behaviors by medications so he can get accepted into a day program.  mother likes Mychart over phone, but is not against phone calls. CHW my charted Patients mother resources on 12/04/19. Patients mother was going to look into them after the holidays.

## 2020-01-13 NOTE — TELEPHONE ENCOUNTER
FUTURE VISIT INFORMATION      FUTURE VISIT INFORMATION:    Date: 2/4/20    Time: 1 PM    Location: [facility]  REFERRAL INFORMATION:    Referring provider:  Self-Referred    Referring providers clinic:  NA    Reason for visit/diagnosis: Sleep Apnea    RECORDS REQUESTED FROM:       Clinic name Comments Records Status Imaging Status   [facility] 10/10/19 to 10/14/19 - OV with Dr. Carmenza Ni    [facility] 8/19/08 - Sleep Study  7/30/08 - Sleep Study and OV with Dr. Perez  4/4/08 - OV with Dr. Rodriguez 1/15 sent to scan                        * 1/13/20 3:45 PM Faxed req to [facility] for Sleep study and any other sleep records - [place]  * 1/15/20 4:10 PM Received sleep study from [facility] and sent to HIM to be scanned into the chart - [place]

## 2020-01-23 ENCOUNTER — PATIENT OUTREACH (OUTPATIENT)
Dept: FAMILY MEDICINE | Facility: CLINIC | Age: 25
End: 2020-01-23

## 2020-01-23 NOTE — PROGRESS NOTES
CHW sent a [employer] message to the patient's mother to see how things have been going. CHW informed patients mother that CHW Candy is taking over for CHW Yanni and if she had any questions or concerns about the resources that were given to her last month to reach out.     Good Morning,     My name is Candy ALLISON, and I will be taking over for Community Health Worker Yanni. I was just checking in and seeing if you had a chance to look over the resources that Yanni had provided for you? Did you have any questions about any of the resources?     Please feel free to call me or message me back with any questions or concerns you may have. I hope you have a good rest of your day!       Next outreach: 2/25/20  Outreach Intervals: Monthly

## 2020-02-04 ENCOUNTER — PRE VISIT (OUTPATIENT)
Dept: OTOLARYNGOLOGY | Facility: CLINIC | Age: 25
End: 2020-02-04

## 2020-02-04 ENCOUNTER — OFFICE VISIT (OUTPATIENT)
Dept: OTOLARYNGOLOGY | Facility: CLINIC | Age: 25
End: 2020-02-04
Payer: MEDICAID

## 2020-02-04 VITALS — HEIGHT: 64 IN | WEIGHT: 255 LBS | BODY MASS INDEX: 43.54 KG/M2

## 2020-02-04 DIAGNOSIS — R06.83 SNORING: Primary | ICD-10-CM

## 2020-02-04 ASSESSMENT — MIFFLIN-ST. JEOR: SCORE: 2047.67

## 2020-02-04 ASSESSMENT — PAIN SCALES - GENERAL: PAINLEVEL: NO PAIN (0)

## 2020-02-04 NOTE — PATIENT INSTRUCTIONS
You were seen in the ENT clinic today with Dr. Morgan    Recommendations for you:    -Call us if you would like to schedule a sleep study    Please call our clinic for any questions, concerns, and/or worsening symptoms.      Clinic #[phone]       Option 1 for scheduling.    Thank you for allowing us to be apart of your care!    Bella WALSH RNCC    If you need to reach me my direct line is: [phone]    I am out of the office on Thursday's.

## 2020-02-04 NOTE — LETTER
2020       RE: Hany Patel  [address]  [place] [address]     Dear Colleague,    Thank you for referring your patient, Hany Patel, to the [facility] EAR NOSE AND THROAT at [facility]. Please see a copy of my visit note below.        SLEEP SURGERY CONSULTATION    Patient: Hany Patel  : 1995  CHIEF COMPLAINT: SHERLY    IDENTIFICATION: Parents consulted Dr. Morgan for surgical evaluation and possible treatment of obstructive sleep apnea syndrome for Hany Patel.      HPI:  Hany Patel is a 24 year old year old male who presents to clinic for possible Obstructive Sleep Apnea.  Patient had a sleep study performed at [facility] on 2008.  Overall AHI was 0.7.  Light to loud snoring was noted on study.  Snoring was worse in the supine position.  Parents are in clinic today because recently the patient was hospitalized with pneumonia.  Dad states several nights with the patient.  During this time he did notice some gasping and choking noises.  This was brought up to 1 of his physicians who recommended that they be seen by me for consideration for inspire.  Mom reports that they do not think that Hany would be able to use CPAP.  They did try CPAP several years ago and he just was not able to tolerate anything on his face so they discontinued it.  They do note that his sleep can be somewhat restless.        PAST MEDICAL HISTORY:  Past Medical History:   Diagnosis Date     Diarrhea     Diarrhea Episodes        PAST SURGICAL HISTORY:  Past Surgical History:   Procedure Laterality Date     INSERT CHEST TUBE Left 2017    Procedure: INSERT CHEST TUBE;  Surgeon: Coco Zambrano MD;  Location: [facility]     SURGICAL HISTORY OF -       Ear tubes      SURGICAL HISTORY OF -       Adenoid removal        MEDICATIONS:  Current Outpatient Medications   Medication Sig Dispense Refill     albuterol (PROAIR HFA/PROVENTIL HFA/VENTOLIN HFA)  108 (90 Base) MCG/ACT inhaler INHALE 2 PUFFS INTO THE LUNGS EVERY 4 HOURS AS NEEDED FOR SHORTNESS OF BREATH OR DIFFICULT BREATHING OR WHEEZING 90 g 3     Ascorbic Acid (VITAMIN C) 500 MG CHEW Take 500 mg by mouth daily        Calcium Carbonate-Vitamin D (CALCIUM + D PO) Take 1 tablet by mouth daily        escitalopram (LEXAPRO) 20 MG tablet Take 1 tablet (20 mg) by mouth daily 90 tablet 1     fish oil-omega-3 fatty acids 1000 MG capsule Take 1 g by mouth daily       ivermectin (SOOLANTRA) 1 % cream Apply topically daily as needed (for flares)       ketoconazole (NIZORAL) 2 % shampoo Apply to the affected area and wash off after 5 minutes. 120 mL 1     Lactobacillus Acid-Pectin (LACTOBACILLUS ACIDOPHILUS) TABS Take 1 tablet by mouth daily        levothyroxine (SYNTHROID, LEVOTHROID) 112 MCG tablet Take 112 mcg by mouth daily.       loperamide (IMODIUM) 2 MG capsule Take 2 mg by mouth daily (with breakfast) Take two tablets with breakfast       metFORMIN (GLUCOPHAGE) 1000 MG tablet Take 1,000 mg by mouth 2 times daily (with meals).       metroNIDAZOLE (METROGEL) 0.75 % topical gel Apply topically 2 times daily 45 g 11     Multiple Vitamin (MULTI-VITAMIN) per tablet Take 1 tablet by mouth daily. With D3       QUEtiapine (SEROQUEL) 6.25 mg TABS quarter-tab Take by mouth daily Unsure dose       simvastatin (ZOCOR) 20 MG tablet Take 1 tablet (20 mg) by mouth At Bedtime       UNABLE TO FIND MEDICATION NAME: acetylcysteine, for congestion       vitamin  B complex with vitamin C (VITAMIN  B COMPLEX) TABS Take 1 tablet by mouth daily       ketotifen (ZADITOR) 0.025 % SOLN ophthalmic solution Place 1 drop into both eyes every 12 hours (Patient not taking: Reported on 2/4/2020) 1 Bottle 3       ALLERGIES:  Allergies   Allergen Reactions     Seasonal Allergies        SOCIAL HISTORY:  Social History     Socioeconomic History     Marital status: Single     Spouse name: Not on file     Number of children: Not on file     Years of  "education: Not on file     Highest education level: Not on file   Occupational History     Not on file   Social Needs     Financial resource strain: Not on file     Food insecurity:     Worry: Not on file     Inability: Not on file     Transportation needs:     Medical: Not on file     Non-medical: Not on file   Tobacco Use     Smoking status: Never Smoker     Smokeless tobacco: Never Used   Substance and Sexual Activity     Alcohol use: No     Drug use: No     Sexual activity: Never   Lifestyle     Physical activity:     Days per week: Not on file     Minutes per session: Not on file     Stress: Not on file   Relationships     Social connections:     Talks on phone: Not on file     Gets together: Not on file     Attends Shinto service: Not on file     Active member of club or organization: Not on file     Attends meetings of clubs or organizations: Not on file     Relationship status: Not on file     Intimate partner violence:     Fear of current or ex partner: Not on file     Emotionally abused: Not on file     Physically abused: Not on file     Forced sexual activity: Not on file   Other Topics Concern     Parent/sibling w/ CABG, MI or angioplasty before 65F 55M? No   Social History Narrative     Not on file       FAMILY HISTORY:  Family History   Problem Relation Age of Onset     Breast Cancer Mother      Graves' disease Mother      Hypertension Father        REVIEW OF SYSTEMS:  [facility] ROS 2/4/2020   Constitutional Weight gain, Problems with sleep   Cardiopulmonary Wheezing   Gastrointestinal/Genitourinary Diarrhea   Allergy/Immunology Skin changes         PHYSICAL EXAM  Ht 1.626 m (5' 4\")   Wt 115.7 kg (255 lb)   BMI 43.77 kg/m       Constitutional: healthy, alert and no distress  ENT:   NOSE:  Septum slightly deviated to the left.  MOUTH:   MMM 4, tonsils 1+        ASSESSMENT:  1.  Snoring, possible SHERLY    PLAN:  1.  I briefly discussed with parents how hypoglossal nerve stimulation therapy works.  We " reviewed some of the criteria.  Right now it is too soon to tell if hypoglossal nerve stimulation therapy would be appropriate for Hany.  The question right now is whether or not he has obstructive sleep apnea.  Back in 2008 he did not he just had loud snoring and would be considered a primary snore.  Has been 11 years and sleep apnea does worsen with time and likely some weight gain.  In 2008 Hany was BMI of 39.  Currently has a BMI of 43.  Parents are little concerned about whether or not they would like to get a sleep study.  Mom has concerns about management for obstructive sleep apnea.  I discussed with him that I think management for obstructive sleep apnea would depend a lot on how severe potential sleep apnea might be as well as goals.  If he had no to mild to moderate apnea it would not be unreasonable to potentially just observe best treatment could be just as disruptive to sleep as having sleep apnea.  What I recommend is that they observe sleep for couple nights to see if that she is snoring if they are actually noting some gasping and choking events.  If they are noting chest gasping and choking events we should set him up for sleep study and a referral to sleep medicine.  They can give us a call once they decide how they would like to proceed.    I spent 35 minutes face-to-face with Hany Patel during today's office visit, of which more than 50% was spent on counseling and coordination of care, which included discussion of pathophysiology of patient's obstructive sleep apnea, treatment options, risks and benefits of each option.        Again, thank you for allowing me to participate in the care of your patient.      Sincerely,    Yenifer Morgan MD

## 2020-02-04 NOTE — NURSING NOTE
"Chief Complaint   Patient presents with     Consult     Snoring, SHERLY     Height 1.626 m (5' 4\"), weight 115.7 kg (255 lb).    Candy Akers, EMT    "

## 2020-02-04 NOTE — PROGRESS NOTES
SLEEP SURGERY CONSULTATION    Patient: Hany Patel  : 1995  CHIEF COMPLAINT: SHERLY    IDENTIFICATION: Parents consulted Dr. Morgan for surgical evaluation and possible treatment of obstructive sleep apnea syndrome for Hany Patel.      HPI:  Hany Patel is a 24 year old year old male who presents to clinic for possible Obstructive Sleep Apnea.  Patient had a sleep study performed at [facility] on 2008.  Overall AHI was 0.7.  Light to loud snoring was noted on study.  Snoring was worse in the supine position.  Parents are in clinic today because recently the patient was hospitalized with pneumonia.  Dad states several nights with the patient.  During this time he did notice some gasping and choking noises.  This was brought up to 1 of his physicians who recommended that they be seen by me for consideration for inspire.  Mom reports that they do not think that Hany would be able to use CPAP.  They did try CPAP several years ago and he just was not able to tolerate anything on his face so they discontinued it.  They do note that his sleep can be somewhat restless.        PAST MEDICAL HISTORY:  Past Medical History:   Diagnosis Date     Diarrhea     Diarrhea Episodes        PAST SURGICAL HISTORY:  Past Surgical History:   Procedure Laterality Date     INSERT CHEST TUBE Left 2017    Procedure: INSERT CHEST TUBE;  Surgeon: Coco Zambrano MD;  [facility]     SURGICAL HISTORY OF -       Ear tubes      SURGICAL HISTORY OF -       Adenoid removal        MEDICATIONS:  Current Outpatient Medications   Medication Sig Dispense Refill     albuterol (PROAIR HFA/PROVENTIL HFA/VENTOLIN HFA) 108 (90 Base) MCG/ACT inhaler INHALE 2 PUFFS INTO THE LUNGS EVERY 4 HOURS AS NEEDED FOR SHORTNESS OF BREATH OR DIFFICULT BREATHING OR WHEEZING 90 g 3     Ascorbic Acid (VITAMIN C) 500 MG CHEW Take 500 mg by mouth daily        Calcium Carbonate-Vitamin D (CALCIUM + D PO) Take 1 tablet by  mouth daily        escitalopram (LEXAPRO) 20 MG tablet Take 1 tablet (20 mg) by mouth daily 90 tablet 1     fish oil-omega-3 fatty acids 1000 MG capsule Take 1 g by mouth daily       ivermectin (SOOLANTRA) 1 % cream Apply topically daily as needed (for flares)       ketoconazole (NIZORAL) 2 % shampoo Apply to the affected area and wash off after 5 minutes. 120 mL 1     Lactobacillus Acid-Pectin (LACTOBACILLUS ACIDOPHILUS) TABS Take 1 tablet by mouth daily        levothyroxine (SYNTHROID, LEVOTHROID) 112 MCG tablet Take 112 mcg by mouth daily.       loperamide (IMODIUM) 2 MG capsule Take 2 mg by mouth daily (with breakfast) Take two tablets with breakfast       metFORMIN (GLUCOPHAGE) 1000 MG tablet Take 1,000 mg by mouth 2 times daily (with meals).       metroNIDAZOLE (METROGEL) 0.75 % topical gel Apply topically 2 times daily 45 g 11     Multiple Vitamin (MULTI-VITAMIN) per tablet Take 1 tablet by mouth daily. With D3       QUEtiapine (SEROQUEL) 6.25 mg TABS quarter-tab Take by mouth daily Unsure dose       simvastatin (ZOCOR) 20 MG tablet Take 1 tablet (20 mg) by mouth At Bedtime       UNABLE TO FIND MEDICATION NAME: acetylcysteine, for congestion       vitamin  B complex with vitamin C (VITAMIN  B COMPLEX) TABS Take 1 tablet by mouth daily       ketotifen (ZADITOR) 0.025 % SOLN ophthalmic solution Place 1 drop into both eyes every 12 hours (Patient not taking: Reported on 2/4/2020) 1 Bottle 3       ALLERGIES:  Allergies   Allergen Reactions     Seasonal Allergies        SOCIAL HISTORY:  Social History     Socioeconomic History     Marital status: Single     Spouse name: Not on file     Number of children: Not on file     Years of education: Not on file     Highest education level: Not on file   Occupational History     Not on file   Social Needs     Financial resource strain: Not on file     Food insecurity:     Worry: Not on file     Inability: Not on file     Transportation needs:     Medical: Not on file      "Non-medical: Not on file   Tobacco Use     Smoking status: Never Smoker     Smokeless tobacco: Never Used   Substance and Sexual Activity     Alcohol use: No     Drug use: No     Sexual activity: Never   Lifestyle     Physical activity:     Days per week: Not on file     Minutes per session: Not on file     Stress: Not on file   Relationships     Social connections:     Talks on phone: Not on file     Gets together: Not on file     Attends Buddhist service: Not on file     Active member of club or organization: Not on file     Attends meetings of clubs or organizations: Not on file     Relationship status: Not on file     Intimate partner violence:     Fear of current or ex partner: Not on file     Emotionally abused: Not on file     Physically abused: Not on file     Forced sexual activity: Not on file   Other Topics Concern     Parent/sibling w/ CABG, MI or angioplasty before 65F 55M? No   Social History Narrative     Not on file       FAMILY HISTORY:  Family History   Problem Relation Age of Onset     Breast Cancer Mother      Graves' disease Mother      Hypertension Father        REVIEW OF SYSTEMS:  [facility] E[facility] ROS 2/4/2020   Constitutional Weight gain, Problems with sleep   Cardiopulmonary Wheezing   Gastrointestinal/Genitourinary Diarrhea   Allergy/Immunology Skin changes         PHYSICAL EXAM  Ht 1.626 m (5' 4\")   Wt 115.7 kg (255 lb)   BMI 43.77 kg/m      Constitutional: healthy, alert and no distress  ENT:   NOSE:  Septum slightly deviated to the left.  MOUTH:   MMM 4, tonsils 1+        ASSESSMENT:  1.  Snoring, possible SHERLY    PLAN:  1.  I briefly discussed with parents how hypoglossal nerve stimulation therapy works.  We reviewed some of the criteria.  Right now it is too soon to tell if hypoglossal nerve stimulation therapy would be appropriate for Hany.  The question right now is whether or not he has obstructive sleep apnea.  Back in 2008 he did not he just had loud snoring and would be considered a " primary snore.  Has been 11 years and sleep apnea does worsen with time and likely some weight gain.  In 2008 Hany was BMI of 39.  Currently has a BMI of 43.  Parents are little concerned about whether or not they would like to get a sleep study.  Mom has concerns about management for obstructive sleep apnea.  I discussed with him that I think management for obstructive sleep apnea would depend a lot on how severe potential sleep apnea might be as well as goals.  If he had no to mild to moderate apnea it would not be unreasonable to potentially just observe best treatment could be just as disruptive to sleep as having sleep apnea.  What I recommend is that they observe sleep for couple nights to see if that she is snoring if they are actually noting some gasping and choking events.  If they are noting chest gasping and choking events we should set him up for sleep study and a referral to sleep medicine.  They can give us a call once they decide how they would like to proceed.    I spent 35 minutes face-to-face with Hany Patel during today's office visit, of which more than 50% was spent on counseling and coordination of care, which included discussion of pathophysiology of patient's obstructive sleep apnea, treatment options, risks and benefits of each option.

## 2020-02-10 DIAGNOSIS — F84.9 PERVASIVE DEVELOPMENTAL DISORDER: ICD-10-CM

## 2020-02-11 RX ORDER — ESCITALOPRAM OXALATE 20 MG/1
TABLET ORAL
Qty: 90 TABLET | Refills: 1 | Status: SHIPPED | OUTPATIENT
Start: 2020-02-11 | End: 2020-03-03

## 2020-02-11 NOTE — TELEPHONE ENCOUNTER
"Requested Prescriptions   Pending Prescriptions Disp Refills     escitalopram (LEXAPRO) 20 MG tablet [Pharmacy Med Name: ESCITALOPRAM 20MG TABLETS]  Last Written Prescription Date:  8/20/2019  Last Fill Quantity: 90 tabs,  # refills: 1   Last office visit: 1/2/2020 with prescribing provider:  Duncan   Future Office Visit:   90 tablet 1     Sig: TAKE 1 TABLET(20 MG) BY MOUTH DAILY       SSRIs Protocol Passed - 2/10/2020  1:13 PM        Passed - Recent (12 mo) or future (30 days) visit within the authorizing provider's specialty     Patient has had an office visit with the authorizing provider or a provider within the authorizing providers department within the previous 12 mos or has a future within next 30 days. See \"Patient Info\" tab in inbasket, or \"Choose Columns\" in Meds & Orders section of the refill encounter.              Passed - Medication is active on med list        Passed - Patient is age 18 or older         "

## 2020-02-11 NOTE — TELEPHONE ENCOUNTER
Prescription approved per [facility] Refill Protocol.    Cesilia Maurice, RN, BSN, PHN  [facility]: [place]

## 2020-02-26 ENCOUNTER — PATIENT OUTREACH (OUTPATIENT)
Dept: CARE COORDINATION | Facility: CLINIC | Age: 25
End: 2020-02-26

## 2020-02-26 NOTE — LETTER
[facility] CARE COORDINATION    March 3, 2020    Hany Patel  [address]   [place] [address]      Dear Hany,    I have been unsuccessful in reaching you since our last contact. At this time the Care Coordination team will make no further attempts to reach you, however this does not change your ability to continue receiving care from your providers at your primary care clinic. If you need additional support from a care coordinator in the future please contact the Community Health Worker at [phone].    All of us at [facility] are invested in your health and are here to assist you in meeting your goals.     Sincerely,    Candy Garcia  [facility] Health Worker   [facility]  [email or web address]  [email or web address]   Office: [phone]  Fax: [phone]

## 2020-03-02 NOTE — PROGRESS NOTES
Scheduled Follow Up Call: Attempt 2 Community Health Worker called and left a message for the patient. If the patient is returning my call, please transfer the patient to Candy ALLISON at [phone].     CHW will reach out to the  SW to see what the next step should be for the patient and his family. This is the second missed call and CHW sent a my chart message too.       Notes:  Have you found a psychiatrist for the patient    Anything new

## 2020-03-03 ENCOUNTER — TELEPHONE (OUTPATIENT)
Dept: FAMILY MEDICINE | Facility: CLINIC | Age: 25
End: 2020-03-03

## 2020-03-03 DIAGNOSIS — F84.9 PERVASIVE DEVELOPMENTAL DISORDER: ICD-10-CM

## 2020-03-03 DIAGNOSIS — L21.9 SEBORRHEIC DERMATITIS: ICD-10-CM

## 2020-03-03 DIAGNOSIS — L71.9 ROSACEA: ICD-10-CM

## 2020-03-03 DIAGNOSIS — J18.9 PNEUMONIA OF LEFT LOWER LOBE DUE TO INFECTIOUS ORGANISM: ICD-10-CM

## 2020-03-03 RX ORDER — METRONIDAZOLE 7.5 MG/G
GEL TOPICAL 2 TIMES DAILY
Qty: 45 G | Refills: 11 | Status: SHIPPED | OUTPATIENT
Start: 2020-03-03

## 2020-03-03 RX ORDER — ESCITALOPRAM OXALATE 20 MG/1
20 TABLET ORAL DAILY
Qty: 90 TABLET | Refills: 1 | Status: SHIPPED | OUTPATIENT
Start: 2020-03-03 | End: 2021-08-04

## 2020-03-03 RX ORDER — KETOCONAZOLE 20 MG/ML
SHAMPOO TOPICAL
Qty: 120 ML | Refills: 1 | Status: SHIPPED | OUTPATIENT
Start: 2020-03-03

## 2020-03-03 RX ORDER — ALBUTEROL SULFATE 90 UG/1
1-2 AEROSOL, METERED RESPIRATORY (INHALATION) EVERY 6 HOURS PRN
Qty: 90 G | Refills: 3 | Status: SHIPPED | OUTPATIENT
Start: 2020-03-03

## 2020-03-03 NOTE — TELEPHONE ENCOUNTER
Pharmacy [facility] calling about below medication clarification.    Given information.    Kallie Dupree, RN

## 2020-03-03 NOTE — TELEPHONE ENCOUNTER
Reason for Call:  Other prescription    Detailed comments: Clarification on medication, ketoconazole (NIZORAL) 2 % external shampoo, needing frequency on this medication.   Also, metroNIDAZOLE (METROGEL) 0.75 % external gel for amount of times a day or how frequently.     Phone Number Patient can be reached at: Other phone number:  [phone]    Best Time: anytime    Can we leave a detailed message on this number? YES    Call taken on 3/3/2020 at 3:48 PM by Hiro Mai

## 2020-03-03 NOTE — PROGRESS NOTES
CHW spoke with the CC SW and it was agreed that the patient could disenroll in the care coordination program at this time. The patient's family will call the CC SW for any future needs if they arise.     Scheduled Follow Up Call: Attempt 2 Community Health Worker called and left a message for the patient. If the patient is returning my call, please transfer the patient to Candy ALLISON at [phone].   I have called the patient 2 times over the past six weeks, mailed an disenrollment letter today and have been unsuccessful in reaching him/her.      Patient has been disenrolled from Clinic Care coordination services, should they return my call or answer my letter, I will discuss clinic care coordination re-enrollment.

## 2020-03-05 ENCOUNTER — TELEPHONE (OUTPATIENT)
Dept: FAMILY MEDICINE | Facility: CLINIC | Age: 25
End: 2020-03-05

## 2020-03-05 NOTE — TELEPHONE ENCOUNTER
Reason for Call:  Form, our goal is to have forms completed with 72 hours, however, some forms may require a visit or additional information.    Type of letter, form or note:  medical    Who is the form from?: [employer] (if other please explain)    Where did the form come from: Patient or family brought in       What clinic location was the form placed at?: [place] ([place])    Where the form was placed: Placed in [facility] [employer]'s box Box/Folder    What number is listed as a contact on the form?: [phone]       Additional comments: PT's Mother would like to have by early next week.      Call taken on 3/5/2020 at 1:15 PM by Mary Gomez

## 2020-03-10 NOTE — TELEPHONE ENCOUNTER
Patient's mother called stating that they are still waiting on this and would like a call when the form is completed and ready for  at: [phone].    Thank You,  Joaquín Ortez

## 2020-03-11 ENCOUNTER — TELEPHONE (OUTPATIENT)
Dept: FAMILY MEDICINE | Facility: CLINIC | Age: 25
End: 2020-03-11

## 2020-03-11 NOTE — TELEPHONE ENCOUNTER
COURTNEY received from [facility]. Patient's new group home, requesting last physical, immunization records, med list, and any concerns we may have.    Printed the above documents along with problem list and faxed to [phone]    Jeramie Viveros

## 2020-03-12 NOTE — TELEPHONE ENCOUNTER
Forms completed, signed, copy made for chart and placed at the  for .    Called patient's mom, Marcella, and LMOM informing her this form is ready.    Jeramie Viveros

## 2020-03-13 DIAGNOSIS — R53.83 OTHER FATIGUE: ICD-10-CM

## 2020-03-13 DIAGNOSIS — F84.9 PERVASIVE DEVELOPMENTAL DISORDER: Primary | ICD-10-CM

## 2020-03-13 DIAGNOSIS — R19.7 DIARRHEA, UNSPECIFIED TYPE: ICD-10-CM

## 2020-03-13 NOTE — TELEPHONE ENCOUNTER
Requested Prescriptions   Pending Prescriptions Disp Refills     Probiotic Product (ACIDOPHILUS PROBIOTIC BLEND) CAPS [Pharmacy Med Name: Acidophilus Probiotic Blend Capsule]  (HISTORICAL)        Last Written Prescription Date:  na  Last Fill Quantity: na,   # refills: na  Last Office Visit: 1/2/2020  w/ THAI Song  Future Office visit:       Routing refill request to provider for review/approval because:  Medication is reported/historical 1 capsule 11     Sig: TAKE 1 CAPSULE BY MOUTH ONCE DAILY *NO REFILLS REMAINING, FAXING FOR REFILLS*       There is no refill protocol information for this order              B Complex Vitamins (VITAMIN B-COMPLEX) TABS [Pharmacy Med Name: Vitamin B-Complex Tablet]  (HISTORICAL)        Last Written Prescription Date:  na  Last Fill Quantity: na,   # refills: na  Last Office Visit: 1/2/2020  master/ THAI Song  Future Office visit:       Routing refill request to provider for review/approval because:  Medication is reported/historical 1 tablet 11     Sig: TAKE 1 TABLET BY MOUTH ONCE DAILY *NO REFILLS REMAINING, FAXING FOR REFILLS*       There is no refill protocol information for this order                loperamide (IMODIUM) 2 MG capsule [Pharmacy Med Name: Loperamide HCl 2 MG Capsule]  (HISTORICAL)        Last Written Prescription Date:  na  Last Fill Quantity: na,   # refills: na  Last Office Visit: 1/2/2020 w/ Duncan S  Future Office visit:       Routing refill request to provider for review/approval because:  Medication is reported/historical 1 capsule 11     Sig: TAKE 1 TABLET BY MOUTH EVERY MORNING *NO REFILLS REMAINING, FAXING FOR REFILLS*       There is no refill protocol information for this order

## 2020-03-13 NOTE — TELEPHONE ENCOUNTER
PT's Mother and guardian, Marcella Crump, picked up forms at , ID verified, signed and documented in book.

## 2020-03-13 NOTE — TELEPHONE ENCOUNTER
Requested Prescriptions   Pending Prescriptions Disp Refills      MG CAPS capsule [Pharmacy Med Name:  MG Capsule]        Last Written Prescription Date:  na  Last Fill Quantity: na,   # refills: na  Last Office Visit: 1/2/2020 master/ THAI Song  Future Office visit:       Routing refill request to provider for review/approval because:  Drug not active on patient's medication list 1 capsule 11     Sig: TAKE 1 CAPSULE BY MOUTH ONCE DAILY *NO REFILLS REMAINING, FAXING FOR REFILLS*       There is no refill protocol information for this order

## 2020-03-16 DIAGNOSIS — Q90.9 DOWN'S SYNDROME: Primary | ICD-10-CM

## 2020-03-16 NOTE — TELEPHONE ENCOUNTER
"Requested Prescriptions   Pending Prescriptions Disp Refills     multivitamin w/minerals (MULTI-VITAMIN) tablet [Pharmacy Med Name: CENTRUM ADULT TAB 200CT Tablet]   (HISTORICAL)    Last Written Prescription Date:  na  Last Fill Quantity: na,   # refills: na  Last Office Visit: 1/2/2020 w/ THAI Song  Future Office visit:       Routing refill request to provider for review/approval because:  Drug not active on patient's medication list  Medication is reported/historical   200 tablet 11     Sig: TAKE 1 TABLET BY MOUTH ONCE DAILY *NO REFILLS REMAINING, FAXING FOR REFILLS*       Vitamin Supplements (Adult) Protocol Passed - 3/16/2020  8:37 AM        Passed - High dose Vitamin D not ordered        Passed - Recent (12 mo) or future (30 days) visit within the authorizing provider's specialty     Patient has had an office visit with the authorizing provider or a provider within the authorizing providers department within the previous 12 mos or has a future within next 30 days. See \"Patient Info\" tab in inbasket, or \"Choose Columns\" in Meds & Orders section of the refill encounter.              Passed - Medication is active on med list           "

## 2020-03-17 RX ORDER — MULTIPLE VITAMINS W/ MINERALS TAB 9MG-400MCG
TAB ORAL
Qty: 200 TABLET | Refills: 11 | Status: SHIPPED | OUTPATIENT
Start: 2020-03-17

## 2020-03-17 RX ORDER — ACETYLCYSTEINE 600 MG
CAPSULE ORAL
Qty: 1 CAPSULE | Refills: 11 | OUTPATIENT
Start: 2020-03-17

## 2020-03-17 NOTE — TELEPHONE ENCOUNTER
Not aware of having ever prescribed these. The family gets them over the counter.    Also - please clarify the Loperamide. I don't think he's taking every day. We've also never prescribed. The family buys OTC. Please find out how often he's actually taking.     He may have moved into a group home which is why we are getting requests - but these are all OTC and we've never prescribed.     Liu Song, MPAS, PA-C

## 2020-03-19 RX ORDER — BIOTIN 10 MG
1 TABLET ORAL DAILY
Qty: 30 TABLET | Refills: 11 | Status: ON HOLD | OUTPATIENT
Start: 2020-03-19 | End: 2021-01-29

## 2020-03-19 RX ORDER — CRANBERRY FRUIT EXTRACT 200 MG
1 CAPSULE ORAL DAILY
Qty: 30 CAPSULE | Refills: 11 | Status: SHIPPED | OUTPATIENT
Start: 2020-03-19

## 2020-03-19 RX ORDER — LOPERAMIDE HCL 2 MG
2 CAPSULE ORAL EVERY MORNING
Qty: 30 CAPSULE | Refills: 11 | Status: SHIPPED | OUTPATIENT
Start: 2020-03-19 | End: 2022-09-01

## 2020-03-19 RX ORDER — VITAMIN B COMPLEX
1 TABLET ORAL DAILY
Qty: 30 TABLET | Refills: 11 | Status: SHIPPED | OUTPATIENT
Start: 2020-03-19

## 2020-03-19 NOTE — TELEPHONE ENCOUNTER
"I called and spoke to mother and father, indeed patient is now in a group home and they need prescriptions sent so that they can give the medications including PRNs.    They also are wondering if Nelson Song would recommend an immune support supplement; it is not on his current med list but is a Vit c/elderberry and some other homeopathic ingredients that supposedly boost immune function.    They would like to boost his immune function with the covid outbreak.    Does Nelson think something like that is a good idea?    I could not find \"Immune Forte\" they report using but cued up a \"immune support chewable\".    Routed to provider to advise and send Rx's for over the counter items as appropriate.      Alicia Arnold RN  [facility]      "

## 2020-03-20 NOTE — TELEPHONE ENCOUNTER
"Noted.  I think Dr. Florence sent some of these a few days ago.    He is on Vitamin C right now and  mg twice a day. That's fine. There aren't any evidence based \"immune boosting\" supplements that we know are safe right now. I wouldn't add anything.    Thanks.  Liu Song, MPAS, PA-C   "

## 2020-03-27 ENCOUNTER — TELEPHONE (OUTPATIENT)
Dept: FAMILY MEDICINE | Facility: CLINIC | Age: 25
End: 2020-03-27

## 2020-03-27 NOTE — TELEPHONE ENCOUNTER
This may be a challenging question. Hany was on a load of over the counter meds that his family provided but his group home required a prescription. Additionally, he has some specialists that have prescribed meds for him, so that part may be unclear.    Let me know what / if additional questions they have if it is unclear after discussing with their group home.  Thanks.  Liu Song, MPAS, PA-C

## 2020-03-27 NOTE — TELEPHONE ENCOUNTER
Reason for Call:  Medication or medication refill:    Do you use a [facility]?  No  Name of the pharmacy and phone number for the current request:       [facility], [facility]. - [place], [place] - [address] [address]    Name of the medication requested:      Other request:  Florencia HUGO, at patient's group home is calling to discuss the patient's medications.  They are wanting to know what medications the patient is actually supposed to be taking.  They have conflicting information.    Can we leave a detailed message on this number?  Yes    Phone number Caller can be reached at:    Florencia HUGO, [phone]    Best Time: ASA    Call taken on 3/27/2020 at 12:04 PM by Alida Hernandez

## 2020-03-30 NOTE — TELEPHONE ENCOUNTER
Patient/family was instructed to return call to [facility], directly on the RN Call back line at [phone].      Kallie Dupree RN

## 2020-04-06 ENCOUNTER — TELEPHONE (OUTPATIENT)
Dept: FAMILY MEDICINE | Facility: CLINIC | Age: 25
End: 2020-04-06

## 2020-04-06 NOTE — TELEPHONE ENCOUNTER
It appears this has been filled by another provider outside of our clinic. Please call care facility and have them request from prescribing MD

## 2020-04-08 NOTE — TELEPHONE ENCOUNTER
See my previous note. Nelson Zaidi has not been refilling this medication. Please review with pharmacy or care facility to see who has and direct it to them     Nicholas Guerrier MD

## 2020-04-09 RX ORDER — QUETIAPINE FUMARATE 25 MG/1
TABLET, FILM COATED ORAL
Qty: 45 TABLET | Refills: 11 | OUTPATIENT
Start: 2020-04-09

## 2020-04-09 NOTE — TELEPHONE ENCOUNTER
Pharmacy is calling to state that the patient is not seeing  Dr Vini Ngo, who is the prescribing provider, anymore, and they will not refill the medication.. The pharmacy was told to request from the patient's current PCP.  Please call Luann at [facility] with any questions.  She can be reached at [phone].  It is ok to leave a voice mail.    Leslie Hernandez  Patient Representative

## 2020-04-09 NOTE — TELEPHONE ENCOUNTER
Marcella (mother) reports that the patient does not take this medication anymore.  She will call the group [facility] patient lives at as well as [facility] to relay this information as we were not the prescribing clinic.      Kallie Dupree RN

## 2020-05-15 NOTE — TELEPHONE ENCOUNTER
Mom called and said that [facility] called her and told her they never got these forms please resend fax to [phone] phone is [phone]

## 2020-05-18 NOTE — TELEPHONE ENCOUNTER
Physical from 8/9/19, med list, and immunization record faxed again as requested.    Jeramie Viveros

## 2020-05-19 NOTE — TELEPHONE ENCOUNTER
Fax from yesterday did not go through. Called [facility] and verified that we have the correct fax number. Info emailed to [email or web address]     Jeramie Viveros

## 2020-06-23 NOTE — TELEPHONE ENCOUNTER
Mom, Marcella, called to scheduled an appointment for the patient and stated the nurse had told her that they were still waiting on records for for the patient.    Marcella [phone]    Leslie Hernandez  Patient Representative

## 2020-07-09 ENCOUNTER — TELEPHONE (OUTPATIENT)
Dept: FAMILY MEDICINE | Facility: CLINIC | Age: 25
End: 2020-07-09

## 2020-07-15 NOTE — TELEPHONE ENCOUNTER
Faxed.    Katerine Andres      
Forms received from [facility]: physician's orders for medications for Nelson Song PA-C.  Forms placed in provider 'sign me' folder.  Please fax forms to [phone] after completion.    Jeramie Viveros        
0

## 2020-07-17 ENCOUNTER — OFFICE VISIT (OUTPATIENT)
Dept: FAMILY MEDICINE | Facility: CLINIC | Age: 25
End: 2020-07-17
Payer: MEDICARE

## 2020-07-17 ENCOUNTER — TELEPHONE (OUTPATIENT)
Dept: FAMILY MEDICINE | Facility: CLINIC | Age: 25
End: 2020-07-17

## 2020-07-17 VITALS
SYSTOLIC BLOOD PRESSURE: 106 MMHG | HEIGHT: 64 IN | HEART RATE: 88 BPM | BODY MASS INDEX: 39.98 KG/M2 | TEMPERATURE: 98.7 F | DIASTOLIC BLOOD PRESSURE: 68 MMHG | OXYGEN SATURATION: 97 % | WEIGHT: 234.2 LBS

## 2020-07-17 DIAGNOSIS — E78.5 DYSLIPIDEMIA: ICD-10-CM

## 2020-07-17 DIAGNOSIS — R73.02 GLUCOSE INTOLERANCE (IMPAIRED GLUCOSE TOLERANCE): ICD-10-CM

## 2020-07-17 DIAGNOSIS — Q90.9 DOWN'S SYNDROME: ICD-10-CM

## 2020-07-17 DIAGNOSIS — L21.9 SEBORRHEIC DERMATITIS: ICD-10-CM

## 2020-07-17 DIAGNOSIS — Z00.00 ROUTINE GENERAL MEDICAL EXAMINATION AT A HEALTH CARE FACILITY: Primary | ICD-10-CM

## 2020-07-17 DIAGNOSIS — K52.9 CHRONIC DIARRHEA: ICD-10-CM

## 2020-07-17 DIAGNOSIS — E66.01 MORBID OBESITY (H): ICD-10-CM

## 2020-07-17 DIAGNOSIS — E88.819 INSULIN RESISTANCE: ICD-10-CM

## 2020-07-17 DIAGNOSIS — J63.2: ICD-10-CM

## 2020-07-17 DIAGNOSIS — Z00.00 ENCOUNTER FOR MEDICARE ANNUAL WELLNESS EXAM: ICD-10-CM

## 2020-07-17 DIAGNOSIS — Q90.9 DOWN'S SYNDROME: Primary | ICD-10-CM

## 2020-07-17 LAB
CHOLEST SERPL-MCNC: 132 MG/DL
FSH SERPL-ACNC: 11 IU/L (ref 0.7–10.8)
GLUCOSE SERPL-MCNC: 89 MG/DL (ref 70–99)
HBA1C MFR BLD: 5.3 % (ref 0–5.6)
HDLC SERPL-MCNC: 32 MG/DL
LDLC SERPL CALC-MCNC: 62 MG/DL
LH SERPL-ACNC: 10.8 IU/L (ref 1.5–9.3)
MISCELLANEOUS TEST: NORMAL
NONHDLC SERPL-MCNC: 100 MG/DL
PROLACTIN SERPL-MCNC: 12 UG/L (ref 2–18)
T3FREE SERPL-MCNC: 2.6 PG/ML (ref 2.3–4.2)
T4 FREE SERPL-MCNC: 1.14 NG/DL (ref 0.76–1.46)
TRIGL SERPL-MCNC: 192 MG/DL

## 2020-07-17 PROCEDURE — 90471 IMMUNIZATION ADMIN: CPT | Performed by: PHYSICIAN ASSISTANT

## 2020-07-17 PROCEDURE — 90715 TDAP VACCINE 7 YRS/> IM: CPT | Performed by: PHYSICIAN ASSISTANT

## 2020-07-17 PROCEDURE — 80061 LIPID PANEL: CPT | Performed by: FAMILY MEDICINE

## 2020-07-17 PROCEDURE — 84146 ASSAY OF PROLACTIN: CPT | Performed by: INTERNAL MEDICINE

## 2020-07-17 PROCEDURE — G0439 PPPS, SUBSEQ VISIT: HCPCS | Performed by: PHYSICIAN ASSISTANT

## 2020-07-17 PROCEDURE — 36415 COLL VENOUS BLD VENIPUNCTURE: CPT | Performed by: FAMILY MEDICINE

## 2020-07-17 PROCEDURE — 82306 VITAMIN D 25 HYDROXY: CPT | Performed by: INTERNAL MEDICINE

## 2020-07-17 PROCEDURE — 83036 HEMOGLOBIN GLYCOSYLATED A1C: CPT | Performed by: FAMILY MEDICINE

## 2020-07-17 PROCEDURE — 84403 ASSAY OF TOTAL TESTOSTERONE: CPT | Performed by: INTERNAL MEDICINE

## 2020-07-17 PROCEDURE — 82947 ASSAY GLUCOSE BLOOD QUANT: CPT | Performed by: FAMILY MEDICINE

## 2020-07-17 PROCEDURE — 84270 ASSAY OF SEX HORMONE GLOBUL: CPT | Performed by: INTERNAL MEDICINE

## 2020-07-17 PROCEDURE — 84439 ASSAY OF FREE THYROXINE: CPT | Performed by: FAMILY MEDICINE

## 2020-07-17 PROCEDURE — 84481 FREE ASSAY (FT-3): CPT | Performed by: INTERNAL MEDICINE

## 2020-07-17 PROCEDURE — 83001 ASSAY OF GONADOTROPIN (FSH): CPT | Performed by: FAMILY MEDICINE

## 2020-07-17 PROCEDURE — 83002 ASSAY OF GONADOTROPIN (LH): CPT | Performed by: FAMILY MEDICINE

## 2020-07-17 RX ORDER — KETOCONAZOLE 20 MG/G
CREAM TOPICAL
Qty: 30 G | Refills: 2 | Status: SHIPPED | OUTPATIENT
Start: 2020-07-17 | End: 2021-08-31

## 2020-07-17 ASSESSMENT — MIFFLIN-ST. JEOR: SCORE: 1948.32

## 2020-07-17 NOTE — PATIENT INSTRUCTIONS
Preventive Health Recommendations  Male Ages 26 - 39    Yearly exam:             See your health care provider every year in order to  o   Review health changes.   o   Discuss preventive care.    o   Review your medicines if your doctor has prescribed any.    You should be tested each year for STDs (sexually transmitted diseases), if you re at risk.     After age 35, talk to your provider about cholesterol testing. If you are at risk for heart disease, have your cholesterol tested at least every 5 years.     If you are at risk for diabetes, you should have a diabetes test (fasting glucose).  Shots: Get a flu shot each year. Get a tetanus shot every 10 years.     Nutrition:    Eat at least 5 servings of fruits and vegetables daily.     Eat whole-grain bread, whole-wheat pasta and brown rice instead of white grains and rice.     Get adequate Calcium and Vitamin D.     Lifestyle    Exercise for at least 150 minutes a week (30 minutes a day, 5 days a week). This will help you control your weight and prevent disease.     Limit alcohol to one drink per day.     No smoking.     Wear sunscreen to prevent skin cancer.     See your dentist every six months for an exam and cleaning.     Patient Education   Personalized Prevention Plan  You are due for the preventive services outlined below.  Your care team is available to assist you in scheduling these services.  If you have already completed any of these items, please share that information with your care team to update in your medical record.  Health Maintenance Due   Topic Date Due     HIV Screening  02/06/2008     Diptheria Tetanus Pertussis (DTAP/TDAP/TD) Vaccine (8 - Td) 09/17/2018     Annual Wellness Visit  08/09/2020

## 2020-07-17 NOTE — PROGRESS NOTES
"  SUBJECTIVE:   Hany Patel is a 25 year old male who presents for Preventive Visit.    Are you in the first 12 months of your Medicare Part B coverage?  No, Medicaid     Physical Health:    In general, how would you rate your overall physical health? Fair to good    Outside of work, how many days during the week do you exercise? none does push ups and sits up    Outside of work, approximately how many minutes a day do you exercise?not applicable    If you drink alcohol do you typically have >3 drinks per day or >7 drinks per week? No    Do you usually eat at least 4 servings of fruit and vegetables a day, include whole grains & fiber and avoid regularly eating high fat or \"junk\" foods? Yes    Do you have any problems taking medications regularly?  No    Do you have any side effects from medications? not applicable    Needs assistance for the following daily activities: transportation, preparing meals, money management and taking medicine    Which of the following safety concerns are present in your home?  none identified     Hearing impairment: No    In the past 6 months, have you been bothered by leaking of urine? no    Mental Health:    In general, how would you rate your overall mental or emotional health? Fair, not being able to do things because of Covid.   PHQ-2 Score:      Do you feel safe in your environment? Yes    Have you ever done Advance Care Planning? (For example, a Health Directive, POLST, or a discussion with a medical provider or your loved ones about your wishes): Yes, advance care planning is on file.    Additional concerns to address?  None    Fall risk:  Fallen 2 or more times in the past year?: No  Any fall with injury in the past year?: No    Cognitive Screening: Not appropriate due to mental handicap    Do you have sleep apnea, excessive snoring or daytime drowsiness?: no        Reviewed and updated as needed this visit by clinical staff  Tobacco  Allergies  Meds  Med Hx  Surg Hx  Fam " "Hx  Soc Hx        Reviewed and updated as needed this visit by Provider        Social History     Tobacco Use     Smoking status: Never Smoker     Smokeless tobacco: Never Used   Substance Use Topics     Alcohol use: No                           Current providers sharing in care for this patient include:   Patient Care Team:  Liu Song PA-C as PCP - General (Family Practice)  Oziel Perry MD as MD (Pediatrics)  Liu Song PA-C as Assigned PCP    The following health maintenance items are reviewed in Epic and correct as of today:  Health Maintenance   Topic Date Due     HIV SCREENING  02/06/2008     DTAP/TDAP/TD IMMUNIZATION (8 - Td) 09/17/2018     MEDICARE ANNUAL WELLNESS VISIT  08/09/2020     INFLUENZA VACCINE (1) 09/01/2020     PHQ-2  Completed     IPV IMMUNIZATION  Completed     HEPATITIS B IMMUNIZATION  Completed     MENINGITIS IMMUNIZATION  Aged Out     Lab work is in process    ROS:  Constitutional, HEENT, cardiovascular, pulmonary, GI, , musculoskeletal, neuro, skin, endocrine and psych systems are negative, except as otherwise noted.    OBJECTIVE:   /68 (BP Location: Right arm, Patient Position: Chair, Cuff Size: Adult Large)   Pulse 88   Temp 98.7  F (37.1  C) (Oral)   Ht 1.626 m (5' 4\")   Wt 106.2 kg (234 lb 3.2 oz)   SpO2 97%   BMI 40.20 kg/m   Estimated body mass index is 40.2 kg/m  as calculated from the following:    Height as of this encounter: 1.626 m (5' 4\").    Weight as of this encounter: 106.2 kg (234 lb 3.2 oz).  EXAM:   GENERAL: healthy, alert and no distress  EYES: Eyes grossly normal to inspection, PERRL and conjunctivae and sclerae normal  HENT: ear canals and TM's normal, nose and mouth without ulcers or lesions  NECK: no adenopathy, no asymmetry, masses, or scars and thyroid normal to palpation  RESP: lungs clear to auscultation - no rales, rhonchi or wheezes  CV: regular rate and rhythm, normal S1 S2, no S3 or S4, no murmur, click or rub, no " "peripheral edema and peripheral pulses strong  ABDOMEN: soft, nontender, no hepatosplenomegaly, no masses and bowel sounds normal  MS: no gross musculoskeletal defects noted, no edema  SKIN: no suspicious lesions or rashes  NEURO: Normal strength and tone, mentation intact and speech normal    Diagnostic Test Results:  Labs reviewed in Epic    ASSESSMENT / PLAN:   (Z00.00) Routine general medical examination at a health care facility  (primary encounter diagnosis)  Comment: Well person   Plan:      ADMIN VACCINE, FIRST            (Z00.00) Encounter for Medicare annual wellness exam  Comment:   Plan: Stable, well person     (E66.01) Morbid obesity (H)  Comment:   Plan: Weight. He is down. Has lost weight. He is congratulated on this. He is living a group home and I expect they are focused on his diet more.    (Q90.9) Down's syndrome  Comment:   Plan: Doing well in a group home, better in fact.    (K52.9) Chronic diarrhea  Comment:   Plan: Digestion is better.     (L21.9) Seborrheic dermatitis  Comment:   Plan: ketoconazole (NIZORAL) 2 % external cream        Noted on both ear canals. Reviewed options. Will treat. This prescription is given with a discussion of side effects, risks and proper use.  Instructions are given to follow up if not improving or symptoms change or worsen as discussed.       COUNSELING:  Reviewed preventive health counseling, as reflected in patient instructions    Estimated body mass index is 40.2 kg/m  as calculated from the following:    Height as of this encounter: 1.626 m (5' 4\").    Weight as of this encounter: 106.2 kg (234 lb 3.2 oz).         reports that he has never smoked. He has never used smokeless tobacco.      Appropriate preventive services were discussed with this patient, including applicable screening as appropriate for cardiovascular disease, diabetes, osteopenia/osteoporosis, and glaucoma.  As appropriate for age/gender, discussed screening for colorectal cancer, prostate " cancer, breast cancer, and cervical cancer. Checklist reviewing preventive services available has been given to the patient.    Reviewed patients plan of care and provided an AVS. The Basic Care Plan (routine screening as documented in Health Maintenance) for Hany meets the Care Plan requirement. This Care Plan has been established and reviewed with the Patient.    Counseling Resources:  ATP IV Guidelines  Pooled Cohorts Equation Calculator  Breast Cancer Risk Calculator  FRAX Risk Assessment  ICSI Preventive Guidelines  Dietary Guidelines for Americans, 2010  USDA's MyPlate  ASA Prophylaxis  Lung CA Screening    YULISA SALGADO PA-C  [facility]

## 2020-07-19 LAB
LOCATION PERFORMED: NORMAL
RESULT: NORMAL
SEND OUTS MISC TEST CODE: NORMAL
SEND OUTS MISC TEST SPECIMEN: NORMAL
TEST NAME: NORMAL

## 2020-07-20 LAB — DEPRECATED CALCIDIOL+CALCIFEROL SERPL-MC: 41 UG/L (ref 20–75)

## 2020-07-21 ENCOUNTER — TELEPHONE (OUTPATIENT)
Dept: FAMILY MEDICINE | Facility: CLINIC | Age: 25
End: 2020-07-21

## 2020-07-21 ENCOUNTER — MEDICAL CORRESPONDENCE (OUTPATIENT)
Dept: HEALTH INFORMATION MANAGEMENT | Facility: CLINIC | Age: 25
End: 2020-07-21

## 2020-07-21 LAB
RESULT: NORMAL
SEND OUTS MISC TEST CODE: NORMAL
SEND OUTS MISC TEST SPECIMEN: NORMAL
SHBG SERPL-SCNC: 20 NMOL/L (ref 11–80)
TEST NAME: NORMAL
TESTOST FREE SERPL-MCNC: 4.51 NG/DL (ref 4.7–24.4)
TESTOST SERPL-MCNC: 184 NG/DL (ref 240–950)

## 2020-07-21 NOTE — TELEPHONE ENCOUNTER
Forms received from [facility]/ Yearly Prescriber orders - Medications (08/2020)  for Nelson Song PA-C.  Forms placed in provider 'sign me' folder.  Please fax forms to [phone] after completion.    Katerine Andres  Patient Representative

## 2020-07-22 NOTE — TELEPHONE ENCOUNTER
Form signed and faxed. Copy made for chart.    Thank you,  Diane QUINTANILLA    NE Team Lyn[place]    
Forms received from [facility]/ Physical Examination Report and Physician Orders  - Medications also requesting Appointment notes from 7/17/20 visit  for Nelson Song PA-C;.  Forms placed in provider 'sign me' folder.  Please fax forms to [phone] after completion.    Katerine Andres  Patient Representative      
Reason for Call:  Form, our goal is to have forms completed with 72 hours, however, some forms may require a visit or additional information.    Type of letter, form or note:  medical    Who is the form from?: Group Home (if other please explain)    Where did the form come from: Physical form being faxed in and requesting visit summary report from office visit 7/17/2020 at 11:20am to be mailed    What clinic location was the form placed at?: [facility])    Where the form was placed: Being faxed to [phone] and would like visit summary     What number is listed as a contact on the form?: [phone]       Additional comments: FAX and phone are the same (phone would have to be disconnect in order for fax to go through) so needs to be mailed to [address] [place] [address]  [id number]    Call taken on 7/17/2020 at 1:47 PM by Mary Gomez        
No

## 2020-07-23 ENCOUNTER — TRANSFERRED RECORDS (OUTPATIENT)
Dept: HEALTH INFORMATION MANAGEMENT | Facility: CLINIC | Age: 25
End: 2020-07-23

## 2020-10-05 ENCOUNTER — TELEPHONE (OUTPATIENT)
Dept: FAMILY MEDICINE | Facility: CLINIC | Age: 25
End: 2020-10-05

## 2020-10-05 NOTE — TELEPHONE ENCOUNTER
Jalen was called back    Patient missed several doses of medication. It appears that he takes many of these medications as a HISTORICAL status.     She will call the prescribing Provider for the medication not prescribed by PCP.    Kallie Dupree RN

## 2020-10-05 NOTE — TELEPHONE ENCOUNTER
Reason for Call:  Other / Med consult    Detailed comments: Jalen with [facility] called and stated there has been  medications error yesterday morning, and they would like to consult with Nurse if there are any precautions they should take or how to follow up with patient's medications. Please call Jalen back to discuss at [phone] from 8:00 to 4:00, but please do not leave message.    Phone Number Patient can be reached at: Home number on file [phone] (home)    Best Time: ASAP    Can we leave a detailed message on this number? NO    Call taken on 10/5/2020 at 1:55 PM by Monica Rodríguez

## 2020-10-09 ENCOUNTER — TELEPHONE (OUTPATIENT)
Dept: FAMILY MEDICINE | Facility: CLINIC | Age: 25
End: 2020-10-09

## 2020-10-09 NOTE — TELEPHONE ENCOUNTER
Reason for Call: Request for an order or referral:    Order or referral being requested: written order to take melatonin with night time meds    Date needed: at your convenience    Has the patient been seen by the PCP for this problem? NO    Additional comments: Jalen calling from group home to ask for pm melatonin order. She states patient has had some nighttime behavioral issues, and he is up until 3-4 am. Then he is tired and grumpy during the day.    Jalen requesting order to be faxed to her at [phone]. Please call with any question    Phone number Patient can be reached at:  Other phone number:  [phone] Jalen*    Best Time:  anytime    Can we leave a detailed message on this number?  YES    Call taken on 10/9/2020 at 11:45 AM by Jeramie Viveros

## 2020-10-09 NOTE — TELEPHONE ENCOUNTER
Letter faxed with a note to group home to let us know if patient's behavior continues.    Jeramie Viveros

## 2020-10-09 NOTE — LETTER
[facility]  [address] [address]  [phone]          October 9, 2020    RE:  Hany Patel                                                                                                                                                       [address] [address]            To whom it may concern:    Hany can be given a 1 mg Melatonin, 30 minutes before his usual bedtime every night.     Sincerely,        Liu Song, MPAS, PA-C

## 2020-10-09 NOTE — TELEPHONE ENCOUNTER
Letter in epic.  Please let them know to update us if it is not working.  Thanks.  Liu Song, MPAS, PA-C

## 2020-10-12 NOTE — TELEPHONE ENCOUNTER
Reason for Call:  Other     Detailed comments: Jalen is calling from [facility] and they are having some issues with their fax machine. She is wanting to know if we could fax the order/ letter to [facility] at fax [phone].     Phone Number Patient can be reached at: Other phone number:  [phone]    Best Time:     Can we leave a detailed message on this number? YES    Call taken on 10/12/2020 at 10:45 AM by Katerine Andres

## 2020-12-07 ENCOUNTER — TELEPHONE (OUTPATIENT)
Dept: FAMILY MEDICINE | Facility: CLINIC | Age: 25
End: 2020-12-07

## 2020-12-07 DIAGNOSIS — Q90.9 DOWN'S SYNDROME: Primary | ICD-10-CM

## 2020-12-07 RX ORDER — ELECTROLYTES/DEXTROSE
1 SOLUTION, ORAL ORAL DAILY
Qty: 90 TABLET | Refills: 1 | Status: CANCELLED | OUTPATIENT
Start: 2020-12-07

## 2020-12-07 RX ORDER — MULTIVITAMIN
1 TABLET ORAL DAILY
Qty: 90 TABLET | Refills: 3 | Status: ON HOLD | OUTPATIENT
Start: 2020-12-07 | End: 2021-01-29

## 2020-12-07 NOTE — TELEPHONE ENCOUNTER
[facility] faxed Medication Change Request re: CERTAVITE & CEROVITE    Reason for requesting name/strength:  CERTAVITE and CEROVITE are on backorder per

## 2020-12-07 NOTE — TELEPHONE ENCOUNTER
"Forwarding request for multivitamin without minerals Rx to PCP.  Pended.    Reached out to [facility] pharmacy for more information and to see if they can just make a substitution of multivitamin.  RN sees \"multivitamin with minerals\" on current med list-last Rx done 3/17/20 for a year worth.  Message from pharmacy below states they are requesting an alternative for Certavite & Cerovite due to backorder per .  Pharmacist states patient's insurance doesn't cover multivitamin with minerals, so they're requesting a new order for one without minerals if okay.     Norma Johnston, BETHEL    "

## 2021-01-26 ENCOUNTER — VIRTUAL VISIT (OUTPATIENT)
Dept: FAMILY MEDICINE | Facility: CLINIC | Age: 26
End: 2021-01-26
Payer: MEDICARE

## 2021-01-26 ENCOUNTER — TELEPHONE (OUTPATIENT)
Dept: FAMILY MEDICINE | Facility: CLINIC | Age: 26
End: 2021-01-26

## 2021-01-26 DIAGNOSIS — Z20.822 EXPOSURE TO 2019 NOVEL CORONAVIRUS: Primary | ICD-10-CM

## 2021-01-26 PROCEDURE — 99441 PR PHYSICIAN TELEPHONE EVALUATION 5-10 MIN: CPT | Mod: CS | Performed by: NURSE PRACTITIONER

## 2021-01-26 NOTE — PROGRESS NOTES
"Hany is a 25 year old who is being evaluated via a billable telephone visit.      What phone number would you like to be contacted at? [phone]  How would you like to obtain your AVS?   Assessment & Plan   Problem List Items Addressed This Visit     None      Visit Diagnoses     Exposure to 2019 novel coronavirus    -  Primary         Symptomatic according to CDC guidelines and +exposure to + case of COVID in . Patient has down's and has history of pneumonia x2 with resp failure requiring intubation, and has DM pt is at high risk of decompensation.   Advised  manager to do vitals every 4 hrs with oxygen saturation. Aware of when to seek urgent care with oxygen saturations <90, SOB, or worsening symptoms.     For now treat fever with tylenol, rest and hydrate.     15  minutes spent on the date of the encounter doing chart review, history and exam, documentation and further activities as noted above         BMI:   Estimated body mass index is 40.2 kg/m  as calculated from the following:    Height as of 7/17/20: 1.626 m (5' 4\").    Weight as of 7/17/20: 106.2 kg (234 lb 3.2 oz).       No follow-ups on file.    TAMEKA Coker CNP  [facility]    Subjective     Hany is a 25 year old who presents to clinic today for the following health issues    manager Janessa on the phone   HPI     Concern for COVID-19  About how many days ago did these symptoms start? 2 days  Is this your first visit for this illness? Yes  In the 14 days before your symptoms started, have you had close contact with someone with COVID-19 (Coronavirus)? Yes, I have been in contact with someone who has COVID-19/Coronavirus (confirmed by lab test).  Do you have a fever or chills? Yes, the highest temperature was 101.3 this morning   Are you having new or worsening difficulty breathing? No  Do you have new or worsening cough? Yes, it's a dry cough.   Have you had any new or unexplained body aches? No    Have you " experienced any of the following NEW symptoms?    Headache: YES    Sore throat: YES    Loss of taste or smell: No    Chest pain: YES earlier, not currently     Diarrhea: No    Rash: No  What treatments have you tried? Tylenol  Who do you live with? Group home   Are you, or a household member, a healthcare worker or a ? No  Do you live in a nursing home, group home, or shelter? YES  Do you have a way to get food/medications if quarantined? Yes     Had Tylenol, fever went down to 99. O2 sat- 96%   Chills.   One of his housemates tested positive COVID 19   No history of asthma, but hx of pneumonia and was hospitalized and intubated   Seasonal allergies   DM- controlled   Vitals every 4 hrs     Review of Systems   Constitutional, HEENT, cardiovascular, pulmonary, GI, , musculoskeletal, neuro, skin, endocrine and psych systems are negative, except as otherwise noted.      Objective           Vitals:  No vitals were obtained today due to virtual visit.    Physical Exam   healthy, alert and no distress  PSYCH: Alert and oriented times 3; coherent speech, normal   rate and volume, able to articulate logical thoughts, able   to abstract reason, no tangential thoughts, no hallucinations   or delusions  His affect is normal  RESP: No cough, no audible wheezing, able to talk in full sentences  Remainder of exam unable to be completed due to telephone visits    Orders Only on 07/17/2020   Component Date Value Ref Range Status     Miscellaneous Test 07/17/2020      Final                    Value:Specimen Received, Reordered and sent to Performing laboratory - Report to follow upon   completion.       Testosterone Total 07/17/2020 184* 240 - 950 ng/dL Final    Comment: This test was developed and its performance characteristics determined by the   [facility].   It has not been cleared or approved by the FDA. The laboratory is regulated   under CLIA  as qualified to perform high-complexity testing. This test is used   for clinical purposes. It should not be regarded as investigational or for   research.       Sex Hormone Binding Globulin 07/17/2020 20  11 - 80 nmol/L Final     Free Testosterone Calculated 07/17/2020 4.51* 4.7 - 24.4 ng/dL Final     Cholesterol 07/17/2020 132  <200 mg/dL Final     Triglycerides 07/17/2020 192* <150 mg/dL Final    Comment: Borderline high:  150-199 mg/dl  High:             200-499 mg/dl  Very high:       >499 mg/dl  Non Fasting       HDL Cholesterol 07/17/2020 32* >39 mg/dL Final     LDL Cholesterol Calculated 07/17/2020 62  <100 mg/dL Final    Desirable:       <100 mg/dl     Non HDL Cholesterol 07/17/2020 100  <130 mg/dL Final     Lutropin 07/17/2020 10.8* 1.5 - 9.3 IU/L Final     FSH 07/17/2020 11.0* 0.7 - 10.8 IU/L Final     Prolactin 07/17/2020 12  2 - 18 ug/L Final     Free T3 07/17/2020 2.6  2.3 - 4.2 pg/mL Final     T4 Free 07/17/2020 1.14  0.76 - 1.46 ng/dL Final     Hemoglobin A1C 07/17/2020 5.3  0 - 5.6 % Final    Comment: Normal <5.7% Prediabetes 5.7-6.4%  Diabetes 6.5% or higher - adopted from ADA   consensus guidelines.       Glucose 07/17/2020 89  70 - 99 mg/dL Final    Non Fasting     Vitamin D Deficiency screening 07/17/2020 41  20 - 75 ug/L Final    Comment: Season, race, dietary intake, and treatment affect the concentration of   25-hydroxy-Vitamin D. Values may decrease during winter months and increase   during summer months. Values 20-29 ug/L may indicate Vitamin D insufficiency   and values <20 ug/L may indicate Vitamin D deficiency.  Vitamin D determination is routinely performed by an immunoassay specific for   25 hydroxyvitamin D3.  If an individual is on vitamin D2 (ergocalciferol)   supplementation, please specify 25 OH vitamin D2 and D3 level determination by   LCMSMS test [id number].       Test Name 07/17/2020 Canceled, Test credited   Corrected    Comment: Incorrectly ordered by PCU/Clinic  Test reordered  as correct code  SEE [id number]  CORRECTED ON 07/19 AT 1334: PREVIOUSLY REPORTED AS THYROID STIMULATING HORMONE   SENSITIVE        Send Outs Misc Test Code 07/17/2020 Canceled, Test credited   Corrected    Comment: Incorrectly ordered by PCU/Clinic  Test reordered as correct code  SEE [id number]  CORRECTED ON 07/19 AT 1334: PREVIOUSLY REPORTED AS [facility]       Send Outs Misc Test Specimen 07/17/2020 Canceled, Test credited   Corrected    Comment: Incorrectly ordered by PCU/Clinic  Test reordered as correct code  SEE [id number]  CORRECTED ON 07/19 AT 1334: PREVIOUSLY REPORTED AS Serum       Location Performed 07/17/2020 Canceled, Test credited   Corrected    Comment: Incorrectly ordered by PCU/Clinic  Test reordered as correct code  SEE [id number]  CORRECTED ON 07/19 AT 1334: PREVIOUSLY REPORTED AS [facility]       Result 07/17/2020 Canceled, Test credited   Final    Comment: Incorrectly ordered by PCU/Clinic  Test reordered as correct code  SEE [id number]       Result 07/17/2020 SEE NOTE 07/21/2020 07:52 AM   Final    Comment: (Note)  Test                           Result        Flag  Unit   RefValue  ------------------------------------------------------------------  TSH, Sensitive, S              2.5                 mIU/L  0.3-4.2            Test Performed by:     [place] [address]     : Stalin Roper M.D. Ph.D.; CLIA# [id number]       Test Name 07/17/2020 Thyroid Stimulating Hormone Sensitive   Final     Send Outs Misc Test Code 07/17/2020 [facility]   Final     Send Outs Misc Test Specimen 07/17/2020 [facility]   Final             Phone call duration:  10 minutes

## 2021-01-26 NOTE — TELEPHONE ENCOUNTER
Reason for call:  Patient reporting a symptom    Symptom or request:  Janessa Shrestha,  at [facility], is calling to report patient has been sick. Fever 101.3 - after taking tylenol, not coughing, patient developmentally disabled - so when Janessa ask him questions about his symptoms - he always says no.  Patient has been drinking a lot of water. In the last hour he has drank 6 bottles of water - patient does have diabetes.  Patient also has been sleeping a lot which is abnormal for him. Janessa would like to speak to a nurse.    Duration (how long have symptoms been present):  Started today.    Have you been treated for this before? No    Additional comments:     Phone Number patient can be reached at:  Other phone number:  Janessa Shrestha,  at [facility], [phone]    Best Time:  any    Can we leave a detailed message on this number:  YES    Call taken on 1/26/2021 at 11:18 AM by Katerine Andres

## 2021-01-26 NOTE — TELEPHONE ENCOUNTER
Patient needs to set up a virtual appointment to be assessed.  Please schedule first available virtual appointment.    Kallie Dupree RN

## 2021-01-27 DIAGNOSIS — Z20.822 EXPOSURE TO 2019 NOVEL CORONAVIRUS: ICD-10-CM

## 2021-01-27 PROCEDURE — 87635 SARS-COV-2 COVID-19 AMP PRB: CPT | Performed by: NURSE PRACTITIONER

## 2021-01-28 ENCOUNTER — TELEPHONE (OUTPATIENT)
Dept: URGENT CARE | Facility: URGENT CARE | Age: 26
End: 2021-01-28

## 2021-01-28 ENCOUNTER — NURSE TRIAGE (OUTPATIENT)
Dept: FAMILY MEDICINE | Facility: CLINIC | Age: 26
End: 2021-01-28

## 2021-01-28 ENCOUNTER — APPOINTMENT (OUTPATIENT)
Dept: GENERAL RADIOLOGY | Facility: CLINIC | Age: 26
DRG: 177 | End: 2021-01-28
Attending: EMERGENCY MEDICINE
Payer: MEDICARE

## 2021-01-28 ENCOUNTER — HOSPITAL ENCOUNTER (INPATIENT)
Facility: CLINIC | Age: 26
LOS: 4 days | Discharge: NURSING, REHAB OR LONG-TERM CARE | DRG: 177 | End: 2021-02-01
Attending: EMERGENCY MEDICINE | Admitting: STUDENT IN AN ORGANIZED HEALTH CARE EDUCATION/TRAINING PROGRAM
Payer: MEDICARE

## 2021-01-28 DIAGNOSIS — U07.1 2019 NOVEL CORONAVIRUS DISEASE (COVID-19): ICD-10-CM

## 2021-01-28 DIAGNOSIS — U07.1 COVID-19: Primary | ICD-10-CM

## 2021-01-28 LAB
LABORATORY COMMENT REPORT: ABNORMAL
SARS-COV-2 RNA RESP QL NAA+PROBE: NORMAL
SARS-COV-2 RNA RESP QL NAA+PROBE: POSITIVE
SPECIMEN SOURCE: ABNORMAL
SPECIMEN SOURCE: NORMAL

## 2021-01-28 PROCEDURE — 71045 X-RAY EXAM CHEST 1 VIEW: CPT | Mod: 26 | Performed by: RADIOLOGY

## 2021-01-28 PROCEDURE — 99285 EMERGENCY DEPT VISIT HI MDM: CPT | Performed by: EMERGENCY MEDICINE

## 2021-01-28 PROCEDURE — 71045 X-RAY EXAM CHEST 1 VIEW: CPT

## 2021-01-28 PROCEDURE — 99285 EMERGENCY DEPT VISIT HI MDM: CPT | Mod: 25 | Performed by: EMERGENCY MEDICINE

## 2021-01-28 PROCEDURE — 120N000002 HC R&B MED SURG/OB UMMC

## 2021-01-28 ASSESSMENT — MIFFLIN-ST. JEOR: SCORE: 1981.44

## 2021-01-28 NOTE — TELEPHONE ENCOUNTER
Reason for call:  Patient reporting a symptom    Symptom or request:  Janessa =  at [facility] [place]  - patient  Had fever again today  And patient just started today complaining of tightness in chest - like an asthma attack/ Nurse at [facility] did give him an inhaler.     Duration (how long have symptoms been present):      Have you been treated for this before? No    Additional comments:     Phone Number patient can be reached at:  Other phone number:  Janessa/  at patients [facility]/ [phone]    Best Time:  any    Can we leave a detailed message on this number:  YES    Call taken on 1/28/2021 at 3:23 PM by Katerine Andres

## 2021-01-28 NOTE — LETTER
Health Information Management Services               Recipient:  Janessa Shrestha  Attn: Nelson      Sender:    Shayy Guzman RN, BSN  Care Coordinator, [facility]  Phone [phone]  Pager [phone]        Date: February 1, 2021  Patient Name:  Hany Patel  Routing Message:      Discharge Summary      The documents accompanying this notice contain confidential information belonging to the sender.  This information is intended only for the use of the individual or entity named above.  The authorized recipient of this information is prohibited from disclosing this information to any other party and is required to destroy the information after its stated need has been fulfilled, unless otherwise required by state law.      If you are not the intended recipient, you are hereby notified that any disclosure, copy, distribution or action taken in reliance on the contents of these documents is strictly prohibited.  If you have received this document in error, please return it by fax to [phone] with a note on the cover sheet explaining why you are returning it (e.g. not your patient, not your provider, etc.).  If you need further assistance, please call [facility] Centralized Transcription at [phone].  Documents may also be returned by mail to [place], , [facility] [address], [place], [place] [address].

## 2021-01-28 NOTE — TELEPHONE ENCOUNTER
"Coronavirus (COVID-19) Notification    Caller Name (Patient, parent, daughter/son, grandparent, etc)  Marcella, mother/guardian    Reason for call  Notify of Positive Coronavirus (COVID-19) lab results, assess symptoms,  review  [facility] recommendations    Lab Result    Lab test:  2019-nCoV rRt-PCR or SARS-CoV-2 PCR    Oropharyngeal AND/OR nasopharyngeal swabs is POSITIVE for 2019-nCoV RNA/SARS-COV-2 PCR (COVID-19 virus)    RN Recommendations/Instructions per [facility] Coronavirus COVID-19 recommendations    Brief introduction script  Introduce self then review script:  \"I am calling on behalf of [employer].  We were notified that your Coronavirus test (COVID-19) for was POSITIVE for the virus.  I have some information to relay to you but first I wanted to mention that the [place] Dept of Health will be contacting you shortly [it's possible MD already called Patient] to talk to you more about how you are feeling and other people you have had contact with who might now also have the virus.  Also,  [employer] is Partnering with the [facility] for Covid-19 research, you may be contacted directly by research staff.\"    Assessment (Inquire about Patient's current symptoms)   Assessment   Current Symptoms at time of phone call: (if no symptoms, document No symptoms] Fever, chest tightness, fatigue     Symptoms onset (if applicable) 1/26/2021     If at time of call, Patients symptoms hare worsened, the Patient should contact 911 or have someone drive them to Emergency Dept promptly:      If Patient calling 911, inform 911 personal that you have tested positive for the Coronavirus (COVID-19).  Place mask on and await 911 to arrive.    If Emergency Dept, If possible, please have another adult drive you to the Emergency Dept but you need to wear mask when in contact with other people.      Monoclonal Antibody Administration    You may be eligible to receive a new treatment with a monoclonal antibody " "for preventing hospitalization in patients at high risk for complications from COVID-19.   This medication is still experimental and available on a limited basis; it is given through an IV and must be given at an infusion center. Please note that not all people who are eligible will receive the medication since it is in limited supply.     Are you interested in being considered for this medication?  No.   Does the patient fit the criteria: Patient declined    If patient qualifies based on above criteria:  \"We will contact you if you are selected to receive the medication in the next 1-2 days.   This is time sensitive and if you are not selected in the next 1-2 days, you will not receive the medication.  If you do not receive a call to schedule, you have not been selected.\"    Review information with Patient    Your result was positive. This means you have COVID-19 (coronavirus).  We have sent you a letter that reviews the information that I'll be reviewing with you now.    How can I protect others?    If you have symptoms: stay home and away from others (self-isolate) until:    You've had no fever--and no medicine that reduces fever--for 1 full day (24 hours). And       Your other symptoms have gotten better. For example, your cough or breathing has improved. And     At least 10 days have passed since your symptoms started. (If you've been told by a doctor that you have a weak immune system, wait 20 days.)     If you don't have symptoms: Stay home and away from others (self-isolate) until at least 10 days have passed since your first positive COVID-19 test. (Date test collected)    During this time:    Stay in your own room, including for meals. Use your own bathroom if you can.    Stay away from others in your home. No hugging, kissing or shaking hands. No visitors.     Don't go to work, school or anywhere else.     Clean  high touch  surfaces often (doorknobs, counters, handles, etc.). Use a household cleaning " spray or wipes. You'll find a full list on the EPA website at www.epa.gov/pesticide-registration/list-n-disinfectants-use-against-sars-cov-2.     Cover your mouth and nose with a mask, tissue or other face covering to avoid spreading germs.    Wash your hands and face often with soap and water.    Caregivers in these groups are at risk for severe illness due to COVID-19:  o People 65 years and older  o People who live in a nursing home or long-term care facility  o People with chronic disease (lung, heart, cancer, diabetes, kidney, liver, immunologic)  o People who have a weakened immune system, including those who:  - Are in cancer treatment  - Take medicine that weakens the immune system, such as corticosteroids  - Had a bone marrow or organ transplant  - Have an immune deficiency  - Have poorly controlled HIV or AIDS  - Are obese (body mass index of 40 or higher)  - Smoke regularly    Caregivers should wear gloves while washing dishes, handling laundry and cleaning bedrooms and bathrooms.    Wash and dry laundry with special caution. Don't shake dirty laundry, and use the warmest water setting you can.    If you have a weakened immune system, ask your doctor about other actions you should take.    For more tips, go to www.cdc.gov/coronavirus/2019-ncov/downloads/10Things.pdf.    You should not go back to work until you meet the guidelines above for ending your home isolation. You don't need to be retested for COVID-19 before going back to work--studies show that you won't spread the virus if it's been at least 10 days since your symptoms started (or 20 days, if you have a weak immune system).    Employers: This document serves as formal notice of your employee's medical guidelines for going back to work. They must meet the above guidelines before going back to work in person.    How can I take care of myself?    1. Get lots of rest. Drink extra fluids (unless a doctor has told you not to).    2. Take Tylenol  (acetaminophen) for fever or pain. If you have liver or kidney problems, ask your family doctor if it's okay to take Tylenol.     Take either:     650 mg (two 325 mg pills) every 4 to 6 hours, or     1,000 mg (two 500 mg pills) every 8 hours as needed.     Note: Don't take more than 3,000 mg in one day. Acetaminophen is found in many medicines (both prescribed and over-the-counter medicines). Read all labels to be sure you don't take too much.    For children, check the Tylenol bottle for the right dose (based on their age or weight).    3. If you have other health problems (like cancer, heart failure, an organ transplant or severe kidney disease): Call your specialty clinic if you don't feel better in the next 2 days.    4. Know when to call 911: Emergency warning signs include:    Trouble breathing or shortness of breath    Pain or pressure in the chest that doesn't go away    Feeling confused like you haven't felt before, or not being able to wake up    Bluish-colored lips or face    5. Sign up for [employer]. We know it's scary to hear that you have COVID-19. We want to track your symptoms to make sure you're okay over the next 2 weeks. Please look for an email from [employer]--this is a free, online program that we'll use to keep in touch. To sign up, follow the link in the email. Learn more at [email or web address].    Where can I get more information?    [facility]: [email or web address]    Coronavirus Basics: www.health.[facility].[place]/diseases/coronavirus/basics.html    What to Do If You're Sick: www.cdc.gov/coronavirus/2019-ncov/about/steps-when-sick.html    Ending Home Isolation: www.cdc.gov/coronavirus/2019-ncov/hcp/disposition-in-home-patients.html     Caring for Someone with COVID-19: www.cdc.gov/coronavirus/2019-ncov/if-you-are-sick/care-for-someone.html     [facility] clinical trials (COVID-19 research studies): clinicalaffairs.[facility]-clinical-trials     A  Positive COVID-19 letter will be sent via [employer] or the mail. (Exception, no letters sent to Presurgerical/Preprocedure Patients)    Hannah Dutton LPN

## 2021-01-28 NOTE — TELEPHONE ENCOUNTER
"RN Huddled with provider.    Continue to monitor 02 Sats, if able listen to patients lungs.    If 02 sats drop or patient has increased work on breathing, Group home should call 911 and trnsport to emergency room.        [facility]    Janessa stated  Patient had spiked a fever today of 101.1    Also had complained of tightness in his chest.  Patient was given his ventolin inhaler  Used for asthma.  Per patient, he no longer felt tightness after taking it.  Janessa stated patient had not used inhaler since moving in to the home February/March of 2020.    Patient has been fatigued over past several days spending most of his time in bed.  Per Janessa, he has been \"Zoning  Out.\"    Advised Mere of providers recommendations. Instructed to continue to monitor 02sats and any increase work on breathing, change in mentation or lungs begin wheezing.  Also reviewed recommendations from virtual visit 1-26    Janessa verbalized understanding and plans to follow advice      Reason for Disposition    [1] Fever > 100.0 F (37.8 C) AND [2] bedridden (e.g., nursing home patient, CVA, chronic illness, recovering from surgery)    Additional Information    Negative: SEVERE difficulty breathing (e.g., struggling for each breath, speaks in single words)    Negative: Difficult to awaken or acting confused (e.g., disoriented, slurred speech)    Negative: Bluish (or gray) lips or face now    Negative: Shock suspected (e.g., cold/pale/clammy skin, too weak to stand, low BP, rapid pulse)    Negative: Sounds like a life-threatening emergency to the triager    Negative: [1] COVID-19 exposure AND [2] no symptoms    Negative: [1] Lives with someone known to have influenza (flu test positive) AND [2] flu-like symptoms (e.g., cough, runny nose, sore throat, SOB; with or without fever)    Negative: [1] Adult with possible COVID-19 symptoms AND [2] triager concerned about severity of symptoms or other causes    " "Negative: COVID-19 vaccine reaction suspected (e.g., fever, headache, muscle aches) occurring during days 1-3 after getting vaccine    Negative: COVID-19 vaccine, questions about    Negative: COVID-19 and breastfeeding, questions about    Answer Assessment - Initial Assessment Questions  1. COVID-19 DIAGNOSIS: \"Who made your Coronavirus (COVID-19) diagnosis?\" \"Was it confirmed by a positive lab test?\" If not diagnosed by a HCP, ask \"Are there lots of cases (community spread) where you live?\" (See public health department website, if unsure)        Patient tested positive yesterday at [facility]    2. COVID-19 EXPOSURE: \"Was there any known exposure to COVID before the symptoms began?\" CDC Definition of close contact: within 6 feet (2 meters) for a total of 15 minutes or more over a 24-hour period.         Another resident at group home [facility] diagnosed on1-    3. ONSET: \"When did the COVID-19 symptoms start?\"         Last several days.  First given Tylenol on 1-26.  Has fatigue, spending more time in bed, Per group home \"zoning out\".  Fever today was 101.1 02 sats 94%.  Used ventolin inhaler which he has not used since he moved in 2-2020.  Complained of tightness in chest.  Had relieve with inhaler.  Has a mild cough    4. WORST SYMPTOM: \"What is your worst symptom?\" (e.g., cough, fever, shortness of breath, muscle aches)    Fever          5. COUGH: \"Do you have a cough?\" If so, ask: \"How bad is the cough?\"          Patient has a slight cough  6. FEVER: \"Do you have a fever?\" If so, ask: \"What is your temperature, how was it measured, and when did it start?\"      *No Answer*  7. RESPIRATORY STATUS: \"Describe your breathing?\" (e.g., shortness of breath, wheezing, unable to speak)         No wheezing, SOB.  Complained of the tighness which was relieved with inhaler  8. BETTER-SAME-WORSE: \"Are you getting better, staying the same or getting worse compared to yesterday?\"  If getting worse, ask, " "\"In what way?\"     Tightness in chest new.  Fever several days ago    9. HIGH RISK DISEASE: \"Do you have any chronic medical problems?\" (e.g., asthma, heart or lung disease, weak immune system, obesity, etc.)        istory of pneumonia    10. PREGNANCY: \"Is there any chance you are pregnant?\" \"When was your last menstrual period?\"          No    11. OTHER SYMPTOMS: \"Do you have any other symptoms?\"  (e.g., chills, fatigue, headache, loss of smell or taste, muscle pain, sore throat; new loss of smell or taste especially support the diagnosis of COVID-19)        *No Answer*    Protocols used: CORONAVIRUS (COVID-19) DIAGNOSED OR [id number] 1.3      Gregg Mendez RN, BSN, PHN  [facility]  "

## 2021-01-28 NOTE — LETTER
Health Information Management Services               Recipient:    Janessa Shrestha   Attn: Nelson    Sender:    Shayy Guzman RN, BSN  Care Coordinator, [facility]  Phone [phone]  Pager [phone]        Date: February 1, 2021  Patient Name:  Hany Patel  Routing Message:      Discharge Summary      The documents accompanying this notice contain confidential information belonging to the sender.  This information is intended only for the use of the individual or entity named above.  The authorized recipient of this information is prohibited from disclosing this information to any other party and is required to destroy the information after its stated need has been fulfilled, unless otherwise required by state law.      If you are not the intended recipient, you are hereby notified that any disclosure, copy, distribution or action taken in reliance on the contents of these documents is strictly prohibited.  If you have received this document in error, please return it by fax to [phone] with a note on the cover sheet explaining why you are returning it (e.g. not your patient, not your provider, etc.).  If you need further assistance, please call [facility] Centralized Transcription at [phone].  Documents may also be returned by mail to [employer], , [facility] [address], [place], [place] [address].

## 2021-01-29 PROBLEM — U07.1 2019 NOVEL CORONAVIRUS DISEASE (COVID-19): Status: ACTIVE | Noted: 2021-01-29

## 2021-01-29 LAB
ALBUMIN SERPL-MCNC: 2.8 G/DL (ref 3.4–5)
ALP SERPL-CCNC: 75 U/L (ref 40–150)
ALT SERPL W P-5'-P-CCNC: 36 U/L (ref 0–70)
ANION GAP SERPL CALCULATED.3IONS-SCNC: 8 MMOL/L (ref 3–14)
AST SERPL W P-5'-P-CCNC: 34 U/L (ref 0–45)
BASOPHILS # BLD AUTO: 0 10E9/L (ref 0–0.2)
BASOPHILS NFR BLD AUTO: 0.8 %
BILIRUB SERPL-MCNC: 0.2 MG/DL (ref 0.2–1.3)
BUN SERPL-MCNC: 10 MG/DL (ref 7–30)
CALCIUM SERPL-MCNC: 9.2 MG/DL (ref 8.5–10.1)
CHLORIDE SERPL-SCNC: 107 MMOL/L (ref 94–109)
CK SERPL-CCNC: 48 U/L (ref 30–300)
CO2 SERPL-SCNC: 26 MMOL/L (ref 20–32)
CREAT SERPL-MCNC: 0.68 MG/DL (ref 0.66–1.25)
D DIMER PPP FEU-MCNC: 0.6 UG/ML FEU (ref 0–0.5)
DIFFERENTIAL METHOD BLD: ABNORMAL
EOSINOPHIL # BLD AUTO: 0 10E9/L (ref 0–0.7)
EOSINOPHIL NFR BLD AUTO: 0 %
ERYTHROCYTE [DISTWIDTH] IN BLOOD BY AUTOMATED COUNT: 14.1 % (ref 10–15)
FERRITIN SERPL-MCNC: 218 NG/ML (ref 26–388)
GFR SERPL CREATININE-BSD FRML MDRD: >90 ML/MIN/{1.73_M2}
GLUCOSE SERPL-MCNC: 148 MG/DL (ref 70–99)
HCT VFR BLD AUTO: 44.3 % (ref 40–53)
HGB BLD-MCNC: 14.5 G/DL (ref 13.3–17.7)
IMM GRANULOCYTES # BLD: 0 10E9/L (ref 0–0.4)
IMM GRANULOCYTES NFR BLD: 1.6 %
LYMPHOCYTES # BLD AUTO: 0.7 10E9/L (ref 0.8–5.3)
LYMPHOCYTES NFR BLD AUTO: 26.4 %
MCH RBC QN AUTO: 29.4 PG (ref 26.5–33)
MCHC RBC AUTO-ENTMCNC: 32.7 G/DL (ref 31.5–36.5)
MCV RBC AUTO: 90 FL (ref 78–100)
MONOCYTES # BLD AUTO: 0.1 10E9/L (ref 0–1.3)
MONOCYTES NFR BLD AUTO: 4.1 %
NEUTROPHILS # BLD AUTO: 1.7 10E9/L (ref 1.6–8.3)
NEUTROPHILS NFR BLD AUTO: 67.1 %
NRBC # BLD AUTO: 0 10*3/UL
NRBC BLD AUTO-RTO: 0 /100
PLATELET # BLD AUTO: 312 10E9/L (ref 150–450)
PLATELET # BLD EST: ABNORMAL 10*3/UL
POTASSIUM SERPL-SCNC: 4 MMOL/L (ref 3.4–5.3)
PROT SERPL-MCNC: 7.6 G/DL (ref 6.8–8.8)
RBC # BLD AUTO: 4.93 10E12/L (ref 4.4–5.9)
RBC MORPH BLD: ABNORMAL
SODIUM SERPL-SCNC: 141 MMOL/L (ref 133–144)
WBC # BLD AUTO: 2.5 10E9/L (ref 4–11)

## 2021-01-29 PROCEDURE — 120N000002 HC R&B MED SURG/OB UMMC

## 2021-01-29 PROCEDURE — 250N000013 HC RX MED GY IP 250 OP 250 PS 637: Performed by: INTERNAL MEDICINE

## 2021-01-29 PROCEDURE — 86140 C-REACTIVE PROTEIN: CPT | Performed by: INTERNAL MEDICINE

## 2021-01-29 PROCEDURE — 80053 COMPREHEN METABOLIC PANEL: CPT | Performed by: INTERNAL MEDICINE

## 2021-01-29 PROCEDURE — 85379 FIBRIN DEGRADATION QUANT: CPT | Performed by: INTERNAL MEDICINE

## 2021-01-29 PROCEDURE — 85025 COMPLETE CBC W/AUTO DIFF WBC: CPT | Performed by: INTERNAL MEDICINE

## 2021-01-29 PROCEDURE — 250N000013 HC RX MED GY IP 250 OP 250 PS 637: Performed by: EMERGENCY MEDICINE

## 2021-01-29 PROCEDURE — 82550 ASSAY OF CK (CPK): CPT | Performed by: INTERNAL MEDICINE

## 2021-01-29 PROCEDURE — 250N000012 HC RX MED GY IP 250 OP 636 PS 637: Performed by: INTERNAL MEDICINE

## 2021-01-29 PROCEDURE — 36415 COLL VENOUS BLD VENIPUNCTURE: CPT | Performed by: INTERNAL MEDICINE

## 2021-01-29 PROCEDURE — 82728 ASSAY OF FERRITIN: CPT | Performed by: INTERNAL MEDICINE

## 2021-01-29 PROCEDURE — 99222 1ST HOSP IP/OBS MODERATE 55: CPT | Mod: AI | Performed by: STUDENT IN AN ORGANIZED HEALTH CARE EDUCATION/TRAINING PROGRAM

## 2021-01-29 PROCEDURE — 250N000013 HC RX MED GY IP 250 OP 250 PS 637: Performed by: STUDENT IN AN ORGANIZED HEALTH CARE EDUCATION/TRAINING PROGRAM

## 2021-01-29 PROCEDURE — 99207 PR APP CREDIT; MD BILLING SHARED VISIT: CPT | Performed by: INTERNAL MEDICINE

## 2021-01-29 RX ORDER — ACETAMINOPHEN 500 MG
1000 TABLET ORAL EVERY 6 HOURS PRN
COMMUNITY

## 2021-01-29 RX ORDER — METRONIDAZOLE 7.5 MG/G
GEL TOPICAL 2 TIMES DAILY
Status: DISCONTINUED | OUTPATIENT
Start: 2021-01-29 | End: 2021-02-01 | Stop reason: HOSPADM

## 2021-01-29 RX ORDER — ESCITALOPRAM OXALATE 20 MG/1
20 TABLET ORAL DAILY
Status: DISCONTINUED | OUTPATIENT
Start: 2021-01-29 | End: 2021-02-01 | Stop reason: HOSPADM

## 2021-01-29 RX ORDER — ALBUTEROL SULFATE 90 UG/1
1-2 AEROSOL, METERED RESPIRATORY (INHALATION) EVERY 4 HOURS PRN
Status: DISCONTINUED | OUTPATIENT
Start: 2021-01-29 | End: 2021-02-01 | Stop reason: HOSPADM

## 2021-01-29 RX ORDER — MULTIVITAMIN
1 TABLET ORAL DAILY
Status: DISCONTINUED | OUTPATIENT
Start: 2021-01-29 | End: 2021-01-29

## 2021-01-29 RX ORDER — ONDANSETRON 4 MG/1
4 TABLET, ORALLY DISINTEGRATING ORAL EVERY 6 HOURS PRN
Status: DISCONTINUED | OUTPATIENT
Start: 2021-01-29 | End: 2021-02-01 | Stop reason: HOSPADM

## 2021-01-29 RX ORDER — MULTIPLE VITAMINS W/ MINERALS TAB 9MG-400MCG
1 TAB ORAL DAILY
Status: DISCONTINUED | OUTPATIENT
Start: 2021-01-29 | End: 2021-02-01 | Stop reason: HOSPADM

## 2021-01-29 RX ORDER — ONDANSETRON 2 MG/ML
4 INJECTION INTRAMUSCULAR; INTRAVENOUS EVERY 6 HOURS PRN
Status: DISCONTINUED | OUTPATIENT
Start: 2021-01-29 | End: 2021-02-01 | Stop reason: HOSPADM

## 2021-01-29 RX ORDER — LEVOTHYROXINE SODIUM 112 UG/1
112 TABLET ORAL DAILY
Status: DISCONTINUED | OUTPATIENT
Start: 2021-01-29 | End: 2021-02-01 | Stop reason: HOSPADM

## 2021-01-29 RX ORDER — ACETAMINOPHEN 325 MG/1
650 TABLET ORAL EVERY 4 HOURS PRN
Status: DISCONTINUED | OUTPATIENT
Start: 2021-01-29 | End: 2021-02-01 | Stop reason: HOSPADM

## 2021-01-29 RX ORDER — VITAMIN B COMPLEX
1 TABLET ORAL DAILY
Status: DISCONTINUED | OUTPATIENT
Start: 2021-01-29 | End: 2021-01-29

## 2021-01-29 RX ORDER — LEVOFLOXACIN 500 MG/1
500 TABLET, FILM COATED ORAL ONCE
Status: COMPLETED | OUTPATIENT
Start: 2021-01-29 | End: 2021-01-29

## 2021-01-29 RX ORDER — KETOCONAZOLE 20 MG/G
CREAM TOPICAL 2 TIMES DAILY
Status: DISCONTINUED | OUTPATIENT
Start: 2021-01-29 | End: 2021-02-01 | Stop reason: HOSPADM

## 2021-01-29 RX ORDER — LEVOFLOXACIN 500 MG/1
500 TABLET, FILM COATED ORAL DAILY
Status: DISCONTINUED | OUTPATIENT
Start: 2021-01-29 | End: 2021-02-01 | Stop reason: HOSPADM

## 2021-01-29 RX ORDER — LOPERAMIDE HCL 2 MG
2 CAPSULE ORAL EVERY MORNING
Status: DISCONTINUED | OUTPATIENT
Start: 2021-01-29 | End: 2021-02-01 | Stop reason: HOSPADM

## 2021-01-29 RX ORDER — LACTOBACILLUS RHAMNOSUS GG 10B CELL
1 CAPSULE ORAL DAILY
Status: DISCONTINUED | OUTPATIENT
Start: 2021-01-29 | End: 2021-02-01 | Stop reason: HOSPADM

## 2021-01-29 RX ORDER — VITAMIN B COMPLEX
2 TABLET ORAL DAILY
COMMUNITY

## 2021-01-29 RX ORDER — ASCORBIC ACID 500 MG
500 TABLET ORAL DAILY
Status: DISCONTINUED | OUTPATIENT
Start: 2021-01-29 | End: 2021-02-01 | Stop reason: HOSPADM

## 2021-01-29 RX ORDER — IBUPROFEN 200 MG
400 TABLET ORAL EVERY 4 HOURS PRN
COMMUNITY
End: 2023-05-30

## 2021-01-29 RX ORDER — ACETAMINOPHEN 650 MG/1
650 SUPPOSITORY RECTAL EVERY 4 HOURS PRN
Status: DISCONTINUED | OUTPATIENT
Start: 2021-01-29 | End: 2021-02-01 | Stop reason: HOSPADM

## 2021-01-29 RX ORDER — SIMVASTATIN 20 MG
20 TABLET ORAL AT BEDTIME
Status: DISCONTINUED | OUTPATIENT
Start: 2021-01-29 | End: 2021-02-01 | Stop reason: HOSPADM

## 2021-01-29 RX ADMIN — LEVOFLOXACIN 500 MG: 500 TABLET, FILM COATED ORAL at 09:50

## 2021-01-29 RX ADMIN — SIMVASTATIN 20 MG: 20 TABLET, FILM COATED ORAL at 20:52

## 2021-01-29 RX ADMIN — Medication 1 TABLET: at 09:51

## 2021-01-29 RX ADMIN — OXYCODONE HYDROCHLORIDE AND ACETAMINOPHEN 500 MG: 500 TABLET ORAL at 10:07

## 2021-01-29 RX ADMIN — ESCITALOPRAM OXALATE 20 MG: 20 TABLET ORAL at 10:07

## 2021-01-29 RX ADMIN — LEVOFLOXACIN 500 MG: 500 TABLET, FILM COATED ORAL at 00:13

## 2021-01-29 RX ADMIN — METFORMIN HYDROCHLORIDE 1000 MG: 500 TABLET ORAL at 17:44

## 2021-01-29 RX ADMIN — MULTIPLE VITAMINS W/ MINERALS TAB 1 TABLET: TAB at 09:50

## 2021-01-29 RX ADMIN — B-COMPLEX W/ C & FOLIC ACID TAB 1 TABLET: TAB at 09:50

## 2021-01-29 RX ADMIN — LEVOTHYROXINE SODIUM 112 MCG: 112 TABLET ORAL at 10:07

## 2021-01-29 RX ADMIN — LOPERAMIDE HYDROCHLORIDE 2 MG: 2 CAPSULE ORAL at 09:51

## 2021-01-29 RX ADMIN — APIXABAN 2.5 MG: 2.5 TABLET, FILM COATED ORAL at 20:52

## 2021-01-29 RX ADMIN — Medication 1 CAPSULE: at 09:51

## 2021-01-29 RX ADMIN — DEXAMETHASONE 6 MG: 2 TABLET ORAL at 13:00

## 2021-01-29 RX ADMIN — METFORMIN HYDROCHLORIDE 1000 MG: 500 TABLET ORAL at 09:50

## 2021-01-29 ASSESSMENT — ACTIVITIES OF DAILY LIVING (ADL)
TOILETING_ISSUES: YES
HEARING_DIFFICULTY_OR_DEAF: NO
DIFFICULTY_EATING/SWALLOWING: NO
DIFFICULTY_COMMUNICATING: YES
CONCENTRATING,_REMEMBERING_OR_MAKING_DECISIONS_DIFFICULTY: YES
WEAR_GLASSES_OR_BLIND: NO
COMMUNICATION: OTHER (SEE COMMENTS)
DOING_ERRANDS_INDEPENDENTLY_DIFFICULTY: YES
PATIENT_/_FAMILY_COMMUNICATION_STYLE: SPOKEN LANGUAGE (ENGLISH OR BILINGUAL)
WALKING_OR_CLIMBING_STAIRS_DIFFICULTY: NO
FALL_HISTORY_WITHIN_LAST_SIX_MONTHS: NO

## 2021-01-29 NOTE — PROGRESS NOTES
"  VS: Blood pressure slightly low this afternoon. 97/48. Patient  Was so frightened to even touch the oximetry probe. Tried multiple times to obtain oximetry reading. Pt stated\" NO\" and would move away if you tried to place anything on his finger or his ear.    O2: Unable to assess. Patient allowed nursing to listen to his lung sounds. Pt appears to be in no distress with breathing. Patient is resting comfortably sitting in chair. Lung sounds sound clear. Pt has no cough. Patient is able to walk back and forth to bathroom with ease.    Output: Patient up to bathroom to void.    Last BM: Unknown.    Activity: Up to bathroom to void.    Skin: Patient did not allow nursing to assess.    Pain: Denies   CMS: Unable  To assess   Dressing: NA   Diet: Regular. Patient did not eat anything for breakfast.    LDA: No IV   Equipment: 1;1/Isolation   Plan: Spoke with Dad Nicholas ([phone]) and Cyndee  (iris)( [phone]. If  You use your own phone and place it on speaker his dad convinced son to take his medications. Patient loves diet mountain dew. His dad suggested to use only as a reward if he takes med's, or allows staff to assess his oximetry reading. Patients sister martínez lora will be the designated visitor and she will come down at 3 pm today to try and help with cares for Hany. Spoke with Sivan DARBY( nurse manager) about plan of cares for designated visitor. Patient needs a lot of extra time due to being so vulnerable and his down syndrome and his phobia of last hospitalization.    Additional Info: Continue to use positive reinforcement if he does a great job. He seems to respond well to that! He loves black cats and diet mountain dew. ( Placed coloring crayons and photos of cats and dogs in room) Patient meanwhile choosing to stay sitting up in chair. Dozes off to sleep often. Patient did not want to lay in bed to take a nap or rest.        "

## 2021-01-29 NOTE — ED PROVIDER NOTES
ED Provider Note  [facility]      History     Chief Complaint   Patient presents with     Shortness of Breath     HPI  Hany Patel is a 25 year old male with a history of Down Syndrome and Covid-19 infection (positive 1/27) who presents to the ED today via EMS from group home for evaluation of shortness of breath.  Here patient is unable to provide much of the history.  His family is at the bedside and states that he otherwise looks well but has a significant history of profound Down syndrome and has had recurrent pneumonias in the past.  Today he had a mild fever at the group home and they stated he looks like he was short of breath.    Past Medical History  Past Medical History:   Diagnosis Date     Diarrhea     Diarrhea Episodes      Past Surgical History:   Procedure Laterality Date     INSERT CHEST TUBE Left 2/7/2017    Procedure: INSERT CHEST TUBE;  Surgeon: Coco Zambrano MD;  Location: [facility] OR     SURGICAL HISTORY OF -       Ear tubes      SURGICAL HISTORY OF -       Adenoid removal           albuterol (PROAIR HFA/PROVENTIL HFA/VENTOLIN HFA) 108 (90 Base) MCG/ACT inhaler       Ascorbic Acid (VITAMIN C) 500 MG CHEW       B Complex Vitamins (VITAMIN B-COMPLEX) TABS       Calcium Carbonate-Vitamin D (CALCIUM + D PO)       escitalopram (LEXAPRO) 20 MG tablet       fish oil-omega-3 fatty acids 1000 MG capsule       ivermectin (SOOLANTRA) 1 % cream       ketoconazole (NIZORAL) 2 % external cream       ketoconazole (NIZORAL) 2 % external shampoo       Lactobacillus Acid-Pectin (LACTOBACILLUS ACIDOPHILUS) TABS       levothyroxine (SYNTHROID, LEVOTHROID) 112 MCG tablet       loperamide (IMODIUM) 2 MG capsule       metFORMIN (GLUCOPHAGE) 1000 MG tablet       metroNIDAZOLE (METROGEL) 0.75 % external gel       Multiple Vitamins-Minerals (IMMUNE SUPPORT) CHEW       multivitamin (ONE-DAILY) tablet       multivitamin w/minerals (MULTI-VITAMIN) tablet       Probiotic Product (ACIDOPHILUS  "PROBIOTIC BLEND) CAPS       QUEtiapine (SEROQUEL) 6.25 mg TABS quarter-tab       simvastatin (ZOCOR) 20 MG tablet       UNABLE TO FIND       vitamin  B complex with vitamin C (VITAMIN  B COMPLEX) TABS      Allergies   Allergen Reactions     Seasonal Allergies      Family History  Family History   Problem Relation Age of Onset     Breast Cancer Mother      Graves' disease Mother      Hypertension Father      Social History   Social History     Tobacco Use     Smoking status: Never Smoker     Smokeless tobacco: Never Used   Substance Use Topics     Alcohol use: No     Drug use: No      Past medical history, past surgical history, medications, allergies, family history, and social history were reviewed with the patient. No additional pertinent items.       Review of Systems  A complete review of systems was performed with pertinent positives and negatives noted in the HPI, and all other systems negative.    Physical Exam   BP: 115/72  Pulse: 82  Temp: 98.1  F (36.7  C)  Resp: 16  Height: 160 cm (5' 3\")  Weight: 111.1 kg (245 lb)  SpO2: 94 %  Physical Exam  Constitutional:       General: He is not in acute distress.     Appearance: He is well-developed. He is not diaphoretic.      Comments: Down syndrome but minimally verbal.   HENT:      Head: Normocephalic and atraumatic.   Eyes:      General: No scleral icterus.     Extraocular Movements: Extraocular movements intact.      Pupils: Pupils are equal, round, and reactive to light.   Neck:      Musculoskeletal: Normal range of motion and neck supple.   Cardiovascular:      Heart sounds: Normal heart sounds.   Pulmonary:      Effort: Pulmonary effort is normal. No tachypnea or respiratory distress.      Breath sounds: Normal breath sounds.   Abdominal:      General: Bowel sounds are normal.      Palpations: Abdomen is soft.      Tenderness: There is no abdominal tenderness.   Musculoskeletal:         General: No tenderness.   Skin:     General: Skin is warm and dry.      " Findings: No rash.   Neurological:      Mental Status: He is alert and oriented to person, place, and time.           ED Course      Procedures                         Results for orders placed or performed during the hospital encounter of 01/28/21   XR Chest Port 1 View     Status: None    Narrative    Exam: XR CHEST PORT 1 VW, 1/28/2021 9:14 PM    Indication: COVID, Short of breath    Comparison: Chest x-ray 1/2/2020    Findings:   Upright AP view of the chest. Bilateral patchy airspace opacities. No  pneumothorax. No definite pleural effusion. Cardiac silhouette borders  are obscured by the pulmonary opacities. No acute osseous abnormality.      Impression    Impression:   Bilateral multifocal airspace opacities compatible with atypical  infection.    I have personally reviewed the examination and initial interpretation  and I agree with the findings.    TRAVIS PHILLIPS MD     Medications - No data to display     Assessments & Plan (with Medical Decision Making)   This is a 25-year-old male with a significant past medical history of Down's who was recently diagnosed with Covid yesterday.  He was at his group home today when they thought that he was like he was short of breath and was febrile so they sent him into the emergency room for evaluation.  After reviewing his chest x-ray and is sitting significant past medical history I believe it is best at this time to bring him in for continued observation as his chest x-ray has significant bilateral infiltrates.  He is otherwise hemodynamically stable and afebrile here in the emergency room.  He is satting at 94% on room air.  He is eating and is very pleasant.    I have reviewed the nursing notes. I have reviewed the findings, diagnosis, plan and need for follow up with the patient.    New Prescriptions    No medications on file       Final diagnoses:   2019 novel coronavirus disease (COVID-19)       --  Fred Mahoney MD  [facility], located within [facility] EMERGENCY  DEPARTMENT  1/28/2021     Fred Mahoney MD  01/28/21 9089

## 2021-01-29 NOTE — PLAN OF CARE
"  VS: BP soft 98/44, pt refused recheck, axillary temp, pt refused oral.   O2: RA, pt refused cont pulse ox, sputum sample needed but no cough.   Output: Voiding independently in bathroom, needs reminders.   Last BM: Pt unable to report.   Activity: Independent/SBA.   Skin: Bumps on lower back at baseline per report, pt refused full skin check.   Pain: Denies.   CMS: Pt denies numbness or tingling.   Dressing: NA.   Diet: Regular.   LDA: No IV access, pt refused.   Equipment: Pt refused cont pulse ox, IS, and PCDs. Call light within reach.   Plan: Continue to monitor.   Additional Info: 1:1 for safety. Pt refused assessments and sat upright in the chair since he arrived at 0440. Pt has been offered TV, box lunch and ice cream, blankets, bathroom breaks, but declines everything. Pt refused simvastatin this am, even with ice cream. Pt is oriented to self but KATRINA time, place, situation as pt mostly only says \"yes\", \"no\", or \"stop it.\"      "

## 2021-01-29 NOTE — ED NOTES
Bed: ED22  Expected date:   Expected time:   Means of arrival:   Comments:  Destinee [address]45  ETA 15-20   26 m  Group home covid positive  Short of breath

## 2021-01-29 NOTE — UTILIZATION REVIEW
Admission Status; Secondary Review Determination     Under the authority of the Utilization Management Commitee, the utilization review process indicated a secondary review on the above patient. The review outcome is based on review of the medical records, discussions with staff, and applying clinical experience noted on the date of the review.     (x) Inpatient Status Appropriate - This patient's medical care is consistent with medical management for inpatient care and reasonable inpatient medical practice.     RATIONALE FOR DETERMINATION:     25 year old male with PMH of Down's syndrome, morbid obesity,  Hypothyroidism, hypertriglyceridemia, seborrheic dermatitis, and chronic diarrhea who presents with COVID-19 infection. who was admitted to [facility] on 1/28/2021 for evaluation of SOB from group home.  The patient was diagnosed with COVID on 1/27/21.  He was reported to be hypoxic with saturations in the upper 80% range in the ER.  Vital signs notable for hypotension with saturations measured in the mid 90% range on RA.  Hospital course has been complicated by intermittent refusal of medications and nursing cares and is requiring a 1:1 attendant for safety.     If this patient continues to require hospitalization beyond today for medical issues including COVID pneumonia then continued inpatient status is appropriate.  However, if this patient has improved well enough to be discharged today or remains in the hospital only for disposition issues then conversion to observation status would be appropriate.    Dr. Bonner was notified of this note and recommendation today via text page.    At the time of admission with the information available to the attending physician more than 2 nights Hospital complex care was anticipated, based on patient risk of adverse outcome if treated as outpatient and complex care required. Inpatient admission is appropriate based on the Medicare  guidelines.    The information on this document is developed by the utilization review team in order for the business office to ensure compliance. This only denotes the appropriateness of proper admission status and does not reflect the quality of care rendered.   The definitions of Inpatient Status and Observation Status used in making the determination above are those provided in the CMS Coverage Manual, Chapter 1 and Chapter 6, section 70.4.     Sincerely,     Rashel Duron MD  Utilization Review   Physician Advisor   [facility]

## 2021-01-29 NOTE — PHARMACY-ADMISSION MEDICATION HISTORY
Admission medication history interview status for the 1/28/2021 admission is complete. See Epic admission navigator for allergy information, pharmacy, prior to admission medications and immunization status.     Medication history interview sources:    - Received MAR from patient's group home ([phone])    Changes made to PTA medication list (reason)  Added: vitamin D3, melatonin, acetaminophen, ibuprofen    Deleted:   - Ivermectin 1% cream, vitamin C, quetiapine: Not listed on MAR    Changed:   - ketoconazole shampoo: added direction (3 times per week)  - fish oil: changed from 1 g daily to twice daily per MAR  -  mg: added direction    Additional medication history information (including reliability of information, actions taken by pharmacist):  - Updated patient's PTA med list based on MAR faxed from patient's group home, so it is pretty reliable.   - Quetiapine is listed under patient's allergy information along with seasonal allergies. No allergic reaction was documented.       Prior to Admission medications    Medication Sig Last Dose Taking? Auth Provider   acetaminophen (TYLENOL) 500 MG tablet Take 1,000 mg by mouth every 6 hours as needed for mild pain 1/28/2021 at PM Yes Unknown, Entered By History   acetylcysteine () 600 MG CAPS capsule Take 600 mg by mouth daily 1/28/2021 at AM Yes Unknown, Entered By History   albuterol (PROAIR HFA/PROVENTIL HFA/VENTOLIN HFA) 108 (90 Base) MCG/ACT inhaler Inhale 1-2 puffs into the lungs every 6 hours as needed for shortness of breath / dyspnea or wheezing 1/28/2021 at PM Yes Liu Song PA-C   B Complex Vitamins (VITAMIN B-COMPLEX) TABS Take 1 tablet by mouth daily 1/28/2021 at AM Yes Sergio Florence MD   calcium carbonate 600 mg-vitamin D 400 units (CALTRATE) 600-400 MG-UNIT per tablet Take 1 tablet by mouth daily 1/28/2021 at AM Yes Unknown, Entered By History   escitalopram (LEXAPRO) 20 MG tablet Take 1 tablet (20 mg) by mouth daily  1/28/2021 at AM Yes Liu Song PA-C   fish oil-omega-3 fatty acids 1000 MG capsule Take 1 g by mouth 2 times daily  1/28/2021 at PM Yes Unknown, Entered By History   ibuprofen (ADVIL/MOTRIN) 200 MG tablet Take 400 mg by mouth every 4 hours as needed for mild pain 1/27/2021 at AM Yes Unknown, Entered By History   ketoconazole (NIZORAL) 2 % external cream Apply small amount twice a day to external ear canal 1/27/2021 at PM Yes Liu Song PA-C   ketoconazole (NIZORAL) 2 % external shampoo Apply to the affected area and wash off after 5 minutes.  Patient taking differently: Apply to the affected area and wash off after 5 minutes 3 times per week (Mon, Wed, Fri) 1/27/2021 at PM Yes Liu Song PA-C   levothyroxine (SYNTHROID, LEVOTHROID) 112 MCG tablet Take 112 mcg by mouth daily. 1/28/2021 at AM Yes Reported, Patient   loperamide (IMODIUM) 2 MG capsule Take 1 capsule (2 mg) by mouth every morning 1/28/2021 at AM Yes Sergio Florence MD   melatonin 1 MG TABS tablet Take 1 mg by mouth At Bedtime 1/28/2021 at PM Yes Unknown, Entered By History   metFORMIN (GLUCOPHAGE) 1000 MG tablet Take 1,000 mg by mouth 2 times daily (with meals). 1/28/2021 at AM Yes Reported, Patient   metroNIDAZOLE (METROGEL) 0.75 % external gel Apply topically 2 times daily 1/27/2021 at PM Yes Liu Song PA-C   multivitamin w/minerals (MULTI-VITAMIN) tablet TAKE 1 TABLET BY MOUTH ONCE DAILY *NO REFILLS REMAINING, FAXING FOR REFILLS* 1/28/2021 at AM Yes Liu Song PA-C   Probiotic Product (ACIDOPHILUS PROBIOTIC BLEND) CAPS Take 1 capsule by mouth daily 1/28/2021 at AM Yes Sergio Florence MD   simvastatin (ZOCOR) 20 MG tablet Take 1 tablet (20 mg) by mouth At Bedtime 1/28/2021 at PM Yes Duncan, Liu Yung, PA-C   Vitamin D3 (CHOLECALCIFEROL) 25 mcg (1000 units) tablet Take 2 tablets by mouth daily 1/28/2021 at AM Yes Unknown, Entered By History         Medication history completed by: Nikia Tristan,  PharmD Candidate

## 2021-01-29 NOTE — ED NOTES
[facility]    ED Nurse to Floor Handoff     Hany Patel is a 25 year old male who speaks English and lives with others,  in a group home  They arrived in the ED by ambulance from home    ED Chief Complaint: Shortness of Breath    ED Dx;   Final diagnoses:   2019 novel coronavirus disease (COVID-19)         Needed?: No    Allergies:   Allergies   Allergen Reactions     Seasonal Allergies    .  Past Medical Hx:   Past Medical History:   Diagnosis Date     Diarrhea     Diarrhea Episodes       Baseline Mental status: other Mentally disabled   Current Mental Status changes: at basesline    Infection present or suspected this encounter: no  Sepsis suspected: No  Isolation type: Special Precautions-COVID-19  Patient tested for COVID 19 prior to admission: YES     Activity level - Baseline/Home:  Stand with Assist  Activity Level - Current:   Stand with Assist    Bariatric equipment needed?: No    In the ED these meds were given: Medications - No data to display    Drips running?  No    Home pump  No    Current LDAs  Wound 02/11/17 Medial Neck Other (comment) Blister (Active)   Number of days: 1447       Rash Bilateral groin (Active)   Number of days:        Incision/Surgical Site 02/07/17 Medial;Left Flank (Active)   Number of days: 1451       Labs results: Labs Ordered and Resulted from Time of ED Arrival Up to the Time of Departure from the ED - No data to display    Imaging Studies:   Recent Results (from the past 24 hour(s))   XR Chest Port 1 View    Narrative    Exam: XR CHEST PORT 1 VW, 1/28/2021 9:14 PM    Indication: COVID, Short of breath    Comparison: Chest x-ray 1/2/2020    Findings:   Upright AP view of the chest. Bilateral patchy airspace opacities. No  pneumothorax. No definite pleural effusion. Cardiac silhouette borders  are obscured by the pulmonary opacities. No acute osseous abnormality.      Impression    Impression:   Bilateral multifocal  "airspace opacities compatible with atypical  infection.    I have personally reviewed the examination and initial interpretation  and I agree with the findings.    TRAVIS PHILLIPS MD       Recent vital signs:   /72   Pulse 82   Temp 98.1  F (36.7  C) (Axillary)   Resp 16   Ht 1.6 m (5' 3\")   Wt 111.1 kg (245 lb)   SpO2 94%   BMI 43.40 kg/m      [place] Coma Scale Score: 15 (01/28/21 2045)       Cardiac Rhythm: Normal Sinus  Pt needs tele? No  Skin/wound Issues: None    Code Status: Full Code    Pain control: pt had none    Nausea control: pt had none    Abnormal labs/tests/findings requiring intervention: None    Family present during ED course? Yes   Family Comments/Social Situation comments: Sister is bedside do to the circumstances of patient not letting staff unless she was around.     Tasks needing completion: None    Rad Schafer RN  [facility] -- [phone] [place] ED  [phone] [facility] ED      "

## 2021-01-29 NOTE — PLAN OF CARE
[phone]  Patient A & O x4. VSS. Pt refusing pulse ox. Pt has Pt up with IND sitter at bedside. Lung sounds . Patient denies chest pain, SOB, lightheadedness, dizziness, tingling, numbness, nausea, and vomiting. Bowel sounds active, passing flatus, and tolerating regular diet. Drinking well and voiding spontaneously without difficulties. No PIV access. No signs of pain. Pt doesn't want lab to draw from him he is scared of needles. Spoke with pt sister in law at bedside about a plan to get labs. Pt allows lab to place tourniquet and clean area but as soon as needle comes out pt gets scared. Giving pt a little break pt has had a lot of people in his room at beginning of evening shift. Came up with a plan for RN and sister in law to hold pt hand while lab attempts to draw. Will give pt reward when it is done. Plan is to discharge back to skilled nursing when medically stable. Patient demonstrates ability to use call light appropriately.     1830  Lab was able to get blood drawn. MD notified. Pt responds better when things are fully explained to him. Pt had some bad experiences at the hospital previously, so tends to get scared if he doesn't know what is going on. Pt will not be able to keep PIV in place. Pt tends to pull at things left on skin.

## 2021-01-29 NOTE — PROGRESS NOTES
"Seen and examined patient.   hany was admitted earlier this morning, by my colleague, Dr. Sun. Please see separate H&P  When I went to see patient, he was sitting in the corner of the room and appeared as if he did not want to be bothered at all.  Specifically he denied having SOB or difficulty in breathing   He had helped him self with a small portion of his breakfast  He did allow me to listen to his heart and lungs. Although refused to get his Oxygen stats probe on    BP 97/43 (BP Location: Right arm)   Pulse 69   Temp 96.9  F (36.1  C) (Axillary)   Resp 19   Ht 1.6 m (5' 3\")   Wt 111.1 kg (245 lb)   SpO2 94%   BMI 43.40 kg/m      CVS: Normal Heart sounds   RS: Faint crackles bibasically   Abdomen: Soft and non tender. Normal bowel sounds.  Neuro: Alert and orientated X 3   MSK: No swelling in the lower extremities      X ray noted  Patient has refused lab tests     Impression:  COVID Pneumonia  Hypothyroidism  Metabolic syndroma      Discussion:  Hany paul is a 25-year-old male patient with known history of Down syndrome, with previous history of pneumonia, who is required intubation.  He was admitted to the [facility] Covid unit earlier this morning.  It is my understanding that he was declined at [facility].    Significant difficulties at this point of time include  inability to get oxygen saturations and inability to get lab test to inform treatment.  I had extensive discussion with his father Nicholas who appears very concerned about patient's admission and wants to do everything we can in our ability to make him better from the situation.  I discussed in length about our inability to get lab tests or to get an oxygen saturation.  However I reassured him that patient did not appear to be in distress and was breathing normally and in fact, refused any shortness of breath or difficulty in breathing.  Nicholas tells me that these issues with patients are secondary to scary memories that he has had " from previous hospitalizations and intubation.    In the absence of lab tests are oxygen saturations, I discussed what Nicholas that we would likely go by how he appears.  Nicholas did mention to me that previously to get lab tests or any other invasive tests he has needed to be sedated.  This I discussed, would not be the most ideal way to proceed forward as we worry about respiratory depression with sedation, I also discussed with him the possibility of needing transfer to the [facility].  Nicholas expressed to me that patient was only transferred out of the [facility]  ER to the [facility] this morning, which was a very difficult process given patient's reluctance to move from one place to the other within the hospital.    I also discussed with Nicholas that we will likely be giving patient medications that we are able to give at this point of time that have had some benefit with Covid related symptoms.  I also discussed his issue with triage staff on call who understands the situation and wondered if he would be able to see if patient could be accommodated in the PICU, should there be a respiratory deterioration.  In the meantime we also discussed that it would be best to consider oral steroids as well as low-dose apixaban for DVT prophylaxis, as these are less invasive treatments associated with COVID-19 infection.  I will also sign out extensively to the oncoming physician for this evening about this particular situation.    Dr MEHNAZ Johnson MD, [facility]  Hospitalist ( Internal medicine)  Pager: [phone]

## 2021-01-29 NOTE — H&P
[facility]     Hospitalist History and Physical       Date of Admission:  1/28/2021  Assessment & Plan   Hany Patel is a 25 year old male with PMH of Down's syndrome, morbid obesity,  Hypothyroidism, hypertriglyceridemia, seborrheic dermatitis, and chronic diarrhea who presents with COVID-19 infection. who was admitted to [facility] on 1/28/2021 for evaluation of SOB from group home.     COVID-19 Diagnosis Details:  Onset of COVID symptoms unknown   Date of positive test results 1/27/2021   Research study unknown     # Confirmed COVID-19 infection    # Viral Pneumonia secondary to COVID-19 infection with concern for bacterial Superinfection  - Initial chest x-ray showed multifocal airspace disease. No supplemental O2 needs currently but O2 sats are in the low to mid 90s, reported desats to high 80s with ambulation in the ED.  - Admit to medical floor with continuous pulse oximetry, COVID-19 special precautions, minimized lab draws, and care consolidation  Patient not consistently willing to allow cares and pulse ox.  - Oxygen: RA  - Labs: labs not done on admission due to patient's fear of lab draws. Will need to discuss access if needed.  - Imaging: CXR done, no additional imaging needed currently  - Breathing treatments: albuterol inhaler q4H PRN; avoid nebulizers in favor of MDIs   - IV fluids: not indicated at this time and no IV access  - Antibiotics: indicated due to concern of bacterial superinfection; levofloxacin started in the ED for treatment of HCAP given group home setting and hx of recurrent pneumonias, not able to give IV abx.  - Treatments: does not qualify for dexamethasone. Day team to discuss remdesivir, which he would qualify for given lung infilatrates but he does not have IV access  - Research study protocol: NA  - DVT Prophylaxis: At high risk of thrombotic complications due to COVID-19 disease but no  D dimer was drawn due to fear of lab draws.         - PROPHYLACTIC dosing: lovenox 40mg two times a day due to BMI >40. Unfortunately patient likely will not be able to tolerate subcutaneous medication. Consider starting DOAC instead.  - Discharge Anticoagulation: At discharge consider one of the following for 30 days and until the patient has returned to normal mobility:        - Apixaban (Eliquis) 2.5 mg two times a day        - Rivaroxaban (Xarelto) 10 mg once daily    No results found for: DD      Chronic, stable Medical Problems:  # Hypothyroidism: continue Synthroid  # Mood, behavior: continue Lexapro. Not clear what dose of seroquel he takes, should confirm with family in AM.   # Chronic diarrhea: Continue PTA loperamide   # Prediabetes/metabolic syndrome: continue PTA metformin and simvastatin       Code Status: Full Code    Goals of Care/ACP:  Unable to discuss goals of care due to: patient's ability to communicate   Diet: Combination Diet Regular Diet Adult    [place] Catheter: not present  Bedside iPad: NOT used due to severity of illness/medical appropriateness   Disposition Plan   Expected discharge: 2 - 3 days, recommended to prior living arrangement once stable on room air and safe disposition plan/TCU bed available.   Entered: Ang Bonner 01/29/2021, 6:49 AM       The patient's care was discussed with the Patient and bedside RN. Please call parents in AM.    Ang Bonner MD (Sally)  Internal Medicine/Pediatrics  [facility]   Contact information available via [facility] Paging/Directory    ______________________________________________________________________    Chief Complaint   COVID, SOB    History is obtained from chart review.    History of Present Illness   Hany Patel is a 25 year old male with PMH of Down's syndrome, morbid obesity,  Hypothyroidism, hypertriglyceridemia, seborrheic dermatitis, and chronic diarrhea who presents with  "COVID-19 infection. who was admitted to [facility] on 1/28/2021 for evaluation of SOB from group home.     Patient does not give me any history so all history obtained from chart review. He had virtual visit on 1/26 for exposure to COVID + member of group home. He tested positive on 1/27. On 1/28 he started c/o chest tightness \"like an asthma attack\". He had fever to 101.1 as well. SOB better after albuterol inhaler. Has been more fatigued over the last few days, \"zoning out.\" He arrived to the ED by EMS on 1/28 due to SOB. He has history of recurrent pneumonias requiring intubation; this, and his morbid obesity, puts him at higher risk for decompensation.     In the ED, could not obtain any labs due to patient's fear of lab draws. CXR shows multifocal airspace opacities. Could not get IV access. Started on PO levofloxacin for HCAP. Satting 94% on RA but reportedly was desatting to the high 80s with ambulation.        Review of Systems    Review of systems not obtained due to patient factors - lack of cooperation    Past Medical History    I have reviewed this patient's medical history and updated it with pertinent information if needed.   Past Medical History:   Diagnosis Date     Diarrhea     Diarrhea Episodes        Past Surgical History   I have reviewed this patient's surgical history and updated it with pertinent information if needed.  Past Surgical History:   Procedure Laterality Date     INSERT CHEST TUBE Left 2/7/2017    Procedure: INSERT CHEST TUBE;  Surgeon: Coco Zambrano MD;  Location: [facility] OR     SURGICAL HISTORY OF -       Ear tubes      SURGICAL HISTORY OF -       Adenoid removal        Social History   I have reviewed this patient's social history and updated it with pertinent information if needed.  Social History     Tobacco Use     Smoking status: Never Smoker     Smokeless tobacco: Never Used   Substance Use Topics     Alcohol use: No     " Drug use: No   Living at group home.    Family History   I have reviewed this patient's family history and updated it with pertinent information if needed.   Family History   Problem Relation Age of Onset     Breast Cancer Mother      Graves' disease Mother      Hypertension Father    Could not verify with patient.    Prior to Admission Medications   Prior to Admission Medications   Prescriptions Last Dose Informant Patient Reported? Taking?   Ascorbic Acid (VITAMIN C) 500 MG CHEW   Yes No   Sig: Take 500 mg by mouth daily    B Complex Vitamins (VITAMIN B-COMPLEX) TABS   No No   Sig: Take 1 tablet by mouth daily   Calcium Carbonate-Vitamin D (CALCIUM + D PO)   Yes No   Sig: Take 1 tablet by mouth daily    Lactobacillus Acid-Pectin (LACTOBACILLUS ACIDOPHILUS) TABS   Yes No   Sig: Take 1 tablet by mouth daily    Multiple Vitamins-Minerals (IMMUNE SUPPORT) CHEW   No No   Sig: Take 1 chew tab by mouth daily   Probiotic Product (ACIDOPHILUS PROBIOTIC BLEND) CAPS   No No   Sig: Take 1 capsule by mouth daily   QUEtiapine (SEROQUEL) 6.25 mg TABS quarter-tab   Yes No   Sig: Take by mouth daily Unsure dose   UNABLE TO FIND   Yes No   Sig: MEDICATION NAME: acetylcysteine, for congestion   albuterol (PROAIR HFA/PROVENTIL HFA/VENTOLIN HFA) 108 (90 Base) MCG/ACT inhaler   No No   Sig: Inhale 1-2 puffs into the lungs every 6 hours as needed for shortness of breath / dyspnea or wheezing   escitalopram (LEXAPRO) 20 MG tablet   No No   Sig: Take 1 tablet (20 mg) by mouth daily   fish oil-omega-3 fatty acids 1000 MG capsule   Yes No   Sig: Take 1 g by mouth daily   ivermectin (SOOLANTRA) 1 % cream   Yes No   Sig: Apply topically daily as needed (for flares)   ketoconazole (NIZORAL) 2 % external cream   No No   Sig: Apply small amount twice a day to external ear canal   ketoconazole (NIZORAL) 2 % external shampoo   No No   Sig: Apply to the affected area and wash off after 5 minutes.   levothyroxine (SYNTHROID, LEVOTHROID) 112 MCG  tablet   Yes No   Sig: Take 112 mcg by mouth daily.   loperamide (IMODIUM) 2 MG capsule   No No   Sig: Take 1 capsule (2 mg) by mouth every morning   metFORMIN (GLUCOPHAGE) 1000 MG tablet   Yes No   Sig: Take 1,000 mg by mouth 2 times daily (with meals).   metroNIDAZOLE (METROGEL) 0.75 % external gel   No No   Sig: Apply topically 2 times daily   multivitamin (ONE-DAILY) tablet   No No   Sig: Take 1 tablet by mouth daily   multivitamin w/minerals (MULTI-VITAMIN) tablet   No No   Sig: TAKE 1 TABLET BY MOUTH ONCE DAILY *NO REFILLS REMAINING, FAXING FOR REFILLS*   simvastatin (ZOCOR) 20 MG tablet   Yes No   Sig: Take 1 tablet (20 mg) by mouth At Bedtime   vitamin  B complex with vitamin C (VITAMIN  B COMPLEX) TABS   Yes No   Sig: Take 1 tablet by mouth daily      Facility-Administered Medications: None     Allergies   Allergies   Allergen Reactions     Seasonal Allergies        Physical Exam   Vital Signs: Temp: 95.9  F (35.5  C) Temp src: Axillary BP: 98/44(pt refused recheck) Pulse: 72   Resp: 20 SpO2: 94 % O2 Device: None (Room air)    Weight: 245 lbs 0 oz    Physical Exam  General: awake, alert, in no acute distress  HEENT: NCAT, sclera anicteric, no nasal discharge, MMM  CV: RRR, no murmurs noted  Resp: patient refused my exam, no increased WOB  Abd: refused exam  MSK: refused exam  Skin: warm, dry, no jaundice  Neuro: alert    Data   Data reviewed today: I reviewed all medications, new labs and imaging results over the last 24 hours. I personally reviewed the chest x-ray image(s) showing multifocal airspace infiltrates.    Results for orders placed or performed during the hospital encounter of 01/28/21 (from the past 24 hour(s))   XR Chest Port 1 View    Narrative    Exam: XR CHEST PORT 1 VW, 1/28/2021 9:14 PM    Indication: COVID, Short of breath    Comparison: Chest x-ray 1/2/2020    Findings:   Upright AP view of the chest. Bilateral patchy airspace opacities. No  pneumothorax. No definite pleural effusion.  Cardiac silhouette borders  are obscured by the pulmonary opacities. No acute osseous abnormality.      Impression    Impression:   Bilateral multifocal airspace opacities compatible with atypical  infection.    I have personally reviewed the examination and initial interpretation  and I agree with the findings.    TRAVIS PHILLIPS MD

## 2021-01-29 NOTE — ED TRIAGE NOTES
Pt presents to the ER via EMS with c/o SOB and recent positive Covid result. Pt's group home sent here due to O2 saturations being in the low 90s.

## 2021-01-30 LAB — CRP SERPL-MCNC: 47 MG/L (ref 0–8)

## 2021-01-30 PROCEDURE — 99232 SBSQ HOSP IP/OBS MODERATE 35: CPT | Performed by: INTERNAL MEDICINE

## 2021-01-30 PROCEDURE — 250N000013 HC RX MED GY IP 250 OP 250 PS 637: Performed by: INTERNAL MEDICINE

## 2021-01-30 PROCEDURE — 120N000002 HC R&B MED SURG/OB UMMC

## 2021-01-30 PROCEDURE — 250N000012 HC RX MED GY IP 250 OP 636 PS 637: Performed by: INTERNAL MEDICINE

## 2021-01-30 PROCEDURE — 99207 PR CDG-MDM COMPONENT: MEETS MODERATE - UP CODED: CPT | Performed by: INTERNAL MEDICINE

## 2021-01-30 PROCEDURE — 250N000013 HC RX MED GY IP 250 OP 250 PS 637: Performed by: STUDENT IN AN ORGANIZED HEALTH CARE EDUCATION/TRAINING PROGRAM

## 2021-01-30 RX ADMIN — B-COMPLEX W/ C & FOLIC ACID TAB 1 TABLET: TAB at 09:18

## 2021-01-30 RX ADMIN — SIMVASTATIN 20 MG: 20 TABLET, FILM COATED ORAL at 21:01

## 2021-01-30 RX ADMIN — DEXAMETHASONE 6 MG: 2 TABLET ORAL at 09:18

## 2021-01-30 RX ADMIN — LEVOFLOXACIN 500 MG: 500 TABLET, FILM COATED ORAL at 09:18

## 2021-01-30 RX ADMIN — LOPERAMIDE HYDROCHLORIDE 2 MG: 2 CAPSULE ORAL at 09:18

## 2021-01-30 RX ADMIN — ESCITALOPRAM OXALATE 20 MG: 20 TABLET ORAL at 09:18

## 2021-01-30 RX ADMIN — Medication 1 CAPSULE: at 09:18

## 2021-01-30 RX ADMIN — APIXABAN 2.5 MG: 2.5 TABLET, FILM COATED ORAL at 09:18

## 2021-01-30 RX ADMIN — METFORMIN HYDROCHLORIDE 1000 MG: 500 TABLET ORAL at 09:18

## 2021-01-30 RX ADMIN — MULTIPLE VITAMINS W/ MINERALS TAB 1 TABLET: TAB at 09:18

## 2021-01-30 RX ADMIN — OXYCODONE HYDROCHLORIDE AND ACETAMINOPHEN 500 MG: 500 TABLET ORAL at 09:18

## 2021-01-30 RX ADMIN — METFORMIN HYDROCHLORIDE 1000 MG: 500 TABLET ORAL at 18:03

## 2021-01-30 RX ADMIN — APIXABAN 2.5 MG: 2.5 TABLET, FILM COATED ORAL at 21:01

## 2021-01-30 RX ADMIN — LEVOTHYROXINE SODIUM 112 MCG: 112 TABLET ORAL at 09:18

## 2021-01-30 RX ADMIN — Medication 1 TABLET: at 09:18

## 2021-01-30 ASSESSMENT — ACTIVITIES OF DAILY LIVING (ADL)
DRESSING/BATHING_DIFFICULTY: NO
TOILETING_ISSUES: NO
COMMUNICATION: DIFFICULTY UNDERSTANDING
DIFFICULTY_COMMUNICATING: YES
DOING_ERRANDS_INDEPENDENTLY_DIFFICULTY: YES
DIFFICULTY_EATING/SWALLOWING: NO
WEAR_GLASSES_OR_BLIND: NO
CONCENTRATING,_REMEMBERING_OR_MAKING_DECISIONS_DIFFICULTY: YES
FALL_HISTORY_WITHIN_LAST_SIX_MONTHS: NO
WALKING_OR_CLIMBING_STAIRS_DIFFICULTY: NO

## 2021-01-30 NOTE — PROGRESS NOTES
"CLINICAL NUTRITION SERVICES - ASSESSMENT NOTE     Nutrition Prescription    RECOMMENDATIONS FOR MDs/PROVIDERS TO ORDER:  None    Malnutrition Status:    Unable to determine due to no NFPA and no group home prior to admission intake available at this time.    Recommendations already ordered by Registered Dietitian (RD):  -Daily weights  -carrots w/ ranch at 2pm  -yogurt TID with meals, strawberry banana if able    Future/Additional Recommendations:  -continue to monitor and encourage oral intake  -consider sending finger foods if intake declines  -consider adding Boost Plus as between-meal snack     REASON FOR ASSESSMENT  Hany Patel is a/an 25 year old male assessed by the dietitian for MST score of 3: unsure for wt loss and positive  (\"patient unable to answer. Picks at morning meals this am\") for poor PO intake R/t decreased appetite.    -presented with SOB. Positive for COVID-19 infection on 1/27/21.  -PMH of Down's syndrome, morbid obesity, hypothyroidism, hypertriglyceridemia, seborrheic dermatitis, and chronic diarrhea. History of recurrent pneumonias requiring intubation.  -lives in a group home  -minimizing lab draws due to patient's fear of lab draws. CXR shows multifocal airspace opacities. Could not get IV access. Requiring a 1:1 attendant for safety and intermittent refusal of medications and nursing cares.    NUTRITION HISTORY  Obtained from Nicholas patient's father via telephone:  -moves to the group home in spring 2020.  -believes they feed him healthy foods at the group home. Loves diet mountain dew. Try to keep the sweets down. Likes unsweetened ice tea, Fritos, cheese sticks, beef jerky sticks, fresh carrots with ranch, and veggies. Generally not a picky eater.  Informed by patient's RN from yesterday that care team is requesting only communicating with Nelson from [facility]. This writer unable to find his contact information in chart. Unable to determine if patient was eating poorly prior to " "admission. RN mentioned that Nicholas would be a good source of information.    CURRENT NUTRITION ORDERS  Diet: Orders Placed This Encounter      Combination Diet Regular Diet Adult    Intake/Tolerance: Ate small amount of breakfast and 75% lunch and dinner 1/29. Per RN note yesterday afternoon patient was tolerating diet and drinking well.    Current temperature 96.5 axillary. On room air, SpO2 93.    LABS  1/29:  Na 129 (WNL)  K+ 4.0 (WNL)  CRP inflammation 47.0 (H)      MEDICATIONS  Calcium citrate/Vitamin D  Decadron  culturell  Levothyroxine  imodium  Metformin  thera-vit-m  Vitamin B complex w/ vitamin C  Vitamin C    ANTHROPOMETRICS  Height: 160 cm (5' 3\")  Most Recent Weight: 111.1 kg (245 lb)    IBW: 56.4 kg (197% IBW)  BMI: Obesity Grade III BMI >40  Weight History:   Wt Readings from Last 10 Encounters:   01/28/21 111.1 kg (245 lb)   07/17/20 106.2 kg (234 lb 3.2 oz)   02/04/20 115.7 kg (255 lb)   01/02/20 113.3 kg (249 lb 12.8 oz)   12/28/19 114.8 kg (253 lb)   12/26/19 112 kg (247 lb)   10/22/19 112 kg (247 lb)   10/13/19 116.2 kg (256 lb 1.6 oz)   10/06/19 116.3 kg (256 lb 4.8 oz)   08/28/19 114.3 kg (252 lb)   -UBW was upper 250 lbs prior to weight loss. Weight loss was gradual. Nicholas believes they feed him healthy foods at the group home. Per office visit with JUDITH on 7/17/20 - Weight. He is down. Has lost weight. He is congratulated on this. He is living a group home and I expect they are focused on his diet more.   -appears patient has gained weight (+11 lbs) over the last 6 months.    Current encounter:  Vitals:    01/28/21 2045   Weight: 111.1 kg (245 lb) ED weight - may have been reported   -RD to order daily weights to assess trends.    Dosing Weight: 70 kg (adjusted weight)    ASSESSED NUTRITION NEEDS  Estimated Energy Needs: 7658-8830 kcals/day (25 - 30 kcals/kg)  Justification: Obese with respect to infection  Estimated Protein Needs: 77-98 grams protein/day (1.1 - 1.4 grams of " pro/kg)  Justification: Hypercatabolism with acute illness  Estimated Fluid Needs: (1 mL/kcal)   Justification: Per provider pending fluid status    PHYSICAL FINDINGS  Unable to assess due to COVID-19 status, and RD off-site.    MALNUTRITION  % Intake: Unable to assess  % Weight Loss: None noted  Subcutaneous Fat Loss: Unable to assess  Muscle Loss: Unable to assess  Fluid Accumulation/Edema: None noted  Malnutrition Diagnosis: Unable to determine due to no NFPA and no group home prior to admission intake available at this time.    NUTRITION DIAGNOSIS  Inadequate oral intake related to variable appetite secondary to acute infection as evidenced by intermittent refusal of cares per chart review with potential for decline pending hospital course.    INTERVENTIONS  Implementation  Nutrition Education: Not appropriate at this time due to patient condition. Discussed nutrition history with father as above.  Collaboration with other providers: briefly discussed with previous RN who answered the phone upon calling the unit.  Multi-trace element supplement therapy: continue  Multivitamin/mineral supplement therapy: continue  Daily weights  Modify composition of meals/snacks: see above    Goals  Patient to consume % of nutritionally adequate meal trays TID, or the equivalent with supplements/snacks.     Monitoring/Evaluation  Progress toward goals will be monitored and evaluated per protocol.    Dianne Rayo RDN, LD  Clinical Dietitian  Office: [phone]  Weekend Pager: [phone]

## 2021-01-30 NOTE — PLAN OF CARE
[id number]  Patient A & O x4-pt has down syndrome. VSS. Pt up IND sitter at bedside for safety. Lung sounds . Patient denies chest pain, SOB, lightheadedness, dizziness, tingling, numbness, nausea, and vomiting. Bowel sounds active, last BM 1/30, passing flatus, and tolerating regular diet. Drinking well and voiding spontaneously without difficulties. No PIV. No signs of pain. Plan is to discharge back to group home in 1-2 days per MD. Father Nicholas is very helpful to convince pt to take meds. Pt responds well when father is put on speaker. Pt does well when rewards are offered and things are explained. Patient demonstrates ability to use call light appropriately. Will continue to monitor patient.

## 2021-01-30 NOTE — PLAN OF CARE
Patient on 1:1 for safety. Patient perseverating about stuffed animal cat for most of morning and refused both breakfast and medications. After multiple calls to patient's father for reassurance and several attempts of redirection, patient did take his morning medications. Patient had bowel movement in bathroom and writer provided pericare; pull up applied per patient request. Patient drank coffee from breakfast tray but declined all other items; patient did butter his toast and move items around on tray but never actually ate anything. Writer assisted patient to order lunch tray with preferred choices and patient is now observed doing same behaviors, moving things around and not eating the items. Another RN provided diet soda and chocolate pudding for patient however he has not consumed those either. No complaints of pain or discomfort. VSS; BP soft per baseline and O2 sat 93% on RA. Declined lab draw this AM. Occasional non productive cough; lungs are clear and equal bilaterally. Continent of bowel and bladder; last BM this morning. Up independently with supervision. Will continue to monitor and provide interventions as needed.     Patient calmer now and eating lunch, albeit very slowly. Responding well to reassurance that his Dad will bring his cat later on today. Patient spent time watching ICarly and SpongeBob on his IPAD which was also helpful for his mood and cooperation level.

## 2021-01-30 NOTE — PROGRESS NOTES
Lengthy face to face discussion with sister in law Cyndee:   Discussed why our preference would be not to sedate him to do blood tests as sedation would be detrimental to respiratory function. However, added that if patient were to deteriorate, we would do everything possible to treat patient's condition   For now, we will continue with oral dexamathasone and apixaban     Discussed that blood tests would inform of baseline parameters and would be a start point, if patient were to be willing to have an IV line in place    We also discussed about monoclonal antibodies and I had a lengthy discussion with ID team as well. Monoclonal antibodies are generally given in an outpatient setting,  And under exceptional situations have been given as inpatient, but it appears that patient gabby not meet criteria. Furthermore, it will also require an IV line.     It does appear that patient did not allow blood tests again, despite presence of his family    Overall, acknowledged that this is a difficult situation. However, it is reassuring that patient was not symptomatic from a  respiratory standpoint and spot check of oxygen saturation was within acceptable limits even without supplemental Oxygen    Updated oncoming physician of these events       Dr MEHNAZ Johnson MD, [facility]  Hospitalist ( Internal medicine)  Pager: [phone]

## 2021-01-30 NOTE — PROGRESS NOTES
[facility]     Medicine Progress Note - Hospitalist Service       Date of Admission:  1/28/2021  Assessment & Plan        Hany Patel is a 25 year old male with PMH of Down's syndrome, morbid obesity,  Hypothyroidism, hypertriglyceridemia, seborrheic dermatitis, and chronic diarrhea who presents with COVID-19 infection. who was admitted to [facility] on 1/28/2021 for evaluation of SOB from group home.         # Confirmed COVID-19 infection    # Viral Pneumonia secondary to COVID-19 infection with concern for bacterial Superinfection  - Initial chest x-ray showed multifocal airspace disease. No supplemental O2 needs currently but O2 sats are in the low to mid 90s, reported desats to high 80s with ambulation in the ED.  - Admitted to medical floor with continuous pulse oximetry, COVID-19 special precautions, minimized lab draws, and care consolidation  Patient not consistently willing to allow  Only spot checks with ulse OXimetry. (3-94% documented   - Oxygen: RA  - Labs: Not indicative of severe infection. CK at 48, Ferritin at 218 and CRP at 47  - Imaging: CXR done, no additional imaging needed currently  - Breathing treatments: albuterol inhaler q4H PRN; avoid nebulizers in favor of MDIs   - IV fluids: not indicated at this time and no IV access  - Antibiotics: indicated due to concern of bacterial superinfection; levofloxacin started in the ED for treatment of HCAP given group home setting and hx of recurrent pneumonias, not able to give IV abx. Day #2 of levofloxacon  - Treatments:  Given the inability to place IV line, appearance of CXR, patient initiated on oral dexamethasone ( #2)  - Research study protocol: NA  - DVT Prophylaxis: At high risk of thrombotic complications due to COVID-19 disease but no D dimer was drawn due to fear of lab draws.         - PROPHYLACTIC dosing: Apixaban BID       No results found for:  DD        Chronic, stable Medical Problems:  # Hypothyroidism: continue Synthroid  # Mood, behavior: continue Lexapro. Not clear what dose of seroquel he takes, should confirm with family in AM.   # Chronic diarrhea: Continue PTA loperamide   # Prediabetes/metabolic syndrome: continue PTA metformin and simvastatin            Diet: Combination Diet Regular Diet Adult  Snacks/Supplements Adult: Other; carrot sticks and ranch; Between Meals  Snacks/Supplements Adult: Other; yogurt; With Meals    DVT Prophylaxis: Apixaban   [place] Catheter: not present  Code Status: Full Code           Disposition Plan   Expected discharge: 1-2 days , recommended to prior living arrangement once SIRS/Sepsis treated.  Entered: Elizabeth Turcios MD 01/30/2021, 12:23 PM       The patient's care was discussed with the Bedside Nurse, Patient and Patient's Family.    Elizabeth Turcios MD  Hospitalist Service  [facility]   Contact information available via [facility] Paging/Directory    ______________________________________________________________________    Interval History   No acute vents over night  Hany is more interactive today  No fevers  Stable Oxygen saturations ( He allowed me to place the Oxygen probe) after talking to him for a long time  No fever or chills noted   No increased work of breathing noted     Data reviewed today: I reviewed all medications, new labs and imaging results over the last 24 hours. I personally reviewed no images or EKG's today.    Physical Exam   Vital Signs: Temp: 96.5  F (35.8  C) Temp src: Axillary BP: 101/49 Pulse: 59   Resp: 18 SpO2: 93 % O2 Device: None (Room air)    Weight: 245 lbs 0 oz  General Appearance: Awake, alert and not in distress  Respiratory: Bi basal crackles heard   Cardiovascular: normal heart sounds. No murmurs   GI: Soft, non Vernon.   Skin: No bruising or bleeding   Other: Moving all 4 limbs     Data   Recent Labs   Lab  01/29/21  1821   WBC 2.5*   HGB 14.5   MCV 90         POTASSIUM 4.0   CHLORIDE 107   CO2 26   BUN 10   CR 0.68   ANIONGAP 8   JUAN 9.2   *   ALBUMIN 2.8*   PROTTOTAL 7.6   BILITOTAL 0.2   ALKPHOS 75   ALT 36   AST 34

## 2021-01-30 NOTE — PLAN OF CARE
[id number]  VS: Refused vitals   O2: In RA, no labored breathing or SOB observed.   Output: Voiding independently in bathroom.   Last BM: Pt unable to report.   Activity: Independent/SBA.   Skin: Refused full skin check.Visible skin: dry and intact   Pain: Denies.   CMS: Pt unable to report   Dressing: NA.   Diet: Regular.   LDA: No IV access   Equipment: Pt refused cont pulse ox, IS, and PCDs. Call light within reach.   Plan: Continue to monitor.   Additional Info: 1:1 for safety.   Able to convinced to sleep on the bed.

## 2021-01-31 PROCEDURE — 250N000012 HC RX MED GY IP 250 OP 636 PS 637: Performed by: INTERNAL MEDICINE

## 2021-01-31 PROCEDURE — 120N000002 HC R&B MED SURG/OB UMMC

## 2021-01-31 PROCEDURE — 99207 PR CDG-MDM COMPONENT: MEETS MODERATE - UP CODED: CPT | Performed by: INTERNAL MEDICINE

## 2021-01-31 PROCEDURE — 250N000013 HC RX MED GY IP 250 OP 250 PS 637: Performed by: INTERNAL MEDICINE

## 2021-01-31 PROCEDURE — 250N000013 HC RX MED GY IP 250 OP 250 PS 637: Performed by: STUDENT IN AN ORGANIZED HEALTH CARE EDUCATION/TRAINING PROGRAM

## 2021-01-31 PROCEDURE — 99232 SBSQ HOSP IP/OBS MODERATE 35: CPT | Performed by: INTERNAL MEDICINE

## 2021-01-31 RX ADMIN — LEVOTHYROXINE SODIUM 112 MCG: 112 TABLET ORAL at 11:56

## 2021-01-31 RX ADMIN — APIXABAN 2.5 MG: 2.5 TABLET, FILM COATED ORAL at 21:34

## 2021-01-31 RX ADMIN — KETOCONAZOLE: 20 CREAM TOPICAL at 21:40

## 2021-01-31 RX ADMIN — ESCITALOPRAM OXALATE 20 MG: 20 TABLET ORAL at 11:57

## 2021-01-31 RX ADMIN — METRONIDAZOLE: 7.5 GEL TOPICAL at 21:41

## 2021-01-31 RX ADMIN — LEVOFLOXACIN 500 MG: 500 TABLET, FILM COATED ORAL at 11:57

## 2021-01-31 RX ADMIN — OXYCODONE HYDROCHLORIDE AND ACETAMINOPHEN 500 MG: 500 TABLET ORAL at 11:56

## 2021-01-31 RX ADMIN — LOPERAMIDE HYDROCHLORIDE 2 MG: 2 CAPSULE ORAL at 11:56

## 2021-01-31 RX ADMIN — DEXAMETHASONE 6 MG: 2 TABLET ORAL at 11:56

## 2021-01-31 RX ADMIN — APIXABAN 2.5 MG: 2.5 TABLET, FILM COATED ORAL at 11:56

## 2021-01-31 RX ADMIN — B-COMPLEX W/ C & FOLIC ACID TAB 1 TABLET: TAB at 11:57

## 2021-01-31 RX ADMIN — Medication 1 TABLET: at 11:57

## 2021-01-31 RX ADMIN — MULTIPLE VITAMINS W/ MINERALS TAB 1 TABLET: TAB at 11:57

## 2021-01-31 RX ADMIN — METFORMIN HYDROCHLORIDE 1000 MG: 500 TABLET ORAL at 17:39

## 2021-01-31 RX ADMIN — Medication 1 CAPSULE: at 11:56

## 2021-01-31 RX ADMIN — METFORMIN HYDROCHLORIDE 1000 MG: 500 TABLET ORAL at 11:56

## 2021-01-31 RX ADMIN — SIMVASTATIN 20 MG: 20 TABLET, FILM COATED ORAL at 21:34

## 2021-01-31 NOTE — PLAN OF CARE
VS: Temp: 96.7  F (35.9  C) Temp src: Axillary BP: (!) 143/62 Pulse: 64   Resp: 17 SpO2: 93 % O2 Device: None (Room air)     O2: Lungs Clear bilaterally   Output: Voids spontaneously without difficulty   Last BM: 1/30   Activity: Up ad osmin   Skin: Dark patches all over body.  Dry skin.    Pain: Denies   CMS: Denies   Dressing: None   Diet: Regular.    LDA: None   Equipment: Personal belongings   Plan: Continue POC   Additional Info: Pt had a shower at 2130.Pt loves mountain dew and chocolate ice cream.    Agitated at midnight, requested repeatedly for cheeseburger.  Cafeteria was closed since 1900. Offered and given turkey sandwich, vanilla pudding , ice cream and sashta but pt declined.Redirected and offered distraction but was unsuccessful. Called and left VM to sister in law and father with no call back.Eventually pt fell asleep at 0100.     5AM. Cyndee (sister in law ?updated about the situation at midnight

## 2021-01-31 NOTE — PROGRESS NOTES
[facility]     Medicine Progress Note - Hospitalist Service       Date of Admission:  1/28/2021  Assessment & Plan        Hany Patel is a 25 year old male with PMH of Down's syndrome, morbid obesity,  Hypothyroidism, hypertriglyceridemia, seborrheic dermatitis, and chronic diarrhea who presents with COVID-19 infection. who was admitted to [facility] on 1/28/2021 for evaluation of SOB from group home.         # Confirmed COVID-19 infection    # Viral Pneumonia secondary to COVID-19 infection with concern for bacterial Superinfection  - Initial chest x-ray showed multifocal airspace disease. No supplemental O2 needs currently but O2 sats are in the  mid 90's, reported desats to high 80s with ambulation in the ED.  - Admitted to medical floor with continuous pulse oximetry, COVID-19 special precautions, minimized lab draws, and care consolidation  Patient not consistently willing to allow  Only spot checks with ulse OXimetry. ( 95%-97%)  - Oxygen: RA  - Labs: Not indicative of severe infection. CK at 48, Ferritin at 218 and CRP at 47  - Imaging: CXR done, no additional imaging needed currently  - Breathing treatments: albuterol inhaler q4H PRN; avoid nebulizers in favor of MDIs   - IV fluids: not indicated at this time and no IV access  - Antibiotics: indicated due to concern of bacterial superinfection; levofloxacin started in the ED for treatment of HCAP given group home setting and hx of recurrent pneumonias, not able to give IV abx. Day #3 of levofloxacon  - Treatments:  Given the inability to place IV line, appearance of CXR, patient initiated on oral dexamethasone ( #3)  - Research study protocol: NA  - DVT Prophylaxis: At high risk of thrombotic complications due to COVID-19 disease but no D dimer was drawn due to fear of lab draws.         - PROPHYLACTIC dosing: Apixaban BID for a total of 4 weeks        No results  found for: DD        Chronic, stable Medical Problems:  # Hypothyroidism: continue Synthroid  # Mood, behavior: continue Lexapro. Not clear what dose of seroquel he takes, should confirm with family in AM.   # Chronic diarrhea: Continue PTA loperamide   # Prediabetes/metabolic syndrome: continue PTA metformin and simvastatin            Diet: Combination Diet Regular Diet Adult  Snacks/Supplements Adult: Other; carrot sticks and ranch; Between Meals  Snacks/Supplements Adult: Other; yogurt; With Meals    DVT Prophylaxis: Apixaban   [place] Catheter: not present  Code Status: Full Code           Disposition Plan   Expected discharge:  Tomorrow ( 2/1), recommended to prior living arrangement once SIRS/Sepsis treated.  Entered: Elizabeth Turcios MD 01/31/2021, 9:54 AM       The patient's care was discussed with the Bedside Nurse, Patient and Patient's Family.    Elizabeth Turcios MD  Hospitalist Service  [facility]   Contact information available via [facility] Paging/Directory    ______________________________________________________________________    Interval History   No acute vents over night  Sleeping comfortably  No fever or chills     Data reviewed today: I reviewed all medications, new labs and imaging results over the last 24 hours. I personally reviewed no images or EKG's today.    Physical Exam   Vital Signs: Temp: 96.4  F (35.8  C) Temp src: Axillary BP: 101/56 Pulse: 51   Resp: 16 SpO2: 97 % O2 Device: None (Room air)    Weight: 245 lbs 0 oz  General Appearance: Awake, alert and not in distress  Respiratory: Clear lung sounds bilaterally   Cardiovascular: normal heart sounds. No murmurs   GI: Soft, non Vernon.   Skin: No bruising or bleeding   Other: Moving all 4 limbs     Data   Recent Labs   Lab 01/29/21  1821   WBC 2.5*   HGB 14.5   MCV 90         POTASSIUM 4.0   CHLORIDE 107   CO2 26   BUN 10   CR 0.68   ANIONGAP 8   JUAN 9.2   GLC  148*   ALBUMIN 2.8*   PROTTOTAL 7.6   BILITOTAL 0.2   ALKPHOS 75   ALT 36   AST 34

## 2021-01-31 NOTE — PLAN OF CARE
VS: VSS   O2: >90% on RA   Output: Voiding adequately   Last BM: 1/30; +fl   Activity: WBAT; up ind in room   Up for meals? Yes   Skin: Baseline hyperpigmentation, dandruff   Pain: Denies   CMS: Intact   Dressing: None   Diet: Regular   LDA: None   Equipment: None   Plan: Likely back to  tomorrow   Additional Info: Pt slept all morning. Woke pt up before noon to take medications and eat.  1:1 at bedside.

## 2021-01-31 NOTE — PROGRESS NOTES
Addendum 1/31/2021, 1500h    Nelson contacted  back. Nelson reported Patient will require medical transport at discharge tomorrow.  contacted [facility] ([phone]) and arranged a ride for 1100h pickup. A transportation company has not been assigned at this time, but [facility] will contact the medical unit to update once a transportation provider has been determined. They are aware Patient is COVID-19 positive.     Nelson also requested that at the time of discharge, staff make sure that Patient has all of his belongings including his iPad/, bluetooth headphones/, black stuffed toy cat, and clothes.     Susanne Jurado [facility]        Care Management Discharge Note    Discharge Date: 02/01/21       Discharge Disposition:  Group Home     spoke with , Janessa Shrestha ([phone]), and group home staff, Nelson ([phone]) to confirm discharge tomorrow, 2/1/2021 at 1100h. Nelson reported they will be ready for him at that time. Nelson asked that discharge paperwork and medication orders be faxed to him at [phone] as soon as they are completed so that he can have their pharmacy fill his medications.     Nelson asked to be called with any new information or additional questions.        Susanne Jurado [facility]    Sunday  coverage [phone]

## 2021-02-01 ENCOUNTER — AMBULATORY - HEALTHEAST (OUTPATIENT)
Dept: OTHER | Facility: CLINIC | Age: 26
End: 2021-02-01

## 2021-02-01 ENCOUNTER — TELEPHONE (OUTPATIENT)
Dept: FAMILY MEDICINE | Facility: CLINIC | Age: 26
End: 2021-02-01

## 2021-02-01 ENCOUNTER — PATIENT OUTREACH (OUTPATIENT)
Dept: CARE COORDINATION | Facility: CLINIC | Age: 26
End: 2021-02-01

## 2021-02-01 ENCOUNTER — DOCUMENTATION ONLY (OUTPATIENT)
Dept: OTHER | Facility: CLINIC | Age: 26
End: 2021-02-01

## 2021-02-01 VITALS
OXYGEN SATURATION: 96 % | TEMPERATURE: 95.3 F | RESPIRATION RATE: 16 BRPM | HEIGHT: 63 IN | HEART RATE: 76 BPM | WEIGHT: 245 LBS | DIASTOLIC BLOOD PRESSURE: 42 MMHG | BODY MASS INDEX: 43.41 KG/M2 | SYSTOLIC BLOOD PRESSURE: 101 MMHG

## 2021-02-01 PROCEDURE — 250N000013 HC RX MED GY IP 250 OP 250 PS 637: Performed by: STUDENT IN AN ORGANIZED HEALTH CARE EDUCATION/TRAINING PROGRAM

## 2021-02-01 PROCEDURE — 99239 HOSP IP/OBS DSCHRG MGMT >30: CPT | Performed by: INTERNAL MEDICINE

## 2021-02-01 PROCEDURE — 250N000012 HC RX MED GY IP 250 OP 636 PS 637: Performed by: INTERNAL MEDICINE

## 2021-02-01 RX ORDER — ASCORBIC ACID 500 MG
500 TABLET ORAL DAILY
Start: 2021-02-01 | End: 2021-07-27

## 2021-02-01 RX ORDER — DEXAMETHASONE 6 MG/1
6 TABLET ORAL DAILY
Qty: 2 TABLET | Refills: 0 | Status: SHIPPED | OUTPATIENT
Start: 2021-02-01 | End: 2021-07-27

## 2021-02-01 RX ORDER — LEVOFLOXACIN 500 MG/1
500 TABLET, FILM COATED ORAL DAILY
Qty: 2 TABLET | Refills: 0 | Status: SHIPPED | OUTPATIENT
Start: 2021-02-01 | End: 2021-07-27

## 2021-02-01 RX ADMIN — Medication 1 TABLET: at 09:35

## 2021-02-01 RX ADMIN — ESCITALOPRAM OXALATE 20 MG: 20 TABLET ORAL at 09:36

## 2021-02-01 RX ADMIN — MULTIPLE VITAMINS W/ MINERALS TAB 1 TABLET: TAB at 09:34

## 2021-02-01 RX ADMIN — OXYCODONE HYDROCHLORIDE AND ACETAMINOPHEN 500 MG: 500 TABLET ORAL at 09:36

## 2021-02-01 RX ADMIN — Medication 1 CAPSULE: at 09:34

## 2021-02-01 RX ADMIN — METFORMIN HYDROCHLORIDE 1000 MG: 500 TABLET ORAL at 09:35

## 2021-02-01 RX ADMIN — DEXAMETHASONE 6 MG: 2 TABLET ORAL at 09:35

## 2021-02-01 RX ADMIN — B-COMPLEX W/ C & FOLIC ACID TAB 1 TABLET: TAB at 09:34

## 2021-02-01 RX ADMIN — LEVOTHYROXINE SODIUM 112 MCG: 112 TABLET ORAL at 09:35

## 2021-02-01 RX ADMIN — LEVOFLOXACIN 500 MG: 500 TABLET, FILM COATED ORAL at 09:35

## 2021-02-01 RX ADMIN — LOPERAMIDE HYDROCHLORIDE 2 MG: 2 CAPSULE ORAL at 09:34

## 2021-02-01 NOTE — PLAN OF CARE
"  VS: KATRINA, pt refused vitals and assessments. Educated on importance and encouraged but pt continued to say \"no more\".   O2: >90% on RA   Activity: Up with SBA, steady gait   Skin: Visible skin intact. Declined full assessment, UTV under clothing.    Pain: Denies.   Diet: Regular, ate small meal before discharge.   LDA: None   Equipment: 1:1 sitter for safety      DISCHARGE SUMMARY    Pt discharging to: [facility]  Transportation: [employer], pt brought to ride outside safely.   AVS given and discussed: Yes. Placed in pts bag of belongings. Instructed patient to keep in his bag until he got home.   Medications given: Yes. Placed in pts bag of belongings. Instructed patient to keep in his bag until he got home.   Belongings returned: Yes.   Comments: Update given to Nelson from [facility] that pt got in the taxi and is on his way. Explained that meds and AVS are in pts belongings bag. No further questions. Update given to family that pt is going back to [facility]. Unit phone number given for any further questions.           "

## 2021-02-01 NOTE — TELEPHONE ENCOUNTER
Routing to PCP as FYI      Patient admitted to hospital 1-      RN spoke with group home on 1- and Notified Merle of Positive Covid19 while triaging patient.    Gregg Mendez, RN, BSN, PHN  [facility]

## 2021-02-01 NOTE — PROGRESS NOTES
Pt was pleasant upon assessment and interaction. Pt was alert and eating food. Medication compliant. 1:1 sitter stated patient was sleeping at 1900 when parents tried to call. Parents were updated on POC.

## 2021-02-01 NOTE — CONSULTS
Care Management Follow Up    Length of Stay (days): 4    Expected Discharge Date: 02/01/21     Concerns to be Addressed: all concerns addressed in this encounter     Patient plan of care discussed at interdisciplinary rounds: Yes    Anticipated Discharge Disposition: Home     Anticipated Discharge Services:    Anticipated Discharge DME:      Patient/family educated on Medicare website which has current facility and service quality ratings: no  Education Provided on the Discharge Plan:    Patient/Family in Agreement with the Plan: unable to assess    Referrals Placed by CM/SW:    Private pay costs discussed: Not applicable    Additional Information:  [employer] picked patient up at 10:42am. Patient had face mask on due to covid + status. Discharge Summary faxed to Nelson at [facility]. All orders and discharge summary faxed to [facility] and patient's Dad per request. RNCC available as needed.    [facility]   [address] [place] [address]      Shayy Guzman RN, BSN  Care Coordinator, [address]  Phone [phone]  Pager [phone]

## 2021-02-01 NOTE — TELEPHONE ENCOUNTER
Yenifer Gomez, TAMEKA CNP  P Ne Nurse             Please advise pt s Group Home of positive result.

## 2021-02-01 NOTE — DISCHARGE SUMMARY
[facility]   Hospitalist Discharge Summary      Date of Admission:  1/28/2021  Date of Discharge:  2/1/2021  Discharging Provider: Elizabeth Turcios MD      Discharge Diagnoses   # Confirmed COVID-19 infection    # Viral Pneumonia secondary to COVID-19 infection with concern for bacterial Superinfection        Follow-ups Needed After Discharge   Follow-up Appointments     Adult [facility] Follow-up and recommended labs and tests      Please follow up with your PCP as needed  Appointments on [place] and/or [facility] (with [facility] or [facility]   provider or service). Call [phone] if you haven't heard regarding   these appointments within 7 days of discharge.             Discharge Disposition   Discharged to home  Condition at discharge: Stable      Hospital Course          Hany Patel is a 25 year old male with PMH of Down's syndrome, morbid obesity,  Hypothyroidism, hypertriglyceridemia, seborrheic dermatitis, and chronic diarrhea who presents with COVID-19 infection. who was admitted to [facility] on 1/28/2021 for evaluation of SOB from group home.         # Confirmed COVID-19 infection    # Viral Pneumonia secondary to COVID-19 infection with concern for bacterial Superinfection  - Initial chest x-ray showed multifocal airspace disease. No supplemental O2 needs currently but O2 sats are in the  mid 90's, reported desats to high 80s with ambulation in the ED.  - Admitted to medical floor with continuous pulse oximetry, COVID-19 special precautions, minimized lab draws, and care consolidation  Patient not consistently willing to allow  Only spot checks with ulse OXimetry. ( 95%-97%)  - Oxygen: RA  - Labs: Not indicative of severe infection. CK at 48, Ferritin at 218 and CRP at 47  - Imaging: CXR done, no additional imaging needed currently  - Breathing treatments: albuterol inhaler q4H PRN; avoid nebulizers in  favor of MDIs ( has not required this)   - IV fluids: not indicated at this time and no IV access  - Antibiotics: indicated due to concern of bacterial superinfection; levofloxacin started in the ED for treatment of HCAP given group home setting and hx of recurrent pneumonias, not able to give IV abx. Day #4 of levofloxacin. Total 6 days   - Treatments:  Given the inability to place IV line, appearance of CXR, patient initiated on oral dexamethasone ( #4) total 6 days  - Research study protocol: NA  - DVT Prophylaxis: At high risk of thrombotic complications due to COVID-19 disease and elevated D dimer       - PROPHYLACTIC dosing: Apixaban BID for a total of 4 weeks             Chronic, stable Medical Problems:  # Hypothyroidism: continue Synthroid  # Mood, behavior: continue Lexapro. Not clear what dose of seroquel he takes, should confirm with family in AM.   # Chronic diarrhea: Continue PTA loperamide   # Prediabetes/metabolic syndrome: continue PTA metformin and simvastatin           Consultations This Hospital Stay   None    Code Status   Full Code    Time Spent on this Encounter   I, Elizabeth Turcios MD, personally saw the patient today and spent greater than 30 minutes discharging this patient.       Elizabeth Turcios MD  [facility] MED SURG ORTHOPEDIC  [address] [place] [id number]  Phone: [phone]  Fax: [phone]  ______________________________________________________________________    Physical Exam   Vital Signs: Temp: 95.3  F (35.2  C) Temp src: Axillary BP: 101/42 Pulse: 76   Resp: 16 SpO2: 96 % O2 Device: None (Room air)    Weight: 245 lbs 0 oz  General Appearance: Awake, alert and not in distress  Respiratory: Clear breath sounds bilaterally.   Cardiovascular: Normal heart sounds. No murmurs   GI: Soft, non tender. Normal bowel spounds  Skin: No bruising or bleeding        Primary Care Physician   YULISA SALGADO    Discharge Orders     "  Reason for your hospital stay    COVID 19 infection     Adult [facility] Follow-up and recommended labs and tests    Please follow up with your PCP as needed  Appointments on [place] and/or [facility] (with [facility] or [facility] provider or service). Call [phone] if you haven't heard regarding these appointments within 7 days of discharge.     Activity    Your activity upon discharge: activity as tolerated     Discharge Instructions    Discharge Instructions for COVID-19 Patients  You have-or may have-COVID-19. Please follow the instructions listed below.   If you have a weakened immune system, discuss with your doctor any other actions you need to take.  How can I protect others?  If you have symptoms (fever, cough, body aches or trouble breathing):    Stay home and away from others (self-isolate) until:  ? Your other symptoms have resolved (gotten better). And?  ? You've had no fever-and no medicine that reduces fever-for 1 full day (24 hours). And?  ? At least 10 days have passed since your symptoms started. (You may need to wait 20 days. Follow the advice of your care team.)  If you don't show symptoms, but testing showed that you have COVID-19:    Stay home and away from others (self-isolate) until at least 10 days have passed since the date of your first positive COVID-19 test.  During this time    Stay in your own room, even for meals. Use your own bathroom if you can.    Stay away from others in your home. No hugging, kissing or shaking hands. No visitors.    Don't go to work, school or anywhere else.    Clean \"high touch\" surfaces often (doorknobs, counters, handles). Use household cleaning spray or wipes.    You'll find a full list of  on the EPA website: www.epa.gov/pesticide-registration/list-n-disinfectants-use-against-sars-cov-2.    Cover your mouth and nose with a mask or other face covering to avoid spreading germs.    Wash your hands and face often. Use soap and water.    Caregivers in " these groups are at risk for severe illness due to COVID-19:  ? People 65 years and older  ? People who live in a nursing home or long-term care facility  ? People with chronic disease (lung, heart, cancer, diabetes, kidney, liver, immunologic)  ? People who have a weakened immune system, including those who:    Are in cancer treatment    Take medicine that weakens the immune system, such as corticosteroids    Had a bone marrow or organ transplant    Have an immune deficiency    Have poorly controlled HIV or AIDS    Are obese (body mass index of 40 or higher)    Smoke regularly    Caregivers should wear gloves while washing dishes, handling laundry and cleaning bedrooms and bathrooms.    Use caution when washing and drying laundry: Don't shake dirty laundry and use the warmest water setting that you can.    For more tips on managing your health at home, go to www.cdc.gov/coronavirus/2019-ncov/downloads/10Things.pdf.  How can I take care of myself at home?  1. Get lots of rest. Drink extra fluids (unless a doctor has told you not to).  2. Take Tylenol (acetaminophen) for fever or pain. If you have liver or kidney problems, ask your family doctor if it's okay to take Tylenol.   Adults can take either:   ? 650 mg (two 325 mg pills) every 4 to 6 hours, or?  ? 1,000 mg (two 500 mg pills) every 8 hours as needed.  ? Note: Don't take more than 3,000 mg in one day. Acetaminophen is found in many medicines (both prescribed and over-the-counter medicines). Read all labels to be sure you don't take too much.  For children, check the Tylenol bottle for the right dose. The dose is based on the child's age or weight.  3. If you have other health problems (like cancer, heart failure, an organ transplant or severe kidney disease): Call your specialty clinic if you don't feel better in the next 2 days.  4. Know when to call 911. Emergency warning signs include:  ? Trouble breathing or shortness of breath  ? Pain or pressure in the  chest that doesn't go away  ? Feeling confused like you haven't felt before, or not being able to wake up  ? Bluish-colored lips or face  5. Your doctor may have prescribed a blood thinner medicine. Follow their instructions.  Where can I get more information?    [facility] - About COVID-19:   [email or web address]    CDC - What to Do If You're Sick: www.cdc.gov/coronavirus/2019-ncov/about/steps-when-sick.html    CDC - Ending Home Isolation: www.cdc.gov/coronavirus/2019-ncov/hcp/disposition-in-home-patients.html    CDC - Caring for Someone: www.cdc.gov/coronavirus/2019-ncov/if-you-are-sick/care-for-someone.html    [facility] - Interim Guidance for Hospital Discharge to Home: www.[facility]./diseases/coronavirus/hcp/hospdischarge.pdf    Below are the COVID-19 hotlines at the [place] Department of Health ([facility]). Interpreters are available.  ? For health questions: Call 400-085-7337 or 1-718.249.1375 (7 a.m. to 7 p.m.)  ? For questions about schools and childcare: Call 948-765-1343 or 1-239.518.9216 (7 a.m. to 7 p.m.)    For informational purposes only. Not to replace the advice of your health care provider. Clinically reviewed by Dr. Kody Callejas.   Copyright   2020 [facility]. All rights reserved. [employer] 972108 - REV 01/05/21.     Full Code     Diet    Follow this diet upon discharge: Orders Placed This Encounter      Snacks/Supplements Adult: Other; carrot sticks and ranch; Between Meals      Snacks/Supplements Adult: Other; yogurt; With Meals      Combination Diet Regular Diet Adult       Significant Results and Procedures   Results for orders placed or performed during the hospital encounter of 01/28/21   XR Chest Port 1 View    Narrative    Exam: XR CHEST PORT 1 VW, 1/28/2021 9:14 PM    Indication: COVID, Short of breath    Comparison: Chest x-ray 1/2/2020    Findings:   Upright AP view of the chest. Bilateral patchy airspace opacities. No  pneumothorax. No definite  pleural effusion. Cardiac silhouette borders  are obscured by the pulmonary opacities. No acute osseous abnormality.      Impression    Impression:   Bilateral multifocal airspace opacities compatible with atypical  infection.    I have personally reviewed the examination and initial interpretation  and I agree with the findings.    TRAVIS PHILLIPS MD       Discharge Medications   Current Discharge Medication List      START taking these medications    Details   apixaban ANTICOAGULANT (ELIQUIS) 2.5 MG tablet Take 1 tablet (2.5 mg) by mouth 2 times daily for 24 days  Qty: 48 tablet, Refills: 0    Associated Diagnoses: COVID-19      dexamethasone (DECADRON) 6 MG tablet Take 1 tablet (6 mg) by mouth daily  Qty: 2 tablet, Refills: 0    Associated Diagnoses: COVID-19      levofloxacin (LEVAQUIN) 500 MG tablet Take 1 tablet (500 mg) by mouth daily  Qty: 2 tablet, Refills: 0    Associated Diagnoses: COVID-19      vitamin C (ASCORBIC ACID) 500 MG tablet Take 1 tablet (500 mg) by mouth daily  Qty:      Associated Diagnoses: COVID-19         CONTINUE these medications which have CHANGED    Details   calcium citrate-vitamin D (CITRACAL) 315-250 MG-UNIT TABS per tablet Take 1 tablet by mouth daily  Qty:      Associated Diagnoses: COVID-19         CONTINUE these medications which have NOT CHANGED    Details   acetaminophen (TYLENOL) 500 MG tablet Take 1,000 mg by mouth every 6 hours as needed for mild pain      acetylcysteine () 600 MG CAPS capsule Take 600 mg by mouth daily      albuterol (PROAIR HFA/PROVENTIL HFA/VENTOLIN HFA) 108 (90 Base) MCG/ACT inhaler Inhale 1-2 puffs into the lungs every 6 hours as needed for shortness of breath / dyspnea or wheezing  Qty: 90 g, Refills: 3    Comments: Pharmacy may dispense brand covered by insurance (Proair, or proventil or ventolin or generic albuterol inhaler)  Associated Diagnoses: Pneumonia of left lower lobe due to infectious organism      B Complex Vitamins (VITAMIN  B-COMPLEX) TABS Take 1 tablet by mouth daily  Qty: 30 tablet, Refills: 11    Comments: URGENT  Associated Diagnoses: Other fatigue      calcium carbonate 600 mg-vitamin D 400 units (CALTRATE) 600-400 MG-UNIT per tablet Take 1 tablet by mouth daily      escitalopram (LEXAPRO) 20 MG tablet Take 1 tablet (20 mg) by mouth daily  Qty: 90 tablet, Refills: 1    Associated Diagnoses: Pervasive developmental disorder      fish oil-omega-3 fatty acids 1000 MG capsule Take 1 g by mouth 2 times daily       ibuprofen (ADVIL/MOTRIN) 200 MG tablet Take 400 mg by mouth every 4 hours as needed for mild pain      ketoconazole (NIZORAL) 2 % external cream Apply small amount twice a day to external ear canal  Qty: 30 g, Refills: 2    Associated Diagnoses: Seborrheic dermatitis      ketoconazole (NIZORAL) 2 % external shampoo Apply to the affected area and wash off after 5 minutes.  Qty: 120 mL, Refills: 1    Associated Diagnoses: Seborrheic dermatitis      levothyroxine (SYNTHROID, LEVOTHROID) 112 MCG tablet Take 112 mcg by mouth daily.      loperamide (IMODIUM) 2 MG capsule Take 1 capsule (2 mg) by mouth every morning  Qty: 30 capsule, Refills: 11    Associated Diagnoses: Pervasive developmental disorder; Diarrhea, unspecified type      melatonin 1 MG TABS tablet Take 1 mg by mouth At Bedtime      metFORMIN (GLUCOPHAGE) 1000 MG tablet Take 1,000 mg by mouth 2 times daily (with meals).      metroNIDAZOLE (METROGEL) 0.75 % external gel Apply topically 2 times daily  Qty: 45 g, Refills: 11    Associated Diagnoses: Rosacea      multivitamin w/minerals (MULTI-VITAMIN) tablet TAKE 1 TABLET BY MOUTH ONCE DAILY *NO REFILLS REMAINING, FAXING FOR REFILLS*  Qty: 200 tablet, Refills: 11    Comments: URGENT  Associated Diagnoses: Down's syndrome      Probiotic Product (ACIDOPHILUS PROBIOTIC BLEND) CAPS Take 1 capsule by mouth daily  Qty: 30 capsule, Refills: 11    Associated Diagnoses: Diarrhea, unspecified type      simvastatin (ZOCOR) 20 MG  tablet Take 1 tablet (20 mg) by mouth At Bedtime      Vitamin D3 (CHOLECALCIFEROL) 25 mcg (1000 units) tablet Take 2 tablets by mouth daily         STOP taking these medications       Ascorbic Acid (VITAMIN C) 500 MG CHEW Comments:   Reason for Stopping:         Calcium Carbonate-Vitamin D (CALCIUM + D PO) Comments:   Reason for Stopping:         Lactobacillus Acid-Pectin (LACTOBACILLUS ACIDOPHILUS) TABS Comments:   Reason for Stopping:         Multiple Vitamins-Minerals (IMMUNE SUPPORT) CHEW Comments:   Reason for Stopping:         vitamin  B complex with vitamin C (VITAMIN  B COMPLEX) TABS Comments:   Reason for Stopping:             Allergies   Allergies   Allergen Reactions     Seasonal Allergies

## 2021-02-01 NOTE — PLAN OF CARE
Pt is alert, VSS. pt slept through the night.  Lungs clear. Denies SOB, CP and nausea. Tolerating regular diet. Bowel sound active in all quadrants. No BM but passing gas. Voiding adequate. Denies pain when asked. No PIV. Pt slept between care. Pt is able to make needs known, call light with in reach. Sitter at bedside as well. Will continue to monitor.

## 2021-02-02 ENCOUNTER — AMBULATORY - HEALTHEAST (OUTPATIENT)
Dept: OTHER | Facility: CLINIC | Age: 26
End: 2021-02-02

## 2021-02-02 ENCOUNTER — DOCUMENTATION ONLY (OUTPATIENT)
Dept: OTHER | Facility: CLINIC | Age: 26
End: 2021-02-02

## 2021-02-03 ENCOUNTER — PATIENT OUTREACH (OUTPATIENT)
Dept: CARE COORDINATION | Facility: CLINIC | Age: 26
End: 2021-02-03

## 2021-02-03 DIAGNOSIS — U07.1 2019 NOVEL CORONAVIRUS DISEASE (COVID-19): Primary | ICD-10-CM

## 2021-02-03 NOTE — PROGRESS NOTES
Clinic Care Coordination Contact    Situation: Patient chart reviewed by care coordinator.    Background: Patient listed within COVID-19 patient discharge report prompting chart review by care coordination team.    Assessment: Patient with recent hospital admission to [facility] from 1/28 to 2/1/21 with diagnosis of viral pneumonia secondary to COVID-19 infection.  Care Coordination referral prompted from the discharge report however upon chart review, patient resides within a group home whom provides patients with 24-7 care.  Post-hospital outreach was also already completed by [facility] population health.      Plan/Recommendations: No outreach indicated by clinic care coordination at this time.      Calista Rose, ROSARION, RN   [facility]  - Clinic Care Coordinator

## 2021-03-04 ENCOUNTER — DOCUMENTATION ONLY (OUTPATIENT)
Dept: OTHER | Facility: CLINIC | Age: 26
End: 2021-03-04

## 2021-03-04 ENCOUNTER — AMBULATORY - HEALTHEAST (OUTPATIENT)
Dept: OTHER | Facility: CLINIC | Age: 26
End: 2021-03-04

## 2021-04-01 ENCOUNTER — MEDICAL CORRESPONDENCE (OUTPATIENT)
Dept: HEALTH INFORMATION MANAGEMENT | Facility: CLINIC | Age: 26
End: 2021-04-01

## 2021-04-06 DIAGNOSIS — R73.02 IGT (IMPAIRED GLUCOSE TOLERANCE): ICD-10-CM

## 2021-04-06 DIAGNOSIS — E03.9 HYPOTHYROIDISM, ADULT: ICD-10-CM

## 2021-04-06 DIAGNOSIS — E78.5 HYPERLIPEMIA: ICD-10-CM

## 2021-04-06 DIAGNOSIS — Q90.9 DS (DOWN'S SYNDROME): ICD-10-CM

## 2021-04-06 DIAGNOSIS — E88.819 INSULIN RESISTANCE: Primary | ICD-10-CM

## 2021-04-27 DIAGNOSIS — E78.5 HYPERLIPEMIA: ICD-10-CM

## 2021-04-27 DIAGNOSIS — E03.9 HYPOTHYROIDISM, ADULT: ICD-10-CM

## 2021-04-27 DIAGNOSIS — E88.819 INSULIN RESISTANCE: ICD-10-CM

## 2021-04-27 DIAGNOSIS — R73.02 IGT (IMPAIRED GLUCOSE TOLERANCE): ICD-10-CM

## 2021-04-27 DIAGNOSIS — Q90.9 DS (DOWN'S SYNDROME): ICD-10-CM

## 2021-04-27 LAB
ALT SERPL W P-5'-P-CCNC: 25 U/L (ref 0–70)
CREAT SERPL-MCNC: 0.77 MG/DL (ref 0.66–1.25)
GFR SERPL CREATININE-BSD FRML MDRD: >90 ML/MIN/{1.73_M2}
LH SERPL-ACNC: 12.3 IU/L (ref 1.5–9.3)
MISCELLANEOUS TEST: NORMAL
OSMOLALITY SERPL: 293 MMOL/KG (ref 275–295)
SODIUM SERPL-SCNC: 141 MMOL/L (ref 133–144)
T4 FREE SERPL-MCNC: 0.94 NG/DL (ref 0.76–1.46)

## 2021-04-27 PROCEDURE — 99000 SPECIMEN HANDLING OFFICE-LAB: CPT | Performed by: INTERNAL MEDICINE

## 2021-04-27 PROCEDURE — 83930 ASSAY OF BLOOD OSMOLALITY: CPT | Performed by: INTERNAL MEDICINE

## 2021-04-27 PROCEDURE — 84295 ASSAY OF SERUM SODIUM: CPT | Performed by: INTERNAL MEDICINE

## 2021-04-27 PROCEDURE — 84270 ASSAY OF SEX HORMONE GLOBUL: CPT | Performed by: INTERNAL MEDICINE

## 2021-04-27 PROCEDURE — 84460 ALANINE AMINO (ALT) (SGPT): CPT | Performed by: INTERNAL MEDICINE

## 2021-04-27 PROCEDURE — 36415 COLL VENOUS BLD VENIPUNCTURE: CPT | Performed by: INTERNAL MEDICINE

## 2021-04-27 PROCEDURE — 82565 ASSAY OF CREATININE: CPT | Performed by: INTERNAL MEDICINE

## 2021-04-27 PROCEDURE — 84439 ASSAY OF FREE THYROXINE: CPT | Performed by: INTERNAL MEDICINE

## 2021-04-27 PROCEDURE — 83002 ASSAY OF GONADOTROPIN (LH): CPT | Performed by: INTERNAL MEDICINE

## 2021-04-27 PROCEDURE — 84403 ASSAY OF TOTAL TESTOSTERONE: CPT | Mod: 90 | Performed by: INTERNAL MEDICINE

## 2021-04-29 LAB
RESULT: NORMAL
SEND OUTS MISC TEST CODE: NORMAL
SEND OUTS MISC TEST SPECIMEN: NORMAL
SHBG SERPL-SCNC: 18 NMOL/L (ref 11–80)
TEST NAME: NORMAL
TESTOST FREE SERPL-MCNC: 6.5 NG/DL (ref 4.7–24.4)
TESTOST SERPL-MCNC: 248 NG/DL (ref 240–950)

## 2021-05-08 DIAGNOSIS — G47.00 INSOMNIA, UNSPECIFIED TYPE: Primary | ICD-10-CM

## 2021-05-10 NOTE — TELEPHONE ENCOUNTER
Routing refill request to provider for review/approval because:  Medication is reported/historical    Leslie Cruz RN

## 2021-05-13 ENCOUNTER — MEDICAL CORRESPONDENCE (OUTPATIENT)
Dept: HEALTH INFORMATION MANAGEMENT | Facility: CLINIC | Age: 26
End: 2021-05-13

## 2021-05-13 ENCOUNTER — TELEPHONE (OUTPATIENT)
Dept: FAMILY MEDICINE | Facility: CLINIC | Age: 26
End: 2021-05-13

## 2021-05-13 ENCOUNTER — TRANSFERRED RECORDS (OUTPATIENT)
Dept: HEALTH INFORMATION MANAGEMENT | Facility: CLINIC | Age: 26
End: 2021-05-13

## 2021-05-13 NOTE — TELEPHONE ENCOUNTER
Forms received from PAULINO Escudero: [facility], physician's orders and standing orders for Nelson Song PA-C.  Forms placed in provider 'sign me' folder.  Please fax forms to [phone] after completion.    Jeramie Viveros

## 2021-05-21 DIAGNOSIS — E78.5 DYSLIPIDEMIA: ICD-10-CM

## 2021-05-21 DIAGNOSIS — E03.9 HYPOTHYROIDISM, ADULT: Primary | ICD-10-CM

## 2021-05-21 DIAGNOSIS — E66.9 OBESITY: ICD-10-CM

## 2021-05-21 LAB
ALBUMIN UR-MCNC: NEGATIVE MG/DL
APPEARANCE UR: CLEAR
BILIRUB UR QL STRIP: NEGATIVE
CALCIUM UR-MCNC: 8.4 MG/DL
CALCIUM/CREAT UR: 0.41 G/G CR
COLOR UR AUTO: YELLOW
CREAT UR-MCNC: 20 MG/DL
GLUCOSE UR STRIP-MCNC: NEGATIVE MG/DL
HGB UR QL STRIP: NEGATIVE
KETONES UR STRIP-MCNC: NEGATIVE MG/DL
LEUKOCYTE ESTERASE UR QL STRIP: NEGATIVE
NITRATE UR QL: NEGATIVE
PH UR STRIP: 7 PH (ref 5–7)
SOURCE: NORMAL
SP GR UR STRIP: 1.01 (ref 1–1.03)
UROBILINOGEN UR STRIP-ACNC: 0.2 EU/DL (ref 0.2–1)

## 2021-05-21 PROCEDURE — 81003 URINALYSIS AUTO W/O SCOPE: CPT | Performed by: INTERNAL MEDICINE

## 2021-05-21 PROCEDURE — 82340 ASSAY OF CALCIUM IN URINE: CPT | Performed by: INTERNAL MEDICINE

## 2021-06-08 ENCOUNTER — OFFICE VISIT (OUTPATIENT)
Dept: FAMILY MEDICINE | Facility: CLINIC | Age: 26
End: 2021-06-08
Payer: MEDICARE

## 2021-06-08 VITALS
HEIGHT: 63 IN | HEART RATE: 87 BPM | DIASTOLIC BLOOD PRESSURE: 74 MMHG | BODY MASS INDEX: 41.6 KG/M2 | WEIGHT: 234.8 LBS | SYSTOLIC BLOOD PRESSURE: 114 MMHG | TEMPERATURE: 98.2 F | OXYGEN SATURATION: 97 %

## 2021-06-08 DIAGNOSIS — L72.3 SEBACEOUS CYST: Primary | ICD-10-CM

## 2021-06-08 PROCEDURE — 99213 OFFICE O/P EST LOW 20 MIN: CPT | Performed by: PHYSICIAN ASSISTANT

## 2021-06-08 ASSESSMENT — MIFFLIN-ST. JEOR: SCORE: 1930.18

## 2021-06-08 NOTE — PROGRESS NOTES
"  Assessment & Plan     Sebaceous cyst  Reviewed options  Wants to have removed - would like Derm to do  Referral placed   - ADULT DERMATOLOGY REFERRAL; Future     BMI:   Estimated body mass index is 41.59 kg/m  as calculated from the following:    Height as of this encounter: 1.6 m (5' 3\").    Weight as of this encounter: 106.5 kg (234 lb 12.8 oz).       Return in about 6 months (around 12/8/2021) for Physical Exam.    YULISA SALGADO PA-C  [facility]    Sabrina Leach is a 26 year old who presents for the following health issues accompanied by staff worker from the group home [place] :    HPI     Concern - Bump on Head  Onset: patient says it has been there for a while  Description: patient has a bump on the right side of his head and he says it hurts  Intensity: severe  Progression of Symptoms:  same  Accompanying Signs & Symptoms: n/a  Previous history of similar problem: no  Precipitating factors:        Worsened by: none  Alleviating factors:        Improved by: none  Therapies tried and outcome:  none     Review of Systems   Constitutional, HEENT, cardiovascular, pulmonary, gi and gu systems are negative, except as otherwise noted.      Objective    /74 (BP Location: Left arm, Patient Position: Chair, Cuff Size: Adult Large)   Pulse 87   Temp 98.2  F (36.8  C) (Oral)   Ht 1.6 m (5' 3\")   Wt 106.5 kg (234 lb 12.8 oz)   SpO2 97%   BMI 41.59 kg/m    Body mass index is 41.59 kg/m .  Physical Exam   GENERAL: healthy, alert and no distress  SKIN: Right side scalp there is a typical 1 cm sebaceous cyst         "

## 2021-06-25 ENCOUNTER — TELEPHONE (OUTPATIENT)
Dept: FAMILY MEDICINE | Facility: CLINIC | Age: 26
End: 2021-06-25

## 2021-06-25 NOTE — TELEPHONE ENCOUNTER
[facility] Date[facility]24/2021 for Nelson Song PA-C.  Forms placed in provider 'sign me' folder.  Please fax forms to [phone] after completion.    CAROLE YU (R)  Radiology Department

## 2021-07-26 NOTE — PROGRESS NOTES
"  SUBJECTIVE:   Hany Patel is a 26 year old male who presents for Preventive Visit.    Patient has been advised of split billing requirements and indicates understanding: Yes  Are you in the first 12 months of your Medicare Part B coverage?  No    Physical Health:    In general, how would you rate your overall physical health? fair    Outside of work, how many days during the week do you exercise? 2-3 days/week    Outside of work, approximately how many minutes a day do you exercise?45-60 minutes    If you drink alcohol do you typically have >3 drinks per day or >7 drinks per week? No    Do you usually eat at least 4 servings of fruit and vegetables a day, include whole grains & fiber and avoid regularly eating high fat or \"junk\" foods? NO, he can be picky    Do you have any problems taking medications regularly?  No    Do you have any side effects from medications? not applicable    Needs assistance for the following daily activities: telephone use, transportation, shopping, preparing meals, housework, bathing, laundry, money management and taking medicine    Which of the following safety concerns are present in your home?  none identified     Hearing impairment: No    In the past 6 months, have you been bothered by leaking of urine? no    Mental Health:    In general, how would you rate your overall mental or emotional health? good  PHQ-2 Score:  3    Do you feel safe in your environment? Yes    Have you ever done Advance Care Planning? (For example, a Health Directive, POLST, or a discussion with a medical provider or your loved ones about your wishes): Yes, advance care planning is on file.    Additional concerns to address?  YES, sometimes just stays up at night, sleep in really late, sometimes melatonin does not help. Should patient be vitamin C?    Fall risk:  Fallen 2 or more times in the past year?: No  Any fall with injury in the past year?: No    Cognitive Screening: Not appropriate due to mental " handicap    Do you have sleep apnea, excessive snoring or daytime drowsiness?: Unknown        Reviewed and updated as needed this visit by clinical staff  Tobacco  Allergies  Meds  Problems  Med Hx  Surg Hx  Fam Hx          Reviewed and updated as needed this visit by Provider  Tobacco  Allergies  Meds  Problems  Med Hx  Surg Hx  Fam Hx         Social History     Tobacco Use     Smoking status: Never Smoker     Smokeless tobacco: Never Used   Substance Use Topics     Alcohol use: No                           Current providers sharing in care for this patient include:   Patient Care Team:  Liu Song PA-C as PCP - General (Family Practice)  Oziel Perry MD as MD (Pediatrics)  Yenifer Morgan MD as Assigned Surgical Provider  Liu Song PA-C as Assigned PCP    The following health maintenance items are reviewed in Epic and correct as of today:  Health Maintenance   Topic Date Due     HEPATITIS C SCREENING  Never done     INFLUENZA VACCINE (1) 09/01/2021     ANNUAL REVIEW OF HM ORDERS  06/08/2022     MEDICARE ANNUAL WELLNESS VISIT  07/27/2022     ADVANCE CARE PLANNING  07/27/2026     DTAP/TDAP/TD IMMUNIZATION (9 - Td or Tdap) 07/17/2030     Pneumococcal Vaccine: Pediatrics (0 to 5 Years) and At-Risk Patients (6 to 64 Years) (2 of 2 - PPSV23) 02/06/2058     PHQ-2  Completed     IPV IMMUNIZATION  Completed     HEPATITIS B IMMUNIZATION  Completed     COVID-19 Vaccine  Completed     MENINGITIS IMMUNIZATION  Aged Out     HIV SCREENING  Discontinued     Lab work is in process  Pneumonia Vaccine: Up to date     ROS:  Constitutional, HEENT, cardiovascular, pulmonary, GI, , musculoskeletal, neuro, skin, endocrine and psych systems are negative, except as otherwise noted.    OBJECTIVE:   Pulse 90   Temp 98.5  F (36.9  C) (Oral)   Wt 104.9 kg (231 lb 3.2 oz)   SpO2 96%   BMI 40.96 kg/m   Estimated body mass index is 40.96 kg/m  as calculated from the following:    Height as  "of 6/8/21: 1.6 m (5' 3\").    Weight as of this encounter: 104.9 kg (231 lb 3.2 oz).  EXAM:   GENERAL: healthy, alert and no distress  EYES: Eyes grossly normal to inspection, PERRL and conjunctivae and sclerae normal  HENT: ear canals and TM's normal, nose and mouth without ulcers or lesions  NECK: no adenopathy, no asymmetry, masses, or scars and thyroid normal to palpation  RESP: lungs clear to auscultation - no rales, rhonchi or wheezes  CV: regular rate and rhythm, normal S1 S2, no S3 or S4, no murmur, click or rub, no peripheral edema and peripheral pulses strong  ABDOMEN: soft, nontender, no hepatosplenomegaly, no masses and bowel sounds normal  MS: no gross musculoskeletal defects noted, no edema  SKIN: no suspicious lesions or rashes  NEURO: Normal strength and tone, mentation intact and speech normal  PSYCH: mentation appears normal, affect normal/bright  LYMPH: no cervical, supraclavicular, axillary, or inguinal adenopathy    Diagnostic Test Results:  Labs reviewed in Epic    ASSESSMENT / PLAN:   (Z00.00) Encounter for Medicare annual wellness exam  (primary encounter diagnosis)  Comment: Well person   Plan: Diet, exercise, wellness and other preventive recommendations related to health maintenance were discussed.  Follow up as needed for acute issues.  Physical exam in 1 year.     (G47.00) Insomnia, unspecified type  Comment:   Plan: traZODone (DESYREL) 50 MG tablet        Waking and wandering at night. Can try Trazodone at his group home.     (Z11.59) Need for hepatitis C screening test  Comment:   Plan:     (E66.01) Morbid obesity (H)  Comment:   Plan: Counseling and spoke with his group home staff on dietary modifications     (E03.9) Hypothyroidism, unspecified type  Comment:   Plan:   Lab Results   Component Value Date    TSH 2.64 11/12/2019      - Stable.     (Q90.9) Down's syndrome  Comment:   Plan: Living in a group home. Doing well. This is the happiest I've ever seen him.     Patient has been " "advised of split billing requirements and indicates understanding: Yes    COUNSELING:  Reviewed preventive health counseling, as reflected in patient instructions    Estimated body mass index is 40.96 kg/m  as calculated from the following:    Height as of 6/8/21: 1.6 m (5' 3\").    Weight as of this encounter: 104.9 kg (231 lb 3.2 oz).        He reports that he has never smoked. He has never used smokeless tobacco.    Appropriate preventive services were discussed with this patient, including applicable screening as appropriate for cardiovascular disease, diabetes, osteopenia/osteoporosis, and glaucoma.  As appropriate for age/gender, discussed screening for colorectal cancer, prostate cancer, breast cancer, and cervical cancer. Checklist reviewing preventive services available has been given to the patient.    Reviewed patients plan of care and provided an AVS. The Basic Care Plan (routine screening as documented in Health Maintenance) for Hany meets the Care Plan requirement. This Care Plan has been established and reviewed with the Patient.    Counseling Resources:  ATP IV Guidelines  Pooled Cohorts Equation Calculator  Breast Cancer Risk Calculator  BRCA-Related Cancer Risk Assessment: FHS-7 Tool  FRAX Risk Assessment  ICSI Preventive Guidelines  Dietary Guidelines for Americans, 2010  USDA's MyPlate  ASA Prophylaxis  Lung CA Screening    YULISA SALGADO PA-C  [facility]  "

## 2021-07-26 NOTE — PATIENT INSTRUCTIONS
Patient Education   Personalized Prevention Plan  You are due for the preventive services outlined below.  Your care team is available to assist you in scheduling these services.  If you have already completed any of these items, please share that information with your care team to update in your medical record.  Health Maintenance Due   Topic Date Due     HIV Screening  Never done     Hepatitis C Screening  Never done     Annual Wellness Visit  07/17/2021

## 2021-07-27 ENCOUNTER — OFFICE VISIT (OUTPATIENT)
Dept: FAMILY MEDICINE | Facility: CLINIC | Age: 26
End: 2021-07-27
Payer: MEDICARE

## 2021-07-27 VITALS — HEART RATE: 90 BPM | OXYGEN SATURATION: 96 % | BODY MASS INDEX: 40.96 KG/M2 | WEIGHT: 231.2 LBS | TEMPERATURE: 98.5 F

## 2021-07-27 DIAGNOSIS — Q90.9 DOWN'S SYNDROME: ICD-10-CM

## 2021-07-27 DIAGNOSIS — G47.00 INSOMNIA, UNSPECIFIED TYPE: ICD-10-CM

## 2021-07-27 DIAGNOSIS — E03.9 HYPOTHYROIDISM, UNSPECIFIED TYPE: ICD-10-CM

## 2021-07-27 DIAGNOSIS — E66.01 MORBID OBESITY (H): ICD-10-CM

## 2021-07-27 DIAGNOSIS — Z11.59 NEED FOR HEPATITIS C SCREENING TEST: ICD-10-CM

## 2021-07-27 DIAGNOSIS — Z00.00 ENCOUNTER FOR MEDICARE ANNUAL WELLNESS EXAM: Primary | ICD-10-CM

## 2021-07-27 PROBLEM — J96.00 ACUTE RESPIRATORY FAILURE (H): Status: RESOLVED | Noted: 2019-10-10 | Resolved: 2021-07-27

## 2021-07-27 PROCEDURE — 99213 OFFICE O/P EST LOW 20 MIN: CPT | Mod: 25 | Performed by: PHYSICIAN ASSISTANT

## 2021-07-27 PROCEDURE — G0439 PPPS, SUBSEQ VISIT: HCPCS | Performed by: PHYSICIAN ASSISTANT

## 2021-07-27 RX ORDER — TRAZODONE HYDROCHLORIDE 50 MG/1
50 TABLET, FILM COATED ORAL AT BEDTIME
Qty: 90 TABLET | Refills: 0 | Status: SHIPPED | OUTPATIENT
Start: 2021-07-27 | End: 2021-10-04

## 2021-08-02 ENCOUNTER — TELEPHONE (OUTPATIENT)
Dept: FAMILY MEDICINE | Facility: CLINIC | Age: 26
End: 2021-08-02

## 2021-08-02 NOTE — TELEPHONE ENCOUNTER
Forms received from [facility]/ Physician Orders - [facility] (fax date 7/30/2021) for NATALIA Jurado .  Forms placed in provider 'sign me' folder.  Please fax forms to [phone] after completion.    Liu De Luna RT (R) (ARRT)  Radiology Dept

## 2021-08-04 DIAGNOSIS — F84.9 PERVASIVE DEVELOPMENTAL DISORDER: ICD-10-CM

## 2021-08-04 RX ORDER — ESCITALOPRAM OXALATE 20 MG/1
TABLET ORAL
Qty: 90 TABLET | Refills: 2 | Status: SHIPPED | OUTPATIENT
Start: 2021-08-04 | End: 2022-05-31

## 2021-08-04 NOTE — TELEPHONE ENCOUNTER
Routing refill request to provider for review/approval because:  Drug interaction warning    Leslie Cruz, RN

## 2021-08-31 DIAGNOSIS — L21.9 SEBORRHEIC DERMATITIS: ICD-10-CM

## 2021-08-31 RX ORDER — KETOCONAZOLE 20 MG/G
CREAM TOPICAL
Qty: 30 G | Refills: 11 | Status: SHIPPED | OUTPATIENT
Start: 2021-08-31

## 2021-10-04 DIAGNOSIS — G47.00 INSOMNIA, UNSPECIFIED TYPE: ICD-10-CM

## 2021-10-04 RX ORDER — TRAZODONE HYDROCHLORIDE 50 MG/1
TABLET, FILM COATED ORAL
Qty: 90 TABLET | Refills: 3 | Status: SHIPPED | OUTPATIENT
Start: 2021-10-04

## 2021-10-04 NOTE — TELEPHONE ENCOUNTER
Routing refill request to provider for review/approval because:  Drug interaction warning    Cesilia Maurice RN, BSN, PHN  [facility]: [place]

## 2021-12-28 NOTE — ED PROVIDER NOTES
History     Chief Complaint   Patient presents with     Hemoptysis     HPI  Hany Patel is a 24 year old male with Down syndrome and recent admission for pneumonia who presents with hemoptysis.  Patient was admitted from 10 4-10 7 with pneumonia.  He finished his course of outpatient antibiotics this morning.  Per family, last night he was not acting himself, had behavioral changes.  Today he had an episode of coughing where blood-tinged sputum was noted.  Patient is not noted to be febrile at home until arrival here.  He denies any current pain but did note GI upset when he was taking antibiotics.  No other symptoms noted.    I have reviewed the Medications, Allergies, Past Medical and Surgical History, and Social History in the Epic system.    Review of Systems   Constitutional: Positive for fever.   HENT: Negative for congestion.    Eyes: Negative for redness.   Respiratory: Positive for cough. Negative for shortness of breath.    Cardiovascular: Negative for chest pain.   Gastrointestinal: Positive for diarrhea. Negative for abdominal pain.   Genitourinary: Negative for difficulty urinating.   Musculoskeletal: Negative for arthralgias and neck stiffness.   Skin: Negative for color change.   Neurological: Negative for headaches.   Psychiatric/Behavioral: Positive for behavioral problems. Negative for confusion.       Physical Exam   BP: (!) 113/38  Pulse: 115  Temp: 102.7  F (39.3  C)  Resp: 20  SpO2: 94 %      Physical Exam  Constitutional:       General: He is not in acute distress.     Appearance: He is well-developed. He is obese. He is not ill-appearing or diaphoretic.   HENT:      Head: Normocephalic and atraumatic.      Mouth/Throat:      Pharynx: No oropharyngeal exudate.   Eyes:      General: No scleral icterus.        Right eye: No discharge.         Left eye: No discharge.      Pupils: Pupils are equal, round, and reactive to light.   Neck:      Musculoskeletal: Normal range of motion and neck  supple.   Cardiovascular:      Rate and Rhythm: Regular rhythm. Tachycardia present.      Heart sounds: Normal heart sounds. No murmur. No friction rub. No gallop.    Pulmonary:      Effort: Pulmonary effort is normal. No respiratory distress.      Breath sounds: Normal breath sounds. No wheezing, rhonchi or rales.      Comments: Productive cough.   Chest:      Chest wall: No tenderness.   Abdominal:      General: Bowel sounds are normal. There is no distension.      Palpations: Abdomen is soft.      Tenderness: There is no tenderness.   Musculoskeletal: Normal range of motion.         General: No tenderness or deformity.   Skin:     General: Skin is warm and dry.      Coloration: Skin is not pale.      Findings: No erythema or rash.   Neurological:      Mental Status: He is alert and oriented to person, place, and time.      Cranial Nerves: No cranial nerve deficit.   Psychiatric:         Behavior: Behavior is withdrawn.         ED Course        Procedures             Critical Care time:  none             Labs Ordered and Resulted from Time of ED Arrival Up to the Time of Departure from the ED - No data to display         Assessments & Plan (with Medical Decision Making)   This is a 24-year-old male with Down syndrome with recent hospitalization for pneumonia who is here with blood-tinged sputum and fever.  This occurred today.  Is also been having behavioral changes that were first noticed yesterday, family states he his acting differently than usual.  On exam patient is tachycardic and febrile.  He continues to have cough.  Patient was given 5 of IM Zyprexa and labs and IV were obtained.  CBC shows a WBC count of 16.7.  Patient's lactic acid is 2.  No other abnormalities on lab work.  Chest x-ray shows worsening of opacities.  Patient's previous sputum culture did grow out staph aureus.  This is pansensitive.  Patient was given piperacillin tazobactam and vancomycin. Patient does note appear septic and is not  Patient is a 60y old  Male who presents with a chief complaint of abd pain (28 Dec 2021 12:06)      HPI:    60 yr  m,    h/o gout,  ex ETOH abuse, chronic pancreatitis, anemia, , CKD  takes  no meds  ep C s/p treatment, prior gastric perforation with peritonitis    resenting with lower   and  epigastic  pain . worsening  for 1 year.     patient  is  a poor historian,   denies any recent etoh useept known to renal (20 Dec 2021 18:23)      INTERVAL HPI/OVERNIGHT EVENTS: Patient seen earlier today  Minimal if any spitting over night. No N/V or abdominal pain. Patient was to have chest CT yesterday but only tolerated first bottle of contrast. When asked to take the second, he could not tolerate it so the procedure was cancelled.     MEDICATIONS  (STANDING):  cyanocobalamin 1000 MICROGram(s) Oral daily  epoetin mejia-epbx (RETACRIT) Injectable 37963 Unit(s) SubCutaneous every 7 days  ferrous    sulfate 325 milliGRAM(s) Oral daily  folic acid 1 milliGRAM(s) Oral daily  gabapentin 100 milliGRAM(s) Oral every 12 hours  heparin   Injectable 5000 Unit(s) SubCutaneous every 12 hours  iohexol 300 mG (iodine)/mL Oral Solution 30 milliLiter(s) Oral once  lactated ringers. 1000 milliLiter(s) (100 mL/Hr) IV Continuous <Continuous>  metoclopramide 5 milliGRAM(s) Oral three times a day before meals  multivitamin 1 Tablet(s) Oral daily  pancrelipase  (CREON  6,000 Lipase Units) 2 Capsule(s) Oral three times a day with meals  pantoprazole    Tablet 40 milliGRAM(s) Oral two times a day  senna 2 Tablet(s) Oral at bedtime  sodium bicarbonate 650 milliGRAM(s) Oral every 6 hours  sucralfate 1 Gram(s) Oral four times a day  tamsulosin 0.4 milliGRAM(s) Oral at bedtime  thiamine 100 milliGRAM(s) Oral daily    MEDICATIONS  (PRN):  acetaminophen     Tablet .. 650 milliGRAM(s) Oral every 6 hours PRN Temp greater or equal to 38C (100.4F), Mild Pain (1 - 3)  HYDROmorphone  Injectable 1 milliGRAM(s) IV Push every 4 hours PRN Severe Pain (7 - 10)  LORazepam   Injectable 2 milliGRAM(s) IV Push every 2 hours PRN CIWA-Ar score 8 or greater  magnesium hydroxide Suspension 30 milliLiter(s) Oral daily PRN Constipation      FAMILY HISTORY:  No pertinent family history in first degree relatives        Allergies    No Known Allergies    Intolerances        PMH/PSH:  ETOH abuse    Pancreatitis    Hepatitis C    Gout    No significant past surgical history          REVIEW OF SYSTEMS:  CONSTITUTIONAL: No fever, weight loss,   EYES: No eye pain, visual disturbances, or discharge  ENMT:  No difficulty hearing, tinnitus, vertigo; No sinus or throat pain  NECK: No pain or stiffness  BREASTS: No pain, masses, or nipple discharge  RESPIRATORY: No cough, wheezing, chills or hemoptysis; No shortness of breath  CARDIOVASCULAR: No chest pain, palpitations, dizziness, or leg swelling  GASTROINTESTINAL: See above   GENITOURINARY: No dysuria, frequency, hematuria, or incontinence  NEUROLOGICAL: No headaches, memory loss, loss of strength, numbness, or tremors  SKIN: No itching, burning, rashes, or lesions   LYMPH NODES: No enlarged glands  ENDOCRINE: No heat or cold intolerance; No hair loss  MUSCULOSKELETAL: No joint pain or swelling; No muscle, back, or extremity pain  PSYCHIATRIC: No depression, anxiety, mood swings, or difficulty sleeping  HEME/LYMPH: No easy bruising, or bleeding gums  ALLERGY AND IMMUNOLOGIC: No hives or eczema    Vital Signs Last 24 Hrs  T(C): 36.3 (28 Dec 2021 11:56), Max: 36.8 (28 Dec 2021 05:04)  T(F): 97.3 (28 Dec 2021 11:56), Max: 98.2 (28 Dec 2021 05:04)  HR: 89 (28 Dec 2021 11:56) (87 - 96)  BP: 145/81 (28 Dec 2021 11:56) (109/69 - 145/81)  BP(mean): --  RR: 18 (28 Dec 2021 11:56) (18 - 19)  SpO2: 96% (28 Dec 2021 11:56) (96% - 99%)    PHYSICAL EXAM:  GENERAL: NAD, well-groomed, well-developed  HEAD:  Atraumatic, Normocephalic  EYES: EOMI, PERRLA, conjunctiva and sclera clear  NECK: Supple, No JVD, Normal thyroid  NERVOUS SYSTEM:  Alert & Oriented X3, Good concentration; Motor Strength 5/5 B/L upper and lower extremities;   CHEST/LUNG: Clear to percussion bilaterally; No rales, rhonchi, wheezing, or rubs  HEART: Regular rate and rhythm; No murmurs, rubs, or gallops  ABDOMEN: Soft, Nontender, Nondistended; Bowel sounds present  EXTREMITIES:  2+ Peripheral Pulses, No clubbing, cyanosis, or edema  LYMPH: No lymphadenopathy noted  SKIN: No rashes or lesions    LAB                          8.7    8.44  )-----------( 404      ( 27 Dec 2021 08:16 )             29.7       CBC:  12-27 @ 08:16  WBC 8.44   Hgb 8.7   Hct 29.7   Plts 404  MCV 77.7  12-25 @ 08:54  WBC 8.10   Hgb 7.9   Hct 26.5   Plts 403  MCV 76.4  12-24 @ 08:30  WBC 6.61   Hgb 7.5   Hct 25.4   Plts 350  MCV 76.5  12-23 @ 08:15  WBC 9.65   Hgb 8.4   Hct 28.1   Plts 395  MCV 76.6  12-23 @ 01:19  WBC 10.34   Hgb 8.0   Hct 26.7   Plts 346  MCV 76.5  12-22 @ 07:24  WBC 12.16   Hgb 6.6   Hct 22.4   Plts 421  MCV 74.7      Chemistry:  12-27 @ 08:16  Na+ 141  K+ 3.7  Cl- 114  CO2 19  BUN 38  Cr 3.67     12-26 @ 09:02  Na+ 140  K+ 3.5  Cl- 115  CO2 18  BUN 41  Cr 4.03     12-25 @ 08:54  Na+ 139  K+ 3.4  Cl- 112  CO2 20  BUN 46  Cr 4.10     12-24 @ 08:30  Na+ 136  K+ 3.7  Cl- 109  CO2 20  BUN 51  Cr 4.30     12-23 @ 08:15  Na+ 140  K+ 3.7  Cl- 111  CO2 21  BUN 65  Cr 4.75     12-22 @ 07:24  Na+ 138  K+ 3.8  Cl- 110  CO2 19  BUN 76  Cr 5.68         Glucose, Serum: 96 mg/dL (12-27 @ 08:16)  Glucose, Serum: 83 mg/dL (12-26 @ 09:02)  Glucose, Serum: 94 mg/dL (12-25 @ 08:54)  Glucose, Serum: 89 mg/dL (12-24 @ 08:30)  Glucose, Serum: 108 mg/dL (12-23 @ 08:15)  Glucose, Serum: 109 mg/dL (12-22 @ 07:24)      27 Dec 2021 08:16    141    |  114    |  38     ----------------------------<  96     3.7     |  19     |  3.67   26 Dec 2021 09:02    140    |  115    |  41     ----------------------------<  83     3.5     |  18     |  4.03   25 Dec 2021 08:54    139    |  112    |  46     ----------------------------<  94     3.4     |  20     |  4.10   24 Dec 2021 08:30    136    |  109    |  51     ----------------------------<  89     3.7     |  20     |  4.30   23 Dec 2021 08:15    140    |  111    |  65     ----------------------------<  108    3.7     |  21     |  4.75   22 Dec 2021 07:24    138    |  110    |  76     ----------------------------<  109    3.8     |  19     |  5.68     Ca    8.4        27 Dec 2021 08:16  Ca    8.7        26 Dec 2021 09:02  Ca    8.5        25 Dec 2021 08:54  Ca    8.0        24 Dec 2021 08:30  Ca    8.4        23 Dec 2021 08:15  Ca    8.2        22 Dec 2021 07:24  Phos  2.9       27 Dec 2021 08:16  Phos  3.2       24 Dec 2021 08:30  Phos  3.4       23 Dec 2021 08:15  Phos  3.0       22 Dec 2021 07:24  Mg     2.2       27 Dec 2021 08:16  Mg     2.4       24 Dec 2021 08:30  Mg     2.1       23 Dec 2021 08:15  Mg     2.9       22 Dec 2021 07:24    TPro  7.0    /  Alb  2.5    /  TBili  0.2    /  DBili  0.1    /  AST  14     /  ALT  9      /  AlkPhos  99     27 Dec 2021 08:16  TPro  6.2    /  Alb  2.5    /  TBili  0.1    /  DBili  x      /  AST  9      /  ALT  11     /  AlkPhos  123    22 Dec 2021 07:24              CAPILLARY BLOOD GLUCOSE              RADIOLOGY & ADDITIONAL TESTS:    Imaging Personally Reviewed:  [ ] YES  [ ] NO    Consultant(s) Notes Reviewed:  [ ] YES  [ ] NO    Care Discussed with Consultants/Other Providers [ ] YES  [ ] NO hypoxemic. I discussed all results with patient and family.  Patient did improve somewhat clinically in the emergency department and was eating and drinking.  I believe patient's symptoms are due to a worsening of his pneumonia. We will admit for further monitoring work-up and treatment.    I have reviewed the nursing notes.    I have reviewed the findings, diagnosis, plan and need for follow up with the patient.    New Prescriptions    No medications on file       Final diagnoses:   None       10/10/2019   [facility], [place], EMERGENCY DEPARTMENT     Steven Martinez,   10/10/19 1940       Steven Martinez,   10/10/19 2024

## 2022-01-13 ENCOUNTER — OFFICE VISIT (OUTPATIENT)
Dept: FAMILY MEDICINE | Facility: CLINIC | Age: 27
End: 2022-01-13
Payer: MEDICARE

## 2022-01-13 VITALS
HEART RATE: 87 BPM | BODY MASS INDEX: 45.96 KG/M2 | HEIGHT: 63 IN | OXYGEN SATURATION: 98 % | TEMPERATURE: 97.4 F | DIASTOLIC BLOOD PRESSURE: 76 MMHG | WEIGHT: 259.4 LBS | SYSTOLIC BLOOD PRESSURE: 130 MMHG

## 2022-01-13 DIAGNOSIS — H60.392 INFECTIVE OTITIS EXTERNA, LEFT: Primary | ICD-10-CM

## 2022-01-13 PROCEDURE — 99213 OFFICE O/P EST LOW 20 MIN: CPT | Performed by: FAMILY MEDICINE

## 2022-01-13 RX ORDER — CIPROFLOXACIN AND DEXAMETHASONE 3; 1 MG/ML; MG/ML
4 SUSPENSION/ DROPS AURICULAR (OTIC) 2 TIMES DAILY
Qty: 7.5 ML | Refills: 0 | Status: SHIPPED | OUTPATIENT
Start: 2022-01-13

## 2022-01-13 ASSESSMENT — PAIN SCALES - GENERAL: PAINLEVEL: NO PAIN (0)

## 2022-01-13 ASSESSMENT — MIFFLIN-ST. JEOR: SCORE: 2041.76

## 2022-01-13 NOTE — PROGRESS NOTES
Assessment & Plan     Infective otitis externa, left  Avoid putting anything into ears canal.    - ciprofloxacin-dexamethasone (CIPRODEX) 0.3-0.1 % otic suspension; Place 4 drops Into the left ear 2 times daily      No follow-ups on file.    Sergio Florence MD  [facility]    Sabrina Leach is a 26 year old who presents for the following health issues:  Resident at group home.  Comes in with concern of left ear been infected, has been having discharge and drainage, decreased hearing.  No other symptoms.  No cough, no sneezing, no fever or chills.  Concern - not hearing well   Onset: a few wks   Description: not sure, patient unable to tell staff   Intensity: mild, moderate  Progression of Symptoms:  same  Accompanying Signs & Symptoms: left ear swelling in Dec. , had clear drainage last week   Previous history of similar problem: yes   Precipitating factors:        Worsened by: not sure, but could be bacterial   Alleviating factors:        Improved by: not sure   Therapies tried and outcome: no OTC meds or treatments     Review of Systems   Constitutional, HEENT, cardiovascular, pulmonary, gi and gu systems are negative, except as otherwise noted.      Objective    There were no vitals taken for this visit.  There is no height or weight on file to calculate BMI.  Physical Exam   GENERAL: healthy, alert and no distress  HENT: right ear: normal: no effusions, no erythema, normal landmarks   Left ear: erythematous and mucopurulent effusion        Sergio Florence MD

## 2022-02-01 DIAGNOSIS — E78.5 HYPERLIPEMIA: ICD-10-CM

## 2022-02-01 DIAGNOSIS — E88.810 INSULIN RESISTANCE SYNDROME: Primary | ICD-10-CM

## 2022-02-01 DIAGNOSIS — Q90.9 DOWN'S SYNDROME: ICD-10-CM

## 2022-02-01 DIAGNOSIS — R73.02 IMPAIRED GLUCOSE TOLERANCE: ICD-10-CM

## 2022-02-01 DIAGNOSIS — E03.9 PRIMARY HYPOTHYROIDISM: ICD-10-CM

## 2022-02-23 ENCOUNTER — LAB (OUTPATIENT)
Dept: LAB | Facility: CLINIC | Age: 27
End: 2022-02-23
Payer: MEDICARE

## 2022-02-23 DIAGNOSIS — E88.810 INSULIN RESISTANCE SYNDROME: ICD-10-CM

## 2022-02-23 DIAGNOSIS — Q90.9 DOWN'S SYNDROME: ICD-10-CM

## 2022-02-23 DIAGNOSIS — E03.9 PRIMARY HYPOTHYROIDISM: ICD-10-CM

## 2022-02-23 DIAGNOSIS — R73.02 IMPAIRED GLUCOSE TOLERANCE: ICD-10-CM

## 2022-02-23 DIAGNOSIS — E78.5 HYPERLIPEMIA: ICD-10-CM

## 2022-02-23 LAB — VIT B12 SERPL-MCNC: 241 PG/ML (ref 193–986)

## 2022-02-23 PROCEDURE — 84439 ASSAY OF FREE THYROXINE: CPT

## 2022-02-23 PROCEDURE — 82607 VITAMIN B-12: CPT

## 2022-02-23 PROCEDURE — 36415 COLL VENOUS BLD VENIPUNCTURE: CPT

## 2022-02-23 PROCEDURE — 82947 ASSAY GLUCOSE BLOOD QUANT: CPT

## 2022-02-23 PROCEDURE — 84443 ASSAY THYROID STIM HORMONE: CPT | Mod: 90

## 2022-02-23 PROCEDURE — 84403 ASSAY OF TOTAL TESTOSTERONE: CPT

## 2022-02-23 PROCEDURE — 99000 SPECIMEN HANDLING OFFICE-LAB: CPT

## 2022-02-24 LAB
FASTING STATUS PATIENT QL REPORTED: NO
GLUCOSE BLD-MCNC: 99 MG/DL (ref 70–99)
Lab: NORMAL
MAYO MISC RESULT: ABNORMAL
PERFORMING LABORATORY: NORMAL
SPECIMEN STATUS: NORMAL
T4 FREE SERPL-MCNC: 0.74 NG/DL (ref 0.76–1.46)
TEST NAME: NORMAL

## 2022-02-25 LAB — TESTOST SERPL-MCNC: 212 NG/DL (ref 240–950)

## 2022-03-09 ENCOUNTER — TRANSFERRED RECORDS (OUTPATIENT)
Dept: HEALTH INFORMATION MANAGEMENT | Facility: CLINIC | Age: 27
End: 2022-03-09
Payer: MEDICARE

## 2022-04-19 NOTE — TELEPHONE ENCOUNTER
ED / Discharge Outreach Protocol    Patient Contact    Attempt # 1    Was call answered?  No.  Left message on voicemail with information to call me back.    Chandan Cruz RN       [FreeTextEntry1] : I, Erin Garcia wrote this note acting as a scribe for Dr. Madhu Gtz on Apr 19, 2022.

## 2022-05-12 ENCOUNTER — TELEPHONE (OUTPATIENT)
Dept: FAMILY MEDICINE | Facility: CLINIC | Age: 27
End: 2022-05-12
Payer: MEDICARE

## 2022-05-12 NOTE — TELEPHONE ENCOUNTER
Received forms from [facility] -Standing orders for OTC medications    Placed forms in MH box to review and sign  Fax completed forms to [phone]    Azalea Yan-  Kevin Bauman

## 2022-05-26 ENCOUNTER — TRANSFERRED RECORDS (OUTPATIENT)
Dept: HEALTH INFORMATION MANAGEMENT | Facility: CLINIC | Age: 27
End: 2022-05-26
Payer: MEDICARE

## 2022-05-31 DIAGNOSIS — F84.9 PERVASIVE DEVELOPMENTAL DISORDER: ICD-10-CM

## 2022-05-31 NOTE — TELEPHONE ENCOUNTER
Nitish is calling,  from the group home the patient resides at.  Calling for a refill on   escitalopram (LEXAPRO) 20 MG tablet    [facility]    Nitish is not aware of who the patient is going to replace SG with.  Pt is not due for an appt until July 22    Call if issues     Azalea Yan-  Kevin Pompa

## 2022-06-01 RX ORDER — ESCITALOPRAM OXALATE 20 MG/1
20 TABLET ORAL DAILY
Qty: 90 TABLET | Refills: 0 | Status: SHIPPED | OUTPATIENT
Start: 2022-06-01 | End: 2022-08-24

## 2022-06-01 NOTE — TELEPHONE ENCOUNTER
Routing refill request to provider for review/approval because:  Drug interaction warning      Patient aware of need to scheduled with new PCP, is not due for follow up until 7/2022 per Nelson Song' last visit note. Please consider one time benoit.     Cesilia Maurice, RN, BSN, PHN  [facility]: [place]

## 2022-06-09 ENCOUNTER — TRANSFERRED RECORDS (OUTPATIENT)
Dept: PEDIATRICS | Facility: CLINIC | Age: 27
End: 2022-06-09

## 2022-06-23 ENCOUNTER — TRANSFERRED RECORDS (OUTPATIENT)
Dept: HEALTH INFORMATION MANAGEMENT | Facility: CLINIC | Age: 27
End: 2022-06-23

## 2022-07-07 ENCOUNTER — TELEPHONE (OUTPATIENT)
Dept: FAMILY MEDICINE | Facility: CLINIC | Age: 27
End: 2022-07-07

## 2022-07-07 RX ORDER — VITAMIN B COMPLEX
2 TABLET ORAL DAILY
Qty: 180 TABLET | Refills: 1 | OUTPATIENT
Start: 2022-07-07

## 2022-07-07 NOTE — TELEPHONE ENCOUNTER
[facility] faxed Medication Change Request re: Vitamin D3 50 mcg (2000 units) tablet    Reason for requesting change: DRUG UNAVAILABLE AND NOT COVERED BY INSURANCE.    COVERED ALTERNATIVES: VITAMIN D3 1000IU (25MCG) CAPS

## 2022-07-07 NOTE — TELEPHONE ENCOUNTER
This medication is historical. Patient needs visit to discuss refills and establish care with new provider.     Cesilia Maurice RN, BSN, PHN  [facility]: [place]

## 2022-07-21 ENCOUNTER — TELEPHONE (OUTPATIENT)
Dept: FAMILY MEDICINE | Facility: CLINIC | Age: 27
End: 2022-07-21

## 2022-07-21 NOTE — TELEPHONE ENCOUNTER
Forms received from [facility]/ Annual physician orders and standing ordersm  for Dr Florence.  Forms placed in provider 'sign me' folder.  Please fax forms to [phone] after completion.    TAMMY Kulkarni  [facility]

## 2022-07-22 ENCOUNTER — TELEPHONE (OUTPATIENT)
Dept: FAMILY MEDICINE | Facility: CLINIC | Age: 27
End: 2022-07-22

## 2022-07-22 NOTE — TELEPHONE ENCOUNTER
Received forms from [facility]. Med list.      Placed in MT box to review and sign  Fax back to [phone]    Azalea Yan-  Kevin Bauman

## 2022-08-10 DIAGNOSIS — E66.01 MORBID OBESITY (H): ICD-10-CM

## 2022-08-10 DIAGNOSIS — E78.1 HYPERTRIGLYCERIDEMIA: Primary | ICD-10-CM

## 2022-08-10 RX ORDER — ICOSAPENT ETHYL 1 G/1
1 CAPSULE ORAL 2 TIMES DAILY
Qty: 80 CAPSULE | Refills: 1 | Status: SHIPPED | OUTPATIENT
Start: 2022-08-10

## 2022-08-10 NOTE — TELEPHONE ENCOUNTER
New Medication Request    Contacts       Type Contact Phone/Fax    08/10/2022 12:25 PM CDT Phone (Incoming) Nitish- for [facility] (Other) [phone]          What medication are you requesting?: Vascepa    Reason for medication request: pt needs refills    Have you taken this medication before?: Yes: for a couple years    Controlled Substance Agreement on file:   CSA -- Patient Level:    CSA: None found at the patient level.         Patient offered an appointment? No    Preferred Pharmacy:   [facility]. [address] [facility] [address]  Phone: [phone] Fax: [phone]      Okay to leave a detailed message?: Yes at Other phone number:  [phone]    Azalea SELLERS

## 2022-08-10 NOTE — TELEPHONE ENCOUNTER
Routing to PCP- see request    Do not see that this has ever been ordered in the past in our system.     Rx pending approval if appropriate     Leslie Cruz RN

## 2022-08-22 DIAGNOSIS — F84.9 PERVASIVE DEVELOPMENTAL DISORDER: ICD-10-CM

## 2022-08-24 DIAGNOSIS — F84.9 PERVASIVE DEVELOPMENTAL DISORDER: ICD-10-CM

## 2022-08-24 RX ORDER — ESCITALOPRAM OXALATE 20 MG/1
20 TABLET ORAL DAILY
Qty: 90 TABLET | Refills: 0 | Status: SHIPPED | OUTPATIENT
Start: 2022-08-24 | End: 2022-11-30

## 2022-08-24 NOTE — TELEPHONE ENCOUNTER
Medication Question or Refill    Contacts       Type Contact Phone/Fax    08/24/2022 10:43 AM CDT Phone (Incoming) Nitish- (Group Home) [phone]          What medication are you calling about (include dose and sig)?: Escitalopram 20 mg    Controlled Substance Agreement on file:   CSA -- Patient Level:    CSA: None found at the patient level.       Who prescribed the medication?: JQ    Do you need a refill? Yes: group home is calling on behalf of patient    When did you use the medication last?     Patient offered an appointment? No    Do you have any questions or concerns?  No    Preferred Pharmacy:   [facility] - [place], [place] - [id number] [place] [address] [address]  [address] [facility] [address]  Phone: [phone] Fax: [phone]    Pt has none left    Azalea Yan-  Kevin Bauman

## 2022-08-24 NOTE — TELEPHONE ENCOUNTER
Routing refill request to provider for review/approval because:  Failed protocol.  Drug interaction.     Radha Soto RN

## 2022-08-25 RX ORDER — ESCITALOPRAM OXALATE 20 MG/1
TABLET ORAL
Qty: 31 TABLET | Refills: 11 | OUTPATIENT
Start: 2022-08-25

## 2022-09-22 DIAGNOSIS — F84.9 PERVASIVE DEVELOPMENTAL DISORDER: ICD-10-CM

## 2022-09-22 DIAGNOSIS — R19.7 DIARRHEA, UNSPECIFIED TYPE: ICD-10-CM

## 2022-09-22 RX ORDER — LOPERAMIDE HCL 2 MG
CAPSULE ORAL
Qty: 20 CAPSULE | Refills: 0 | Status: SHIPPED | OUTPATIENT
Start: 2022-09-22 | End: 2022-10-24

## 2022-10-24 DIAGNOSIS — F84.9 PERVASIVE DEVELOPMENTAL DISORDER: ICD-10-CM

## 2022-10-24 DIAGNOSIS — R19.7 DIARRHEA, UNSPECIFIED TYPE: ICD-10-CM

## 2022-10-24 RX ORDER — LOPERAMIDE HCL 2 MG
CAPSULE ORAL
Qty: 20 CAPSULE | Refills: 11 | Status: SHIPPED | OUTPATIENT
Start: 2022-10-24

## 2022-10-24 NOTE — TELEPHONE ENCOUNTER
Reason for Call:  Other     Detailed comments:  Nitish from [facility] is calling to check on status of refill. PLease call Nitish back if this medication cannot be refilled.    Phone Number Patient can be reached at: Other phone number:  Nitish - [facility]/ [phone]    Best Time: any    Can we leave a detailed message on this number? YES    Call taken on 10/24/2022 at 11:00 AM by Katerine Andres

## 2023-02-02 ENCOUNTER — TRANSFERRED RECORDS (OUTPATIENT)
Dept: HEALTH INFORMATION MANAGEMENT | Facility: CLINIC | Age: 28
End: 2023-02-02

## 2023-02-02 LAB
CREATININE (EXTERNAL): 0.78 MG/DL (ref 0.73–1.18)
GFR ESTIMATED (EXTERNAL): >60 ML/MIN/1.73M2
GLUCOSE (EXTERNAL): 88 MG/DL (ref 70–100)
TSH SERPL-ACNC: 0.61 UIU/ML (ref 0.3–4.5)

## 2023-02-16 ENCOUNTER — TRANSFERRED RECORDS (OUTPATIENT)
Dept: HEALTH INFORMATION MANAGEMENT | Facility: CLINIC | Age: 28
End: 2023-02-16

## 2023-02-20 DIAGNOSIS — F84.9 PERVASIVE DEVELOPMENTAL DISORDER: ICD-10-CM

## 2023-02-21 RX ORDER — ESCITALOPRAM OXALATE 20 MG/1
20 TABLET ORAL DAILY
Qty: 30 TABLET | Refills: 0 | Status: SHIPPED | OUTPATIENT
Start: 2023-02-21

## 2023-02-28 NOTE — TELEPHONE ENCOUNTER
Called spoke with mom, relayed providers message,    Needs to est care       Flores Car    [facility]

## 2023-03-23 ENCOUNTER — TELEPHONE (OUTPATIENT)
Dept: FAMILY MEDICINE | Facility: CLINIC | Age: 28
End: 2023-03-23
Payer: MEDICARE

## 2023-03-23 NOTE — TELEPHONE ENCOUNTER
Nitish  calling from [facility]      He was wondering about pt's Trazodone medication, when it was initially prescribed, how long he had taken, and what is was being taken for. Provided information.  Pt is overdue for yearly physical.  Nitish states that patient has seen and changed PCP to [facility].    Advised that the family inform the pharmacy as we are still getting refill request for some medications.      Kallie Dupree RN

## 2023-04-25 DIAGNOSIS — F84.9 PERVASIVE DEVELOPMENTAL DISORDER: ICD-10-CM

## 2023-04-26 RX ORDER — ESCITALOPRAM OXALATE 20 MG/1
20 TABLET ORAL DAILY
Qty: 30 TABLET | Refills: 0 | OUTPATIENT
Start: 2023-04-26

## 2023-05-30 ENCOUNTER — VIRTUAL VISIT (OUTPATIENT)
Dept: SURGERY | Facility: CLINIC | Age: 28
End: 2023-05-30
Payer: MEDICARE

## 2023-05-30 ENCOUNTER — OFFICE VISIT (OUTPATIENT)
Dept: SURGERY | Facility: CLINIC | Age: 28
End: 2023-05-30
Payer: MEDICARE

## 2023-05-30 VITALS
WEIGHT: 258 LBS | DIASTOLIC BLOOD PRESSURE: 76 MMHG | HEIGHT: 63 IN | SYSTOLIC BLOOD PRESSURE: 124 MMHG | BODY MASS INDEX: 45.71 KG/M2

## 2023-05-30 DIAGNOSIS — R73.03 PRE-DIABETES: ICD-10-CM

## 2023-05-30 DIAGNOSIS — E66.01 OBESITY, CLASS III, BMI 40-49.9 (MORBID OBESITY) (H): Primary | ICD-10-CM

## 2023-05-30 DIAGNOSIS — E66.01 MORBID OBESITY (H): Primary | ICD-10-CM

## 2023-05-30 PROCEDURE — 99215 OFFICE O/P EST HI 40 MIN: CPT | Performed by: FAMILY MEDICINE

## 2023-05-30 PROCEDURE — 97802 MEDICAL NUTRITION INDIV IN: CPT | Mod: 95

## 2023-05-30 RX ORDER — TOPIRAMATE 50 MG/1
TABLET, FILM COATED ORAL
Qty: 90 TABLET | Refills: 0 | Status: SHIPPED | OUTPATIENT
Start: 2023-05-30 | End: 2023-08-02

## 2023-05-30 ASSESSMENT — SLEEP AND FATIGUE QUESTIONNAIRES
HOW LIKELY ARE YOU TO NOD OFF OR FALL ASLEEP WHEN YOU ARE A PASSENGER IN A CAR FOR AN HOUR WITHOUT A BREAK: WOULD NEVER DOZE
HOW LIKELY ARE YOU TO NOD OFF OR FALL ASLEEP WHILE WATCHING TV: SLIGHT CHANCE OF DOZING
HOW LIKELY ARE YOU TO NOD OFF OR FALL ASLEEP WHILE SITTING AND TALKING TO SOMEONE: SLIGHT CHANCE OF DOZING
HOW LIKELY ARE YOU TO NOD OFF OR FALL ASLEEP IN A CAR, WHILE STOPPED FOR A FEW MINUTES IN TRAFFIC: WOULD NEVER DOZE
HOW LIKELY ARE YOU TO NOD OFF OR FALL ASLEEP WHILE SITTING AND READING: MODERATE CHANCE OF DOZING
HOW LIKELY ARE YOU TO NOD OFF OR FALL ASLEEP WHILE LYING DOWN TO REST IN THE AFTERNOON WHEN CIRCUMSTANCES PERMIT: SLIGHT CHANCE OF DOZING
HOW LIKELY ARE YOU TO NOD OFF OR FALL ASLEEP WHILE SITTING QUIETLY AFTER LUNCH WITHOUT ALCOHOL: SLIGHT CHANCE OF DOZING
HOW LIKELY ARE YOU TO NOD OFF OR FALL ASLEEP WHILE SITTING INACTIVE IN A PUBLIC PLACE: WOULD NEVER DOZE

## 2023-05-30 NOTE — PROGRESS NOTES
Hany Patel is a 28 year old who is being evaluated via a billable telephone     What phone number would you like to be contacted at? [phone]  How would you like to obtain your AVS? Jona Nava: staff member at group home           Medical Weight Loss Initial Diet Evaluation  Assessment:  This patient was referred by Dr. Aburto for MNT as treatment for Morbid obesity which is impacting his pre diabetes.  Hany is presenting today for a new weight management nutrition consultation. Pt has had an initial appointment with Dr. Aburto.    Weight loss medication: Topamax.     Personal Goals: weight loss and overall health focused.     Anthropometrics:    Pt's weight is 258 lbs  Initial weight: 258 lbs    BMI: 46.44 kg/m2  Ideal body weight: 55.7 kg (122 lb 14.5 oz)  Adjusted ideal body weight: 80.3 kg (176 lb 15.1 oz)  Estimated RMR (F[place]-St Jeor equation):  1,850 kcals x 1.2 (sedentary) = 2,225 kcals (for weight maintenance)    Recommended Protein Intake: 65-80 grams of protein/day    Medical History:  Patient Active Problem List   Diagnosis     Down's syndrome     Hypothyroidism     Pre-diabetes     Chronic diarrhea     CARDIOVASCULAR SCREENING; LDL GOAL LESS THAN 130     Pervasive developmental disorder     Cystic acne vulgaris     Hypoxia     Morbid obesity (H)     Pneumonia     COVID-19     2019 novel coronavirus disease (COVID-19)      Diabetes: No  HbA1c:  No results found for: HGBA1C    Nutrition History:   Food allergies/intolerances/cultural or religous food customs: Yes     Weight loss history: no overall hx provided. Goes to the gym with his dad on fridays to work with a . Enjoys carb based food items. Group home staff noted vargas are difficult with weight gain d/t lack of activity.    Vitamins/Mineral Supplementation: see med list     Dietary Recall:  Breakfast: Sausage hash browns and fruit.  Lunch: burgers and fried and vegetable Or chicken rice and vegetables   Dinner: Protein rice  and vegetables   Typical Snacks: yogurt, bagel, trail mix, beef jerky       Beverages: Water, Crystal light, diet soda     Exercise: no routine at this time. Walks with staff every other day. Dad takes him to the gym every Friday to work with a .     Nutrition Diagnosis (PES statement):   Morbid obesity related to excessive energy intake as evidence by high carb intake at mealtime and BMI of 46.44 kg/m2       Nutrition Intervention  1. Food and/or Nutrient Delivery   a. Placed emphasis on importance of developing a healthy meal routine, aiming for 3 meals a day and no snacks.  2. Nutrition Education   a. Discussed with patient how to build a meal: the importance of including a lean/low fat protein at each meal, include a source of vegetables at a minimum of lunch and dinner and limiting carbohydrate intake   b. Educated on sources of lean protein, portion sizes, the amount of grams found in each source. Recommend patient to aim for 20-30g protein at each meal.  c. Discussed the importance of adequate hydration, with emphasis on drinking 64oz of water or zero calorie beverages per day.    3. Nutrition Counseling   a. Encouraged importance of developing routine exercise for health benefits and weight loss.    Goals established by patient:   1. Aim to eat protein fist   2. Double protein at breakfast time   3. Start  Up in kickball again     Handouts provided:  myplate  Protein source  Snack pairing     Assessment/Plan:    Pt will follow up in 3 month(s) with bariatrician      Phone call duration: 15 minutes      Originating Location (pt. Location): Other Group home        Distant Location (provider location):  Off-site    Mode of Communication:  Telephone via [facility]    Physician has received verbal consent for a Video Visit from the patient? Yes      Sonia Newman RD

## 2023-05-30 NOTE — PATIENT INSTRUCTIONS
Initial Consult / Weight Loss    My Plate   [email or web address]    Protein Sources for Weight Loss  [email or web address]     Carbohydrates  [email or web address]     Portion Control Without Measuring  [email or web address]     150 Calories or Less Snack Ideas   1 hardboiled egg with   cup berries  1 small apple with 1 hardboiled egg  10 almonds with   cup berries  2 clementines with 1 light string cheese  1 light string cheese with   sliced apple  1 light string cheese wrapped in 2 slices of turkey  5 100% whole wheat crackers (e.g. Triscuit) with 1 light string cheese    c. cottage cheese with   cup fruit and 1 Tbsp sunflower seeds     cup cottage cheese with   of an avocado     can tuna fish with 1 cup sliced cucumbers   2 oz turkey slices with 1 cup carrots  1 container (6 oz) of low sugar (less than 10 grams of sugar) greek yogurt   3 Tablespoons of hummus with 1 cup sliced bell peppers, carrots or vegetable of your choice  4 Tablespoons ranch dip made with plain Greek Yogurt and 3 mini cucumbers  1/4 cup nuts (any kind)  1 Tablespoon peanut butter with 1 stalk celery   1 dill pickle wrapped in 1-2 slices of deli ham with 1 tsp of light cream cheese  5 100% whole wheat crackers (e.g. Triscuit) with 1 tsp each of guacamole/avocado topped with a cherry tomato - season with pepper or Everything Bagel Seasoning

## 2023-05-30 NOTE — PATIENT INSTRUCTIONS

## 2023-05-30 NOTE — PROGRESS NOTES
"    New Medical Weight Management Consult    PATIENT:  Hany Patel  MRN:         [id number]  :         1995  CHIN:         2023    Dear Dr. Perry,    I had the pleasure of seeing your patient, Hany Patel. Full intake/assessment was done to determine barriers to weight loss success and develop a treatment plan. Hany Patel is a 28 year old male interested in treatment of medical problems associated with excess weight. He has a height of 5' 2.5\", a weight of 258 lbs 0 oz, and the calculated Body mass index is 46.44 kg/m .    ASSESSMENT/PLAN:  1. Morbid obesity (H)  We discussed healthy habits to assist with weight loss. Hany and his group home staff will work on planning meals ahead of time using the plate method for portion control and macronutrient proportions. He will start going to the gym with a group home staff member on  and  in addition to  with his dad. We discussed medication that may assist with weight loss. Topamax was prescribed. Risks/ benefits and possible side effects were discussed and questions were answered. Written information was given. A1C and CMP were ordered.      2. Pre-diabetes  This may improve with healthy habits and weight loss.    He has the following co-morbidities:        2023    11:08 AM   --   I have the following health issues associated with obesity Pre-Diabetes   I have the following symptoms associated with obesity Fatigue    Groin Rash             2023    11:08 AM   Referring Provider   Please name the provider who referred you to Medical Weight Management  If you do not know, please answer \"I Don't Know\" Dr. Perry           2023    11:08 AM   Weight History   How concerned are you about your weight? Somewhat Concerned   I became overweight As an Adult   The following factors have contributed to my weight gain Mental Health Issues    Eating Wrong Types of Food    Eating Too Much    Lack of Exercise   I have tried the " following methods to lose weight Watching Portions or Calories    Exercise   I have the following family history of obesity/being overweight I am the only one in my immediate family who is overweight           5/30/2023    11:08 AM   Diet Recall Review with Patient   If you do eat breakfast, what types of food do you eat? Hashbrowns, sausages, bagel, yogurt, fruit, pancakes, oatmeal, eggs   If you do eat lunch, what types of food do you typically eat? burgers/fries, spaghetti, chicken and rice   If you do eat supper, what types of food do you typically eat? Meat, rice/potatoes, vegetables   If you do snack, what types of food do you typically eat? Trail mix, chips, jello, crackers, beef jerky, fruit, yogurt, cheese sticks- provided by group 4-5 x per day    How many glasses of juice do you drink in a typical day? 4- 1 pkt of crystal light per pitcher   How many of glasses of milk do you drink in a typical day? 0   How many 8oz glasses of sugar containing drinks such as Tucker-Aid/sweet tea do you drink in a day? none   How many cans/bottles of sugar pop/soda/tea/sports drinks do you drink in a day? 0   How many cans/bottles of diet pop/soda/tea or sports drink do you drink in a day? 1   How often do you have a drink of alcohol? Never           5/30/2023    11:08 AM   Eating Habits   Generally, my meals include foods like these bread, pasta, rice, potatoes, corn, crackers, sweet dessert, pop, or juice Almost Everyday   Generally, my meals include foods like these fried meats, brats, burgers, french fries, pizza, cheese, chips, or ice cream A Few Times a Week   Eat fast food (like [employer], [employer], Cintric Bell) Once a Week   Eat at a buffet or sit-down restaurant Once a Week   Eat most of my meals in front of the TV or computer Never   Often skip meals, eat at random times, have no regular eating times Never   Rarely sit down for a meal but snack or graze throughout Never   Eat extra snacks between meals Almost  Everyday   Eat most of my food at the end of the day Never   Eat in the middle of the night or wake up at night to eat A Few Times a Week   Eat extra snacks to prevent or correct low blood sugar Never   Eat to prevent acid reflux or stomach pain Never   Worry about not having enough food to eat Never   I eat when I am depressed Less Than Weekly   I eat when I am stressed Less Than Weekly   I eat when I am bored Everyday   I eat when I am anxious Less Than Weekly   I eat when I am happy or as a reward Everyday   I feel hungry all the time even if I just have eaten Everyday   Feeling full is important to me Everyday   I finish all the food on my plate even if I am already full Everyday   I can't resist eating delicious food or walk past the good food/smell Almost Everyday   I eat/snack without noticing that I am eating A Few Times a Week   I eat when I am preparing the meal Never   I eat more than usual when I see others eating Everyday   I have trouble not eating sweets, ice cream, cookies, or chips if they are around the house Once a Week   I think about food all day Almost Everyday   What foods, if any, do you crave? Chips/Crackers   Please list any other foods you crave? Peanut butter and jelly bagel      Fast food once a week, lunch with dad once a week        5/30/2023    11:08 AM   Amount of Food   I feel out of control when eating Monthly   I eat a large amount of food, like a loaf of bread, a box of cookies, a pint/quart of ice cream, all at once Monthly   I eat a large amount of food even when I am not hungry Almost Everyday   I eat rapidly Everyday   I eat alone because I feel embarrassed and do not want others to see how much I have eaten Never   I eat until I am uncomfortably full Weekly   I feel bad, disgusted, or guilty after I overeat Never           5/30/2023    11:08 AM   Activity/Exercise History   How much of a typical 12 hour day do you spend sitting? Half the Day   How much of a typical 12 hour  day do you spend lying down? Less Than Half the Day   How much of a typical day do you spend walking/standing? Less Than Half the Day   How many hours (not including work) do you spend on the TV/Video Games/Computer/Tablet/Phone? 6 Hours or More   How many times a week are you active for the purpose of exercise? 4-5 TImes a Week   What keeps you from being more active? Shortness of Breath    Too tired   How many total minutes do you spend doing some activity for the purpose of exercising when you exercise? More Than 30 Minutes     Works with a  for 1 hour once a week. He has been playing basketball, goes for walks, plays football in the yard    PAST MEDICAL HISTORY:  Past Medical History:   Diagnosis Date     Acute respiratory failure (H) 10/10/2019     Diarrhea     Diarrhea Episodes            5/30/2023    11:08 AM   Work/Social History Reviewed With Patient   My employment status is Disabled   What is your marital status? Single   Who do you live with? Group home- [facility]   Who does the food shopping? - Brandy           5/30/2023    11:08 AM   Mental Health History Reviewed With Patient   Have you ever been physically or sexually abused? No   How often in the past 2 weeks have you felt little interest or pleasure in doing things? Not at all   Over the past 2 weeks how often have you felt down, depressed, or hopeless? Not at all           5/30/2023    11:08 AM   Sleep History Reviewed With Patient   How many hours do you sleep at night? 10       MEDICATIONS:   Current Outpatient Medications   Medication Sig Dispense Refill     acetaminophen (TYLENOL) 500 MG tablet Take 1,000 mg by mouth every 6 hours as needed for mild pain       acetylcysteine () 600 MG CAPS capsule Take 600 mg by mouth daily       albuterol (PROAIR HFA/PROVENTIL HFA/VENTOLIN HFA) 108 (90 Base) MCG/ACT inhaler Inhale 1-2 puffs into the lungs every 6 hours as needed for shortness of breath / dyspnea or  wheezing 90 g 3     B Complex Vitamins (VITAMIN B-COMPLEX) TABS Take 1 tablet by mouth daily 30 tablet 11     calcium carbonate 600 mg-vitamin D 400 units (CALTRATE) 600-400 MG-UNIT per tablet Take 1 tablet by mouth daily       calcium citrate-vitamin D (CITRACAL) 315-250 MG-UNIT TABS per tablet Take 1 tablet by mouth daily       ciprofloxacin-dexamethasone (CIPRODEX) 0.3-0.1 % otic suspension Place 4 drops Into the left ear 2 times daily 7.5 mL 0     escitalopram (LEXAPRO) 20 MG tablet Take 1 tablet (20 mg) by mouth daily 30 tablet 0     ketoconazole (NIZORAL) 2 % external cream APPLY SMALL AMOUNT TWICE A DAY TO EXTERNAL EAR CANAL 30 g 11     ketoconazole (NIZORAL) 2 % external shampoo Apply to the affected area and wash off after 5 minutes. (Patient taking differently: Apply to the affected area and wash off after 5 minutes 3 times per week (Mon, Wed, Fri)) 120 mL 1     levothyroxine (SYNTHROID, LEVOTHROID) 112 MCG tablet Take 112 mcg by mouth daily.       loperamide (IMODIUM) 2 MG capsule TAKE 1 CAPSULE BY MOUTH EVERY MORNING FOR LOOSE STOOL *1 TOTAL FILL* 20 capsule 11     melatonin 1 MG TABS tablet TAKE 1 TABLET BY MOUTH ONCE DAILY 30 MINUTES BEFORE USUAL BEDTIME EVERY NIGHT *1 TOTAL FILL* *BUYS OTC* 100 tablet 11     metFORMIN (GLUCOPHAGE) 1000 MG tablet Take 1,000 mg by mouth 2 times daily (with meals).       metroNIDAZOLE (METROGEL) 0.75 % external gel Apply topically 2 times daily 45 g 11     multivitamin w/minerals (MULTI-VITAMIN) tablet TAKE 1 TABLET BY MOUTH ONCE DAILY *NO REFILLS REMAINING, FAXING FOR REFILLS* 200 tablet 11     Probiotic Product (ACIDOPHILUS PROBIOTIC BLEND) CAPS Take 1 capsule by mouth daily 30 capsule 11     simvastatin (ZOCOR) 20 MG tablet Take 1 tablet (20 mg) by mouth At Bedtime       traZODone (DESYREL) 50 MG tablet TAKE 1 TABLET BY MOUTH AT BEDTIME. 90 tablet 3     Vitamin D3 (CHOLECALCIFEROL) 25 mcg (1000 units) tablet Take 2 tablets by mouth daily       Icosapent Ethyl  (VASCEPA) 1 g CAPS Take 1 g by mouth 2 times daily (Patient not taking: Reported on 5/30/2023) 80 capsule 1       ALLERGIES:   Allergies   Allergen Reactions     Seasonal Allergies      ROS  General  Fatigue: yes  HEENT  Hx of glaucoma: no  Vision changes: no  Cardiovascular  Hx of heart disease: no  Chest Pain with Exertion: no  Palpitations: no  Pulmonary  Shortness of breath at rest: no  Shortness of breath with exertion: yes  Stop-bang score: na- tested 3 years ago, plans to set up another study  Phelps Score: na  Gastrointestinal  Heartburn: no  Pancreatitis: no  Psychiatric  Moods Stable: yes- gets quite irritable if he isn't given the food that he wants  Endocrine  Polydipsia: yes  No personal or family history of medullary thyroid cancer: no  Neurologic  Hx of seizures: no  Migraine headaches: no    Birth control: male  Kidney stones: no    PHYSICAL EXAM:  Wt Readings from Last 4 Encounters:   05/30/23 117 kg (258 lb)   01/13/22 117.7 kg (259 lb 6.4 oz)   07/27/21 104.9 kg (231 lb 3.2 oz)   06/08/21 106.5 kg (234 lb 12.8 oz)     BP Readings from Last 3 Encounters:   05/30/23 124/76   01/13/22 130/76   06/08/21 114/74         GEN: Alert and oriented in no acute distress.   HEENT: mucous membranes moist  CV: RRR no MRG  ABDOMEN: moderate protuberance      FOLLOW-UP:   12 weeks.    Total time spent on the date of this encounter doing: chart review, review of test results, patient visit, physical exam, education, counseling, developing plan of care and documenting = 53 minutes.    Sincerely,    FELICIANO Aburto MD

## 2023-07-05 DIAGNOSIS — F84.9 PERVASIVE DEVELOPMENTAL DISORDER: ICD-10-CM

## 2023-07-05 DIAGNOSIS — R19.7 DIARRHEA, UNSPECIFIED TYPE: ICD-10-CM

## 2023-07-05 RX ORDER — LOPERAMIDE HCL 2 MG
CAPSULE ORAL
Qty: 20 CAPSULE | Refills: 11 | OUTPATIENT
Start: 2023-07-05

## 2023-07-05 NOTE — TELEPHONE ENCOUNTER
Group home called states that the pt does have a PCP he sees group home will reach out to PCP to have them fill medication       Flores Car    [facility]

## 2023-07-05 NOTE — TELEPHONE ENCOUNTER
TC reached out to relay message     VM message left to call the clinic  Overdue for needed care. Please call to schedule. Once appt is scheduled, route back to me. and i have seen this pt once over a year ago, for ear infections      Flores Car    [facility]

## 2023-07-05 NOTE — TELEPHONE ENCOUNTER
Overdue for needed care. Please call to schedule. Once appt is scheduled, route back to me. and i have seen this pt once over a year ago, for ear infections

## 2023-08-01 DIAGNOSIS — E66.01 MORBID OBESITY (H): ICD-10-CM

## 2023-08-02 RX ORDER — TOPIRAMATE 50 MG/1
50 TABLET, FILM COATED ORAL DAILY
Qty: 90 TABLET | Refills: 0 | Status: SHIPPED | OUTPATIENT
Start: 2023-08-02

## 2023-08-21 NOTE — TELEPHONE ENCOUNTER
Reason for Call:  Medication or medication refill:    Do you use a [facility]?  Name of the pharmacy and phone number for the current request:  [facility] - [place], [place] - [address]    Name of the medication requested: QUEtiapine (SEROQUEL) 6.25 mg TABS quarter-tab    Other request:  No     Can we leave a detailed message on this number? YES    Phone number patient can be reached at: Other phone number:  [phone]    Best Time:  Anytime     Call taken on 4/7/2020 at 10:30 AM by Tiffany Castillo       Rn attempted to contact patient and got a message that stated, \"number listed for patient has been changed and the new number is unknown\"

## 2024-10-03 ENCOUNTER — LAB (OUTPATIENT)
Dept: LAB | Facility: HOSPITAL | Age: 29
End: 2024-10-03
Payer: MEDICARE

## 2024-10-03 DIAGNOSIS — E03.9 MYXEDEMA HEART DISEASE: ICD-10-CM

## 2024-10-03 DIAGNOSIS — I51.9 MYXEDEMA HEART DISEASE: ICD-10-CM

## 2024-10-03 DIAGNOSIS — R73.03 DIABETES MELLITUS, LATENT: ICD-10-CM

## 2024-10-03 DIAGNOSIS — E78.5 HYPERLIPEMIA: Primary | ICD-10-CM

## 2024-10-03 LAB
ALBUMIN SERPL BCG-MCNC: 3.8 G/DL (ref 3.5–5.2)
ALP SERPL-CCNC: 75 U/L (ref 40–150)
ALT SERPL W P-5'-P-CCNC: 22 U/L (ref 0–70)
ANION GAP SERPL CALCULATED.3IONS-SCNC: 9 MMOL/L (ref 7–15)
AST SERPL W P-5'-P-CCNC: 18 U/L (ref 0–45)
BILIRUB SERPL-MCNC: 0.3 MG/DL
BUN SERPL-MCNC: 7.3 MG/DL (ref 6–20)
CALCIUM SERPL-MCNC: 8.7 MG/DL (ref 8.8–10.4)
CHLORIDE SERPL-SCNC: 105 MMOL/L (ref 98–107)
CHOLEST SERPL-MCNC: 166 MG/DL
CREAT SERPL-MCNC: 0.73 MG/DL (ref 0.67–1.17)
EGFRCR SERPLBLD CKD-EPI 2021: >90 ML/MIN/1.73M2
ERYTHROCYTE [DISTWIDTH] IN BLOOD BY AUTOMATED COUNT: 15 % (ref 10–15)
EST. AVERAGE GLUCOSE BLD GHB EST-MCNC: 97 MG/DL
FASTING STATUS PATIENT QL REPORTED: YES
FASTING STATUS PATIENT QL REPORTED: YES
GLUCOSE SERPL-MCNC: 95 MG/DL (ref 70–99)
HBA1C MFR BLD: 5 %
HCO3 SERPL-SCNC: 25 MMOL/L (ref 22–29)
HCT VFR BLD AUTO: 45 % (ref 40–53)
HDLC SERPL-MCNC: 42 MG/DL
HGB BLD-MCNC: 15.1 G/DL (ref 13.3–17.7)
LDLC SERPL CALC-MCNC: 98 MG/DL
MCH RBC QN AUTO: 29.9 PG (ref 26.5–33)
MCHC RBC AUTO-ENTMCNC: 33.6 G/DL (ref 31.5–36.5)
MCV RBC AUTO: 89 FL (ref 78–100)
NONHDLC SERPL-MCNC: 124 MG/DL
PLATELET # BLD AUTO: 317 10E3/UL (ref 150–450)
POTASSIUM SERPL-SCNC: 4.2 MMOL/L (ref 3.4–5.3)
PROT SERPL-MCNC: 6.8 G/DL (ref 6.4–8.3)
RBC # BLD AUTO: 5.05 10E6/UL (ref 4.4–5.9)
SODIUM SERPL-SCNC: 139 MMOL/L (ref 135–145)
TRIGL SERPL-MCNC: 132 MG/DL
TSH SERPL DL<=0.005 MIU/L-ACNC: 2.01 UIU/ML (ref 0.3–4.2)
WBC # BLD AUTO: 5.7 10E3/UL (ref 4–11)

## 2024-10-03 PROCEDURE — 82435 ASSAY OF BLOOD CHLORIDE: CPT

## 2024-10-03 PROCEDURE — 84443 ASSAY THYROID STIM HORMONE: CPT

## 2024-10-03 PROCEDURE — 84155 ASSAY OF PROTEIN SERUM: CPT

## 2024-10-03 PROCEDURE — 36415 COLL VENOUS BLD VENIPUNCTURE: CPT

## 2024-10-03 PROCEDURE — 85014 HEMATOCRIT: CPT

## 2024-10-03 PROCEDURE — 80061 LIPID PANEL: CPT

## 2024-10-03 PROCEDURE — 83036 HEMOGLOBIN GLYCOSYLATED A1C: CPT

## 2025-03-26 NOTE — PROGRESS NOTES
Refill Decision Note   Nilo Hoover  is requesting a refill authorization.  Brief Assessment and Rationale for Refill:  Quick Discontinue     Medication Therapy Plan: FLOS; The original prescription was discontinued on 3/21/2025 by Yasmin Prather MD for the following reason: Alternate therapy      Comments:     Note composed:12:26 PM 03/26/2025             Opened in error

## 2025-05-21 ENCOUNTER — APPOINTMENT (OUTPATIENT)
Dept: CT IMAGING | Facility: HOSPITAL | Age: 30
DRG: 186 | End: 2025-05-21
Payer: MEDICARE

## 2025-05-21 ENCOUNTER — HOSPITAL ENCOUNTER (INPATIENT)
Facility: HOSPITAL | Age: 30
DRG: 186 | End: 2025-05-21
Attending: EMERGENCY MEDICINE | Admitting: EMERGENCY MEDICINE
Payer: MEDICARE

## 2025-05-21 DIAGNOSIS — J18.9 MULTIFOCAL PNEUMONIA: ICD-10-CM

## 2025-05-21 DIAGNOSIS — J18.9 PNEUMONIA OF RIGHT LUNG DUE TO INFECTIOUS ORGANISM, UNSPECIFIED PART OF LUNG: Primary | ICD-10-CM

## 2025-05-21 LAB
ANION GAP SERPL CALCULATED.3IONS-SCNC: 10 MMOL/L (ref 7–15)
BASOPHILS # BLD AUTO: 0.2 10E3/UL (ref 0–0.2)
BASOPHILS NFR BLD AUTO: 2 %
BUN SERPL-MCNC: 6.3 MG/DL (ref 6–20)
CALCIUM SERPL-MCNC: 8.7 MG/DL (ref 8.8–10.4)
CHLORIDE SERPL-SCNC: 104 MMOL/L (ref 98–107)
CREAT SERPL-MCNC: 0.8 MG/DL (ref 0.67–1.17)
EGFRCR SERPLBLD CKD-EPI 2021: >90 ML/MIN/1.73M2
EOSINOPHIL # BLD AUTO: 0.2 10E3/UL (ref 0–0.7)
EOSINOPHIL NFR BLD AUTO: 2 %
ERYTHROCYTE [DISTWIDTH] IN BLOOD BY AUTOMATED COUNT: 15.1 % (ref 10–15)
FLUAV RNA SPEC QL NAA+PROBE: NEGATIVE
FLUBV RNA RESP QL NAA+PROBE: NEGATIVE
GLUCOSE SERPL-MCNC: 94 MG/DL (ref 70–99)
HCO3 SERPL-SCNC: 25 MMOL/L (ref 22–29)
HCT VFR BLD AUTO: 36.5 % (ref 40–53)
HGB BLD-MCNC: 11.7 G/DL (ref 13.3–17.7)
IMM GRANULOCYTES # BLD: 0.4 10E3/UL
IMM GRANULOCYTES NFR BLD: 4 %
LDH SERPL L TO P-CCNC: 124 U/L (ref 0–250)
LYMPHOCYTES # BLD AUTO: 2.3 10E3/UL (ref 0.8–5.3)
LYMPHOCYTES NFR BLD AUTO: 24 %
MCH RBC QN AUTO: 28.1 PG (ref 26.5–33)
MCHC RBC AUTO-ENTMCNC: 32.1 G/DL (ref 31.5–36.5)
MCV RBC AUTO: 88 FL (ref 78–100)
MONOCYTES # BLD AUTO: 0.6 10E3/UL (ref 0–1.3)
MONOCYTES NFR BLD AUTO: 6 %
NEUTROPHILS # BLD AUTO: 6.1 10E3/UL (ref 1.6–8.3)
NEUTROPHILS NFR BLD AUTO: 63 %
NRBC # BLD AUTO: 0 10E3/UL
NRBC BLD AUTO-RTO: 0 /100
NT-PROBNP SERPL-MCNC: 135 PG/ML (ref 0–93)
PLATELET # BLD AUTO: 528 10E3/UL (ref 150–450)
POTASSIUM SERPL-SCNC: 3.9 MMOL/L (ref 3.4–5.3)
PROCALCITONIN SERPL IA-MCNC: 0.17 NG/ML
PROT SERPL-MCNC: 6.3 G/DL (ref 6.4–8.3)
RBC # BLD AUTO: 4.16 10E6/UL (ref 4.4–5.9)
RSV RNA SPEC NAA+PROBE: NEGATIVE
SARS-COV-2 RNA RESP QL NAA+PROBE: NEGATIVE
SODIUM SERPL-SCNC: 139 MMOL/L (ref 135–145)
TROPONIN T SERPL HS-MCNC: <6 NG/L
WBC # BLD AUTO: 9.8 10E3/UL (ref 4–11)

## 2025-05-21 PROCEDURE — 71250 CT THORAX DX C-: CPT

## 2025-05-21 PROCEDURE — 85014 HEMATOCRIT: CPT

## 2025-05-21 PROCEDURE — 99223 1ST HOSP IP/OBS HIGH 75: CPT | Performed by: INTERNAL MEDICINE

## 2025-05-21 PROCEDURE — 87637 SARSCOV2&INF A&B&RSV AMP PRB: CPT

## 2025-05-21 PROCEDURE — 82565 ASSAY OF CREATININE: CPT

## 2025-05-21 PROCEDURE — 84145 PROCALCITONIN (PCT): CPT | Performed by: INTERNAL MEDICINE

## 2025-05-21 PROCEDURE — 84155 ASSAY OF PROTEIN SERUM: CPT

## 2025-05-21 PROCEDURE — 120N000001 HC R&B MED SURG/OB

## 2025-05-21 PROCEDURE — 258N000003 HC RX IP 258 OP 636: Performed by: INTERNAL MEDICINE

## 2025-05-21 PROCEDURE — 250N000011 HC RX IP 250 OP 636

## 2025-05-21 PROCEDURE — 36415 COLL VENOUS BLD VENIPUNCTURE: CPT

## 2025-05-21 PROCEDURE — 250N000011 HC RX IP 250 OP 636: Performed by: INTERNAL MEDICINE

## 2025-05-21 PROCEDURE — 258N000003 HC RX IP 258 OP 636

## 2025-05-21 PROCEDURE — 96365 THER/PROPH/DIAG IV INF INIT: CPT

## 2025-05-21 PROCEDURE — 93005 ELECTROCARDIOGRAM TRACING: CPT

## 2025-05-21 PROCEDURE — 83880 ASSAY OF NATRIURETIC PEPTIDE: CPT

## 2025-05-21 PROCEDURE — 99285 EMERGENCY DEPT VISIT HI MDM: CPT | Mod: 25

## 2025-05-21 PROCEDURE — 84484 ASSAY OF TROPONIN QUANT: CPT

## 2025-05-21 PROCEDURE — 83615 LACTATE (LD) (LDH) ENZYME: CPT

## 2025-05-21 PROCEDURE — 250N000013 HC RX MED GY IP 250 OP 250 PS 637: Performed by: INTERNAL MEDICINE

## 2025-05-21 RX ORDER — GUAIFENESIN 200 MG/10ML
200 LIQUID ORAL EVERY 4 HOURS PRN
Status: DISCONTINUED | OUTPATIENT
Start: 2025-05-21 | End: 2025-05-24 | Stop reason: HOSPADM

## 2025-05-21 RX ORDER — CALCIUM CARBONATE 500 MG/1
1000 TABLET, CHEWABLE ORAL 4 TIMES DAILY PRN
Status: DISCONTINUED | OUTPATIENT
Start: 2025-05-21 | End: 2025-05-24 | Stop reason: HOSPADM

## 2025-05-21 RX ORDER — FLUTICASONE PROPIONATE 50 MCG
1 SPRAY, SUSPENSION (ML) NASAL DAILY PRN
COMMUNITY

## 2025-05-21 RX ORDER — SODIUM CHLORIDE, SODIUM LACTATE, POTASSIUM CHLORIDE, CALCIUM CHLORIDE 600; 310; 30; 20 MG/100ML; MG/100ML; MG/100ML; MG/100ML
INJECTION, SOLUTION INTRAVENOUS CONTINUOUS
Status: DISCONTINUED | OUTPATIENT
Start: 2025-05-22 | End: 2025-05-22

## 2025-05-21 RX ORDER — DOCUSATE SODIUM 100 MG/1
100 CAPSULE, LIQUID FILLED ORAL 2 TIMES DAILY
Status: DISCONTINUED | OUTPATIENT
Start: 2025-05-21 | End: 2025-05-24 | Stop reason: HOSPADM

## 2025-05-21 RX ORDER — PIPERACILLIN SODIUM, TAZOBACTAM SODIUM 3; .375 G/15ML; G/15ML
3.38 INJECTION, POWDER, LYOPHILIZED, FOR SOLUTION INTRAVENOUS ONCE
Status: COMPLETED | OUTPATIENT
Start: 2025-05-21 | End: 2025-05-21

## 2025-05-21 RX ORDER — PIPERACILLIN SODIUM, TAZOBACTAM SODIUM 4; .5 G/20ML; G/20ML
4.5 INJECTION, POWDER, LYOPHILIZED, FOR SOLUTION INTRAVENOUS EVERY 6 HOURS
Status: DISCONTINUED | OUTPATIENT
Start: 2025-05-21 | End: 2025-05-21

## 2025-05-21 RX ORDER — IPRATROPIUM BROMIDE AND ALBUTEROL SULFATE 2.5; .5 MG/3ML; MG/3ML
3 SOLUTION RESPIRATORY (INHALATION) EVERY 4 HOURS PRN
Status: DISCONTINUED | OUTPATIENT
Start: 2025-05-21 | End: 2025-05-24 | Stop reason: HOSPADM

## 2025-05-21 RX ORDER — PIPERACILLIN SODIUM, TAZOBACTAM SODIUM 3; .375 G/15ML; G/15ML
3.38 INJECTION, POWDER, LYOPHILIZED, FOR SOLUTION INTRAVENOUS EVERY 6 HOURS
Status: DISCONTINUED | OUTPATIENT
Start: 2025-05-22 | End: 2025-05-24 | Stop reason: HOSPADM

## 2025-05-21 RX ADMIN — AZITHROMYCIN MONOHYDRATE 500 MG: 500 INJECTION, POWDER, LYOPHILIZED, FOR SOLUTION INTRAVENOUS at 23:25

## 2025-05-21 RX ADMIN — PIPERACILLIN AND TAZOBACTAM 3.38 G: 3; .375 INJECTION, POWDER, FOR SOLUTION INTRAVENOUS at 20:59

## 2025-05-21 RX ADMIN — SODIUM CHLORIDE 2500 MG: 0.9 INJECTION, SOLUTION INTRAVENOUS at 22:00

## 2025-05-21 RX ADMIN — DOCUSATE SODIUM 100 MG: 100 CAPSULE, LIQUID FILLED ORAL at 23:16

## 2025-05-21 ASSESSMENT — COLUMBIA-SUICIDE SEVERITY RATING SCALE - C-SSRS
1. IN THE PAST MONTH, HAVE YOU WISHED YOU WERE DEAD OR WISHED YOU COULD GO TO SLEEP AND NOT WAKE UP?: NO
2. HAVE YOU ACTUALLY HAD ANY THOUGHTS OF KILLING YOURSELF IN THE PAST MONTH?: NO
6. HAVE YOU EVER DONE ANYTHING, STARTED TO DO ANYTHING, OR PREPARED TO DO ANYTHING TO END YOUR LIFE?: NO

## 2025-05-21 ASSESSMENT — ACTIVITIES OF DAILY LIVING (ADL)
ADLS_ACUITY_SCORE: 58

## 2025-05-21 NOTE — Clinical Note
Hany Patel was seen and treated in our emergency department on 5/21/2025.         Sincerely,     [facility]

## 2025-05-21 NOTE — ED PROVIDER NOTES
Emergency Department Encounter   NAME: Hany Patel ; AGE: 30 year old male ; YOB: 1995 ; MRN: [id number] ; PCP: [facility]   ED PROVIDER: Cesilia Clements PA-C    Evaluation Date & Time:   5/21/2025  5:49 PM    CHIEF COMPLAINT:  Cough      Impression and Plan   MDM: Hany Patel is a 30 year old male who presents to the ED for evaluation of cough.   Reviewed patient's recent urgent care visit: Noted cough and runny nose for the last 1 week.  Has not been eating as much as usual.  He had a small left effusion with bibasilar atelectasis on chest x-ray and so was sent here.  Although on my review here reviewed the x-ray rib that was done in clinic which had a large effusion on the right side concerning for pneumonia.    Patient has himself has no medical complaints.  Denies any cough, fevers, chest pain, problems breathing.  Discussed with group home staff via phone.  She states he has had a cough for about a week.  No fevers.  Had a recent change in appetite so they were concerned about him not eating as much because it is not like him.  His mother is unfortunately very ill and now on hospice and patient has been more sad about this lately.  Apparently he looked short of breath this morning per group home staff.  Had recent pneumonia last fall.  Reviewed these records and at the time patient was treated on outpatient basis with amoxicillin and doxycycline.    On exam patient well-appearing no acute distress.  Has no medical plaints today.  He is oxygenating well with oxygen 92% a little bit on the low side.  He is not tachycardic.  Afebrile.  Lung sounds unremarkable.  Differential including acute pneumonia, pleural effusion, PE, ACS, pneumothorax, flu/covid/rsv.     EKG sinus rhythm without any obvious acute ST or T wave changes.  Troponin is less than 6.  Doubt ACS.  BNP slightly elevated at 135.  No other signs of fluid overload.  Electrolytes unremarkable.  CBC without  leukocytosis.  Mild anemia 11.7.  This is lower than patient's baseline although last was 7 months ago.  Considered PE however patient does PERC out.  Flu COVID-negative.    CT chest ordered which shows multifocal pneumonia and right sided pleural effusion.  Patient has a curb 65 score of 0.  However, he just recently had pneumonia in November.  Additionally he has a past medical history of Down syndrome and is also at risk of something like aspiration pneumonia.  He has multiple areas of pneumonia that I think could progress causing patient to become sick and hypoxic.  Discussed case with hospitalist Dr. Gondal who accepts patient for admission- recommends thoracentesis for tomorrow- I ordered inpatient IR consult as well as thoracentesis.     Patient started on vancomycin and Zosyn.  Updated patient and group home staff of plan and patient is admitted. Patient remains in stable condition throughout ED course and does not require any supplemental oxygen.     Medical Decision Making    Reviewed lab work and imaging  Admit.    MIPS (CTPE, Dental pain, [place], Sinusitis, Asthma/COPD, Head Trauma): Not Applicable    SEPSIS: None  Critical Care: 0    ED COURSE:  6:00 PM I met and introduced myself to the patient. I gathered initial history and performed my physical exam. We discussed plan for initial workup.    I have staffed the patient with Dr. Melgar, ED MD, who has evaluated the patient and agrees with all aspects of today's care.    I rechecked the patient and discussed results, discharge, follow up, and reasons to return to the ED.     At the conclusion of the encounter I discussed the results of all the tests and the disposition. The questions were answered. The patient or family acknowledged understanding and was agreeable with the care plan.    FINAL IMPRESSION:    ICD-10-CM    1. Multifocal pneumonia  J18.9             MEDICATIONS GIVEN IN THE EMERGENCY DEPARTMENT:  Medications   piperacillin-tazobactam  (ZOSYN) 3.375 g vial to attach to  mL bag (has no administration in time range)   vancomycin (VANCOCIN) 2,500 mg in 0.9% NaCl 525 mL intermittent infusion (has no administration in time range)         NEW PRESCRIPTIONS STARTED AT TODAY'S ED VISIT:  New Prescriptions    No medications on file         HPI   Use of Intrepreter: N/A     Hany Patel is a 30 year old male with a pertinent history of Down syndrome, hypothyroidism, prediabetes, hypoxia, morbid obesity, pneumonia who presents to the ED by private vehicle for evaluation of cough.     Medical History     Past Medical History:   Diagnosis Date    Acute respiratory failure (H) 10/10/2019    Diarrhea        Past Surgical History:   Procedure Laterality Date    INSERT CHEST TUBE Left 2/7/2017    Procedure: INSERT CHEST TUBE;  Surgeon: Coco Zambrano MD;  Location: [facility] OR    SURGICAL HISTORY OF -       Ear tubes     SURGICAL HISTORY OF -       Adenoid removal        Family History   Problem Relation Age of Onset    Breast Cancer Mother     Graves' disease Mother     Hypertension Father        Social History     Tobacco Use    Smoking status: Never    Smokeless tobacco: Never   Substance Use Topics    Alcohol use: No    Drug use: No       acetaminophen (TYLENOL) 500 MG tablet  acetylcysteine () 600 MG CAPS capsule  albuterol (PROAIR HFA/PROVENTIL HFA/VENTOLIN HFA) 108 (90 Base) MCG/ACT inhaler  B Complex Vitamins (VITAMIN B-COMPLEX) TABS  calcium carbonate 600 mg-vitamin D 400 units (CALTRATE) 600-400 MG-UNIT per tablet  calcium citrate-vitamin D (CITRACAL) 315-250 MG-UNIT TABS per tablet  ciprofloxacin-dexamethasone (CIPRODEX) 0.3-0.1 % otic suspension  escitalopram (LEXAPRO) 20 MG tablet  ketoconazole (NIZORAL) 2 % external cream  levothyroxine (SYNTHROID, LEVOTHROID) 112 MCG tablet  loperamide (IMODIUM) 2 MG capsule  melatonin 1 MG TABS tablet  metFORMIN (GLUCOPHAGE) 1000 MG tablet  metroNIDAZOLE (METROGEL) 0.75 % external gel  multivitamin  "w/minerals (MULTI-VITAMIN) tablet  Probiotic Product (ACIDOPHILUS PROBIOTIC BLEND) CAPS  simvastatin (ZOCOR) 20 MG tablet  topiramate (TOPAMAX) 50 MG tablet  traZODone (DESYREL) 50 MG tablet  Vitamin D3 (CHOLECALCIFEROL) 25 mcg (1000 units) tablet          Physical Exam     First Vitals:  Patient Vitals for the past 24 hrs:   BP Temp Temp src Pulse Resp SpO2 Height Weight   05/21/25 1744 98/50 97.4  F (36.3  C) Temporal 62 18 92 % 1.575 m (5' 2\") 101.3 kg (223 lb 6.4 oz)         PHYSICAL EXAM:   Physical Exam    Constitutional: alert,  no acute distress,  not ill-appearing  Head: normocephalic, atraumatic  Eyes: EOM intact   Ears: right and left canal normal limit, right and left TM normal  Nose: no congestion or rhinorrhea   Mouth/Throat: moist, clear, no erythema or exudate   Eyes: PERRL, EOM intact  Neck: ROM normal  Cardio: regular rate, regular rhythm, no murmurs   Pulmonary: effort normal, lung sounds normal, no wheezing, crackles, or rales    Abdominal: flat, no distention, nontender  MSK: no obvious swelling or deformity  Skin: no visible rashes or erythema   Neuro: no gross focal deficit, acting per baseline   Psychiatric: mood and behavior within normal limit    Results     LAB:  All pertinent labs reviewed and interpreted  Labs Ordered and Resulted from Time of ED Arrival to Time of ED Departure   BASIC METABOLIC PANEL - Abnormal       Result Value    Sodium 139      Potassium 3.9      Chloride 104      Carbon Dioxide (CO2) 25      Anion Gap 10      Urea Nitrogen 6.3      Creatinine 0.80      GFR Estimate >90      Calcium 8.7 (*)     Glucose 94     NT-PROBNP - Abnormal    NT-proBNP 135 (*)    CBC WITH PLATELETS AND DIFFERENTIAL - Abnormal    WBC Count 9.8      RBC Count 4.16 (*)     Hemoglobin 11.7 (*)     Hematocrit 36.5 (*)     MCV 88      MCH 28.1      MCHC 32.1      RDW 15.1 (*)     Platelet Count 528 (*)     % Neutrophils 63      % Lymphocytes 24      % Monocytes 6      % Eosinophils 2      % " Basophils 2      % Immature Granulocytes 4      NRBCs per 100 WBC 0      Absolute Neutrophils 6.1      Absolute Lymphocytes 2.3      Absolute Monocytes 0.6      Absolute Eosinophils 0.2      Absolute Basophils 0.2      Absolute Immature Granulocytes 0.4      Absolute NRBCs 0.0     TROPONIN T, HIGH SENSITIVITY - Normal    Troponin T, High Sensitivity <6     INFLUENZA A/B, RSV AND SARS-COV2 PCR - Normal    Influenza A PCR Negative      Influenza B PCR Negative      RSV PCR Negative      SARS CoV2 PCR Negative          RADIOLOGY:  Chest CT w/o contrast   Final Result   IMPRESSION:    1.  Small-moderate right pleural effusion with large areas of consolidation in the middle lobe and right lower lobe, as well as multiple nodular opacities throughout the aerated portion of the right lung. Findings are most concerning for multifocal    pneumonia.   2.  Numerous borderline and mildly enlarged mediastinal and hilar lymph nodes, likely reactive.                EKG results reviewed and interpreted by Dr. Melgar, DIGNA QIUNTEROS.     PROCEDURES:  None       Cesilia Clements PA-C   Emergency Medicine   [facility] EMERGENCY DEPARTMENT       Cesilia Clements PA-C  05/21/25 1088

## 2025-05-21 NOTE — ED TRIAGE NOTES
Patient has not been eating recently so went into [facility] in [place], told he had a right lung effusion and to go to ER for further evaluation.

## 2025-05-21 NOTE — Clinical Note
Hany Patel was seen and treated in our emergency department on 5/21/2025.    Hany Patel is being admitted to [facility] for treatment of pneumonia.      Sincerely,     [facility] Emergency Department

## 2025-05-21 NOTE — Clinical Note
Hany Patel was seen and treated in our emergency department on 5/21/2025.    Hany Patel is being admitted to [facility] for treatment of pneumonia. For any questions, contact [facility].      Sincerely,     [facility] Emergency Department

## 2025-05-21 NOTE — ED PROVIDER NOTES
"Emergency Department Midlevel Supervisory Note     I had a face to face encounter with this patient seen by the Advanced Practice Provider (ADRIANA). I personally made/approved the management plan and take responsibility for the patient management. I personally saw patient and performed a substantive portion of the visit including all aspects of the medical decision making.     ED Course:  6:23 PM Cesilia Clements PA-C staffed patient with me. I agree with their assessment and plan of management, and I will see the patient.  8:00 PM I met with the patient to introduce myself, gather additional history, perform my initial exam, and discuss the plan.     Brief HPI:     Hany Patel is a 30 year old male who presents for evaluation of cough.  Patient with a history of Down syndrome.  Referred for cough and dyspnea.    I, Johnnie Dewitt, am serving as a scribe to document services personally performed by Anibal Melgar MD, based on my observations and the provider's statements to me.   I, Anibal Melgar MD attest that Johnnie Dewitt was acting in a scribe capacity, has observed my performance of the services and has documented them in accordance with my direction.    Brief Physical Exam: BP 98/50   Pulse 62   Temp 97.4  F (36.3  C) (Temporal)   Resp 18   Ht 1.575 m (5' 2\")   Wt 101.3 kg (223 lb 6.4 oz)   SpO2 92%   BMI 40.86 kg/m    Constitutional:  Alert, in no acute distress  EYES: Conjunctivae clear  HENT:  Atraumatic  Respiratory:  Respirations even, unlabored, in no acute respiratory distress  Cardiovascular:  Regular rate and rhythm, good peripheral perfusion  GI: Soft, non-distended, non-tender  Musculoskeletal:  Moves all 4 extremities equally, grossly symmetrical strength  Integument: Warm & dry. No appreciable rash, erythema.  Neurologic:  Alert & oriented, speech clear and fluent, no focal deficits noted  Psych: Normal mood and affect       MDM:  Patient is a 30-year-old with a  history of Down syndrome " who presents to the emergency department for cough.  His chest CT shows multifocal pneumonia.  He is clinically stable with appropriate heart rate and blood pressure.  Other lab evaluation so far looks stable.  Patient is maintaining oxygen saturations in the low 90s on room air but has extensive pneumonia on CT.  Will be started on vancomycin and Zosyn for aggressive treatment of multifocal pneumonia causing large right lower lobe consolidations and small pleural effusion.  Agree with remainder of ED course and plan as documented by JUDITH Lomas      1. Multifocal pneumonia          Labs and Imaging:  Results for orders placed or performed during the hospital encounter of 05/21/25   Chest CT w/o contrast    Impression    IMPRESSION:   1.  Small-moderate right pleural effusion with large areas of consolidation in the middle lobe and right lower lobe, as well as multiple nodular opacities throughout the aerated portion of the right lung. Findings are most concerning for multifocal   pneumonia.  2.  Numerous borderline and mildly enlarged mediastinal and hilar lymph nodes, likely reactive.       Basic metabolic panel   Result Value Ref Range    Sodium 139 135 - 145 mmol/L    Potassium 3.9 3.4 - 5.3 mmol/L    Chloride 104 98 - 107 mmol/L    Carbon Dioxide (CO2) 25 22 - 29 mmol/L    Anion Gap 10 7 - 15 mmol/L    Urea Nitrogen 6.3 6.0 - 20.0 mg/dL    Creatinine 0.80 0.67 - 1.17 mg/dL    GFR Estimate >90 >60 mL/min/1.73m2    Calcium 8.7 (L) 8.8 - 10.4 mg/dL    Glucose 94 70 - 99 mg/dL   Result Value Ref Range    Troponin T, High Sensitivity <6 <=22 ng/L   Influenza A/B, RSV and SARS-CoV2 PCR (COVID-19) Nasopharyngeal    Specimen: Nasopharyngeal; Swab   Result Value Ref Range    Influenza A PCR Negative Negative    Influenza B PCR Negative Negative    RSV PCR Negative Negative    SARS CoV2 PCR Negative Negative   NT-proBNP   Result Value Ref Range    NT-proBNP 135 (H) 0 - 93 pg/mL   CBC with platelets and differential    Result Value Ref Range    WBC Count 9.8 4.0 - 11.0 10e3/uL    RBC Count 4.16 (L) 4.40 - 5.90 10e6/uL    Hemoglobin 11.7 (L) 13.3 - 17.7 g/dL    Hematocrit 36.5 (L) 40.0 - 53.0 %    MCV 88 78 - 100 fL    MCH 28.1 26.5 - 33.0 pg    MCHC 32.1 31.5 - 36.5 g/dL    RDW 15.1 (H) 10.0 - 15.0 %    Platelet Count 528 (H) 150 - 450 10e3/uL    % Neutrophils 63 %    % Lymphocytes 24 %    % Monocytes 6 %    % Eosinophils 2 %    % Basophils 2 %    % Immature Granulocytes 4 %    NRBCs per 100 WBC 0 <1 /100    Absolute Neutrophils 6.1 1.6 - 8.3 10e3/uL    Absolute Lymphocytes 2.3 0.8 - 5.3 10e3/uL    Absolute Monocytes 0.6 0.0 - 1.3 10e3/uL    Absolute Eosinophils 0.2 0.0 - 0.7 10e3/uL    Absolute Basophils 0.2 0.0 - 0.2 10e3/uL    Absolute Immature Granulocytes 0.4 <=0.4 10e3/uL    Absolute NRBCs 0.0 10e3/uL   Result Value Ref Range    Lactate Dehydrogenase 124 0 - 250 U/L   Result Value Ref Range    Protein Total 6.3 (L) 6.4 - 8.3 g/dL   Result Value Ref Range    Procalcitonin 0.17 <0.50 ng/mL         Procedures:  I was present for the key portions of procedures documented in ADRIANA/midlevel note, see midlevel note for further details.    Anibal Melgar MD  [facility] EMERGENCY DEPARTMENT  [address] [place] [id number] [phone]       Fred Melgar MD  05/21/25 5651       Fred Melgar MD  05/21/25 4522

## 2025-05-21 NOTE — Clinical Note
Hany Patel was seen and treated in our emergency department on 5/21/2025.    Hany Patel is being admitted to [facility] for treatment of pneumonia.      Sincerely,     [facility] Emergency Department

## 2025-05-22 ENCOUNTER — APPOINTMENT (OUTPATIENT)
Dept: ULTRASOUND IMAGING | Facility: HOSPITAL | Age: 30
DRG: 186 | End: 2025-05-22
Payer: MEDICARE

## 2025-05-22 VITALS
HEIGHT: 62 IN | BODY MASS INDEX: 41.11 KG/M2 | OXYGEN SATURATION: 95 % | SYSTOLIC BLOOD PRESSURE: 99 MMHG | DIASTOLIC BLOOD PRESSURE: 52 MMHG | TEMPERATURE: 97.8 F | RESPIRATION RATE: 22 BRPM | WEIGHT: 223.4 LBS | HEART RATE: 85 BPM

## 2025-05-22 LAB
% LINING CELLS, BODY FLUID: 6 %
ALBUMIN SERPL BCG-MCNC: 2.8 G/DL (ref 3.5–5.2)
ALP SERPL-CCNC: 69 U/L (ref 40–150)
ALT SERPL W P-5'-P-CCNC: 32 U/L (ref 0–70)
ANION GAP SERPL CALCULATED.3IONS-SCNC: 9 MMOL/L (ref 7–15)
APPEARANCE FLD: ABNORMAL
AST SERPL W P-5'-P-CCNC: 14 U/L (ref 0–45)
BASOPHILS # BLD AUTO: 0.2 10E3/UL (ref 0–0.2)
BASOPHILS NFR BLD AUTO: 2 %
BILIRUB SERPL-MCNC: <0.2 MG/DL
BUN SERPL-MCNC: 6 MG/DL (ref 6–20)
CALCIUM SERPL-MCNC: 8.6 MG/DL (ref 8.8–10.4)
CHLORIDE SERPL-SCNC: 106 MMOL/L (ref 98–107)
COLOR FLD: YELLOW
CREAT SERPL-MCNC: 0.85 MG/DL (ref 0.67–1.17)
EGFRCR SERPLBLD CKD-EPI 2021: >90 ML/MIN/1.73M2
EOSINOPHIL # BLD AUTO: 0.2 10E3/UL (ref 0–0.7)
EOSINOPHIL NFR BLD AUTO: 2 %
ERYTHROCYTE [DISTWIDTH] IN BLOOD BY AUTOMATED COUNT: 15.4 % (ref 10–15)
GLUCOSE BODY FLUID SOURCE: NORMAL
GLUCOSE FLD-MCNC: 100 MG/DL
GLUCOSE SERPL-MCNC: 111 MG/DL (ref 70–99)
GRAM STAIN RESULT: NORMAL
GRAM STAIN RESULT: NORMAL
HCO3 SERPL-SCNC: 27 MMOL/L (ref 22–29)
HCT VFR BLD AUTO: 35.5 % (ref 40–53)
HGB BLD-MCNC: 12 G/DL (ref 13.3–17.7)
IMM GRANULOCYTES # BLD: 0.4 10E3/UL
IMM GRANULOCYTES NFR BLD: 4 %
LD BODY BODY FLUID SOURCE: NORMAL
LDH FLD L TO P-CCNC: 106 U/L
LYMPHOCYTES # BLD AUTO: 1.6 10E3/UL (ref 0.8–5.3)
LYMPHOCYTES NFR BLD AUTO: 13 %
LYMPHOCYTES NFR FLD MANUAL: 34 %
MCH RBC QN AUTO: 29.4 PG (ref 26.5–33)
MCHC RBC AUTO-ENTMCNC: 33.8 G/DL (ref 31.5–36.5)
MCV RBC AUTO: 87 FL (ref 78–100)
MONOCYTES # BLD AUTO: 0.7 10E3/UL (ref 0–1.3)
MONOCYTES NFR BLD AUTO: 6 %
MONOS+MACROS NFR FLD MANUAL: 21 %
NEUTROPHILS # BLD AUTO: 8.9 10E3/UL (ref 1.6–8.3)
NEUTROPHILS NFR BLD AUTO: 74 %
NEUTS BAND NFR FLD MANUAL: 39 %
NRBC # BLD AUTO: 0 10E3/UL
NRBC BLD AUTO-RTO: 0 /100
PLATELET # BLD AUTO: 507 10E3/UL (ref 150–450)
POTASSIUM SERPL-SCNC: 3.6 MMOL/L (ref 3.4–5.3)
PROT FLD-MCNC: 3.7 G/DL
PROT SERPL-MCNC: 6.1 G/DL (ref 6.4–8.3)
PROTEIN BODY FLUID SOURCE: NORMAL
RBC # BLD AUTO: 4.08 10E6/UL (ref 4.4–5.9)
SODIUM SERPL-SCNC: 142 MMOL/L (ref 135–145)
SPECIMEN SOURCE FLD: ABNORMAL
WBC # BLD AUTO: 12 10E3/UL (ref 4–11)
WBC # FLD AUTO: 3547 /UL

## 2025-05-22 PROCEDURE — 36415 COLL VENOUS BLD VENIPUNCTURE: CPT | Performed by: INTERNAL MEDICINE

## 2025-05-22 PROCEDURE — 84157 ASSAY OF PROTEIN OTHER: CPT

## 2025-05-22 PROCEDURE — 250N000011 HC RX IP 250 OP 636: Performed by: HOSPITALIST

## 2025-05-22 PROCEDURE — 87205 SMEAR GRAM STAIN: CPT

## 2025-05-22 PROCEDURE — 89050 BODY FLUID CELL COUNT: CPT

## 2025-05-22 PROCEDURE — 87070 CULTURE OTHR SPECIMN AEROBIC: CPT

## 2025-05-22 PROCEDURE — 0W993ZZ DRAINAGE OF RIGHT PLEURAL CAVITY, PERCUTANEOUS APPROACH: ICD-10-PCS | Performed by: RADIOLOGY

## 2025-05-22 PROCEDURE — 99233 SBSQ HOSP IP/OBS HIGH 50: CPT | Performed by: HOSPITALIST

## 2025-05-22 PROCEDURE — 82947 ASSAY GLUCOSE BLOOD QUANT: CPT | Performed by: INTERNAL MEDICINE

## 2025-05-22 PROCEDURE — 85018 HEMOGLOBIN: CPT | Performed by: INTERNAL MEDICINE

## 2025-05-22 PROCEDURE — 250N000011 HC RX IP 250 OP 636: Performed by: INTERNAL MEDICINE

## 2025-05-22 PROCEDURE — 83615 LACTATE (LD) (LDH) ENZYME: CPT

## 2025-05-22 PROCEDURE — 82945 GLUCOSE OTHER FLUID: CPT

## 2025-05-22 PROCEDURE — 120N000001 HC R&B MED SURG/OB

## 2025-05-22 PROCEDURE — 258N000003 HC RX IP 258 OP 636: Performed by: INTERNAL MEDICINE

## 2025-05-22 PROCEDURE — 250N000013 HC RX MED GY IP 250 OP 250 PS 637: Performed by: INTERNAL MEDICINE

## 2025-05-22 PROCEDURE — 32555 ASPIRATE PLEURA W/ IMAGING: CPT

## 2025-05-22 RX ORDER — DEXTROSE MONOHYDRATE 25 G/50ML
25-50 INJECTION, SOLUTION INTRAVENOUS
Status: DISCONTINUED | OUTPATIENT
Start: 2025-05-22 | End: 2025-05-24 | Stop reason: HOSPADM

## 2025-05-22 RX ORDER — METRONIDAZOLE TOPICAL 7.5 MG/G
GEL TOPICAL 2 TIMES DAILY
Status: DISCONTINUED | OUTPATIENT
Start: 2025-05-22 | End: 2025-05-24 | Stop reason: HOSPADM

## 2025-05-22 RX ORDER — NICOTINE POLACRILEX 4 MG
15-30 LOZENGE BUCCAL
Status: DISCONTINUED | OUTPATIENT
Start: 2025-05-22 | End: 2025-05-24 | Stop reason: HOSPADM

## 2025-05-22 RX ORDER — TRAZODONE HYDROCHLORIDE 50 MG/1
50 TABLET ORAL AT BEDTIME
Status: DISCONTINUED | OUTPATIENT
Start: 2025-05-22 | End: 2025-05-24 | Stop reason: HOSPADM

## 2025-05-22 RX ORDER — KETOCONAZOLE 20 MG/G
CREAM TOPICAL DAILY
Status: DISCONTINUED | OUTPATIENT
Start: 2025-05-22 | End: 2025-05-22

## 2025-05-22 RX ORDER — MULTIPLE VITAMINS W/ MINERALS TAB 9MG-400MCG
1 TAB ORAL DAILY
Status: DISCONTINUED | OUTPATIENT
Start: 2025-05-22 | End: 2025-05-24 | Stop reason: HOSPADM

## 2025-05-22 RX ORDER — LACTOBACILLUS RHAMNOSUS GG 10B CELL
1 CAPSULE ORAL DAILY
Status: DISCONTINUED | OUTPATIENT
Start: 2025-05-22 | End: 2025-05-24 | Stop reason: HOSPADM

## 2025-05-22 RX ORDER — LORAZEPAM 2 MG/ML
0.5 INJECTION INTRAMUSCULAR
Status: DISCONTINUED | OUTPATIENT
Start: 2025-05-22 | End: 2025-05-22

## 2025-05-22 RX ORDER — LORAZEPAM 2 MG/ML
1 INJECTION INTRAMUSCULAR
Status: COMPLETED | OUTPATIENT
Start: 2025-05-22 | End: 2025-05-22

## 2025-05-22 RX ORDER — ACETAMINOPHEN 500 MG
1000 TABLET ORAL EVERY 6 HOURS PRN
Status: DISCONTINUED | OUTPATIENT
Start: 2025-05-22 | End: 2025-05-24 | Stop reason: HOSPADM

## 2025-05-22 RX ORDER — CIPROFLOXACIN AND DEXAMETHASONE 3; 1 MG/ML; MG/ML
4 SUSPENSION/ DROPS AURICULAR (OTIC) 2 TIMES DAILY
Status: DISCONTINUED | OUTPATIENT
Start: 2025-05-22 | End: 2025-05-24 | Stop reason: HOSPADM

## 2025-05-22 RX ORDER — NICOTINE POLACRILEX 4 MG
15-30 LOZENGE BUCCAL
Status: DISCONTINUED | OUTPATIENT
Start: 2025-05-22 | End: 2025-05-23

## 2025-05-22 RX ORDER — SIMVASTATIN 10 MG
20 TABLET ORAL AT BEDTIME
Status: DISCONTINUED | OUTPATIENT
Start: 2025-05-22 | End: 2025-05-24 | Stop reason: HOSPADM

## 2025-05-22 RX ORDER — FLUTICASONE PROPIONATE 50 MCG
1 SPRAY, SUSPENSION (ML) NASAL DAILY PRN
Status: DISCONTINUED | OUTPATIENT
Start: 2025-05-22 | End: 2025-05-24 | Stop reason: HOSPADM

## 2025-05-22 RX ORDER — TOPIRAMATE 25 MG/1
50 TABLET, FILM COATED ORAL DAILY
Status: DISCONTINUED | OUTPATIENT
Start: 2025-05-22 | End: 2025-05-24 | Stop reason: HOSPADM

## 2025-05-22 RX ORDER — VITAMIN B COMPLEX
1 TABLET ORAL DAILY
Status: DISCONTINUED | OUTPATIENT
Start: 2025-05-22 | End: 2025-05-22

## 2025-05-22 RX ORDER — ESCITALOPRAM OXALATE 10 MG/1
20 TABLET ORAL DAILY
Status: DISCONTINUED | OUTPATIENT
Start: 2025-05-22 | End: 2025-05-24 | Stop reason: HOSPADM

## 2025-05-22 RX ORDER — DEXTROSE, SODIUM CHLORIDE, SODIUM LACTATE, POTASSIUM CHLORIDE, AND CALCIUM CHLORIDE 5; .6; .31; .03; .02 G/100ML; G/100ML; G/100ML; G/100ML; G/100ML
INJECTION, SOLUTION INTRAVENOUS CONTINUOUS
Status: DISCONTINUED | OUTPATIENT
Start: 2025-05-22 | End: 2025-05-23

## 2025-05-22 RX ADMIN — PIPERACILLIN AND TAZOBACTAM 3.38 G: 3; .375 INJECTION, POWDER, FOR SOLUTION INTRAVENOUS at 03:14

## 2025-05-22 RX ADMIN — TOPIRAMATE 50 MG: 25 TABLET, FILM COATED ORAL at 09:01

## 2025-05-22 RX ADMIN — SODIUM CHLORIDE 1500 MG: 0.9 INJECTION, SOLUTION INTRAVENOUS at 22:05

## 2025-05-22 RX ADMIN — METRONIDAZOLE: 7.5 GEL TOPICAL at 09:01

## 2025-05-22 RX ADMIN — ESCITALOPRAM OXALATE 20 MG: 20 TABLET ORAL at 08:59

## 2025-05-22 RX ADMIN — LEVOTHYROXINE SODIUM 125 MCG: 0.12 TABLET ORAL at 09:00

## 2025-05-22 RX ADMIN — Medication 1 CAPSULE: at 09:00

## 2025-05-22 RX ADMIN — SODIUM CHLORIDE 1500 MG: 0.9 INJECTION, SOLUTION INTRAVENOUS at 11:18

## 2025-05-22 RX ADMIN — PIPERACILLIN AND TAZOBACTAM 3.38 G: 3; .375 INJECTION, POWDER, FOR SOLUTION INTRAVENOUS at 21:25

## 2025-05-22 RX ADMIN — CIPROFLOXACIN AND DEXAMETHASONE 4 DROP: 3; 1 SUSPENSION/ DROPS AURICULAR (OTIC) at 09:01

## 2025-05-22 RX ADMIN — TRAZODONE HYDROCHLORIDE 50 MG: 50 TABLET ORAL at 21:27

## 2025-05-22 RX ADMIN — SODIUM CHLORIDE, SODIUM LACTATE, POTASSIUM CHLORIDE, AND CALCIUM CHLORIDE: .6; .31; .03; .02 INJECTION, SOLUTION INTRAVENOUS at 00:51

## 2025-05-22 RX ADMIN — PIPERACILLIN AND TAZOBACTAM 3.38 G: 3; .375 INJECTION, POWDER, FOR SOLUTION INTRAVENOUS at 08:07

## 2025-05-22 RX ADMIN — PIPERACILLIN AND TAZOBACTAM 3.38 G: 3; .375 INJECTION, POWDER, FOR SOLUTION INTRAVENOUS at 16:24

## 2025-05-22 RX ADMIN — LORAZEPAM 0.5 MG: 2 INJECTION INTRAMUSCULAR; INTRAVENOUS at 12:40

## 2025-05-22 RX ADMIN — DEXTROSE, SODIUM CHLORIDE, SODIUM LACTATE, POTASSIUM CHLORIDE, AND CALCIUM CHLORIDE: 5; .6; .31; .03; .02 INJECTION, SOLUTION INTRAVENOUS at 02:30

## 2025-05-22 RX ADMIN — Medication 1 TABLET: at 08:59

## 2025-05-22 RX ADMIN — AZITHROMYCIN MONOHYDRATE 250 MG: 500 INJECTION, POWDER, LYOPHILIZED, FOR SOLUTION INTRAVENOUS at 22:36

## 2025-05-22 RX ADMIN — DOCUSATE SODIUM 100 MG: 100 CAPSULE, LIQUID FILLED ORAL at 20:23

## 2025-05-22 RX ADMIN — LORAZEPAM 1 MG: 2 INJECTION INTRAMUSCULAR; INTRAVENOUS at 11:29

## 2025-05-22 RX ADMIN — METRONIDAZOLE: 7.5 GEL TOPICAL at 20:24

## 2025-05-22 RX ADMIN — SIMVASTATIN 20 MG: 10 TABLET, FILM COATED ORAL at 21:26

## 2025-05-22 ASSESSMENT — ACTIVITIES OF DAILY LIVING (ADL)
ADLS_ACUITY_SCORE: 64
ADLS_ACUITY_SCORE: 67
ADLS_ACUITY_SCORE: 64
ADLS_ACUITY_SCORE: 58
ADLS_ACUITY_SCORE: 64
ADLS_ACUITY_SCORE: 67
ADLS_ACUITY_SCORE: 64
ADLS_ACUITY_SCORE: 62
ADLS_ACUITY_SCORE: 64
ADLS_ACUITY_SCORE: 64
ADLS_ACUITY_SCORE: 58
ADLS_ACUITY_SCORE: 64
ADLS_ACUITY_SCORE: 58
ADLS_ACUITY_SCORE: 64
ADLS_ACUITY_SCORE: 62

## 2025-05-22 NOTE — PHARMACY-ADMISSION MEDICATION HISTORY
Medication Scribe Admission Medication History    Admission medication history is complete. The information provided in this note is only as accurate as the sources available at the time of the update.    Information Source(s): Facility ([facility]/) medication list/MAR via in-person    Pertinent Information: Medication is managed by PAULINO Jackson. I called and received the MAR, and confirmed that the medication was taken, as verbally reported by ellis ,[phone]  who are  a CNA    Changes made to PTA medication list:  Added:   Psyllium fib per mar   Fluticasone per mar   Deleted:    Lcosapent ethyl per mar   Ketoconazol shampoo per mar     Changed: None    Allergies reviewed with patient and updates made in EHR: yes    Medication History Completed By: Aklilu Gebreyesus 5/21/2025 8:51 PM    PTA Med List   Medication Sig Last Dose/Taking    acetaminophen (TYLENOL) 500 MG tablet Take 1,000 mg by mouth every 6 hours as needed for mild pain Taking As Needed    acetylcysteine () 600 MG CAPS capsule Take 600 mg by mouth daily 5/21/2025 Morning    albuterol (PROAIR HFA/PROVENTIL HFA/VENTOLIN HFA) 108 (90 Base) MCG/ACT inhaler Inhale 1-2 puffs into the lungs every 6 hours as needed for shortness of breath / dyspnea or wheezing Taking As Needed    B Complex Vitamins (VITAMIN B-COMPLEX) TABS Take 1 tablet by mouth daily 5/21/2025 Morning    calcium carbonate 600 mg-vitamin D 400 units (CALTRATE) 600-400 MG-UNIT per tablet Take 1 tablet by mouth daily as needed. 5/21/2025 Morning    calcium citrate-vitamin D (CITRACAL) 315-250 MG-UNIT TABS per tablet Take 1 tablet by mouth daily 5/21/2025 Morning    ciprofloxacin-dexamethasone (CIPRODEX) 0.3-0.1 % otic suspension Place 4 drops Into the left ear 2 times daily Taking    escitalopram (LEXAPRO) 20 MG tablet Take 1 tablet (20 mg) by mouth daily 5/21/2025 Morning    fluticasone (FLONASE) 50 MCG/ACT nasal spray Spray 1 spray into both nostrils daily as needed  for rhinitis or allergies. 5/15/2025 Morning    ketoconazole (NIZORAL) 2 % external cream APPLY SMALL AMOUNT TWICE A DAY TO EXTERNAL EAR CANAL 5/21/2025 Morning    levothyroxine (SYNTHROID/LEVOTHROID) 125 MCG tablet Take 125 mcg by mouth daily. 5/21/2025 Morning    loperamide (IMODIUM) 2 MG capsule TAKE 1 CAPSULE BY MOUTH EVERY MORNING FOR LOOSE STOOL *1 TOTAL FILL* 5/21/2025 Morning    melatonin 1 MG TABS tablet TAKE 1 TABLET BY MOUTH ONCE DAILY 30 MINUTES BEFORE USUAL BEDTIME EVERY NIGHT *1 TOTAL FILL* *BUYS OTC* Taking    metFORMIN (GLUCOPHAGE) 1000 MG tablet Take 1,000 mg by mouth 2 times daily (with meals). 5/21/2025 Morning    metroNIDAZOLE (METROGEL) 0.75 % external gel Apply topically 2 times daily 5/21/2025 Morning    multivitamin w/minerals (MULTI-VITAMIN) tablet TAKE 1 TABLET BY MOUTH ONCE DAILY *NO REFILLS REMAINING, FAXING FOR REFILLS* 5/21/2025 Morning    Probiotic Product (ACIDOPHILUS PROBIOTIC BLEND) CAPS Take 1 capsule by mouth daily 5/21/2025 Morning    psyllium (METAMUCIL/KONSYL) capsule Take 1 capsule by mouth daily. 5/21/2025 Morning    simvastatin (ZOCOR) 20 MG tablet Take 1 tablet (20 mg) by mouth At Bedtime 5/20/2025 Bedtime    topiramate (TOPAMAX) 50 MG tablet Take 1 tablet (50 mg) by mouth daily AFTER FINISHING 25MG TABLETS:  TAKE 1 TABLET BY MOUTH EVERY EVENING 5/21/2025 Morning    traZODone (DESYREL) 50 MG tablet TAKE 1 TABLET BY MOUTH AT BEDTIME. 5/20/2025 Evening    Vitamin D3 (CHOLECALCIFEROL) 25 mcg (1000 units) tablet Take 2 tablets by mouth daily 5/21/2025 Morning

## 2025-05-22 NOTE — ED NOTES
Helped patient call and speak with father Nicholas at [phone].  RN updated Nicholas, who would like to speak with the patient again tomorrow morning.

## 2025-05-22 NOTE — PROGRESS NOTES
[facility]    Medicine Progress Note - Hospitalist Service    Date of Admission:  5/21/2025    Assessment & Plan                Hany Patel is a 30 year old male with Down syndrome, hypothyroidism, prediabetes presented for evaluation of difficulty breathing found to have multifocal pneumonia and moderate-sized pleural effusion. Hospital Day: 2    Patient sees [facility], discussed with resident service and they will assume care of this patient      #multifocal pneumonia, without sepsis  - Presented with difficulty breathing and cough, without hypoxia  - CT chest shows large areas of consolidation in the RML and RLL, multiple nodular opacities throughout the right lung.  Small to moderate right pleural effusion.  Lymphadenopathy noted.  Concerning for multifocal pneumonia with parapneumonic effusion  - Patient undergo diagnostic thoracentesis today  - Patient is currently on azithromycin, Zosyn, vancomycin  - Legionella antigen ordered  - Sputum culture ordered, uncertain if patient will be able to give a sample  - MRSA nares swab ordered  -Trend WBC  -family asked about pneumonia vaccine- he has had PPSV23 in 2019, would benefit from a dose of PCV21 if available to cover additional strains, and consider booster PPSV23 q5 years given high risk patient    #Fearfulness of medical procedures  -giving IV ativan to help him tolerate thoracentesis    #Adjustment disorder with depressed mood  -Apparently his mom is on hospice and has been more depressed, coping with her diagnosis  - Continue home Lexapro    # Prediabetes  -holding home metformin    # Hypothyroidism, home Synthroid  # Rosacea, hold home MetroGel  # Hyperlipidemia, home simvastatin  # Morbid obesity, hold home Topamax            Diet: NPO for Procedure/Surgery per Anesthesia Guidelines Except for: Meds, Ice Chips; Clear liquids before procedure/surgery: ADULT (Age GREATER than or Equal to 18 years) - Clear liquids 2 hours  "before procedure/surgery  DVT Prophylaxis: Low risk.   Pneumatic Compression Devices  [place] Catheter: Not present  Lines: None     Cardiac Monitoring: None  Code Status: Full Code      Clinically Significant Risk Factors Present on Admission               # Hypoalbuminemia: Lowest albumin = 2.8 g/dL at 5/22/2025 10:07 AM, will monitor as appropriate               # Morbid Obesity: Estimated body mass index is 40.86 kg/m  as calculated from the following:    Height as of this encounter: 1.575 m (5' 2\").    Weight as of this encounter: 101.3 kg (223 lb 6.4 oz).              Disposition Plan     Medically Ready for Discharge: Anticipated in 2-4 Days         Discharge barrier(s): culture results, rising WBC  Care discussed with: patient, RN, patient's father, resident service      Kaylyn Sullivan MD  Hospitalist Service  [facility]  Securely message with [employer] (more info)  Text page via [employer] Paging/Directory   ______________________________________________________________________      Physical Exam   Vital Signs: Temp: 97.5  F (36.4  C) Temp src: Oral BP: 104/58 Pulse: 73   Resp: 18 SpO2: 93 % O2 Device: None (Room air)    Weight: 223 lbs 6.4 oz    General: in no apparent distress, non-toxic, and alert obese male sitting upright and slumped forward, oriented to person and place, no respiratory distress  HEENT: Head normocephalic atraumatic, oral mucosa moist. Sclerae anicteric, injected in appearance  Auscultation deferred, patient is on his way to the bathroom  Skin: No rashes or lesions  Extremities: No peripheral edema  Psych: Normal affect, mood euthymic  Neuro: Grossly normal      Medical Decision Making               Data   Recent Results (from the past 16 hours)   Comprehensive metabolic panel    Collection Time: 05/22/25 10:07 AM   Result Value Ref Range    Sodium 142 135 - 145 mmol/L    Potassium 3.6 3.4 - 5.3 mmol/L    Carbon Dioxide (CO2) 27 22 - 29 mmol/L    Anion Gap 9 7 - 15 mmol/L "    Urea Nitrogen 6.0 6.0 - 20.0 mg/dL    Creatinine 0.85 0.67 - 1.17 mg/dL    GFR Estimate >90 >60 mL/min/1.73m2    Calcium 8.6 (L) 8.8 - 10.4 mg/dL    Chloride 106 98 - 107 mmol/L    Glucose 111 (H) 70 - 99 mg/dL    Alkaline Phosphatase 69 40 - 150 U/L    AST 14 0 - 45 U/L    ALT 32 0 - 70 U/L    Protein Total 6.1 (L) 6.4 - 8.3 g/dL    Albumin 2.8 (L) 3.5 - 5.2 g/dL    Bilirubin Total <0.2 <=1.2 mg/dL   CBC with platelets and differential    Collection Time: 05/22/25 10:07 AM   Result Value Ref Range    WBC Count 12.0 (H) 4.0 - 11.0 10e3/uL    RBC Count 4.08 (L) 4.40 - 5.90 10e6/uL    Hemoglobin 12.0 (L) 13.3 - 17.7 g/dL    Hematocrit 35.5 (L) 40.0 - 53.0 %    MCV 87 78 - 100 fL    MCH 29.4 26.5 - 33.0 pg    MCHC 33.8 31.5 - 36.5 g/dL    RDW 15.4 (H) 10.0 - 15.0 %    Platelet Count 507 (H) 150 - 450 10e3/uL    % Neutrophils 74 %    % Lymphocytes 13 %    % Monocytes 6 %    % Eosinophils 2 %    % Basophils 2 %    % Immature Granulocytes 4 %    NRBCs per 100 WBC 0 <1 /100    Absolute Neutrophils 8.9 (H) 1.6 - 8.3 10e3/uL    Absolute Lymphocytes 1.6 0.8 - 5.3 10e3/uL    Absolute Monocytes 0.7 0.0 - 1.3 10e3/uL    Absolute Eosinophils 0.2 0.0 - 0.7 10e3/uL    Absolute Basophils 0.2 0.0 - 0.2 10e3/uL    Absolute Immature Granulocytes 0.4 <=0.4 10e3/uL    Absolute NRBCs 0.0 10e3/uL       Interval History   Patient is currently on room air.  He denies any complaints except that he needed to use the restroom.  He has agreed to undergo thoracentesis.    I called and updated patient's dad, Fred.  He confirmed that patient is generally pretty healthy but does tend to get pneumonias, has required intubation in the past resulting in him being a little bit fearful of procedures in the hospital.  Typically is hospitalized at the [facility], however they were concerned about prolonged wait times in the ED and so decided to come to [facility] this time.  Apparently patient has been sick with upper respiratory  symptoms for about a week which the group home attributed to allergies and so had been treating with antihistamines, patient's father is little bit upset about this and feels he needs to make it clear to the group home staff that patient is prone to severe pneumonias and respiratory symptoms need to be addressed quickly.  Apparently they had not notified patient's provider of his symptoms.  Patient's father is in agreement with current care plan.  He is very open to further calls and updates from care team.  Discussed that patient likely will need a few more days in the hospital

## 2025-05-22 NOTE — PHARMACY-VANCOMYCIN DOSING SERVICE
Pharmacy Vancomycin Initial Note  Date of Service May 21, 2025  Patient's  1995  30 year old, male    Indication: Community Acquired Pneumonia    Current estimated CrCl = Estimated Creatinine Clearance: 137.4 mL/min (based on SCr of 0.8 mg/dL).    Creatinine for last 3 days  2025:  6:52 PM Creatinine 0.80 mg/dL    Recent Vancomycin Level(s) for last 3 days  No results found for requested labs within last 3 days.      Vancomycin IV Administrations (past 72 hours)        No vancomycin orders with administrations in past 72 hours.                    Nephrotoxins and other renal medications (From now, onward)      Start     Dose/Rate Route Frequency Ordered Stop    25  piperacillin-tazobactam (ZOSYN) 3.375 g vial to attach to  mL bag         3.375 g  over 30 Minutes Intravenous ONCE 25  vancomycin (VANCOCIN) 2,500 mg in 0.9% NaCl 525 mL intermittent infusion         2,500 mg  over 120 Minutes Intravenous ONCE 25              Contrast Orders - past 72 hours (72h ago, onward)      None            Plan:  Start vancomycin  2500 mg IV once.   This is a one time dose. Please reconsult pharmacy if you would like vancomycin continued inpatient.     Xavier Levni [facility]

## 2025-05-22 NOTE — PLAN OF CARE
Problem: Adult Inpatient Plan of Care  Goal: Absence of Hospital-Acquired Illness or Injury  Intervention: Identify and Manage Fall Risk  Recent Flowsheet Documentation  Taken 5/21/2025 2355 by Anila Mcguire RN  Safety Promotion/Fall Prevention: activity supervised  Intervention: Prevent Skin Injury  Recent Flowsheet Documentation  Taken 5/21/2025 2355 by Anila Mcguire, RN  Body Position: sitting up in bed  Intervention: Prevent Infection  Recent Flowsheet Documentation  Taken 5/21/2025 2355 by Anila Mcguire, RN  Infection Prevention:   hand hygiene promoted   rest/sleep promoted   Goal Outcome Evaluation:

## 2025-05-22 NOTE — H&P
With a medical history significant for [facility]    History and Physical - Hospitalist Service       Date of Admission:  5/21/2025    Assessment & Plan      Hany Patel is a 30 year old male with a medical history significant for Down syndrome, hypothyroidism, chronic diarrhea, prediabetes and other medical comorbidities who is admitted with acute hypoxemic respiratory failure likely due to aspiration pneumonia versus healthcare associated pneumonia.    Patient Active Problem List   Diagnosis    Down's syndrome    Hypothyroidism    Pre-diabetes    Chronic diarrhea    CARDIOVASCULAR SCREENING; LDL GOAL LESS THAN 130    Pervasive developmental disorder    Cystic acne vulgaris    Hypoxia    Morbid obesity (H)    Pneumonia    COVID-19    2019 novel coronavirus disease (COVID-19)    Multifocal pneumonia       #Community-acquired pneumonia/aspiration pneumonia  - started on zosyn and vancomycin  -added azithromycin  -optimize lung cares  -check swallowing  -recent intubation per dad  -ultrasound ordered for drainage of pleural effusion      #Hypothyroidism.   -Continue home levothyroxine    #Chronic diarrhea  - no complaints so far  -probiotics    #Prediabetes  - noted to be eating a lot  Dad says they try to manage his intake    #Down syndrome  -answers  simple questions sometimes is his baseline    #obesity  -ate 4 muffins  -his dinner  -and was still asking for food  Will consult nutrition for health choices  -may benefit from GLP-1    #Prediabetes  -hold metformin for now  -Sliding scale insulin PRN  -BS goal 100-140    Rest of patient's medical problems management remains unchanged  With stable vital signs retraction she is not which I am responsible for since I had show enlarged especially at the medical spinal    Diet:  NPO  DVT Prophylaxis: Pneumatic Compression Devices  [place] Catheter: Not present  Lines: None     Cardiac Monitoring: None  Code Status:  Full code    Clinically  "Significant Risk Factors Present on Admission                             # Morbid Obesity: Estimated body mass index is 40.86 kg/m  as calculated from the following:    Height as of this encounter: 1.575 m (5' 2\").    Weight as of this encounter: 101.3 kg (223 lb 6.4 oz).              Disposition Plan     Medically Ready for Discharge: Anticipated in 2-4 Days           Rosanne Lewis MD  Hospitalist Service  [facility]  Securely message with [employer] (more info)  Text page via [employer] Paging/Directory     ______________________________________________________________________    Chief Complaint   SOB  Decreased appetitie  PNA        History of Present Illness   Hany Patel is a 30 year old male with a medical history significant for Down syndrome, hypothyroidism, morbid obesity, chronic diarrhea, prediabetes and other medical comorbidities who is admitted with acute hypoxemic respiratory failure likely due to aspiration pneumonia versus healthcare associated pneumonia.    Patient was seen in the ER on admission.  He was not a poor historian and history was taken from the chart.  Per report, he was referred to the ER for evaluation of a cough via private vehicle.    Preliminary evaluation included chest x-ray which showed pleural effusion.  A CT chest without contrast was done with results below:      IMPRESSION:   1.  Small-moderate right pleural effusion with large areas of consolidation in the middle lobe and right lower lobe, as well as multiple nodular opacities throughout the aerated portion of the right lung. Findings are most concerning for multifocal   pneumonia.  2.  Numerous borderline and mildly enlarged mediastinal and hilar lymph nodes, likely reactive.     Labs done showed a BMP which was unremarkable.  CBC showed a white count of 9.8, hemoglobin 11.7 which was lower than baseline of 15 in October 2024, platelet count was 528, COVID test was negative, influenza and RSV was " negative.  Urine Legionella and strep pneumo was ordered as well.        He was started  on vancomycin  and zosyn for presumed pneumonia and azithromycin was added for  atypical coverage.      Patient is being admitted for further inpatient care as of multifocal community-acquired pneumonia.  Review of systems was otherwise limited.  Ultrasound-guided thoracentesis has been ordered for drainage of pleural effusion.  Patient's father Nicholas was updated this morning.  Patient is a full code    Past Medical History    Past Medical History:   Diagnosis Date    Acute respiratory failure (H) 10/10/2019    Diarrhea     Diarrhea Episodes        Past Surgical History   Past Surgical History:   Procedure Laterality Date    INSERT CHEST TUBE Left 2/7/2017    Procedure: INSERT CHEST TUBE;  Surgeon: Coco Zambrano MD;  Location: [facility] OR    SURGICAL HISTORY OF -       Ear tubes     SURGICAL HISTORY OF -       Adenoid removal        Prior to Admission Medications   Prior to Admission Medications   Prescriptions Last Dose Informant Patient Reported? Taking?   B Complex Vitamins (VITAMIN B-COMPLEX) TABS 5/21/2025 Morning Nursing Home No Yes   Sig: Take 1 tablet by mouth daily   Probiotic Product (ACIDOPHILUS PROBIOTIC BLEND) CAPS 5/21/2025 Morning Nursing Home No Yes   Sig: Take 1 capsule by mouth daily   Vitamin D3 (CHOLECALCIFEROL) 25 mcg (1000 units) tablet 5/21/2025 Morning Nursing Home Yes Yes   Sig: Take 2 tablets by mouth daily   acetaminophen (TYLENOL) 500 MG tablet  Nursing Home Yes Yes   Sig: Take 1,000 mg by mouth every 6 hours as needed for mild pain   acetylcysteine () 600 MG CAPS capsule 5/21/2025 Morning Nursing Home Yes Yes   Sig: Take 600 mg by mouth daily   albuterol (PROAIR HFA/PROVENTIL HFA/VENTOLIN HFA) 108 (90 Base) MCG/ACT inhaler  Nursing Home No Yes   Sig: Inhale 1-2 puffs into the lungs every 6 hours as needed for shortness of breath / dyspnea or wheezing   calcium carbonate 600 mg-vitamin D  400 units (CALTRATE) 600-400 MG-UNIT per tablet 5/21/2025 Morning Nursing Home Yes Yes   Sig: Take 1 tablet by mouth daily as needed.   calcium citrate-vitamin D (CITRACAL) 315-250 MG-UNIT TABS per tablet 5/21/2025 Morning  No Yes   Sig: Take 1 tablet by mouth daily   ciprofloxacin-dexamethasone (CIPRODEX) 0.3-0.1 % otic suspension   No Yes   Sig: Place 4 drops Into the left ear 2 times daily   escitalopram (LEXAPRO) 20 MG tablet 5/21/2025 Morning  No Yes   Sig: Take 1 tablet (20 mg) by mouth daily   fluticasone (FLONASE) 50 MCG/ACT nasal spray 5/15/2025 Morning  Yes Yes   Sig: Spray 1 spray into both nostrils daily as needed for rhinitis or allergies.   ketoconazole (NIZORAL) 2 % external cream 5/21/2025 Morning  No Yes   Sig: APPLY SMALL AMOUNT TWICE A DAY TO EXTERNAL EAR CANAL   levothyroxine (SYNTHROID/LEVOTHROID) 125 MCG tablet 5/21/2025 Morning Nursing Home Yes Yes   Sig: Take 125 mcg by mouth daily.   loperamide (IMODIUM) 2 MG capsule 5/21/2025 Morning  No Yes   Sig: TAKE 1 CAPSULE BY MOUTH EVERY MORNING FOR LOOSE STOOL *1 TOTAL FILL*   melatonin 1 MG TABS tablet   No Yes   Sig: TAKE 1 TABLET BY MOUTH ONCE DAILY 30 MINUTES BEFORE USUAL BEDTIME EVERY NIGHT *1 TOTAL FILL* *BUYS OTC*   metFORMIN (GLUCOPHAGE) 1000 MG tablet 5/21/2025 Morning Nursing Home Yes Yes   Sig: Take 1,000 mg by mouth 2 times daily (with meals).   metroNIDAZOLE (METROGEL) 0.75 % external gel 5/21/2025 Morning Nursing Home No Yes   Sig: Apply topically 2 times daily   multivitamin w/minerals (MULTI-VITAMIN) tablet 5/21/2025 Morning Nursing Home No Yes   Sig: TAKE 1 TABLET BY MOUTH ONCE DAILY *NO REFILLS REMAINING, FAXING FOR REFILLS*   psyllium (METAMUCIL/KONSYL) capsule 5/21/2025 Morning  Yes Yes   Sig: Take 1 capsule by mouth daily.   simvastatin (ZOCOR) 20 MG tablet 5/20/2025 Bedtime Nursing Home Yes Yes   Sig: Take 1 tablet (20 mg) by mouth At Bedtime   topiramate (TOPAMAX) 50 MG tablet 5/21/2025 Morning  No Yes   Sig: Take 1 tablet (50  mg) by mouth daily AFTER FINISHING 25MG TABLETS:  TAKE 1 TABLET BY MOUTH EVERY EVENING   traZODone (DESYREL) 50 MG tablet 5/20/2025 Evening  No Yes   Sig: TAKE 1 TABLET BY MOUTH AT BEDTIME.      Facility-Administered Medications: None        Review of Systems    The 10 point Review of Systems is negative other than noted in the HPI or here.  Using weight-based    Social History   I have reviewed this patient's social history and updated it with pertinent information if needed.  Social History     Tobacco Use    Smoking status: Never    Smokeless tobacco: Never   Substance Use Topics    Alcohol use: No    Drug use: No         Family History   I have reviewed this patient's family history and updated it with pertinent information if needed.  Family History   Problem Relation Age of Onset    Breast Cancer Mother     Graves' disease Mother     Hypertension Father          Allergies   Allergies   Allergen Reactions    Seasonal Allergies         Physical Exam   Vital Signs: Temp: 97.4  F (36.3  C) Temp src: Temporal BP: 98/50 Pulse: 62   Resp: 18 SpO2: 92 % O2 Device: None (Room air)    Weight: 223 lbs 6.4 oz        General Baseline history of Down syndrome   Flat affect, NAD, on RA obese  HEENT  dry MM, EOMI, PERRL  Completing sentences  Chest decreased air entry of the lung bases,  no wheezing  Heart RRR, No M/R/G  Abd- full, distended NT, BS+  - Deferred,   Extremity- Moving all extremities, No digital clubbing,   No edema  Neuro-baseline history of Down syndrome   moving all extremities gait not checked  Skin  Has no tattoo, No skin rash     Medical Decision Making       85 MINUTES SPENT BY ME on the date of service doing chart review, history, exam, documentation & further activities per the note.      ------------------ MEDICAL DECISION MAKING ------------------------------------------------------------------------------------------------------  MANAGEMENT DISCUSSED with the following over the past 24 hours:  patient and care team       Data   ------------------------- PAST 24 HR DATA REVIEWED -----------------------------------------------    I have personally reviewed the following data over the past 24 hrs:    9.8  \   11.7 (L)   / 528 (H)     139 104 6.3 /  94   3.9 25 0.80 \     ALT: N/A AST: N/A AP: N/A TBILI: N/A   ALB: N/A TOT PROTEIN: 6.3 (L) LIPASE: N/A     Trop: <6 BNP: 135 (H)     Ferritin:  N/A % Retic:  N/A LDH:  124       Imaging results reviewed over the past 24 hrs:   Recent Results (from the past 24 hours)   XR Chest 2 Views    Narrative    For Patients: As a result of the 21st Century Cures Act, medical imaging exams and procedure reports are released immediately into your electronic medical record. You may view this report before your referring provider. If you have questions, please contact your health care provider.    EXAM: XR CHEST 2 VIEWS PA AND LATERAL  LOCATION: [facility]  DATE: 5/21/2025    INDICATION: Cough, Unspecified Type  COMPARISON: 11/21/2024    Impression    Heart is normal in size. Elevation of the right hemidiaphragm, unchanged. Small left effusion with bibasilar atelectasis. Left lung clear.   Chest CT w/o contrast    Narrative    EXAM: CT CHEST W/O CONTRAST  LOCATION: [facility]  DATE: 5/21/2025    INDICATION: cough abnormal xray today  COMPARISON: 10/10/2019  TECHNIQUE: CT chest without IV contrast. Multiplanar reformats were obtained. Dose reduction techniques were used.  CONTRAST: None.    FINDINGS:   LUNGS AND PLEURA: Small-moderate right pleural effusion with confluent areas of consolidation in the middle lobe and right lower lobe, as well as multiple nodular opacities throughout the aerated portion of the right lung. Central airways are patent.    MEDIASTINUM/AXILLAE: Numerous borderline and mildly enlarged mediastinal and hilar lymph nodes. Trace pericardial fluid.    CORONARY ARTERY CALCIFICATION: None.    UPPER ABDOMEN:  Normal.    MUSCULOSKELETAL: Normal.      Impression    IMPRESSION:   1.  Small-moderate right pleural effusion with large areas of consolidation in the middle lobe and right lower lobe, as well as multiple nodular opacities throughout the aerated portion of the right lung. Findings are most concerning for multifocal   pneumonia.  2.  Numerous borderline and mildly enlarged mediastinal and hilar lymph nodes, likely reactive.

## 2025-05-22 NOTE — PLAN OF CARE
"  Problem: Adult Inpatient Plan of Care  Goal: Plan of Care Review  Description: The Plan of Care Review/Shift note should be completed every shift.  The Outcome Evaluation is a brief statement about your assessment that the patient is improving, declining, or no change.  This information will be displayed automatically on your shift  note.  Outcome: Progressing  Goal: Patient-Specific Goal (Individualized)  Description: You can add care plan individualizations to a care plan. Examples of Individualization might be:  \"Parent requests to be called daily at 9am for status\", \"I have a hard time hearing out of my right ear\", or \"Do not touch me to wake me up as it startles  me\".  Outcome: Progressing  Goal: Absence of Hospital-Acquired Illness or Injury  Outcome: Progressing  Intervention: Identify and Manage Fall Risk  Recent Flowsheet Documentation  Taken 5/22/2025 1333 by Chester Simpson RN  Safety Promotion/Fall Prevention: activity supervised  Intervention: Prevent Skin Injury  Recent Flowsheet Documentation  Taken 5/22/2025 1333 by Chester Simpson RN  Body Position: sitting up in bed  Intervention: Prevent Infection  Recent Flowsheet Documentation  Taken 5/22/2025 1333 by Chester Simpson RN  Infection Prevention:   hand hygiene promoted   rest/sleep promoted  Goal: Optimal Comfort and Wellbeing  Outcome: Progressing  Goal: Readiness for Transition of Care  Outcome: Progressing   Goal Outcome Evaluation:         Pt is alert and oriented x3. Pt's v/s is stable. . Pt denies pain. pt is up SBA. Pt has legal guardian. Pt had thoracentesis this morning. Continue to monitor pt.                   "

## 2025-05-22 NOTE — PLAN OF CARE
Problem: Adult Inpatient Plan of Care  Goal: Absence of Hospital-Acquired Illness or Injury  Intervention: Identify and Manage Fall Risk  Recent Flowsheet Documentation  Taken 5/21/2025 2355 by Anila Mcguire RN  Safety Promotion/Fall Prevention: activity supervised  Intervention: Prevent Skin Injury  Recent Flowsheet Documentation  Taken 5/21/2025 2355 by Anila Mcguire RN  Body Position: sitting up in bed  Intervention: Prevent Infection  Recent Flowsheet Documentation  Taken 5/21/2025 2355 by Anila Mcguire RN  Infection Prevention:   hand hygiene promoted   rest/sleep promoted   Goal Outcome Evaluation:        Patient is alert denied pain  overnight, on RA.   D5% in LR infusing at 50 ml/hr.  IV F Abx Zosyn infused.  Patient refused BS check.  Patient had a large incontinent loose stool overnight. UP to the toilet with SBA.    Patient constantly refusing care. Hand off report given to morning nurse.     Anila FITZGERALD RN

## 2025-05-22 NOTE — PROGRESS NOTES
11:39 AM  Brief Progress Note     Received warm handoff from hospitalist, Dr. Sullivan.  This is a [facility] patient and is being transferred over to our residency service care.    In brief, this is a 30-year-old male with a history significant for Down syndrome and hypothyroidism who presented with acute respiratory distress, found to have right sided pneumonia with pleural effusion.  He is currently on azithromycin, vanc/zosyn.  Pending cx, legionella, MRSA, sputum cx. Awaiting thoracentesis. Not thought to be related to aspiration.      Known history of recurrent pneumonia, requiring intubations in the past.  Dr. Sullivan reported that they have already rounded on the patient today, so we will be taking over his cares tomorrow. VSS. Currently on RA.     Antonio Villeda MD PGY2  [facility]

## 2025-05-22 NOTE — PHARMACY-VANCOMYCIN DOSING SERVICE
Pharmacy Vancomycin Initial Note  Date of Service May 21, 2025  Patient's  1995  30 year old, male    Indication: Community Acquired Pneumonia    Current estimated CrCl = Estimated Creatinine Clearance: 137.4 mL/min (based on SCr of 0.8 mg/dL).    Creatinine for last 3 days  2025:  6:52 PM Creatinine 0.80 mg/dL    Recent Vancomycin Level(s) for last 3 days  No results found for requested labs within last 3 days.      Vancomycin IV Administrations (past 72 hours)                     vancomycin (VANCOCIN) 2,500 mg in 0.9% NaCl 525 mL intermittent infusion (mg) 2,500 mg New Bag 25 2200                    Nephrotoxins and other renal medications (From now, onward)      Start     Dose/Rate Route Frequency Ordered Stop    25 1000  vancomycin (VANCOCIN) 1,500 mg in 0.9% NaCl 265 mL intermittent infusion         1,500 mg  over 90 Minutes Intravenous EVERY 12 HOURS 25 2212      25 0300  piperacillin-tazobactam (ZOSYN) 3.375 g vial to attach to  mL bag         3.375 g  over 30 Minutes Intravenous EVERY 6 HOURS 25 2205 25 0259    25 2030  vancomycin (VANCOCIN) 2,500 mg in 0.9% NaCl 525 mL intermittent infusion         2,500 mg  over 120 Minutes Intravenous ONCE 25              Contrast Orders - past 72 hours (72h ago, onward)      None            [employer] Prediction of Planned Initial Vancomycin Regimen  Loading dose: N/A  Regimen: 1500 mg IV every 12 hours.  Start time: 10:00 on 2025  Exposure target: AUC24 (range) 400-600 mg/L.hr   AUC24,ss: 587 mg/L.hr  Probability of AUC24 > 400: 76 %  Ctrough,ss: 15.5 mg/L  Probability of Ctrough,ss > 20: 38 %  Probability of nephrotoxicity (Lodise SAMREEN ): 11 %          Plan:  Start vancomycin  1500 mg IV q12h.   Vancomycin monitoring method: AUC  Vancomycin therapeutic monitoring goal: 400-600 mg*h/L  Pharmacy will check vancomycin levels as appropriate in 1-3 Days.    Serum creatinine levels will be ordered  daily for the first week of therapy and at least twice weekly for subsequent weeks.      Troy Barajas, [facility]

## 2025-05-23 LAB
% LINING CELLS, BODY FLUID: 6 %
ANION GAP SERPL CALCULATED.3IONS-SCNC: 9 MMOL/L (ref 7–15)
BASOPHILS # BLD AUTO: 0.1 10E3/UL (ref 0–0.2)
BASOPHILS NFR BLD AUTO: 2 %
BUN SERPL-MCNC: 5.7 MG/DL (ref 6–20)
CALCIUM SERPL-MCNC: 8.4 MG/DL (ref 8.8–10.4)
CHLORIDE SERPL-SCNC: 111 MMOL/L (ref 98–107)
CREAT SERPL-MCNC: 0.9 MG/DL (ref 0.67–1.17)
EGFRCR SERPLBLD CKD-EPI 2021: >90 ML/MIN/1.73M2
EOSINOPHIL # BLD AUTO: 0.3 10E3/UL (ref 0–0.7)
EOSINOPHIL NFR BLD AUTO: 3 %
ERYTHROCYTE [DISTWIDTH] IN BLOOD BY AUTOMATED COUNT: 16 % (ref 10–15)
GLUCOSE SERPL-MCNC: 128 MG/DL (ref 70–99)
HCO3 SERPL-SCNC: 25 MMOL/L (ref 22–29)
HCT VFR BLD AUTO: 36.8 % (ref 40–53)
HGB BLD-MCNC: 12.2 G/DL (ref 13.3–17.7)
IMM GRANULOCYTES # BLD: 0.3 10E3/UL
IMM GRANULOCYTES NFR BLD: 3 %
LYMPHOCYTES # BLD AUTO: 1.9 10E3/UL (ref 0.8–5.3)
LYMPHOCYTES NFR BLD AUTO: 21 %
LYMPHOCYTES NFR FLD MANUAL: 34 %
MCH RBC QN AUTO: 28.9 PG (ref 26.5–33)
MCHC RBC AUTO-ENTMCNC: 33.2 G/DL (ref 31.5–36.5)
MCV RBC AUTO: 87 FL (ref 78–100)
MONOCYTES # BLD AUTO: 0.5 10E3/UL (ref 0–1.3)
MONOCYTES NFR BLD AUTO: 6 %
MONOS+MACROS NFR FLD MANUAL: 21 %
NEUTROPHILS # BLD AUTO: 5.8 10E3/UL (ref 1.6–8.3)
NEUTROPHILS NFR BLD AUTO: 65 %
NEUTS BAND NFR FLD MANUAL: 39 %
NRBC # BLD AUTO: 0 10E3/UL
NRBC BLD AUTO-RTO: 0 /100
PATH REV: NORMAL
PLATELET # BLD AUTO: 484 10E3/UL (ref 150–450)
POTASSIUM SERPL-SCNC: 5 MMOL/L (ref 3.4–5.3)
RBC # BLD AUTO: 4.22 10E6/UL (ref 4.4–5.9)
SODIUM SERPL-SCNC: 145 MMOL/L (ref 135–145)
WBC # BLD AUTO: 8.9 10E3/UL (ref 4–11)

## 2025-05-23 PROCEDURE — 250N000011 HC RX IP 250 OP 636: Performed by: INTERNAL MEDICINE

## 2025-05-23 PROCEDURE — 82435 ASSAY OF BLOOD CHLORIDE: CPT

## 2025-05-23 PROCEDURE — 85004 AUTOMATED DIFF WBC COUNT: CPT

## 2025-05-23 PROCEDURE — 250N000013 HC RX MED GY IP 250 OP 250 PS 637: Performed by: INTERNAL MEDICINE

## 2025-05-23 PROCEDURE — 120N000001 HC R&B MED SURG/OB

## 2025-05-23 PROCEDURE — 258N000003 HC RX IP 258 OP 636: Performed by: INTERNAL MEDICINE

## 2025-05-23 RX ADMIN — PIPERACILLIN AND TAZOBACTAM 3.38 G: 3; .375 INJECTION, POWDER, FOR SOLUTION INTRAVENOUS at 15:53

## 2025-05-23 RX ADMIN — SODIUM CHLORIDE 1500 MG: 0.9 INJECTION, SOLUTION INTRAVENOUS at 09:12

## 2025-05-23 RX ADMIN — LEVOTHYROXINE SODIUM 125 MCG: 0.12 TABLET ORAL at 08:11

## 2025-05-23 RX ADMIN — Medication 1 CAPSULE: at 08:11

## 2025-05-23 RX ADMIN — DOCUSATE SODIUM 100 MG: 100 CAPSULE, LIQUID FILLED ORAL at 08:11

## 2025-05-23 RX ADMIN — DOCUSATE SODIUM 100 MG: 100 CAPSULE, LIQUID FILLED ORAL at 20:52

## 2025-05-23 RX ADMIN — ESCITALOPRAM OXALATE 20 MG: 20 TABLET ORAL at 08:11

## 2025-05-23 RX ADMIN — PIPERACILLIN AND TAZOBACTAM 3.38 G: 3; .375 INJECTION, POWDER, FOR SOLUTION INTRAVENOUS at 08:16

## 2025-05-23 RX ADMIN — PIPERACILLIN AND TAZOBACTAM 3.38 G: 3; .375 INJECTION, POWDER, FOR SOLUTION INTRAVENOUS at 03:05

## 2025-05-23 RX ADMIN — SODIUM CHLORIDE 1500 MG: 0.9 INJECTION, SOLUTION INTRAVENOUS at 23:18

## 2025-05-23 RX ADMIN — TOPIRAMATE 50 MG: 25 TABLET, FILM COATED ORAL at 08:11

## 2025-05-23 RX ADMIN — SIMVASTATIN 20 MG: 10 TABLET, FILM COATED ORAL at 22:12

## 2025-05-23 RX ADMIN — Medication 1 TABLET: at 08:11

## 2025-05-23 RX ADMIN — PIPERACILLIN AND TAZOBACTAM 3.38 G: 3; .375 INJECTION, POWDER, FOR SOLUTION INTRAVENOUS at 20:52

## 2025-05-23 RX ADMIN — AZITHROMYCIN MONOHYDRATE 250 MG: 500 INJECTION, POWDER, LYOPHILIZED, FOR SOLUTION INTRAVENOUS at 22:08

## 2025-05-23 RX ADMIN — TRAZODONE HYDROCHLORIDE 50 MG: 50 TABLET ORAL at 22:12

## 2025-05-23 RX ADMIN — METRONIDAZOLE: 7.5 GEL TOPICAL at 21:03

## 2025-05-23 ASSESSMENT — ACTIVITIES OF DAILY LIVING (ADL)
ADLS_ACUITY_SCORE: 64
DEPENDENT_IADLS:: CLEANING;COOKING;LAUNDRY;SHOPPING;MEAL PREPARATION;TRANSPORTATION;MONEY MANAGEMENT;MEDICATION MANAGEMENT
ADLS_ACUITY_SCORE: 64

## 2025-05-23 NOTE — PLAN OF CARE
"  Problem: Adult Inpatient Plan of Care  Goal: Plan of Care Review  Description: The Plan of Care Review/Shift note should be completed every shift.  The Outcome Evaluation is a brief statement about your assessment that the patient is improving, declining, or no change.  This information will be displayed automatically on your shift  note.  Outcome: Progressing     Problem: Adult Inpatient Plan of Care  Goal: Patient-Specific Goal (Individualized)  Description: You can add care plan individualizations to a care plan. Examples of Individualization might be:  \"Parent requests to be called daily at 9am for status\", \"I have a hard time hearing out of my right ear\", or \"Do not touch me to wake me up as it startles  me\".  Outcome: Progressing     Problem: Adult Inpatient Plan of Care  Goal: Absence of Hospital-Acquired Illness or Injury  Intervention: Identify and Manage Fall Risk  Recent Flowsheet Documentation  Taken 5/23/2025 1100 by Alessandro Flores, RN  Safety Promotion/Fall Prevention:   activity supervised   assistive device/personal items within reach     Problem: Adult Inpatient Plan of Care  Goal: Absence of Hospital-Acquired Illness or Injury  Intervention: Prevent Skin Injury  Recent Flowsheet Documentation  Taken 5/23/2025 1100 by Alessandro Flores, RN  Body Position: position changed independently   Goal Outcome Evaluation:       Patent alert to self, continues IV ABX's, refusing lab draws and BG checks. Dad will come at 1200 ro encourage patient to agree to lab draws, updated group home.                     "

## 2025-05-23 NOTE — PROGRESS NOTES
[facility]    Progress Note - Hospitalist Service       Date of Admission:  5/21/2025    Assessment & Plan   Hany Patel is a 30 year old male admitted on 5/21/2025. He has a history of Down syndrome, pneumonia, hypothyroidism, prediabetes and is admitted for multifocal pneumonia without hypoxia.     Changes 5/23  - Continue IV abx another day awaiting cultures   - Discussed with father     Multifocal pneumonia, without sepsis  Patient presented with what sounds like 1 to 2 weeks of a viral-like illness in the outpatient setting.  Initially treated as an allergy but then group home staff brought patient to clinic within asked them to come to the ER.   CT chest shows large areas of consolidation in the RML and RLL, multiple nodular opacities throughout the right lung. Patient was started on vancomycin, Zosyn, azithromycin. S/p thoracentesis which showed exudative effusion based on Jorge's Criteria. Hospital day 2 marked by patient wishing to go home. Discussed with father and will stay another day for IV abx and to await culture results. No signs of increased WOB.  - Legionella antigen ordered  - Sputum culture ordered, uncertain if patient will be able to give a sample  - MRSA nares swab ordered  -Trend WBC  -family asked about pneumonia vaccine- he has had PPSV23 in 2019, would benefit from a dose of PCV21 if available to cover additional strains, and consider booster PPSV23 q5 years given high risk patient     Fearfulness of medical procedures  -IV ativan to help him tolerate thoracentesis     Adjustment disorder with depressed mood  - His mom is on hospice and has been more depressed, coping with her diagnosis  - Continue home Lexapro     Prediabetes  -holding home metformin     Hypothyroidism, home Synthroid  Rosacea, hold home MetroGel  Hyperlipidemia, home simvastatin  Morbid obesity, hold home Topamax               Diet: Regular Diet Adult    DVT Prophylaxis: Pneumatic  "Compression Devices  [place] Catheter: Not present  Fluids: PO  Lines: None     Cardiac Monitoring: None  Code Status: Full Code      Clinically Significant Risk Factors               # Hypoalbuminemia: Lowest albumin = 2.8 g/dL at 5/22/2025 10:07 AM, will monitor as appropriate                # Morbid Obesity: Estimated body mass index is 40.86 kg/m  as calculated from the following:    Height as of this encounter: 1.575 m (5' 2\").    Weight as of this encounter: 101.3 kg (223 lb 6.4 oz)., PRESENT ON ADMISSION            Social Drivers of Health          Disposition Plan     Medically Ready for Discharge: Anticipated Tomorrow         The patient's care was discussed with the Attending Physician, Dr. Carlson.    Raleigh Maurice MD  Hospitalist Service  [facility]  Securely message with [employer] (more info)  Text page via [employer] Paging/Directory   ______________________________________________________________________    Interval History   No acute overnight events.  Patient was transferred from hospital service 2 hours.  Patient is currently on vancomycin, Zosyn, azithromycin for multifocal pneumonia.  He states that he is feeling well and would like to go home.  He was sleeping on my arrival with a wet cough.    Physical Exam   Vital Signs: Temp: 97.9  F (36.6  C) Temp src: Oral BP: 114/67 Pulse: 90   Resp: 22 SpO2: (!) 91 % O2 Device: None (Room air)    Weight: 223 lbs 6.4 oz  GENERAL: Sleeping face down on arrival with loud snoring. Awakes briefly to talk and say he wants to go home.   EYES: Eyes grossly normal to inspection.  No discharge or erythema, or obvious scleral/conjunctival abnormalities.  RESP:  Lungs clear throughout on left side. Right lung with rhonchi. No wheeze or crackles. No increased work of breathing.  CV: Heart RRR. No murmur  MSK: No gross deformity. Normal tone.  SKIN: Visible skin clear. No significant rash, abnormal pigmentation or lesions.  NEURO: Cranial nerves grossly " intact.  Mentation and speech appropriate for age.  PSYCH: Mentation appears normal, affect normal/bright, judgement and insight intact, normal speech and appearance well-groomed.      Medical Decision Making       Please see A&P for additional details of medical decision making.      Data   ------------------------- PAST 24 HR DATA REVIEWED -----------------------------------------------        Imaging results reviewed over the past 24 hrs:   Recent Results (from the past 24 hours)   US Thoracentesis    Narrative    EXAM:   1. RIGHT THORACENTESIS  2. ULTRASOUND GUIDANCE  LOCATION: [facility]  DATE: 5/22/2025    INDICATION: Pleural effusion.    PROCEDURE: Informed consent obtained. Time out performed. The chest was prepped and draped in sterile fashion. 10 mL of 1 % lidocaine was infused into the local soft tissues. Under direct ultrasound guidance, a 5 Macedonian catheter system was placed into   the pleural effusion.     0.55 liters of yellow fluid were removed and sent to lab, if requested.    Patient tolerated procedure well.    Ultrasound imaging was obtained and placed in the patient's permanent medical record.      Impression    IMPRESSION:  Status post right ultrasound-guided thoracentesis.    Reference CPT Code: 75119

## 2025-05-23 NOTE — PLAN OF CARE
"  Problem: Adult Inpatient Plan of Care  Goal: Plan of Care Review  Description: The Plan of Care Review/Shift note should be completed every shift.  The Outcome Evaluation is a brief statement about your assessment that the patient is improving, declining, or no change.  This information will be displayed automatically on your shift  note.  5/23/2025 1606 by Alessandro Flores, RN  Outcome: Progressing     Problem: Adult Inpatient Plan of Care  Goal: Patient-Specific Goal (Individualized)  Description: You can add care plan individualizations to a care plan. Examples of Individualization might be:  \"Parent requests to be called daily at 9am for status\", \"I have a hard time hearing out of my right ear\", or \"Do not touch me to wake me up as it startles  me\".  5/23/2025 1606 by Alessandro Flores, RN  Outcome: Progressing     Problem: Adult Inpatient Plan of Care  Goal: Absence of Hospital-Acquired Illness or Injury  5/23/2025 1606 by Alessandro Flores, RN  Outcome: Progressing   Goal Outcome Evaluation:       Patient alert to self, agreed to lab draw, continue IV ABX's, father at bedside                     "

## 2025-05-23 NOTE — CONSULTS
Care Management Initial Consult    General Information  Assessment completed with: Parents, Caregiver,    Type of CM/SW Visit: Initial Assessment    Primary Care Provider verified and updated as needed:     Readmission within the last 30 days: no previous admission in last 30 days      Reason for Consult: discharge planning  Advance Care Planning: Advance Care Planning Reviewed: no concerns identified          Communication Assessment  Patient's communication style: spoken language (English or Bilingual)             Cognitive  Cognitive/Neuro/Behavioral: .WDL except, orientation  Level of Consciousness: alert  Arousal Level: opens eyes spontaneously  Orientation: disoriented to, place, situation, time     Best Language: 0 - No aphasia       Living Environment:   People in home: facility resident     Current living Arrangements: group home      Able to return to prior arrangements: yes       Family/Social Support:  Care provided by:  (Group home staff)  Provides care for: no one, unable/limited ability to care for self  Marital Status: Single  Support system:            Description of Support System:           Current Resources:   Patient receiving home care services: No        Community Resources: Jovie, [employer] Worker  Equipment currently used at home: none  Supplies currently used at home: None    Employment/Financial:  Employment Status: disabled        Financial Concerns: none           Does the patient's insurance plan have a 3 day qualifying hospital stay waiver?  No    Lifestyle & Psychosocial Needs:  Social Drivers of Health     Food Insecurity: No Food Insecurity (5/21/2025)    Received from [facility]    Food Insecurity     Do you worry your food will run out before you are able to buy more?: 1   Depression: Not at risk (1/13/2022)    PHQ-2     PHQ-2 Score: 0   Housing Stability: Low Risk  (5/21/2025)    [facility]     Housing Stability     What is your housing situation today?: 1   Tobacco Use: Low Risk  (5/21/2025)    Received from [facility]    Patient History     Smoking Tobacco Use: Never     Smokeless Tobacco Use: Never     Passive Exposure: Not on file   Financial Resource Strain: Low Risk  (5/21/2025)    Received from [facility]    Financial Resource Strain     Difficulty of Paying Living Expenses: 3     Difficulty of Paying Living Expenses: Not on file   Alcohol Use: Not on file   Transportation Needs: No Transportation Needs (5/21/2025)    Received from [email or web address]    Transportation Needs     Does lack of transportation keep you from medical appointments?: 1     Does lack of transportation keep you from work, meetings or getting things that you need?: 1   Physical Activity: Not on file   Interpersonal Safety: Not on file   Stress: Not on file   Social Connections: Socially Integrated (5/21/2025)    Received from [facility]    Social Connections     Do you often feel lonely or isolated from those around you?: 0   Health Literacy: Not on file       Functional Status:  Prior to admission patient needed assistance:   Dependent ADLs:: Toileting, Bathing  Dependent IADLs:: Cleaning, Cooking, Laundry, Shopping, Meal Preparation, Transportation, Money Management, Medication Management       Mental Health Status:  Mental Health Status: No Current Concerns       Chemical Dependency Status:  Chemical Dependency Status: No Current Concerns             Values/Beliefs:  Spiritual, Cultural Beliefs, Zoroastrianism Practices, Values that affect care: no               Discussed  Partnership in Safe Discharge Planning  document with patient/family: No    Additional Information:  CM met with pt and father/guardian Fred in room to discuss discharge planning and complete initial assessment. Demographics  confirmed and updated on facesheet.     Pt lives in a group home. Pt does not use an assistive device. Pt gets assistance with bathing, toileting, and all IADLs. He has a  2 hrs/week and a positive behavior support program.     CM called pt's , Nitish ([phone]) who confirms the above information. He states they can take pt back over the weekend if he is ready and group home can provide transportation.     Next Steps: coordinate return to group home    Contacts   - Nitish [phone]  Group home main number - [phone]  Guardian/Father - [phone]    Kate Ariza, LGSW

## 2025-05-23 NOTE — CONSULTS
"NUTRITION EDUCATION      REASON FOR ASSESSMENT:  Received Provider Order  - Healthy Choices    Per chart, Hany Patel is a 30 year old male admitted on 5/21/2025. He has a history of Down syndrome, pneumonia, hypothyroidism, prediabetes and is admitted for multifocal pneumonia without hypoxia   S/p thoracentesis    NUTRITION HISTORY:  Per chart, pt's father helps pt with food choices. Pt resides in group home.    Anthropometrics:  Height: 157.5 cm (5' 2\")   Last Wt: 101.3 kg (223 lb 6.4 oz)   BMI: 40.86 kg/m  Obese III    Estimated Nutrition Needs:    65.5 kg adjusted BW for calculations  Calories per day: 1900 - 2000 ( kg x 30 kcal/kg), weight loss with some increased needs  Protein daily: 66 kg  (kg x 1 gm/kg), maintenance  Fluid daily: 2,000+ mL/day (kg x 30+ mL/kg), maintenance, or per Provider    CURRENT DIET:  Regular, pt ate 100% of dinner last night.  He had a tray of food in front of him when RD visited, said he was hungry but, not eating. He was watching TV.  Pt ordered blueberry muffin, chicken strips, tator tots, banana, pepsi    NUTRITION DIAGNOSIS:  Food- and nutrition-related knowledge deficit R/t Healthy eating with pre DM.    INTERVENTIONS:  Left message with pt's father about consult, information left with pt, and how to contact RD, if needed.    Nutrition Prescription:  Healthy balanced diet with limited added sugars and portion controlled    Implementation:      *  Nutrition Education (Content):   A)  Provided handout General Healthy Eating Nutrition Therapy with a menu example, healthy snack ideas, My Plate visual   B)  Informed pt the information is related to eating a healthy diet.  Pt only answered questions with one word and did not engage in conversation.       *  Nutrition Education (Application):   A)  Let pt know I would let his Dad know about the information provided.      Goals:      *  Patient/Father will verbalize understanding of diet .         Follow Up/Monitoring:      *  " Provided RD contact information for future questions      *  Recommended Out-Patient Nutrition Referral, if further diet instructions are needed

## 2025-05-23 NOTE — PLAN OF CARE
"  Problem: Adult Inpatient Plan of Care  Goal: Plan of Care Review  Description: The Plan of Care Review/Shift note should be completed every shift.  The Outcome Evaluation is a brief statement about your assessment that the patient is improving, declining, or no change.  This information will be displayed automatically on your shift  note.  Outcome: Progressing  Goal: Patient-Specific Goal (Individualized)  Description: You can add care plan individualizations to a care plan. Examples of Individualization might be:  \"Parent requests to be called daily at 9am for status\", \"I have a hard time hearing out of my right ear\", or \"Do not touch me to wake me up as it startles  me\".  Outcome: Progressing  Goal: Absence of Hospital-Acquired Illness or Injury  Outcome: Progressing  Intervention: Identify and Manage Fall Risk  Recent Flowsheet Documentation  Taken 5/22/2025 1841 by Cruz Paulson RN  Safety Promotion/Fall Prevention: activity supervised  Intervention: Prevent Skin Injury  Recent Flowsheet Documentation  Taken 5/22/2025 1841 by Cruz Paulson RN  Body Position: sitting up in bed  Intervention: Prevent Infection  Recent Flowsheet Documentation  Taken 5/22/2025 1841 by Cruz Paulson RN  Infection Prevention:   hand hygiene promoted   rest/sleep promoted  Goal: Optimal Comfort and Wellbeing  Outcome: Progressing  Goal: Readiness for Transition of Care  Outcome: Progressing     Problem: Gas Exchange Impaired  Goal: Optimal Gas Exchange  Outcome: Progressing   Goal Outcome Evaluation:         Pt is alert and oriented, refused ear drop at bedtime, sat in the bed most of the shift, IV Abx is infusing. Pt refused BS check.                   "

## 2025-05-23 NOTE — PLAN OF CARE
"  Problem: Adult Inpatient Plan of Care  Goal: Plan of Care Review  Description: The Plan of Care Review/Shift note should be completed every shift.  The Outcome Evaluation is a brief statement about your assessment that the patient is improving, declining, or no change.  This information will be displayed automatically on your shift  note.  Outcome: Progressing  Goal: Patient-Specific Goal (Individualized)  Description: You can add care plan individualizations to a care plan. Examples of Individualization might be:  \"Parent requests to be called daily at 9am for status\", \"I have a hard time hearing out of my right ear\", or \"Do not touch me to wake me up as it startles  me\".  Outcome: Progressing  Goal: Absence of Hospital-Acquired Illness or Injury  Outcome: Progressing  Intervention: Identify and Manage Fall Risk  Recent Flowsheet Documentation  Taken 5/23/2025 0030 by Joan Charles RN  Safety Promotion/Fall Prevention: activity supervised  Intervention: Prevent Skin Injury  Recent Flowsheet Documentation  Taken 5/23/2025 0030 by Joan Charles RN  Body Position: sitting up in bed  Intervention: Prevent Infection  Recent Flowsheet Documentation  Taken 5/23/2025 0030 by Joan Charles RN  Infection Prevention:   hand hygiene promoted   rest/sleep promoted  Goal: Optimal Comfort and Wellbeing  Outcome: Progressing  Goal: Readiness for Transition of Care  Outcome: Progressing     Problem: Gas Exchange Impaired  Goal: Optimal Gas Exchange  Outcome: Progressing   Goal Outcome Evaluation:  Pt sleeping between cares. IV axb's ran as ordered. Pt is AxOx3, able to make his needs known.                           "

## 2025-05-24 VITALS
DIASTOLIC BLOOD PRESSURE: 55 MMHG | BODY MASS INDEX: 41.11 KG/M2 | WEIGHT: 223.4 LBS | RESPIRATION RATE: 23 BRPM | HEIGHT: 62 IN | HEART RATE: 68 BPM | OXYGEN SATURATION: 94 % | TEMPERATURE: 98.3 F | SYSTOLIC BLOOD PRESSURE: 108 MMHG

## 2025-05-24 PROCEDURE — 258N000003 HC RX IP 258 OP 636: Performed by: INTERNAL MEDICINE

## 2025-05-24 PROCEDURE — 250N000013 HC RX MED GY IP 250 OP 250 PS 637: Performed by: INTERNAL MEDICINE

## 2025-05-24 PROCEDURE — 250N000011 HC RX IP 250 OP 636: Performed by: INTERNAL MEDICINE

## 2025-05-24 RX ORDER — DOXYCYCLINE 100 MG/1
100 CAPSULE ORAL 2 TIMES DAILY
Qty: 10 CAPSULE | Refills: 0 | Status: SHIPPED | OUTPATIENT
Start: 2025-05-24 | End: 2025-05-29

## 2025-05-24 RX ORDER — CEFDINIR 300 MG/1
300 CAPSULE ORAL 2 TIMES DAILY
Qty: 16 CAPSULE | Refills: 0 | Status: SHIPPED | OUTPATIENT
Start: 2025-05-24 | End: 2025-06-01

## 2025-05-24 RX ADMIN — DOCUSATE SODIUM 100 MG: 100 CAPSULE, LIQUID FILLED ORAL at 08:15

## 2025-05-24 RX ADMIN — PIPERACILLIN AND TAZOBACTAM 3.38 G: 3; .375 INJECTION, POWDER, FOR SOLUTION INTRAVENOUS at 03:12

## 2025-05-24 RX ADMIN — SODIUM CHLORIDE 1500 MG: 0.9 INJECTION, SOLUTION INTRAVENOUS at 09:50

## 2025-05-24 RX ADMIN — Medication 1 CAPSULE: at 08:15

## 2025-05-24 RX ADMIN — LEVOTHYROXINE SODIUM 125 MCG: 0.12 TABLET ORAL at 08:15

## 2025-05-24 RX ADMIN — ESCITALOPRAM OXALATE 20 MG: 20 TABLET ORAL at 08:15

## 2025-05-24 RX ADMIN — Medication 1 TABLET: at 08:15

## 2025-05-24 RX ADMIN — PIPERACILLIN AND TAZOBACTAM 3.38 G: 3; .375 INJECTION, POWDER, FOR SOLUTION INTRAVENOUS at 08:19

## 2025-05-24 RX ADMIN — TOPIRAMATE 50 MG: 25 TABLET, FILM COATED ORAL at 08:15

## 2025-05-24 ASSESSMENT — ACTIVITIES OF DAILY LIVING (ADL)
COMMUNICATION: UNABLE TO UNDERSTAND
ADLS_ACUITY_SCORE: 64
ADLS_ACUITY_SCORE: 56
HEARING_DIFFICULTY_OR_DEAF: NO
ADLS_ACUITY_SCORE: 64
ADLS_ACUITY_SCORE: 64
DRESSING/BATHING_DIFFICULTY: NO
ADLS_ACUITY_SCORE: 68
WALKING_OR_CLIMBING_STAIRS_DIFFICULTY: OTHER (SEE COMMENTS)
FALL_HISTORY_WITHIN_LAST_SIX_MONTHS: NO
DOING_ERRANDS_INDEPENDENTLY_DIFFICULTY: YES
ADLS_ACUITY_SCORE: 56
DIFFICULTY_COMMUNICATING: YES
WEAR_GLASSES_OR_BLIND: NO
ADLS_ACUITY_SCORE: 64
DIFFICULTY_EATING/SWALLOWING: NO
ADLS_ACUITY_SCORE: 64
TOILETING_ISSUES: OTHER (SEE COMMENTS)
CONCENTRATING,_REMEMBERING_OR_MAKING_DECISIONS_DIFFICULTY: YES
CHANGE_IN_FUNCTIONAL_STATUS_SINCE_ONSET_OF_CURRENT_ILLNESS/INJURY: NO

## 2025-05-24 NOTE — DISCHARGE SUMMARY
"[facility]  Discharge Summary - Medicine & Pediatrics       Date of Admission:  5/21/2025  Date of Discharge:  5/24/2025  Discharging Provider: Raleigh Maurice MD, Dr. Carlson  Discharge Service: Hospitalist Service    Discharge Diagnoses   Multifocal pneumonia right sided  Pleural effusion right side s/p thoracentesis     Clinically Significant Risk Factors     # Morbid Obesity: Estimated body mass index is 40.86 kg/m  as calculated from the following:    Height as of this encounter: 1.575 m (5' 2\").    Weight as of this encounter: 101.3 kg (223 lb 6.4 oz).       Follow-ups Needed After Discharge   Follow-up Appointments       Hospital Follow-up with Existing Primary Care Provider (PCP)          Schedule Primary Care visit within: 30 Days             Unresulted Labs Ordered in the Past 30 Days of this Admission       Date and Time Order Name Status Description    5/21/2025  9:06 PM Pleural fluid Aerobic Bacterial Culture Routine With Gram Stain Preliminary         These results will be followed up by Raleigh Maurice MD     Discharge Disposition   Discharged to home  Condition at discharge: Stable    Hospital Course   Hany Patel was admitted on 5/21/2025 for right sided multifocal pneumonia and pleural effusion.  The following problems were addressed during his hospitalization:    Changes 5/24  - Patient remains off oxygen with no signs of increased work of breathing  -Lung exam on right side today with less rhonchorous sounds  - Discussed with father and patient is ready for discharge     Multifocal pneumonia, without sepsis  Patient presented with what sounds like 1 to 2 weeks of a viral-like illness in the outpatient setting.  Initially treated as an allergy but then group home staff brought patient to clinic within asked them to come to the ER.   CT chest shows large areas of consolidation in the RML and RLL, multiple nodular opacities throughout the right lung. Patient was started on " vancomycin, Zosyn, azithromycin. S/p thoracentesis which showed exudative effusion based on Light's Criteria.  During patient's 3-day hospital stay he never utilized oxygen and did not have any increased work of breathing on hospital day 1 or 2.  His lung sounds on his right side are less rhonchorous on day of discharge and he is safe for discharge on oral antibiotics.  - Will discharge patient on course of doxycycline and cefdinir for CAP Treatment along with some coverage for MRSA due to this multifocal pneumonia   - 5 days more of doxy   - 8 days more of cefdinir for 10 day course  -family asked about pneumonia vaccine- he has had PPSV23 in 2019, would benefit from a dose of PCV21 if available to cover additional strains, and consider booster PPSV23 q5 years given high risk patient     Fearfulness of medical procedures  -utilized ativan to help him tolerate thoracentesis      Consultations This Hospital Stay   PHARMACY TO DOSE VANCO  INTERVENTIONAL RADIOLOGY ADULT/PEDS IP CONSULT  PHARMACY TO DOSE VANCO  NUTRITION SERVICES ADULT IP CONSULT  CARE MANAGEMENT / SOCIAL WORK IP CONSULT    Code Status   Full Code       The patient was discussed with Dr. Aldo Maurice MD  [facility] [place] [address]  Phone: [phone]  Fax: [phone]  ______________________________________________________________________    Physical Exam   Vital Signs: Temp: 98.3  F (36.8  C) Temp src: Oral BP: 108/55 Pulse: 68   Resp: 23 SpO2: 94 % O2 Device: None (Room air)    Weight: 223 lbs 6.4 oz  GENERAL: Sleeping face down on arrival with loud snoring. Awakes briefly to talk and say he wants to go home.   EYES: Eyes grossly normal to inspection.  No discharge or erythema, or obvious scleral/conjunctival abnormalities.  RESP:  Lungs clear throughout on left side. Right lung with rhonchi but lessened from yesterday and more air movement. No wheeze or crackles. No  increased work of breathing.  CV: Heart RRR. No murmur  MSK: No gross deformity. Normal tone.  SKIN: Visible skin clear. No significant rash, abnormal pigmentation or lesions.  NEURO: Cranial nerves grossly intact.        Primary Care Physician   [facility]    Discharge Orders      Reason for your hospital stay    You came to the hospital for difficulty breathing and coughing.  You were found to have a fairly complicated pneumonia in your right lung along with fluid on the lungs.  The fluid was pulled off with the procedure and your breathing got a lot better after that.  He never required any oxygen and you are safe to discharge on oral antibiotics.     Activity    Your activity upon discharge: activity as tolerated     Diet    Follow this diet upon discharge: Current Diet:Orders Placed This Encounter      Regular Diet Adult     Hospital Follow-up with Existing Primary Care Provider (PCP)            Significant Results and Procedures   Most Recent 3 CBC's:  Recent Labs   Lab Test 05/23/25  1551 05/22/25  1007 05/21/25  1852   WBC 8.9 12.0* 9.8   HGB 12.2* 12.0* 11.7*   MCV 87 87 88   * 507* 528*     Most Recent 3 BMP's:  Recent Labs   Lab Test 05/23/25  1551 05/22/25  1007 05/21/25  1852    142 139   POTASSIUM 5.0 3.6 3.9   CHLORIDE 111* 106 104   CO2 25 27 25   BUN 5.7* 6.0 6.3   CR 0.90 0.85 0.80   ANIONGAP 9 9 10   JUAN 8.4* 8.6* 8.7*   * 111* 94   ,   Results for orders placed or performed during the hospital encounter of 05/21/25   Chest CT w/o contrast    Narrative    EXAM: CT CHEST W/O CONTRAST  LOCATION: [facility]  DATE: 5/21/2025    INDICATION: cough abnormal xray today  COMPARISON: 10/10/2019  TECHNIQUE: CT chest without IV contrast. Multiplanar reformats were obtained. Dose reduction techniques were used.  CONTRAST: None.    FINDINGS:   LUNGS AND PLEURA: Small-moderate right pleural effusion with confluent areas of consolidation in the middle  lobe and right lower lobe, as well as multiple nodular opacities throughout the aerated portion of the right lung. Central airways are patent.    MEDIASTINUM/AXILLAE: Numerous borderline and mildly enlarged mediastinal and hilar lymph nodes. Trace pericardial fluid.    CORONARY ARTERY CALCIFICATION: None.    UPPER ABDOMEN: Normal.    MUSCULOSKELETAL: Normal.      Impression    IMPRESSION:   1.  Small-moderate right pleural effusion with large areas of consolidation in the middle lobe and right lower lobe, as well as multiple nodular opacities throughout the aerated portion of the right lung. Findings are most concerning for multifocal   pneumonia.  2.  Numerous borderline and mildly enlarged mediastinal and hilar lymph nodes, likely reactive.       US Thoracentesis    Narrative    EXAM:   1. RIGHT THORACENTESIS  2. ULTRASOUND GUIDANCE  LOCATION: [facility]  DATE: 5/22/2025    INDICATION: Pleural effusion.    PROCEDURE: Informed consent obtained. Time out performed. The chest was prepped and draped in sterile fashion. 10 mL of 1 % lidocaine was infused into the local soft tissues. Under direct ultrasound guidance, a 5 Guamanian catheter system was placed into   the pleural effusion.     0.55 liters of yellow fluid were removed and sent to lab, if requested.    Patient tolerated procedure well.    Ultrasound imaging was obtained and placed in the patient's permanent medical record.      Impression    IMPRESSION:  Status post right ultrasound-guided thoracentesis.    Reference CPT Code: 71225       Discharge Medications   Current Discharge Medication List        START taking these medications    Details   cefdinir (OMNICEF) 300 MG capsule Take 1 capsule (300 mg) by mouth 2 times daily for 8 days.  Qty: 16 capsule, Refills: 0    Associated Diagnoses: Pneumonia of right lung due to infectious organism, unspecified part of lung      doxycycline hyclate (VIBRAMYCIN) 100 MG capsule Take 1 capsule (100  mg) by mouth 2 times daily for 5 days.  Qty: 10 capsule, Refills: 0    Associated Diagnoses: Pneumonia of right lung due to infectious organism, unspecified part of lung           CONTINUE these medications which have NOT CHANGED    Details   acetaminophen (TYLENOL) 500 MG tablet Take 1,000 mg by mouth every 6 hours as needed for mild pain      acetylcysteine () 600 MG CAPS capsule Take 600 mg by mouth daily      albuterol (PROAIR HFA/PROVENTIL HFA/VENTOLIN HFA) 108 (90 Base) MCG/ACT inhaler Inhale 1-2 puffs into the lungs every 6 hours as needed for shortness of breath / dyspnea or wheezing  Qty: 90 g, Refills: 3    Comments: Pharmacy may dispense brand covered by insurance (Proair, or proventil or ventolin or generic albuterol inhaler)  Associated Diagnoses: Pneumonia of left lower lobe due to infectious organism      B Complex Vitamins (VITAMIN B-COMPLEX) TABS Take 1 tablet by mouth daily  Qty: 30 tablet, Refills: 11    Comments: URGENT  Associated Diagnoses: Other fatigue      calcium carbonate 600 mg-vitamin D 400 units (CALTRATE) 600-400 MG-UNIT per tablet Take 1 tablet by mouth daily as needed.      calcium citrate-vitamin D (CITRACAL) 315-250 MG-UNIT TABS per tablet Take 1 tablet by mouth daily  Qty:      Associated Diagnoses: COVID-19      ciprofloxacin-dexamethasone (CIPRODEX) 0.3-0.1 % otic suspension Place 4 drops Into the left ear 2 times daily  Qty: 7.5 mL, Refills: 0    Associated Diagnoses: Infective otitis externa, left      escitalopram (LEXAPRO) 20 MG tablet Take 1 tablet (20 mg) by mouth daily  Qty: 30 tablet, Refills: 0    Comments: Need an appt for refills  Associated Diagnoses: Pervasive developmental disorder      fluticasone (FLONASE) 50 MCG/ACT nasal spray Spray 1 spray into both nostrils daily as needed for rhinitis or allergies.      ketoconazole (NIZORAL) 2 % external cream APPLY SMALL AMOUNT TWICE A DAY TO EXTERNAL EAR CANAL  Qty: 30 g, Refills: 11    Associated Diagnoses:  Seborrheic dermatitis      levothyroxine (SYNTHROID/LEVOTHROID) 125 MCG tablet Take 125 mcg by mouth daily.      loperamide (IMODIUM) 2 MG capsule TAKE 1 CAPSULE BY MOUTH EVERY MORNING FOR LOOSE STOOL *1 TOTAL FILL*  Qty: 20 capsule, Refills: 11    Associated Diagnoses: Pervasive developmental disorder; Diarrhea, unspecified type      melatonin 1 MG TABS tablet TAKE 1 TABLET BY MOUTH ONCE DAILY 30 MINUTES BEFORE USUAL BEDTIME EVERY NIGHT *1 TOTAL FILL* *BUYS OTC*  Qty: 100 tablet, Refills: 11    Associated Diagnoses: Insomnia, unspecified type      metFORMIN (GLUCOPHAGE) 1000 MG tablet Take 1,000 mg by mouth 2 times daily (with meals).      metroNIDAZOLE (METROGEL) 0.75 % external gel Apply topically 2 times daily  Qty: 45 g, Refills: 11    Associated Diagnoses: Rosacea      multivitamin w/minerals (MULTI-VITAMIN) tablet TAKE 1 TABLET BY MOUTH ONCE DAILY *NO REFILLS REMAINING, FAXING FOR REFILLS*  Qty: 200 tablet, Refills: 11    Comments: URGENT  Associated Diagnoses: Down's syndrome      Probiotic Product (ACIDOPHILUS PROBIOTIC BLEND) CAPS Take 1 capsule by mouth daily  Qty: 30 capsule, Refills: 11    Associated Diagnoses: Diarrhea, unspecified type      psyllium (METAMUCIL/KONSYL) capsule Take 1 capsule by mouth daily.      simvastatin (ZOCOR) 20 MG tablet Take 1 tablet (20 mg) by mouth At Bedtime      topiramate (TOPAMAX) 50 MG tablet Take 1 tablet (50 mg) by mouth daily AFTER FINISHING 25MG TABLETS:  TAKE 1 TABLET BY MOUTH EVERY EVENING  Qty: 90 tablet, Refills: 0    Comments: SD23  Associated Diagnoses: Morbid obesity (H)      traZODone (DESYREL) 50 MG tablet TAKE 1 TABLET BY MOUTH AT BEDTIME.  Qty: 90 tablet, Refills: 3    Comments: SD23  Associated Diagnoses: Insomnia, unspecified type      Vitamin D3 (CHOLECALCIFEROL) 25 mcg (1000 units) tablet Take 2 tablets by mouth daily           Allergies   Allergies   Allergen Reactions    Seasonal Allergies

## 2025-05-24 NOTE — PLAN OF CARE
Problem: Pneumonia  Goal: Fluid Balance  Outcome: Progressing     Problem: Cognitive Impairment  Goal: Optimal Cognitive Function  Outcome: Progressing   Goal Outcome Evaluation:                  Pt alert,denied having any pain,received IV ABX,refused BG checks,ear drops, am labs and cares,VS stable on room air,slept between cares.

## 2025-05-24 NOTE — PROGRESS NOTES
Care Management Discharge Note    Discharge Date: 05/24/2025     Discharge Disposition: Group Home    Discharge Services: None    Discharge DME: None    Discharge Transportation:  (Group home staff)    Private pay costs discussed: Not applicable    Does the patient's insurance plan have a 3 day qualifying hospital stay waiver?  No    PAS Confirmation Code:    Patient/family educated on Medicare website which has current facility and service quality ratings: no    Education Provided on the Discharge Plan: Yes  Persons Notified of Discharge Plans:  Nitish and Father/Guardian Fred  Patient/Family in Agreement with the Plan: Yes    Handoff Referral Completed: No, handoff not indicated or clinically appropriate    Additional Information:  Patient discharging today back to group home, patient transitioning to oral abx. Group home will be providing transportation around 1pm today. Guardian/Father Fred updated, and in agreement with this plan. Fred intends to visits patient once he gets home this evening. Group Home requested new medications be filled and sent with patient at time of discharge. No additional concerns noted at this time.     Radha Pinon RN

## 2025-05-24 NOTE — PLAN OF CARE
"  Problem: Adult Inpatient Plan of Care  Goal: Plan of Care Review  Description: The Plan of Care Review/Shift note should be completed every shift.  The Outcome Evaluation is a brief statement about your assessment that the patient is improving, declining, or no change.  This information will be displayed automatically on your shift  note.  Outcome: Met     Problem: Adult Inpatient Plan of Care  Goal: Patient-Specific Goal (Individualized)  Description: You can add care plan individualizations to a care plan. Examples of Individualization might be:  \"Parent requests to be called daily at 9am for status\", \"I have a hard time hearing out of my right ear\", or \"Do not touch me to wake me up as it startles  me\".  Outcome: Met     Problem: Adult Inpatient Plan of Care  Goal: Absence of Hospital-Acquired Illness or Injury  Outcome: Met     Problem: Adult Inpatient Plan of Care  Goal: Absence of Hospital-Acquired Illness or Injury  Intervention: Identify and Manage Fall Risk  Recent Flowsheet Documentation  Taken 5/24/2025 1142 by Alessandro Flores, RN  Safety Promotion/Fall Prevention:   activity supervised   assistive device/personal items within reach     Problem: Adult Inpatient Plan of Care  Goal: Absence of Hospital-Acquired Illness or Injury  Intervention: Prevent Skin Injury  Recent Flowsheet Documentation  Taken 5/24/2025 1142 by Alessandro Flores, RN  Body Position: position changed independently   Goal Outcome Evaluation:       Patient alert to self, able to express needs, VS WDL. Patient will discharge back to the group home at 1300. Group home staff will transport. All patient belongings will go with patient.                     "

## 2025-05-25 LAB
ATRIAL RATE - MUSE: 67 BPM
DIASTOLIC BLOOD PRESSURE - MUSE: NORMAL MMHG
INTERPRETATION ECG - MUSE: NORMAL
P AXIS - MUSE: 4 DEGREES
PR INTERVAL - MUSE: 166 MS
QRS DURATION - MUSE: 92 MS
QT - MUSE: 428 MS
QTC - MUSE: 452 MS
R AXIS - MUSE: 57 DEGREES
SYSTOLIC BLOOD PRESSURE - MUSE: NORMAL MMHG
T AXIS - MUSE: 36 DEGREES
VENTRICULAR RATE- MUSE: 67 BPM

## 2025-05-27 LAB
BACTERIA PLR CULT: NO GROWTH
GRAM STAIN RESULT: NORMAL
GRAM STAIN RESULT: NORMAL

## 2025-06-05 NOTE — PROGRESS NOTES
[facility] Non Surgical Follow up Visit   Date of visit: 6/10/2025  Physician: FELICIANO Aburto MD, MD  Primary Care is Services, [facility] Physician.  Hany LAM Light   30 year old  male     Initial Weight: 258#  Initial BMI: 46.44  Today's Weight:   Wt Readings from Last 1 Encounters:   06/10/25 98.2 kg (216 lb 8 oz)     Body mass index is 39.6 kg/m .           Assessment and Plan   Assessment: Hany is a 30 year old year old male who presents for medical weight management.      Plan:    1. Morbid obesity (H) (Primary)  Patient was congratulated on his success thus far. Healthy habits to assist with further weight loss were discussed. Group home is helping him with portion control and trying to limit soda. He will continue his exercise. He will continue the topamax (and metformin prescribed by another provider).. We discussed the patient's co-morbid conditions including prediabetes. These likely will improve with healthy habits and weight loss. We briefly discussed starting a GLP1. I worry that he would not be able to verbalize side effects and these medications could be dangerous for him.    2. Pre-diabetes  This may improve with healthy habits and weight loss.      Follow up in 6 months with myself           INTERIM HISTORY  Patient started topamax after his initial consultation 2 years ago. He has been lost to follow up since then. He is also taking metformin.    DIETARY HISTORY  Meals Per Day: 3  Eating Protein First?: sometimes  Food Diary: B:toast or waffles or egg and sandwich L:sandwich or soup with chips D:pasta, sometimes meat, and vegetable or salad  Snacks Per Day: late afternoon and evening  Typical Snack: peanuts, pretzels, cookies  Fluid Intake: 64 oz  Portion Control: struggling  Calorie Containing Beverages: juice, soda- group home is trying to limit portions  Choosing Whole Grains: usually  Meals at Restaurant per week:once a week    Positive Changes Since Last Visit: exercise, water  "intake, staff is working on portion control  Struggling With: snacking, wanting more food    Knowledgeable in Reading Food Labels: staff is  Getting Adequate Protein: not always      PHYSICAL ACTIVITY PATTERNS:  He plays kickball once a week and goes to the gym with his dad once a week. He rides his bike 1-2 x per week.    REVIEW OF SYSTEMS  HEENT:   glaucoma: no  CARDIOVASCULAR:  History of heart disease: no  GI:  Pancreatitis: no  PSYCHIATRIC:  Moods: stable  ENDOCRINE:  Monitoring Blood Sugars: no  Sugars Well Controlled: na  No personal or family history of medullary thyroid cancer group home staff unaware  :  Birth control: male  History of kidney stones: no     Patient Profile   Social History     Social History Narrative    Not on file        Past Medical History   Past Medical History:   Diagnosis Date    Acute respiratory failure (H) 10/10/2019    Diarrhea     Diarrhea Episodes      Patient Active Problem List   Diagnosis    Down's syndrome    Hypothyroidism    Pre-diabetes    Chronic diarrhea    CARDIOVASCULAR SCREENING; LDL GOAL LESS THAN 130    Pervasive developmental disorder    Cystic acne vulgaris    Hypoxia    Morbid obesity (H)    Pneumonia    COVID-19    2019 novel coronavirus disease (COVID-19)    Multifocal pneumonia       Past Surgical History  He has a past surgical history that includes surgical history of - ; surgical history of - ; and Insert chest tube (Left, 2/7/2017).     Examination   /64   Ht 1.575 m (5' 2\")   Wt 98.2 kg (216 lb 8 oz)   BMI 39.60 kg/m    Wt Readings from Last 4 Encounters:   06/10/25 98.2 kg (216 lb 8 oz)   05/21/25 101.3 kg (223 lb 6.4 oz)   05/30/23 117 kg (258 lb)   01/13/22 117.7 kg (259 lb 6.4 oz)      BP Readings from Last 3 Encounters:   06/10/25 110/64   05/24/25 108/55   05/30/23 124/76      GEN: Alert and oriented in no acute distress.   HEENT: mucous membranes moist         Counseling:   We reviewed the important post op bariatric " recommendations:  -eating 3 meals daily  -eating protein first, getting >60gm protein daily  -eating slowly, chewing food well  -avoiding/limiting calorie containing beverages  -limiting starchy vegetables and carbohydrates, choosing wheat, not white with breads,   crackers, pastas, indira, bagels, tortillas, rice  -limiting restaurant or cafeteria eating to twice a week or less    We discussed the importance of restorative sleep and stress management in maintaining a healthy weight.  We discussed the National Weight Control Registry healthy weight maintenance strategies and ways to optimize metabolism.  We discussed the importance of physical activity including cardiovascular and strength training in maintaining a healthier weight.    Total time spent on the date of this encounter doing: chart review, review of test results, patient visit, physical exam, education, counseling, developing plan of care and documenting = 44 minutes.         FELICIANO Aburto MD  [facility]

## 2025-06-10 ENCOUNTER — OFFICE VISIT (OUTPATIENT)
Dept: SURGERY | Facility: CLINIC | Age: 30
End: 2025-06-10
Payer: MEDICARE

## 2025-06-10 VITALS
HEIGHT: 62 IN | DIASTOLIC BLOOD PRESSURE: 64 MMHG | SYSTOLIC BLOOD PRESSURE: 110 MMHG | BODY MASS INDEX: 39.84 KG/M2 | WEIGHT: 216.5 LBS

## 2025-06-10 DIAGNOSIS — E66.01 MORBID OBESITY (H): Primary | ICD-10-CM

## 2025-06-10 DIAGNOSIS — R73.03 PRE-DIABETES: ICD-10-CM

## 2025-06-10 PROCEDURE — 3074F SYST BP LT 130 MM HG: CPT | Performed by: FAMILY MEDICINE

## 2025-06-10 PROCEDURE — 3078F DIAST BP <80 MM HG: CPT | Performed by: FAMILY MEDICINE

## 2025-06-10 PROCEDURE — 99215 OFFICE O/P EST HI 40 MIN: CPT | Performed by: FAMILY MEDICINE

## 2025-06-10 RX ORDER — TOPIRAMATE 50 MG/1
50 TABLET, FILM COATED ORAL DAILY
Qty: 90 TABLET | Refills: 3 | Status: SHIPPED | OUTPATIENT
Start: 2025-06-10

## 2025-06-10 NOTE — LETTER
6/10/2025      Hany Patel  [address] [address]      Dear Colleague,    Thank you for referring your patient, Hany Patel, to the [facility] [place]. Please see a copy of my visit note below.    Bariat[facility] Care Clinic Non Surgical Follow up Visit   Date of visit: 6/10/2025  Physician: FELICIANO Aburto MD, MD  Primary Care is Services, [facility].  Hany Patel   30 year old  male     Initial Weight: 258#  Initial BMI: 46.44  Today's Weight:   Wt Readings from Last 1 Encounters:   06/10/25 98.2 kg (216 lb 8 oz)     Body mass index is 39.6 kg/m .           Assessment and Plan   Assessment: Hany is a 30 year old year old male who presents for medical weight management.      Plan:    1. Morbid obesity (H) (Primary)  Patient was congratulated on his success thus far. Healthy habits to assist with further weight loss were discussed. Group home is helping him with portion control and trying to limit soda. He will continue his exercise. He will continue the topamax (and metformin prescribed by another provider).. We discussed the patient's co-morbid conditions including prediabetes. These likely will improve with healthy habits and weight loss. We briefly discussed starting a GLP1. I worry that he would not be able to verbalize side effects and these medications could be dangerous for him.    2. Pre-diabetes  This may improve with healthy habits and weight loss.      Follow up in 6 months with myself           INTERIM HISTORY  Patient started topamax after his initial consultation 2 years ago. He has been lost to follow up since then. He is also taking metformin.    DIETARY HISTORY  Meals Per Day: 3  Eating Protein First?: sometimes  Food Diary: B:toast or waffles or egg and sandwich L:sandwich or soup with chips D:pasta, sometimes meat, and vegetable or salad  Snacks Per Day: late afternoon and evening  Typical Snack: peanuts, pretzels, cookies  Fluid  "Intake: 64 oz  Portion Control: struggling  Calorie Containing Beverages: juice, soda- group home is trying to limit portions  Choosing Whole Grains: usually  Meals at Restaurant per week:once a week    Positive Changes Since Last Visit: exercise, water intake, staff is working on portion control  Struggling With: snacking, wanting more food    Knowledgeable in Reading Food Labels: staff is  Getting Adequate Protein: not always      PHYSICAL ACTIVITY PATTERNS:  He plays kickball once a week and goes to the gym with his dad once a week. He rides his bike 1-2 x per week.    REVIEW OF SYSTEMS  HEENT:   glaucoma: no  CARDIOVASCULAR:  History of heart disease: no  GI:  Pancreatitis: no  PSYCHIATRIC:  Moods: stable  ENDOCRINE:  Monitoring Blood Sugars: no  Sugars Well Controlled: na  No personal or family history of medullary thyroid cancer group home staff unaware  :  Birth control: male  History of kidney stones: no     Patient Profile   Social History     Social History Narrative     Not on file        Past Medical History   Past Medical History:   Diagnosis Date     Acute respiratory failure (H) 10/10/2019     Diarrhea     Diarrhea Episodes      Patient Active Problem List   Diagnosis     Down's syndrome     Hypothyroidism     Pre-diabetes     Chronic diarrhea     CARDIOVASCULAR SCREENING; LDL GOAL LESS THAN 130     Pervasive developmental disorder     Cystic acne vulgaris     Hypoxia     Morbid obesity (H)     Pneumonia     COVID-19     2019 novel coronavirus disease (COVID-19)     Multifocal pneumonia       Past Surgical History  He has a past surgical history that includes surgical history of - ; surgical history of - ; and Insert chest tube (Left, 2/7/2017).     Examination   /64   Ht 1.575 m (5' 2\")   Wt 98.2 kg (216 lb 8 oz)   BMI 39.60 kg/m    Wt Readings from Last 4 Encounters:   06/10/25 98.2 kg (216 lb 8 oz)   05/21/25 101.3 kg (223 lb 6.4 oz)   05/30/23 117 kg (258 lb)   01/13/22 117.7 kg (259 lb " 6.4 oz)      BP Readings from Last 3 Encounters:   06/10/25 110/64   05/24/25 108/55   05/30/23 124/76      GEN: Alert and oriented in no acute distress.   HEENT: mucous membranes moist         Counseling:   We reviewed the important post op bariatric recommendations:  -eating 3 meals daily  -eating protein first, getting >60gm protein daily  -eating slowly, chewing food well  -avoiding/limiting calorie containing beverages  -limiting starchy vegetables and carbohydrates, choosing wheat, not white with breads,   crackers, pastas, indira, bagels, tortillas, rice  -limiting restaurant or cafeteria eating to twice a week or less    We discussed the importance of restorative sleep and stress management in maintaining a healthy weight.  We discussed the National Weight Control Registry healthy weight maintenance strategies and ways to optimize metabolism.  We discussed the importance of physical activity including cardiovascular and strength training in maintaining a healthier weight.    Total time spent on the date of this encounter doing: chart review, review of test results, patient visit, physical exam, education, counseling, developing plan of care and documenting = 44 minutes.         FELICIANO Aburto MD  [facility]             Again, thank you for allowing me to participate in the care of your patient.        Sincerely,        FELICIANO Aburto MD    Electronically signed

## 2025-06-10 NOTE — PATIENT INSTRUCTIONS

## (undated) DEVICE — DILATOR VASCULAR 10FRX20CM G00993

## (undated) DEVICE — CONNECTOR ONE-LINK INJECTION SITE LF 7N8399

## (undated) DEVICE — CATH ANGIO MARINER JB1 4FRX65CM 11714035

## (undated) DEVICE — TIES BANDING T50R

## (undated) DEVICE — BLADE KNIFE SURG 11 371111

## (undated) DEVICE — SU ETHILON 2-0 PS 18" 585H

## (undated) DEVICE — Device

## (undated) DEVICE — TUBING VINYL CONNECTING 14FR 30CM G02278

## (undated) DEVICE — WIRE GUIDE AMPLATZ SUPER STIFF 0.035"X260CM STR M001465090

## (undated) DEVICE — LINEN TOWEL PACK X5 5464

## (undated) DEVICE — DRAPE CONVERTORS U-DRAPE 60X72" 8476

## (undated) DEVICE — SYR 05ML LL W/O NDL

## (undated) DEVICE — DRAPE C-ARM W/STRAPS 42X72" 07-CA104

## (undated) DEVICE — SYR 50ML LL W/O NDL 309653

## (undated) DEVICE — NDL 18GA 1.5" 305196

## (undated) DEVICE — DRSG GAUZE 2X2" 8042

## (undated) DEVICE — WIRE GUIDE 0.035"X145CM BENTSON TSFB-35-145-BH

## (undated) DEVICE — SOL WATER IRRIG 1000ML BOTTLE 2F7114

## (undated) DEVICE — SUCTION PLEURAVAC UNIT CHEST 2002-000

## (undated) DEVICE — DRSG TEGADERM IV ADVANCED 3.5X4.5" 1685

## (undated) DEVICE — INTRODUCER SHEATH PEEL AWAY 12FRX15.5CM G06492

## (undated) DEVICE — PREP CHLORAPREP W/ORANGE TINT 10.5ML 260715

## (undated) DEVICE — SU ETHILON 3-0 PS-2 18" 1669H

## (undated) DEVICE — NDL YUEH CENTESIS 5FRX10CM G09490 DTVN-5.0-19-10.0-YUEH

## (undated) DEVICE — COVER PROBE ULTRASOUND 3D W/GEL 5X96" LF 20-P3D596

## (undated) DEVICE — DILATOR VASCULAR 8FRX20CM G00980

## (undated) DEVICE — CONNECTOR STOPCOCK 3 WAY MALE LL MX5311L

## (undated) DEVICE — DEVICE ANCHORING FLEXI-TRAK 37449

## (undated) DEVICE — LINEN GOWN LG 5406

## (undated) DEVICE — GLOVE PROTEXIS W/NEU-THERA 7.5  2D73TE75

## (undated) DEVICE — GOWN XLG DISP 9545

## (undated) RX ORDER — HYDROMORPHONE HYDROCHLORIDE 1 MG/ML
INJECTION, SOLUTION INTRAMUSCULAR; INTRAVENOUS; SUBCUTANEOUS
Status: DISPENSED
Start: 2017-02-07

## (undated) RX ORDER — SODIUM CHLORIDE, SODIUM LACTATE, POTASSIUM CHLORIDE, CALCIUM CHLORIDE 600; 310; 30; 20 MG/100ML; MG/100ML; MG/100ML; MG/100ML
INJECTION, SOLUTION INTRAVENOUS
Status: DISPENSED
Start: 2017-02-07

## (undated) RX ORDER — LORAZEPAM 2 MG/ML
INJECTION INTRAMUSCULAR
Status: DISPENSED
Start: 2017-02-08

## (undated) RX ORDER — LIDOCAINE HYDROCHLORIDE 10 MG/ML
INJECTION, SOLUTION EPIDURAL; INFILTRATION; INTRACAUDAL; PERINEURAL
Status: DISPENSED
Start: 2019-10-04